# Patient Record
Sex: MALE | Race: WHITE | Employment: UNEMPLOYED | URBAN - METROPOLITAN AREA
[De-identification: names, ages, dates, MRNs, and addresses within clinical notes are randomized per-mention and may not be internally consistent; named-entity substitution may affect disease eponyms.]

---

## 2017-11-16 ENCOUNTER — APPOINTMENT (OUTPATIENT)
Dept: GENERAL RADIOLOGY | Age: 57
End: 2017-11-16
Attending: PHYSICIAN ASSISTANT
Payer: SELF-PAY

## 2017-11-16 ENCOUNTER — HOSPITAL ENCOUNTER (EMERGENCY)
Age: 57
Discharge: HOME | End: 2017-11-16
Attending: EMERGENCY MEDICINE | Admitting: EMERGENCY MEDICINE
Payer: SELF-PAY

## 2017-11-16 VITALS
OXYGEN SATURATION: 98 % | WEIGHT: 150 LBS | RESPIRATION RATE: 16 BRPM | HEIGHT: 70 IN | TEMPERATURE: 97.4 F | BODY MASS INDEX: 21.47 KG/M2 | HEART RATE: 72 BPM | SYSTOLIC BLOOD PRESSURE: 114 MMHG | DIASTOLIC BLOOD PRESSURE: 84 MMHG

## 2017-11-16 DIAGNOSIS — M79.672 ACUTE FOOT PAIN, LEFT: Primary | ICD-10-CM

## 2017-11-16 DIAGNOSIS — F17.200 NICOTINE DEPENDENCE, UNCOMPLICATED, UNSPECIFIED NICOTINE PRODUCT TYPE: ICD-10-CM

## 2017-11-16 LAB
ALBUMIN SERPL-MCNC: 3.9 G/DL (ref 3.5–5)
ALBUMIN/GLOB SERPL: 1.1 {RATIO} (ref 1.1–2.2)
ALP SERPL-CCNC: 69 U/L (ref 45–117)
ALT SERPL-CCNC: 21 U/L (ref 12–78)
ANION GAP SERPL CALC-SCNC: 8 MMOL/L (ref 5–15)
AST SERPL-CCNC: 13 U/L (ref 15–37)
BASOPHILS # BLD: 0 K/UL (ref 0–0.1)
BASOPHILS NFR BLD: 0 % (ref 0–1)
BILIRUB SERPL-MCNC: 0.4 MG/DL (ref 0.2–1)
BUN SERPL-MCNC: 16 MG/DL (ref 6–20)
BUN/CREAT SERPL: 13 (ref 12–20)
CALCIUM SERPL-MCNC: 8.6 MG/DL (ref 8.5–10.1)
CHLORIDE SERPL-SCNC: 103 MMOL/L (ref 97–108)
CO2 SERPL-SCNC: 29 MMOL/L (ref 21–32)
CREAT SERPL-MCNC: 1.22 MG/DL (ref 0.7–1.3)
CRP SERPL-MCNC: <0.29 MG/DL
EOSINOPHIL # BLD: 0.3 K/UL (ref 0–0.4)
EOSINOPHIL NFR BLD: 2 % (ref 0–7)
ERYTHROCYTE [DISTWIDTH] IN BLOOD BY AUTOMATED COUNT: 14.1 % (ref 11.5–14.5)
ERYTHROCYTE [SEDIMENTATION RATE] IN BLOOD: 4 MM/HR (ref 0–20)
GLOBULIN SER CALC-MCNC: 3.6 G/DL (ref 2–4)
GLUCOSE SERPL-MCNC: 102 MG/DL (ref 65–100)
HCT VFR BLD AUTO: 43.3 % (ref 36.6–50.3)
HGB BLD-MCNC: 14.6 G/DL (ref 12.1–17)
LYMPHOCYTES # BLD: 1.7 K/UL (ref 0.8–3.5)
LYMPHOCYTES NFR BLD: 13 % (ref 12–49)
MCH RBC QN AUTO: 30.9 PG (ref 26–34)
MCHC RBC AUTO-ENTMCNC: 33.7 G/DL (ref 30–36.5)
MCV RBC AUTO: 91.7 FL (ref 80–99)
MONOCYTES # BLD: 0.9 K/UL (ref 0–1)
MONOCYTES NFR BLD: 7 % (ref 5–13)
NEUTS SEG # BLD: 10.4 K/UL (ref 1.8–8)
NEUTS SEG NFR BLD: 78 % (ref 32–75)
PLATELET # BLD AUTO: 299 K/UL (ref 150–400)
POTASSIUM SERPL-SCNC: 3.9 MMOL/L (ref 3.5–5.1)
PROT SERPL-MCNC: 7.5 G/DL (ref 6.4–8.2)
RBC # BLD AUTO: 4.72 M/UL (ref 4.1–5.7)
SODIUM SERPL-SCNC: 140 MMOL/L (ref 136–145)
URATE SERPL-MCNC: 3.6 MG/DL (ref 3.5–7.2)
WBC # BLD AUTO: 13.2 K/UL (ref 4.1–11.1)

## 2017-11-16 PROCEDURE — 86140 C-REACTIVE PROTEIN: CPT | Performed by: PHYSICIAN ASSISTANT

## 2017-11-16 PROCEDURE — 74011250636 HC RX REV CODE- 250/636: Performed by: PHYSICIAN ASSISTANT

## 2017-11-16 PROCEDURE — 36415 COLL VENOUS BLD VENIPUNCTURE: CPT | Performed by: PHYSICIAN ASSISTANT

## 2017-11-16 PROCEDURE — 84550 ASSAY OF BLOOD/URIC ACID: CPT | Performed by: PHYSICIAN ASSISTANT

## 2017-11-16 PROCEDURE — 73630 X-RAY EXAM OF FOOT: CPT

## 2017-11-16 PROCEDURE — 85652 RBC SED RATE AUTOMATED: CPT | Performed by: PHYSICIAN ASSISTANT

## 2017-11-16 PROCEDURE — 99282 EMERGENCY DEPT VISIT SF MDM: CPT

## 2017-11-16 PROCEDURE — 96374 THER/PROPH/DIAG INJ IV PUSH: CPT

## 2017-11-16 PROCEDURE — 80053 COMPREHEN METABOLIC PANEL: CPT | Performed by: PHYSICIAN ASSISTANT

## 2017-11-16 PROCEDURE — 85025 COMPLETE CBC W/AUTO DIFF WBC: CPT | Performed by: PHYSICIAN ASSISTANT

## 2017-11-16 RX ORDER — AMOXICILLIN AND CLAVULANATE POTASSIUM 875; 125 MG/1; MG/1
1 TABLET, FILM COATED ORAL 2 TIMES DAILY
Qty: 20 TAB | Refills: 0 | Status: SHIPPED | OUTPATIENT
Start: 2017-11-16 | End: 2020-11-11

## 2017-11-16 RX ORDER — NAPROXEN 500 MG/1
500 TABLET ORAL
Qty: 20 TAB | Refills: 0 | Status: SHIPPED | OUTPATIENT
Start: 2017-11-16 | End: 2020-11-11

## 2017-11-16 RX ORDER — CIPROFLOXACIN 500 MG/1
500 TABLET ORAL 2 TIMES DAILY
Qty: 20 TAB | Refills: 0 | Status: SHIPPED | OUTPATIENT
Start: 2017-11-16 | End: 2017-11-26

## 2017-11-16 RX ORDER — TRAMADOL HYDROCHLORIDE 50 MG/1
50 TABLET ORAL
Qty: 10 TAB | Refills: 0 | Status: SHIPPED | OUTPATIENT
Start: 2017-11-16 | End: 2017-11-26

## 2017-11-16 RX ORDER — KETOROLAC TROMETHAMINE 30 MG/ML
15 INJECTION, SOLUTION INTRAMUSCULAR; INTRAVENOUS
Status: COMPLETED | OUTPATIENT
Start: 2017-11-16 | End: 2017-11-16

## 2017-11-16 RX ADMIN — KETOROLAC TROMETHAMINE 15 MG: 30 INJECTION, SOLUTION INTRAMUSCULAR at 16:04

## 2017-11-16 NOTE — DISCHARGE INSTRUCTIONS
Foot Pain: Care Instructions  Your Care Instructions  Foot injuries that cause pain and swelling are fairly common. Almost all sports or home repair projects can cause a misstep that ends up as foot pain. Normal wear and tear, especially as you get older, also can cause foot pain. Most minor foot injuries will heal on their own, and home treatment is usually all you need to do. If you have a severe injury, you may need tests and treatment. Follow-up care is a key part of your treatment and safety. Be sure to make and go to all appointments, and call your doctor if you are having problems. It's also a good idea to know your test results and keep a list of the medicines you take. How can you care for yourself at home? · Take pain medicines exactly as directed. ¨ If the doctor gave you a prescription medicine for pain, take it as prescribed. ¨ If you are not taking a prescription pain medicine, ask your doctor if you can take an over-the-counter medicine. · Rest and protect your foot. Take a break from any activity that may cause pain. · Put ice or a cold pack on your foot for 10 to 20 minutes at a time. Put a thin cloth between the ice and your skin. · Prop up the sore foot on a pillow when you ice it or anytime you sit or lie down during the next 3 days. Try to keep it above the level of your heart. This will help reduce swelling. · Your doctor may recommend that you wrap your foot with an elastic bandage. Keep your foot wrapped for as long as your doctor advises. · If your doctor recommends crutches, use them as directed. · Wear roomy footwear. · As soon as pain and swelling end, begin gentle exercises of your foot. Your doctor can tell you which exercises will help. When should you call for help? Call 911 anytime you think you may need emergency care. For example, call if:  ? · Your foot turns pale, white, blue, or cold.    ?Call your doctor now or seek immediate medical care if:  ? · You cannot move or stand on your foot. ? · Your foot looks twisted or out of its normal position. ? · Your foot is not stable when you step down. ? · You have signs of infection, such as:  ¨ Increased pain, swelling, warmth, or redness. ¨ Red streaks leading from the sore area. ¨ Pus draining from a place on your foot. ¨ A fever. ? · Your foot is numb or tingly. ? Watch closely for changes in your health, and be sure to contact your doctor if:  ? · You do not get better as expected. ? · You have bruises from an injury that last longer than 2 weeks. Where can you learn more? Go to http://yfn-chata.info/. Enter O042 in the search box to learn more about \"Foot Pain: Care Instructions. \"  Current as of: March 21, 2017  Content Version: 11.4  © 2383-4040 [employer]. Care instructions adapted under license by [employer] (which disclaims liability or warranty for this information). If you have questions about a medical condition or this instruction, always ask your healthcare professional. [address] any warranty or liability for your use of this information. Purine-Restricted Diet: Care Instructions  Your Care Instructions    Purines are substances that are found in some foods. Your body turns purines into uric acid. High levels of uric acid can cause gout, which is a form of arthritis that causes pain and inflammation in joints. You may be able to help control the amount of uric acid in your body by limiting high-purine foods in your diet. Follow-up care is a key part of your treatment and safety. Be sure to make and go to all appointments, and call your doctor if you are having problems. It's also a good idea to know your test results and keep a list of the medicines you take. How can you care for yourself at home? · Plan your meals and snacks around foods that are low in purines and are safe for you to eat.  These foods include:  ¨ Green vegetables and tomatoes. ¨ Fruits. ¨ Whole-grain breads, rice, and cereals. ¨ Eggs, peanut butter, and nuts. ¨ Low-fat milk, cheese, and other milk products. ¨ Popcorn. ¨ Gelatin desserts, chocolate, cocoa, and cakes and sweets, in small amounts. · You can eat certain foods that are medium-high in purines, but eat them only once in a while. These foods include:  ¨ Legumes, such as dried beans and dried peas. You can have 1 cup cooked legumes each day. ¨ Asparagus, cauliflower, spinach, mushrooms, and green peas. ¨ Fish and seafood (other than very high-purine seafood). ¨ Oatmeal, wheat bran, and wheat germ. · Limit very high-purine foods, including:  ¨ Organ meats, such as liver, kidneys, sweetbreads, and brains. ¨ Meats, including jones, beef, pork, and lamb. ¨ Game meats and any other meats in large amounts. ¨ Anchovies, sardines, herring, mackerel, and scallops. ¨ Gravy. ¨ Beer. Where can you learn more? Go to http://yfnLooxiichata.info/. Enter F448 in the search box to learn more about \"Purine-Restricted Diet: Care Instructions. \"  Current as of: May 12, 2017  Content Version: 11.4  © 4073-3594 [employer]. Care instructions adapted under license by [employer] (which disclaims liability or warranty for this information). If you have questions about a medical condition or this instruction, always ask your healthcare professional. [place] [address] any warranty or liability for your use of this information. Gout: Care Instructions  Your Care Instructions    Gout is a form of arthritis caused by a buildup of uric acid crystals in a joint. It causes sudden attacks of pain, swelling, redness, and stiffness, usually in one joint, especially the big toe. Gout usually comes on without a cause. But it can be brought on by drinking alcohol (especially beer) or eating seafood and red meat.  Taking certain medicines, such as diuretics or aspirin, also can bring on an attack of gout. Taking your medicines as prescribed and following up with your doctor regularly can help you avoid gout attacks in the future. Follow-up care is a key part of your treatment and safety. Be sure to make and go to all appointments, and call your doctor if you are having problems. It's also a good idea to know your test results and keep a list of the medicines you take. How can you care for yourself at home? · If the joint is swollen, put ice or a cold pack on the area for 10 to 20 minutes at a time. Put a thin cloth between the ice and your skin. · Prop up the sore limb on a pillow when you ice it or anytime you sit or lie down during the next 3 days. Try to keep it above the level of your heart. This will help reduce swelling. · Rest sore joints. Avoid activities that put weight or strain on the joints for a few days. Take short rest breaks from your regular activities during the day. · Take your medicines exactly as prescribed. Call your doctor if you think you are having a problem with your medicine. · Take pain medicines exactly as directed. ¨ If the doctor gave you a prescription medicine for pain, take it as prescribed. ¨ If you are not taking a prescription pain medicine, ask your doctor if you can take an over-the-counter medicine. · Eat less seafood and red meat. · Check with your doctor before drinking alcohol. · Losing weight, if you are overweight, may help reduce attacks of gout. But do not go on a [employer]. \" Losing a lot of weight in a short amount of time can cause a gout attack. When should you call for help? Call your doctor now or seek immediate medical care if:  ? · You have a fever. ? · The joint is so painful you cannot use it. ? · You have sudden, unexplained swelling, redness, warmth, or severe pain in one or more joints. ? Watch closely for changes in your health, and be sure to contact your doctor if:  ? · You have joint pain. ? · Your symptoms get worse or are not improving after 2 or 3 days. Where can you learn more? Go to http://yfn-chata.info/. Enter S019 in the search box to learn more about \"Gout: Care Instructions. \"  Current as of: October 31, 2016  Content Version: 11.4  © 3777-5713 [employer]. Care instructions adapted under license by [employer] (which disclaims liability or warranty for this information). If you have questions about a medical condition or this instruction, always ask your healthcare professional. [address] any warranty or liability for your use of this information. Learning About Benefits From Quitting Smoking  How does quitting smoking make you healthier? If you're thinking about quitting smoking, you may have a few reasons to be smoke-free. Your health may be one of them. · When you quit smoking, you lower your risks for cancer, lung disease, heart attack, stroke, blood vessel disease, and blindness from macular degeneration. · When you're smoke-free, you get sick less often, and you heal faster. You are less likely to get colds, flu, bronchitis, and pneumonia. · As a nonsmoker, you may find that your mood is better and you are less stressed. When and how will you feel healthier? Quitting has real health benefits that start from day 1 of being smoke-free. And the longer you stay smoke-free, the healthier you get and the better you feel. The first hours  · After just 20 minutes, your blood pressure and heart rate go down. That means there's less stress on your heart and blood vessels. · Within 12 hours, the level of carbon monoxide in your blood drops back to normal. That makes room for more oxygen. With more oxygen in your body, you may notice that you have more energy than when you smoked. After 2 weeks  · Your lungs start to work better. · Your risk of heart attack starts to drop.   After 1 month  · When your lungs are clear, you cough less and breathe deeper, so it's easier to be active. · Your sense of taste and smell return. That means you can enjoy food more than you have since you started smoking. Over the years  · After 1 year, your risk of heart disease is half what it would be if you kept smoking. · After 5 years, your risk of stroke starts to shrink. Within a few years after that, it's about the same as if you'd never smoked. · After 10 years, your risk of dying from lung cancer is cut by about half. And your risk for many other types of cancer is lower too. How would quitting help others in your life? When you quit smoking, you improve the health of everyone who now breathes in your smoke. · Their heart, lung, and cancer risks drop, much like yours. · They are sick less. For babies and small children, living smoke-free means they're less likely to have ear infections, pneumonia, and bronchitis. · If you're a woman who is or will be pregnant someday, quitting smoking means a healthier . · Children who are close to you are less likely to become adult smokers. Where can you learn more? Go to http://yfnQuobyte Inc.chata.info/. Enter [id number] [id number] [id number] in the search box to learn more about \"Learning About Benefits From Quitting Smoking. \"  Current as of: 2017  Content Version: 11.4  © 5519-3943 [employer]. Care instructions adapted under license by [employer] (which disclaims liability or warranty for this information). If you have questions about a medical condition or this instruction, always ask your healthcare professional. [address] any warranty or liability for your use of this information. We hope that we have addressed all of your medical concerns. The examination and treatment you received in the Emergency Department were for an emergent problem and were not intended as complete care.  It is important that you follow up with your healthcare provider(s) for ongoing care. If your symptoms worsen or do not improve as expected, and you are unable to reach your usual health care provider(s), you should return to the Emergency Department. Today's healthcare is undergoing tremendous change, and patient satisfaction surveys are one of the many tools to assess the quality of medical care. You may receive a survey from the [employer] regarding your experience in the Emergency Department. I hope that your experience has been completely positive, particularly the medical care that I provided. As such, please participate in the survey; anything less than excellent does not meet my expectations or intentions. [address] and [facility] participate in nationally recognized quality of care measures. If your blood pressure is greater than 120/80, as reported below, we urge that you seek medical care to address the potential of high blood pressure, commonly known as hypertension. Hypertension can be hereditary or can be caused by certain medical conditions, pain, stress, or \"white coat syndrome. \"       Please make an appointment with your health care provider(s) for follow up of your Emergency Department visit. VITALS:   Patient Vitals for the past 8 hrs:   Temp Pulse Resp BP SpO2   11/16/17 1527 97.4 °F (36.3 °C) (!) 120 16 128/78 98 %          Thank you for allowing us to provide you with medical care today. We realize that you have many choices for your emergency care needs. Please choose us in the future for any continued health care needs. Ginnie Krabbe Quick, [facility] [place] Webb: [phone]            Recent Results (from the past 24 hour(s))   CBC WITH AUTOMATED DIFF    Collection Time: 11/16/17  3:56 PM   Result Value Ref Range    WBC 13.2 (H) 4.1 - 11.1 K/uL    RBC 4.72 4.10 - 5.70 M/uL HGB 14.6 12.1 - 17.0 g/dL    HCT 43.3 36.6 - 50.3 %    MCV 91.7 80.0 - 99.0 FL    MCH 30.9 26.0 - 34.0 PG    MCHC 33.7 30.0 - 36.5 g/dL    RDW 14.1 11.5 - 14.5 %    PLATELET 322 560 - 949 K/uL    NEUTROPHILS 78 (H) 32 - 75 %    LYMPHOCYTES 13 12 - 49 %    MONOCYTES 7 5 - 13 %    EOSINOPHILS 2 0 - 7 %    BASOPHILS 0 0 - 1 %    ABS. NEUTROPHILS 10.4 (H) 1.8 - 8.0 K/UL    ABS. LYMPHOCYTES 1.7 0.8 - 3.5 K/UL    ABS. MONOCYTES 0.9 0.0 - 1.0 K/UL    ABS. EOSINOPHILS 0.3 0.0 - 0.4 K/UL    ABS. BASOPHILS 0.0 0.0 - 0.1 K/UL   METABOLIC PANEL, COMPREHENSIVE    Collection Time: 11/16/17  3:56 PM   Result Value Ref Range    Sodium 140 136 - 145 mmol/L    Potassium 3.9 3.5 - 5.1 mmol/L    Chloride 103 97 - 108 mmol/L    CO2 29 21 - 32 mmol/L    Anion gap 8 5 - 15 mmol/L    Glucose 102 (H) 65 - 100 mg/dL    BUN 16 6 - 20 MG/DL    Creatinine 1.22 0.70 - 1.30 MG/DL    BUN/Creatinine ratio 13 12 - 20      GFR est AA >60 >60 ml/min/1.73m2    GFR est non-AA >60 >60 ml/min/1.73m2    Calcium 8.6 8.5 - 10.1 MG/DL    Bilirubin, total 0.4 0.2 - 1.0 MG/DL    ALT (SGPT) 21 12 - 78 U/L    AST (SGOT) 13 (L) 15 - 37 U/L    Alk. phosphatase 69 45 - 117 U/L    Protein, total 7.5 6.4 - 8.2 g/dL    Albumin 3.9 3.5 - 5.0 g/dL    Globulin 3.6 2.0 - 4.0 g/dL    A-G Ratio 1.1 1.1 - 2.2     URIC ACID    Collection Time: 11/16/17  3:56 PM   Result Value Ref Range    Uric acid 3.6 3.5 - 7.2 MG/DL   SED RATE (ESR)    Collection Time: 11/16/17  3:56 PM   Result Value Ref Range    Sed rate, automated 4 0 - 20 mm/hr   C REACTIVE PROTEIN, QT    Collection Time: 11/16/17  3:56 PM   Result Value Ref Range    C-Reactive protein <0.29 <0.60 mg/dL       Xr Foot Lt Min 3 V    Result Date: 11/16/2017  EXAM:  XR FOOT LT MIN 3 V INDICATION: Pain and swelling at the left MTP since recently stepping on a nail. COMPARISON: None. FINDINGS: Three views of the left foot demonstrate no fracture or other acute osseous or articular abnormality.  The soft tissues are within normal limits. IMPRESSION: Normal left foot.

## 2017-11-16 NOTE — ED TRIAGE NOTES
Pt states he is having pain and swelling to left foot, reports that he stepped on a nail two weeks prior

## 2017-11-16 NOTE — LETTER
[address] 
[facility] EMERGENCY DEPT 
[address] [id number] [phone] Work/School Note Date: 11/16/2017 To Whom It May concern: 
 
Amaury Noel was seen and treated today in the emergency room by the following provider(s): 
Attending Provider: Salma Boss MD 
Physician Assistant: YOLANDA Lozano. Amaury Noel may return to work on 11/19/17.  
 
Sincerely, 
 
 
 
 
YOLANDA Lozano

## 2017-11-16 NOTE — ED PROVIDER NOTES
HPI Comments: 56 y.o. male with no significant past medical history who presents from home with chief complaint of foot pain. Pt has been experiencing pain and swelling over his 1st MTP joint of the L-foot with limited ROM. He stepped on a nail 2 weeks ago and thinks that it may be infected now. He took ibuprofen last night with little relief of symptoms. Pt denies hx of gout. Pt denies numbness, fever, chills, nausea, vomiting, diarrhea, SOB, CP, or abd pain. There are no other acute medical concerns at this time. Social hx: (+) tobacco use (1 pack/day); rare EtOH use; (-) illicit drug use; currently unemployed  Significant FMHx: gout (mother)    Note written by Janice Manning, as dictated by YOLANDA Blake 3:38 PM    The history is provided by the patient and a friend. No  was used. History reviewed. No pertinent past medical history. History reviewed. No pertinent surgical history. History reviewed. No pertinent family history. Social History     Social History    Marital status: SINGLE     Spouse name: N/A    Number of children: N/A    Years of education: N/A     Occupational History    Not on file. Social History Main Topics    Smoking status: Current Every Day Smoker     Packs/day: 0.50    Smokeless tobacco: Never Used    Alcohol use Not on file    Drug use: Not on file    Sexual activity: Not on file     Other Topics Concern    Not on file     Social History Narrative    No narrative on file         ALLERGIES: Review of patient's allergies indicates no known allergies. Review of Systems   Constitutional: Negative for chills and fever. HENT: Negative for congestion, rhinorrhea, sneezing and sore throat. Eyes: Negative for redness and visual disturbance. Respiratory: Negative for shortness of breath. Cardiovascular: Negative for chest pain and leg swelling.    Gastrointestinal: Negative for abdominal pain, diarrhea, nausea and vomiting. Genitourinary: Negative for difficulty urinating and frequency. Musculoskeletal: Positive for arthralgias (1st L-toe pain ) and joint swelling. Negative for back pain, myalgias and neck stiffness. Skin: Negative for rash. Neurological: Negative for dizziness, syncope, weakness, numbness and headaches. Hematological: Negative for adenopathy. Vitals:    11/16/17 1527 11/16/17 1745   BP: 128/78 114/84   Pulse: (!) 120 72   Resp: 16    Temp: 97.4 °F (36.3 °C)    SpO2: 98%    Weight: 68 kg (150 lb)    Height: 5' 10\" (1.778 m)             Physical Exam   Constitutional: He is oriented to person, place, and time. He appears well-developed and well-nourished. No distress. HENT:   Head: Normocephalic and atraumatic. Right Ear: External ear normal.   Left Ear: External ear normal.   Eyes: EOM are normal. Pupils are equal, round, and reactive to light. Neck: Neck supple. Cardiovascular: Normal rate, regular rhythm, normal heart sounds and intact distal pulses. Exam reveals no gallop and no friction rub. No murmur heard. Pulmonary/Chest: Effort normal and breath sounds normal. No stridor. No respiratory distress. He has no wheezes. He has no rales. He exhibits no tenderness. Abdominal: Soft. Bowel sounds are normal. He exhibits no distension and no mass. There is no tenderness. There is no rebound and no guarding. Musculoskeletal: Normal range of motion. He exhibits edema and tenderness. He exhibits no deformity. Left great toe MTP joint on dorsal surface with swelling, erythema and focal ttp. ROM intact with increased pain, distal  N/v intact cap refill brisk. Warm to touch. No swelling, ttp or lesions noted to plantar surface of foot   Neurological: He is alert and oriented to person, place, and time. No cranial nerve deficit. Coordination normal.   Skin: No rash noted. No erythema. No pallor. Psychiatric: He has a normal mood and affect.  His behavior is normal.   Nursing note and vitals reviewed. MDM  Number of Diagnoses or Management Options  Acute foot pain, left:   Nicotine dependence, uncomplicated, unspecified nicotine product type:      Amount and/or Complexity of Data Reviewed  Clinical lab tests: ordered and reviewed  Tests in the radiology section of CPT®: ordered and reviewed  Tests in the medicine section of CPT®: ordered and reviewed  Obtain history from someone other than the patient: yes (friend)  Review and summarize past medical records: yes  Independent visualization of images, tracings, or specimens: yes    Patient Progress  Patient progress: stable    ED Course       Procedures      3:43 PM  Discussed with the patient the medical risks of prolonged smoking habits and advised the patient of the benefits of the cessation of smoking. The patient verbalized their understanding. YOLANDA Carney    3:55 PM  Discussed pt, sx, hx and current findings with Dr Guillermo Ogden. He is in agreement with plan, suspect gout as source. Will check labs for infection and gout. Will get xray to eval for air/ infection  Jeancarlos Alexandra. LEANDRO Sotuh    5:38 PM   Labs and imaging non acute other than wbc 13.2 ( neg crp, sed rate, no shift, neg uric acid). Will tx for gout, but give rx for abx if sx not improving. Pt to follow with podiatry  Jeancarlos Alexandra. LEANDRO [place]    LABORATORY TESTS:  Recent Results (from the past 12 hour(s))   CBC WITH AUTOMATED DIFF    Collection Time: 11/16/17  3:56 PM   Result Value Ref Range    WBC 13.2 (H) 4.1 - 11.1 K/uL    RBC 4.72 4.10 - 5.70 M/uL    HGB 14.6 12.1 - 17.0 g/dL    HCT 43.3 36.6 - 50.3 %    MCV 91.7 80.0 - 99.0 FL    MCH 30.9 26.0 - 34.0 PG    MCHC 33.7 30.0 - 36.5 g/dL    RDW 14.1 11.5 - 14.5 %    PLATELET 180 905 - 244 K/uL    NEUTROPHILS 78 (H) 32 - 75 %    LYMPHOCYTES 13 12 - 49 %    MONOCYTES 7 5 - 13 %    EOSINOPHILS 2 0 - 7 %    BASOPHILS 0 0 - 1 %    ABS. NEUTROPHILS 10.4 (H) 1.8 - 8.0 K/UL    ABS. LYMPHOCYTES 1.7 0.8 - 3.5 K/UL    ABS. MONOCYTES 0.9 0.0 - 1.0 K/UL    ABS. EOSINOPHILS 0.3 0.0 - 0.4 K/UL    ABS. BASOPHILS 0.0 0.0 - 0.1 K/UL   METABOLIC PANEL, COMPREHENSIVE    Collection Time: 11/16/17  3:56 PM   Result Value Ref Range    Sodium 140 136 - 145 mmol/L    Potassium 3.9 3.5 - 5.1 mmol/L    Chloride 103 97 - 108 mmol/L    CO2 29 21 - 32 mmol/L    Anion gap 8 5 - 15 mmol/L    Glucose 102 (H) 65 - 100 mg/dL    BUN 16 6 - 20 MG/DL    Creatinine 1.22 0.70 - 1.30 MG/DL    BUN/Creatinine ratio 13 12 - 20      GFR est AA >60 >60 ml/min/1.73m2    GFR est non-AA >60 >60 ml/min/1.73m2    Calcium 8.6 8.5 - 10.1 MG/DL    Bilirubin, total 0.4 0.2 - 1.0 MG/DL    ALT (SGPT) 21 12 - 78 U/L    AST (SGOT) 13 (L) 15 - 37 U/L    Alk. phosphatase 69 45 - 117 U/L    Protein, total 7.5 6.4 - 8.2 g/dL    Albumin 3.9 3.5 - 5.0 g/dL    Globulin 3.6 2.0 - 4.0 g/dL    A-G Ratio 1.1 1.1 - 2.2     URIC ACID    Collection Time: 11/16/17  3:56 PM   Result Value Ref Range    Uric acid 3.6 3.5 - 7.2 MG/DL   SED RATE (ESR)    Collection Time: 11/16/17  3:56 PM   Result Value Ref Range    Sed rate, automated 4 0 - 20 mm/hr   C REACTIVE PROTEIN, QT    Collection Time: 11/16/17  3:56 PM   Result Value Ref Range    C-Reactive protein <0.29 <0.60 mg/dL       IMAGING RESULTS:    Xr Foot Lt Min 3 V    Result Date: 11/16/2017  EXAM:  XR FOOT LT MIN 3 V INDICATION: Pain and swelling at the left MTP since recently stepping on a nail. COMPARISON: None. FINDINGS: Three views of the left foot demonstrate no fracture or other acute osseous or articular abnormality. The soft tissues are within normal limits. IMPRESSION: Normal left foot. MEDICATIONS GIVEN:  Medications   ketorolac (TORADOL) injection 15 mg (15 mg IntraVENous Given 11/16/17 3161)       IMPRESSION:  1. Acute foot pain, left    2. Nicotine dependence, uncomplicated, unspecified nicotine product type        PLAN:  1. Discharge Medication List as of 11/16/2017  5:48 PM        2.    Follow-up Information     Follow up With Details Comments [address] [facility] Shazia Preciado DPM Schedule an appointment as soon as possible for a visit 2-4 days for recheck [address]  [phone]          Return to ED if worse     5:48 PM  Pt has been reexamined. Pt has no new complaints, changes or physical findings. Care plan outlined and precautions discussed. All available results were reviewed with pt. All medications were reviewed with pt. All of pt's questions and concerns were addressed. Pt agrees to F/U as instructed and agrees to return to ED upon further deterioration. Pt is ready to go home.   YOLANDA Thomas

## 2019-08-21 ENCOUNTER — APPOINTMENT (OUTPATIENT)
Dept: GENERAL RADIOLOGY | Age: 59
End: 2019-08-21
Attending: EMERGENCY MEDICINE
Payer: SELF-PAY

## 2019-08-21 ENCOUNTER — HOSPITAL ENCOUNTER (EMERGENCY)
Age: 59
Discharge: HOME | End: 2019-08-21
Attending: EMERGENCY MEDICINE
Payer: SELF-PAY

## 2019-08-21 VITALS
BODY MASS INDEX: 21.47 KG/M2 | RESPIRATION RATE: 18 BRPM | DIASTOLIC BLOOD PRESSURE: 74 MMHG | WEIGHT: 150 LBS | HEART RATE: 96 BPM | HEIGHT: 70 IN | SYSTOLIC BLOOD PRESSURE: 106 MMHG | TEMPERATURE: 98.3 F | OXYGEN SATURATION: 97 %

## 2019-08-21 DIAGNOSIS — L02.91 ABSCESS: Primary | ICD-10-CM

## 2019-08-21 PROCEDURE — 77030011256 HC DRSG MEPILEX <16IN NO BORD MOLN -A

## 2019-08-21 PROCEDURE — 73562 X-RAY EXAM OF KNEE 3: CPT

## 2019-08-21 PROCEDURE — 74011000250 HC RX REV CODE- 250: Performed by: NURSE PRACTITIONER

## 2019-08-21 PROCEDURE — 87147 CULTURE TYPE IMMUNOLOGIC: CPT

## 2019-08-21 PROCEDURE — 75810000289 HC I&D ABSCESS SIMP/COMP/MULT

## 2019-08-21 PROCEDURE — 87186 SC STD MICRODIL/AGAR DIL: CPT

## 2019-08-21 PROCEDURE — 87205 SMEAR GRAM STAIN: CPT

## 2019-08-21 PROCEDURE — 99283 EMERGENCY DEPT VISIT LOW MDM: CPT

## 2019-08-21 PROCEDURE — 87077 CULTURE AEROBIC IDENTIFY: CPT

## 2019-08-21 RX ORDER — SULFAMETHOXAZOLE AND TRIMETHOPRIM 800; 160 MG/1; MG/1
1 TABLET ORAL 2 TIMES DAILY
Qty: 20 TAB | Refills: 0 | Status: SHIPPED | OUTPATIENT
Start: 2019-08-21 | End: 2019-08-31

## 2019-08-21 RX ORDER — LIDOCAINE HYDROCHLORIDE AND EPINEPHRINE 10; 10 MG/ML; UG/ML
1.5 INJECTION, SOLUTION INFILTRATION; PERINEURAL ONCE
Status: COMPLETED | OUTPATIENT
Start: 2019-08-21 | End: 2019-08-21

## 2019-08-21 RX ADMIN — LIDOCAINE HYDROCHLORIDE,EPINEPHRINE BITARTRATE 15 MG: 10; .01 INJECTION, SOLUTION INFILTRATION; PERINEURAL at 21:00

## 2019-08-22 NOTE — DISCHARGE INSTRUCTIONS

## 2019-08-22 NOTE — ED TRIAGE NOTES
Pt reports right knee pain since Sunday after he popped the zit on the top of the knee cap, pain getting worse with activity.

## 2019-08-22 NOTE — ED PROVIDER NOTES
64 y.o. male with no known significant past medical history who presents via personal vehicle with chief complaint of right knee pain. Pt is forde. Pt noticed 1 \"zit\" on his right anterior knee on 8/18/19, unsuccessfully tried to pop it on 8/19/19, and now right anterior knee is swollen, red, and tender. Nae Wilkes is black and still present and the pt also reports warmth and mild pain to the right knee upon and standing and during movement. PT states that he doesn't think anything bit him. Pt denies any fever or chills. 8:20 PM  I have evaluated the patient as the Provider in Triage. I have reviewed His vital signs and the triage nurse assessment. I have talked with the patient and any available family and advised that I am the provider in triage and have ordered the appropriate study to initiate their work up based on the clinical presentation during my assessment. I have advised that the patient will be accommodated in the Main ED as soon as possible. I have also requested to contact the triage nurse or myself immediately if the patient experiences any changes in their condition during this brief waiting period. Bianca Nath MD    There are no other acute medical concerns at this time. Surgical hx - unknown  Social hx - Tobacco use: daily smoker (0.5 packs/day)    PCP: None    Note written by Gilford Marker, Scribe, in Room as dictated by Anurag Nolan NP 8:43PM  & in triage by Romero Henson MD 8:20PM    The history is provided by the patient. No  was used.      Social History     Socioeconomic History    Marital status: SINGLE     Spouse name: Not on file    Number of children: Not on file    Years of education: Not on file    Highest education level: Not on file   Occupational History    Not on file   Social Needs    Financial resource strain: Not on file    Food insecurity:     Worry: Not on file     Inability: Not on file    Transportation needs: Medical: Not on file     Non-medical: Not on file   Tobacco Use    Smoking status: Current Every Day Smoker     Packs/day: 0.50    Smokeless tobacco: Never Used   Substance and Sexual Activity    Alcohol use: Not on file    Drug use: Not on file    Sexual activity: Not on file   Lifestyle    Physical activity:     Days per week: Not on file     Minutes per session: Not on file    Stress: Not on file   Relationships    Social connections:     Talks on phone: Not on file     Gets together: Not on file     Attends Episcopal service: Not on file     Active member of club or organization: Not on file     Attends meetings of clubs or organizations: Not on file     Relationship status: Not on file    Intimate partner violence:     Fear of current or ex partner: Not on file     Emotionally abused: Not on file     Physically abused: Not on file     Forced sexual activity: Not on file   Other Topics Concern    Not on file   Social History Narrative    Not on file         ALLERGIES: Patient has no known allergies. Review of Systems   Constitutional: Negative for chills and fever. HENT: Negative for congestion and sore throat. Eyes: Negative for pain and redness. Respiratory: Negative for cough and shortness of breath. Cardiovascular: Negative for chest pain and palpitations. Gastrointestinal: Negative for abdominal pain, diarrhea, nausea and vomiting. Genitourinary: Negative for frequency and hematuria. Musculoskeletal: Positive for arthralgias and joint swelling. Negative for back pain and neck pain. Skin: Positive for color change and wound. Neurological: Negative for dizziness and headaches. Hematological: Does not bruise/bleed easily. All other systems reviewed and are negative.       Vitals:    08/21/19 2006   BP: 113/74   Pulse: 99   Resp: 16   Temp: 98.3 °F (36.8 °C)   SpO2: 98%   Weight: 68 kg (150 lb)   Height: 5' 10\" (1.778 m)            Physical Exam   Constitutional: He is oriented to person, place, and time. He appears well-developed and well-nourished. No distress. HENT:   Head: Normocephalic and atraumatic. Eyes: Pupils are equal, round, and reactive to light. Conjunctivae and EOM are normal.   Neck: Normal range of motion. Pulmonary/Chest: Effort normal. He has no wheezes. Musculoskeletal: Normal range of motion. He exhibits no deformity. Neurological: He is alert and oriented to person, place, and time. Coordination normal.   Skin: He is not diaphoretic. 1 cm x 1 cm area of tenderness and warmth over right anterior knee with a subcutaneous mass present. No spontaneous drainage of the area. Ultrasound shows nickel sized homogenous cystic region consistent w/ an abscess. Psychiatric: He has a normal mood and affect. His behavior is normal. Judgment and thought content normal.   Nursing note and vitals reviewed. Note written by Janice Wise, in Room as dictated by Arabella Walter NP 8:43PM    MDM  Number of Diagnoses or Management Options  Abscess: new and requires workup  Diagnosis management comments:     Physical examination reveals a small abscess, not involving the joint space. There is good ROM. Radiograph is unremarkable. Needle aspiration successful. Will plan to follow culture and start bactrim. Strict discussion regarding spreading to the joint space or systemic symptoms to report back to the ED.         Amount and/or Complexity of Data Reviewed  Tests in the radiology section of CPT®: ordered and reviewed  Discuss the patient with other providers: yes  Independent visualization of images, tracings, or specimens: yes           I&D Abcess Simple  Date/Time: 8/21/2019 10:38 PM  Performed by: Debo Davies NP  Authorized by: Debo Davies NP     Consent:     Consent obtained:  Verbal    Consent given by:  Patient    Risks discussed:  Incomplete drainage, pain and infection    Alternatives discussed:  No treatment  Location: Type:  Abscess    Location:  Lower extremity    Lower extremity location:  Knee    Knee location:  R knee  Pre-procedure details:     Skin preparation:  Betadine  Anesthesia (see MAR for exact dosages): Anesthesia method:  Local infiltration    Local anesthetic:  Lidocaine 1% WITH epi  Procedure type:     Complexity:  Simple  Procedure details:     Needle aspiration: yes      Needle size:  18 G    Incision types:  Stab incision    Incision depth:  Submucosal    Drainage:  Purulent    Drainage amount:  Scant    Wound treatment:  Wound left open    Packing materials:  1/2 in gauze  Post-procedure details:     Patient tolerance of procedure:   Tolerated well, no immediate complications

## 2019-08-24 LAB
BACTERIA SPEC CULT: ABNORMAL
GRAM STN SPEC: ABNORMAL
SERVICE CMNT-IMP: ABNORMAL

## 2020-01-01 ENCOUNTER — HOSPITAL ENCOUNTER (OUTPATIENT)
Dept: INFUSION THERAPY | Age: 60
Discharge: HOME | End: 2020-12-14
Payer: MEDICAID

## 2020-01-01 ENCOUNTER — OFFICE VISIT (OUTPATIENT)
Dept: ONCOLOGY | Age: 60
End: 2020-01-01
Payer: MEDICAID

## 2020-01-01 ENCOUNTER — HOSPITAL ENCOUNTER (OUTPATIENT)
Dept: INFUSION THERAPY | Age: 60
Discharge: HOME | End: 2020-12-30
Payer: MEDICAID

## 2020-01-01 ENCOUNTER — HOSPITAL ENCOUNTER (OUTPATIENT)
Dept: INFUSION THERAPY | Age: 60
Discharge: HOME | End: 2020-12-28
Payer: MEDICAID

## 2020-01-01 ENCOUNTER — HOSPITAL ENCOUNTER (OUTPATIENT)
Dept: INFUSION THERAPY | Age: 60
Discharge: HOME | End: 2020-12-16
Payer: MEDICAID

## 2020-01-01 ENCOUNTER — DOCUMENTATION ONLY (OUTPATIENT)
Dept: ONCOLOGY | Age: 60
End: 2020-01-01

## 2020-01-01 VITALS
HEART RATE: 95 BPM | SYSTOLIC BLOOD PRESSURE: 97 MMHG | OXYGEN SATURATION: 100 % | RESPIRATION RATE: 18 BRPM | DIASTOLIC BLOOD PRESSURE: 61 MMHG | BODY MASS INDEX: 18.5 KG/M2 | WEIGHT: 129.19 LBS | HEIGHT: 70 IN | TEMPERATURE: 97.7 F

## 2020-01-01 VITALS
WEIGHT: 129.2 LBS | RESPIRATION RATE: 18 BRPM | DIASTOLIC BLOOD PRESSURE: 65 MMHG | OXYGEN SATURATION: 100 % | SYSTOLIC BLOOD PRESSURE: 102 MMHG | TEMPERATURE: 97.7 F | HEIGHT: 70 IN | HEART RATE: 81 BPM | BODY MASS INDEX: 18.5 KG/M2

## 2020-01-01 VITALS
BODY MASS INDEX: 17.14 KG/M2 | OXYGEN SATURATION: 100 % | HEART RATE: 72 BPM | WEIGHT: 119.71 LBS | TEMPERATURE: 97.9 F | HEIGHT: 70 IN | SYSTOLIC BLOOD PRESSURE: 111 MMHG | RESPIRATION RATE: 18 BRPM | DIASTOLIC BLOOD PRESSURE: 78 MMHG

## 2020-01-01 VITALS
DIASTOLIC BLOOD PRESSURE: 62 MMHG | HEART RATE: 96 BPM | OXYGEN SATURATION: 99 % | RESPIRATION RATE: 18 BRPM | SYSTOLIC BLOOD PRESSURE: 126 MMHG | TEMPERATURE: 98.1 F

## 2020-01-01 VITALS
DIASTOLIC BLOOD PRESSURE: 74 MMHG | HEART RATE: 86 BPM | HEIGHT: 70 IN | TEMPERATURE: 97.9 F | WEIGHT: 119.7 LBS | OXYGEN SATURATION: 100 % | RESPIRATION RATE: 18 BRPM | BODY MASS INDEX: 17.13 KG/M2 | SYSTOLIC BLOOD PRESSURE: 110 MMHG

## 2020-01-01 VITALS — HEART RATE: 87 BPM | SYSTOLIC BLOOD PRESSURE: 112 MMHG | TEMPERATURE: 97.8 F | DIASTOLIC BLOOD PRESSURE: 82 MMHG

## 2020-01-01 DIAGNOSIS — C78.6 PERITONEAL CARCINOMATOSIS (HCC): ICD-10-CM

## 2020-01-01 DIAGNOSIS — C25.1 MALIGNANT NEOPLASM OF BODY OF PANCREAS (HCC): Primary | ICD-10-CM

## 2020-01-01 DIAGNOSIS — Z51.11 CHEMOTHERAPY MANAGEMENT, ENCOUNTER FOR: ICD-10-CM

## 2020-01-01 DIAGNOSIS — K59.03 DRUG-INDUCED CONSTIPATION: ICD-10-CM

## 2020-01-01 DIAGNOSIS — G89.3 NEOPLASM RELATED PAIN (ACUTE) (CHRONIC): ICD-10-CM

## 2020-01-01 DIAGNOSIS — R59.0 ABDOMINAL LYMPHADENOPATHY: ICD-10-CM

## 2020-01-01 DIAGNOSIS — C25.9 MALIGNANT NEOPLASM OF PANCREAS, UNSPECIFIED LOCATION OF MALIGNANCY (HCC): ICD-10-CM

## 2020-01-01 DIAGNOSIS — T45.1X5A CHEMOTHERAPY INDUCED NEUTROPENIA (HCC): ICD-10-CM

## 2020-01-01 DIAGNOSIS — D70.1 CHEMOTHERAPY INDUCED NEUTROPENIA (HCC): ICD-10-CM

## 2020-01-01 LAB
ALBUMIN SERPL-MCNC: 3.3 G/DL (ref 3.5–5)
ALBUMIN SERPL-MCNC: 3.3 G/DL (ref 3.5–5)
ALBUMIN/GLOB SERPL: 1.1 {RATIO} (ref 1.1–2.2)
ALBUMIN/GLOB SERPL: 1.2 {RATIO} (ref 1.1–2.2)
ALP SERPL-CCNC: 49 U/L (ref 45–117)
ALP SERPL-CCNC: 54 U/L (ref 45–117)
ALT SERPL-CCNC: 20 U/L (ref 12–78)
ALT SERPL-CCNC: 31 U/L (ref 12–78)
ANION GAP SERPL CALC-SCNC: 4 MMOL/L (ref 5–15)
ANION GAP SERPL CALC-SCNC: 5 MMOL/L (ref 5–15)
AST SERPL-CCNC: 15 U/L (ref 15–37)
AST SERPL-CCNC: 8 U/L (ref 15–37)
BASO+EOS+MONOS # BLD AUTO: 0.6 K/UL (ref 0.2–1.2)
BASO+EOS+MONOS NFR BLD AUTO: 14 % (ref 3.2–16.9)
BASOPHILS # BLD: 0 K/UL (ref 0–0.1)
BASOPHILS NFR BLD: 1 % (ref 0–1)
BILIRUB SERPL-MCNC: 0.2 MG/DL (ref 0.2–1)
BILIRUB SERPL-MCNC: 0.4 MG/DL (ref 0.2–1)
BUN SERPL-MCNC: 12 MG/DL (ref 6–20)
BUN SERPL-MCNC: 9 MG/DL (ref 6–20)
BUN/CREAT SERPL: 12 (ref 12–20)
BUN/CREAT SERPL: 9 (ref 12–20)
CALCIUM SERPL-MCNC: 8.4 MG/DL (ref 8.5–10.1)
CALCIUM SERPL-MCNC: 8.4 MG/DL (ref 8.5–10.1)
CHLORIDE SERPL-SCNC: 107 MMOL/L (ref 97–108)
CHLORIDE SERPL-SCNC: 111 MMOL/L (ref 97–108)
CO2 SERPL-SCNC: 27 MMOL/L (ref 21–32)
CO2 SERPL-SCNC: 28 MMOL/L (ref 21–32)
CREAT SERPL-MCNC: 1.01 MG/DL (ref 0.7–1.3)
CREAT SERPL-MCNC: 1.03 MG/DL (ref 0.7–1.3)
DIFFERENTIAL METHOD BLD: ABNORMAL
DIFFERENTIAL METHOD BLD: ABNORMAL
EOSINOPHIL # BLD: 0.2 K/UL (ref 0–0.4)
EOSINOPHIL NFR BLD: 5 % (ref 0–7)
ERYTHROCYTE [DISTWIDTH] IN BLOOD BY AUTOMATED COUNT: 13.9 % (ref 11.8–15.8)
ERYTHROCYTE [DISTWIDTH] IN BLOOD BY AUTOMATED COUNT: 14.6 % (ref 11.5–14.5)
GLOBULIN SER CALC-MCNC: 2.8 G/DL (ref 2–4)
GLOBULIN SER CALC-MCNC: 3 G/DL (ref 2–4)
GLUCOSE SERPL-MCNC: 132 MG/DL (ref 65–100)
GLUCOSE SERPL-MCNC: 97 MG/DL (ref 65–100)
HCT VFR BLD AUTO: 32.9 % (ref 36.6–50.3)
HCT VFR BLD AUTO: 35.3 % (ref 36.6–50.3)
HGB BLD-MCNC: 10.8 G/DL (ref 12.1–17)
HGB BLD-MCNC: 12.2 G/DL (ref 12.1–17)
IMM GRANULOCYTES # BLD AUTO: 0 K/UL (ref 0–0.04)
IMM GRANULOCYTES NFR BLD AUTO: 0 % (ref 0–0.5)
LYMPHOCYTES # BLD: 1.4 K/UL (ref 0.8–3.5)
LYMPHOCYTES # BLD: 1.8 K/UL (ref 0.8–3.5)
LYMPHOCYTES NFR BLD: 36 % (ref 12–49)
LYMPHOCYTES NFR BLD: 41 % (ref 12–49)
MCH RBC QN AUTO: 30.5 PG (ref 26–34)
MCH RBC QN AUTO: 30.6 PG (ref 26–34)
MCHC RBC AUTO-ENTMCNC: 32.8 G/DL (ref 30–36.5)
MCHC RBC AUTO-ENTMCNC: 34.6 G/DL (ref 30–36.5)
MCV RBC AUTO: 88.3 FL (ref 80–99)
MCV RBC AUTO: 93.2 FL (ref 80–99)
MONOCYTES # BLD: 0.6 K/UL (ref 0–1)
MONOCYTES NFR BLD: 16 % (ref 5–13)
NEUTS SEG # BLD: 1.5 K/UL (ref 1.8–8)
NEUTS SEG # BLD: 1.9 K/UL (ref 1.8–8)
NEUTS SEG NFR BLD: 41 % (ref 32–75)
NEUTS SEG NFR BLD: 45 % (ref 32–75)
NRBC # BLD: 0 K/UL (ref 0–0.01)
NRBC BLD-RTO: 0 PER 100 WBC
PLATELET # BLD AUTO: 162 K/UL (ref 150–400)
PLATELET # BLD AUTO: 183 K/UL (ref 150–400)
PMV BLD AUTO: 9.2 FL (ref 8.9–12.9)
POTASSIUM SERPL-SCNC: 3.3 MMOL/L (ref 3.5–5.1)
POTASSIUM SERPL-SCNC: 3.9 MMOL/L (ref 3.5–5.1)
PROT SERPL-MCNC: 6.1 G/DL (ref 6.4–8.2)
PROT SERPL-MCNC: 6.3 G/DL (ref 6.4–8.2)
RBC # BLD AUTO: 3.53 M/UL (ref 4.1–5.7)
RBC # BLD AUTO: 4 M/UL (ref 4.1–5.7)
SODIUM SERPL-SCNC: 140 MMOL/L (ref 136–145)
SODIUM SERPL-SCNC: 142 MMOL/L (ref 136–145)
WBC # BLD AUTO: 3.7 K/UL (ref 4.1–11.1)
WBC # BLD AUTO: 4.3 K/UL (ref 4.1–11.1)

## 2020-01-01 PROCEDURE — 96523 IRRIG DRUG DELIVERY DEVICE: CPT

## 2020-01-01 PROCEDURE — 74011250636 HC RX REV CODE- 250/636: Performed by: INTERNAL MEDICINE

## 2020-01-01 PROCEDURE — 96413 CHEMO IV INFUSION 1 HR: CPT

## 2020-01-01 PROCEDURE — 96417 CHEMO IV INFUS EACH ADDL SEQ: CPT

## 2020-01-01 PROCEDURE — 36415 COLL VENOUS BLD VENIPUNCTURE: CPT

## 2020-01-01 PROCEDURE — 96415 CHEMO IV INFUSION ADDL HR: CPT

## 2020-01-01 PROCEDURE — 80053 COMPREHEN METABOLIC PANEL: CPT

## 2020-01-01 PROCEDURE — 96367 TX/PROPH/DG ADDL SEQ IV INF: CPT

## 2020-01-01 PROCEDURE — 85025 COMPLETE CBC W/AUTO DIFF WBC: CPT

## 2020-01-01 PROCEDURE — 74011250637 HC RX REV CODE- 250/637: Performed by: NURSE PRACTITIONER

## 2020-01-01 PROCEDURE — 99214 OFFICE O/P EST MOD 30 MIN: CPT | Performed by: INTERNAL MEDICINE

## 2020-01-01 PROCEDURE — 96375 TX/PRO/DX INJ NEW DRUG ADDON: CPT

## 2020-01-01 PROCEDURE — 74011000258 HC RX REV CODE- 258: Performed by: INTERNAL MEDICINE

## 2020-01-01 PROCEDURE — 96368 THER/DIAG CONCURRENT INF: CPT

## 2020-01-01 PROCEDURE — 96416 CHEMO PROLONG INFUSE W/PUMP: CPT

## 2020-01-01 PROCEDURE — 99215 OFFICE O/P EST HI 40 MIN: CPT | Performed by: INTERNAL MEDICINE

## 2020-01-01 PROCEDURE — 77030012965 HC NDL HUBR BBMI -A

## 2020-01-01 PROCEDURE — 74011250636 HC RX REV CODE- 250/636: Performed by: NURSE PRACTITIONER

## 2020-01-01 PROCEDURE — 74011000250 HC RX REV CODE- 250: Performed by: INTERNAL MEDICINE

## 2020-01-01 RX ORDER — ACETAMINOPHEN 325 MG/1
650 TABLET ORAL AS NEEDED
Status: CANCELLED
Start: 2020-01-01

## 2020-01-01 RX ORDER — POTASSIUM CHLORIDE 750 MG/1
40 TABLET, FILM COATED, EXTENDED RELEASE ORAL
Status: COMPLETED | OUTPATIENT
Start: 2020-01-01 | End: 2020-01-01

## 2020-01-01 RX ORDER — SODIUM CHLORIDE 9 MG/ML
10 INJECTION INTRAMUSCULAR; INTRAVENOUS; SUBCUTANEOUS AS NEEDED
Status: ACTIVE | OUTPATIENT
Start: 2020-01-01 | End: 2020-01-01

## 2020-01-01 RX ORDER — HEPARIN 100 UNIT/ML
300-500 SYRINGE INTRAVENOUS AS NEEDED
Status: ACTIVE | OUTPATIENT
Start: 2020-01-01 | End: 2020-01-01

## 2020-01-01 RX ORDER — HEPARIN 100 UNIT/ML
300-500 SYRINGE INTRAVENOUS AS NEEDED
Status: DISCONTINUED | OUTPATIENT
Start: 2020-01-01 | End: 2020-01-01 | Stop reason: HOSPADM

## 2020-01-01 RX ORDER — OXYCODONE AND ACETAMINOPHEN 5; 325 MG/1; MG/1
1 TABLET ORAL
Qty: 45 TAB | Refills: 0 | Status: SHIPPED | OUTPATIENT
Start: 2020-01-01 | End: 2020-01-01

## 2020-01-01 RX ORDER — EPINEPHRINE 1 MG/ML
0.3 INJECTION, SOLUTION, CONCENTRATE INTRAVENOUS AS NEEDED
Status: CANCELLED | OUTPATIENT
Start: 2020-01-01

## 2020-01-01 RX ORDER — DIPHENHYDRAMINE HYDROCHLORIDE 50 MG/ML
50 INJECTION, SOLUTION INTRAMUSCULAR; INTRAVENOUS AS NEEDED
Status: CANCELLED
Start: 2020-01-01

## 2020-01-01 RX ORDER — ONDANSETRON 2 MG/ML
8 INJECTION INTRAMUSCULAR; INTRAVENOUS AS NEEDED
Status: CANCELLED | OUTPATIENT
Start: 2020-01-01

## 2020-01-01 RX ORDER — HEPARIN 100 UNIT/ML
300-500 SYRINGE INTRAVENOUS AS NEEDED
Status: CANCELLED
Start: 2020-01-01

## 2020-01-01 RX ORDER — ALBUTEROL SULFATE 0.83 MG/ML
2.5 SOLUTION RESPIRATORY (INHALATION) AS NEEDED
Status: CANCELLED
Start: 2020-01-01

## 2020-01-01 RX ORDER — ATROPINE SULFATE 0.4 MG/ML
0.4 INJECTION, SOLUTION ENDOTRACHEAL; INTRAMEDULLARY; INTRAMUSCULAR; INTRAVENOUS; SUBCUTANEOUS ONCE
Status: CANCELLED | OUTPATIENT
Start: 2020-01-01

## 2020-01-01 RX ORDER — ATROPINE SULFATE 0.4 MG/ML
0.4 INJECTION, SOLUTION ENDOTRACHEAL; INTRAMEDULLARY; INTRAMUSCULAR; INTRAVENOUS; SUBCUTANEOUS ONCE
Status: COMPLETED | OUTPATIENT
Start: 2020-01-01 | End: 2020-01-01

## 2020-01-01 RX ORDER — SODIUM CHLORIDE 9 MG/ML
10 INJECTION INTRAMUSCULAR; INTRAVENOUS; SUBCUTANEOUS AS NEEDED
Status: DISCONTINUED | OUTPATIENT
Start: 2020-01-01 | End: 2020-01-01 | Stop reason: HOSPADM

## 2020-01-01 RX ORDER — HYDROCORTISONE SODIUM SUCCINATE 100 MG/2ML
100 INJECTION, POWDER, FOR SOLUTION INTRAMUSCULAR; INTRAVENOUS AS NEEDED
Status: CANCELLED | OUTPATIENT
Start: 2020-01-01

## 2020-01-01 RX ORDER — DIPHENHYDRAMINE HYDROCHLORIDE 50 MG/ML
25 INJECTION, SOLUTION INTRAMUSCULAR; INTRAVENOUS AS NEEDED
Status: CANCELLED
Start: 2020-01-01

## 2020-01-01 RX ORDER — DEXTROSE MONOHYDRATE 50 MG/ML
25 INJECTION, SOLUTION INTRAVENOUS CONTINUOUS
Status: DISPENSED | OUTPATIENT
Start: 2020-01-01 | End: 2020-01-01

## 2020-01-01 RX ORDER — PALONOSETRON 0.05 MG/ML
0.25 INJECTION, SOLUTION INTRAVENOUS ONCE
Status: COMPLETED | OUTPATIENT
Start: 2020-01-01 | End: 2020-01-01

## 2020-01-01 RX ORDER — OXYCODONE HCL 10 MG/1
10 TABLET, FILM COATED, EXTENDED RELEASE ORAL EVERY 12 HOURS
Qty: 60 TAB | Refills: 0 | Status: SHIPPED | OUTPATIENT
Start: 2020-01-01 | End: 2021-01-01 | Stop reason: SDUPTHER

## 2020-01-01 RX ORDER — SODIUM CHLORIDE 0.9 % (FLUSH) 0.9 %
10 SYRINGE (ML) INJECTION AS NEEDED
Status: DISPENSED | OUTPATIENT
Start: 2020-01-01 | End: 2020-01-01

## 2020-01-01 RX ORDER — SODIUM CHLORIDE 0.9 % (FLUSH) 0.9 %
10 SYRINGE (ML) INJECTION AS NEEDED
Status: CANCELLED | OUTPATIENT
Start: 2020-01-01

## 2020-01-01 RX ORDER — SODIUM CHLORIDE 0.9 % (FLUSH) 0.9 %
10 SYRINGE (ML) INJECTION AS NEEDED
Status: DISCONTINUED | OUTPATIENT
Start: 2020-01-01 | End: 2020-01-01 | Stop reason: HOSPADM

## 2020-01-01 RX ORDER — SODIUM CHLORIDE 9 MG/ML
10 INJECTION INTRAMUSCULAR; INTRAVENOUS; SUBCUTANEOUS AS NEEDED
Status: CANCELLED | OUTPATIENT
Start: 2020-01-01

## 2020-01-01 RX ADMIN — DEXTROSE MONOHYDRATE 25 ML/HR: 5 INJECTION, SOLUTION INTRAVENOUS at 10:34

## 2020-01-01 RX ADMIN — ATROPINE SULFATE 0.4 MG: 0.4 INJECTION, SOLUTION INTRAMUSCULAR; INTRAVENOUS; SUBCUTANEOUS at 13:24

## 2020-01-01 RX ADMIN — FLUOROURACIL 3936 MG: 50 INJECTION, SOLUTION INTRAVENOUS at 15:54

## 2020-01-01 RX ADMIN — OXALIPLATIN 139.5 MG: 5 INJECTION, SOLUTION INTRAVENOUS at 11:05

## 2020-01-01 RX ADMIN — Medication 500 UNITS: at 13:45

## 2020-01-01 RX ADMIN — DEXTROSE MONOHYDRATE 25 ML/HR: 5 INJECTION, SOLUTION INTRAVENOUS at 10:04

## 2020-01-01 RX ADMIN — DEXTROSE MONOHYDRATE 246 MG: 50 INJECTION, SOLUTION INTRAVENOUS at 14:04

## 2020-01-01 RX ADMIN — PALONOSETRON 0.25 MG: 0.05 INJECTION, SOLUTION INTRAVENOUS at 10:38

## 2020-01-01 RX ADMIN — SODIUM CHLORIDE 10 ML: 9 INJECTION INTRAMUSCULAR; INTRAVENOUS; SUBCUTANEOUS at 09:01

## 2020-01-01 RX ADMIN — ATROPINE SULFATE 0.4 MG: 0.4 INJECTION, SOLUTION INTRAMUSCULAR; INTRAVENOUS; SUBCUTANEOUS at 13:55

## 2020-01-01 RX ADMIN — PALONOSETRON 0.25 MG: 0.05 INJECTION, SOLUTION INTRAVENOUS at 10:14

## 2020-01-01 RX ADMIN — POTASSIUM CHLORIDE 40 MEQ: 750 TABLET, FILM COATED, EXTENDED RELEASE ORAL at 13:34

## 2020-01-01 RX ADMIN — FLUOROURACIL 3936 MG: 50 INJECTION, SOLUTION INTRAVENOUS at 15:28

## 2020-01-01 RX ADMIN — DEXAMETHASONE SODIUM PHOSPHATE 12 MG: 10 INJECTION, SOLUTION INTRAMUSCULAR; INTRAVENOUS at 10:17

## 2020-01-01 RX ADMIN — OXALIPLATIN 139.5 MG: 5 INJECTION, SOLUTION INTRAVENOUS at 11:49

## 2020-01-01 RX ADMIN — LEUCOVORIN CALCIUM 656 MG: 500 INJECTION, POWDER, LYOPHILIZED, FOR SOLUTION INTRAMUSCULAR; INTRAVENOUS at 14:04

## 2020-01-01 RX ADMIN — DEXTROSE MONOHYDRATE 656 MG: 50 INJECTION, SOLUTION INTRAVENOUS at 13:33

## 2020-01-01 RX ADMIN — Medication 10 ML: at 13:45

## 2020-01-01 RX ADMIN — DEXAMETHASONE SODIUM PHOSPHATE 12 MG: 4 INJECTION, SOLUTION INTRAMUSCULAR; INTRAVENOUS at 10:37

## 2020-01-01 RX ADMIN — SODIUM CHLORIDE 10 ML: 9 INJECTION INTRAMUSCULAR; INTRAVENOUS; SUBCUTANEOUS at 09:00

## 2020-01-01 RX ADMIN — DEXTROSE MONOHYDRATE 246 MG: 50 INJECTION, SOLUTION INTRAVENOUS at 13:33

## 2020-01-01 RX ADMIN — Medication 10 ML: at 15:51

## 2020-11-11 ENCOUNTER — APPOINTMENT (OUTPATIENT)
Dept: CT IMAGING | Age: 60
End: 2020-11-11
Attending: EMERGENCY MEDICINE
Payer: MEDICAID

## 2020-11-11 ENCOUNTER — HOSPITAL ENCOUNTER (OUTPATIENT)
Age: 60
Setting detail: OBSERVATION
Discharge: HOME | End: 2020-11-13
Attending: EMERGENCY MEDICINE | Admitting: INTERNAL MEDICINE
Payer: MEDICAID

## 2020-11-11 DIAGNOSIS — R59.1 LYMPHADENOPATHY: ICD-10-CM

## 2020-11-11 DIAGNOSIS — R18.8 OTHER ASCITES: ICD-10-CM

## 2020-11-11 DIAGNOSIS — K86.89 PANCREATIC MASS: Primary | ICD-10-CM

## 2020-11-11 DIAGNOSIS — R59.0 ABDOMINAL LYMPHADENOPATHY: ICD-10-CM

## 2020-11-11 DIAGNOSIS — R10.9 INTRACTABLE ABDOMINAL PAIN: ICD-10-CM

## 2020-11-11 DIAGNOSIS — R10.9 ACUTE ABDOMINAL PAIN: ICD-10-CM

## 2020-11-11 LAB
ALBUMIN SERPL-MCNC: 3.7 G/DL (ref 3.5–5)
ALBUMIN/GLOB SERPL: 0.9 {RATIO} (ref 1.1–2.2)
ALP SERPL-CCNC: 66 U/L (ref 45–117)
ALT SERPL-CCNC: 19 U/L (ref 12–78)
ANION GAP SERPL CALC-SCNC: 4 MMOL/L (ref 5–15)
AST SERPL-CCNC: 10 U/L (ref 15–37)
BASOPHILS # BLD: 0.1 K/UL (ref 0–0.1)
BASOPHILS NFR BLD: 1 % (ref 0–1)
BILIRUB SERPL-MCNC: 0.2 MG/DL (ref 0.2–1)
BUN SERPL-MCNC: 12 MG/DL (ref 6–20)
BUN/CREAT SERPL: 9 (ref 12–20)
CALCIUM SERPL-MCNC: 8.4 MG/DL (ref 8.5–10.1)
CHLORIDE SERPL-SCNC: 106 MMOL/L (ref 97–108)
CO2 SERPL-SCNC: 29 MMOL/L (ref 21–32)
CREAT SERPL-MCNC: 1.31 MG/DL (ref 0.7–1.3)
DIFFERENTIAL METHOD BLD: NORMAL
EOSINOPHIL # BLD: 0.4 K/UL (ref 0–0.4)
EOSINOPHIL NFR BLD: 5 % (ref 0–7)
ERYTHROCYTE [DISTWIDTH] IN BLOOD BY AUTOMATED COUNT: 13.4 % (ref 11.5–14.5)
GLOBULIN SER CALC-MCNC: 4.1 G/DL (ref 2–4)
GLUCOSE SERPL-MCNC: 82 MG/DL (ref 65–100)
HCT VFR BLD AUTO: 43.3 % (ref 36.6–50.3)
HGB BLD-MCNC: 14.7 G/DL (ref 12.1–17)
IMM GRANULOCYTES # BLD AUTO: 0 K/UL (ref 0–0.04)
IMM GRANULOCYTES NFR BLD AUTO: 0 % (ref 0–0.5)
LIPASE SERPL-CCNC: 82 U/L (ref 73–393)
LYMPHOCYTES # BLD: 2.5 K/UL (ref 0.8–3.5)
LYMPHOCYTES NFR BLD: 31 % (ref 12–49)
MCH RBC QN AUTO: 30.6 PG (ref 26–34)
MCHC RBC AUTO-ENTMCNC: 33.9 G/DL (ref 30–36.5)
MCV RBC AUTO: 90 FL (ref 80–99)
MONOCYTES # BLD: 0.7 K/UL (ref 0–1)
MONOCYTES NFR BLD: 9 % (ref 5–13)
NEUTS SEG # BLD: 4.4 K/UL (ref 1.8–8)
NEUTS SEG NFR BLD: 54 % (ref 32–75)
NRBC # BLD: 0 K/UL (ref 0–0.01)
NRBC BLD-RTO: 0 PER 100 WBC
PLATELET # BLD AUTO: 280 K/UL (ref 150–400)
PMV BLD AUTO: 9.1 FL (ref 8.9–12.9)
POTASSIUM SERPL-SCNC: 3.9 MMOL/L (ref 3.5–5.1)
PROT SERPL-MCNC: 7.8 G/DL (ref 6.4–8.2)
RBC # BLD AUTO: 4.81 M/UL (ref 4.1–5.7)
SODIUM SERPL-SCNC: 139 MMOL/L (ref 136–145)
WBC # BLD AUTO: 8.2 K/UL (ref 4.1–11.1)

## 2020-11-11 PROCEDURE — 74011250636 HC RX REV CODE- 250/636: Performed by: EMERGENCY MEDICINE

## 2020-11-11 PROCEDURE — 74011000636 HC RX REV CODE- 636: Performed by: RADIOLOGY

## 2020-11-11 PROCEDURE — 80053 COMPREHEN METABOLIC PANEL: CPT

## 2020-11-11 PROCEDURE — 96374 THER/PROPH/DIAG INJ IV PUSH: CPT

## 2020-11-11 PROCEDURE — 85025 COMPLETE CBC W/AUTO DIFF WBC: CPT

## 2020-11-11 PROCEDURE — 96365 THER/PROPH/DIAG IV INF INIT: CPT

## 2020-11-11 PROCEDURE — 99218 HC RM OBSERVATION: CPT

## 2020-11-11 PROCEDURE — 96375 TX/PRO/DX INJ NEW DRUG ADDON: CPT

## 2020-11-11 PROCEDURE — 65270000029 HC RM PRIVATE

## 2020-11-11 PROCEDURE — 83690 ASSAY OF LIPASE: CPT

## 2020-11-11 PROCEDURE — 74011250636 HC RX REV CODE- 250/636: Performed by: INTERNAL MEDICINE

## 2020-11-11 PROCEDURE — 36415 COLL VENOUS BLD VENIPUNCTURE: CPT

## 2020-11-11 PROCEDURE — 99284 EMERGENCY DEPT VISIT MOD MDM: CPT

## 2020-11-11 PROCEDURE — 74177 CT ABD & PELVIS W/CONTRAST: CPT

## 2020-11-11 RX ORDER — AMOXICILLIN 250 MG
1 CAPSULE ORAL 2 TIMES DAILY
Status: DISCONTINUED | OUTPATIENT
Start: 2020-11-12 | End: 2020-11-13 | Stop reason: HOSPADM

## 2020-11-11 RX ORDER — MORPHINE SULFATE 4 MG/ML
4 INJECTION INTRAVENOUS
Status: COMPLETED | OUTPATIENT
Start: 2020-11-11 | End: 2020-11-11

## 2020-11-11 RX ORDER — FACIAL-BODY WIPES
10 EACH TOPICAL DAILY PRN
Status: DISCONTINUED | OUTPATIENT
Start: 2020-11-11 | End: 2020-11-13 | Stop reason: HOSPADM

## 2020-11-11 RX ORDER — SODIUM CHLORIDE 0.9 % (FLUSH) 0.9 %
5-40 SYRINGE (ML) INJECTION AS NEEDED
Status: DISCONTINUED | OUTPATIENT
Start: 2020-11-11 | End: 2020-11-13 | Stop reason: HOSPADM

## 2020-11-11 RX ORDER — ACETAMINOPHEN 650 MG/1
650 SUPPOSITORY RECTAL
Status: DISCONTINUED | OUTPATIENT
Start: 2020-11-11 | End: 2020-11-13 | Stop reason: HOSPADM

## 2020-11-11 RX ORDER — DEXTROSE, SODIUM CHLORIDE, AND POTASSIUM CHLORIDE 5; .45; .15 G/100ML; G/100ML; G/100ML
75 INJECTION INTRAVENOUS CONTINUOUS
Status: DISCONTINUED | OUTPATIENT
Start: 2020-11-11 | End: 2020-11-13 | Stop reason: HOSPADM

## 2020-11-11 RX ORDER — PROMETHAZINE HYDROCHLORIDE 25 MG/1
12.5 TABLET ORAL
Status: DISCONTINUED | OUTPATIENT
Start: 2020-11-11 | End: 2020-11-13 | Stop reason: HOSPADM

## 2020-11-11 RX ORDER — SODIUM CHLORIDE 9 MG/ML
25 INJECTION, SOLUTION INTRAVENOUS AS NEEDED
Status: DISCONTINUED | OUTPATIENT
Start: 2020-11-11 | End: 2020-11-13 | Stop reason: HOSPADM

## 2020-11-11 RX ORDER — ONDANSETRON 2 MG/ML
4 INJECTION INTRAMUSCULAR; INTRAVENOUS
Status: DISCONTINUED | OUTPATIENT
Start: 2020-11-11 | End: 2020-11-13 | Stop reason: HOSPADM

## 2020-11-11 RX ORDER — HYDROMORPHONE HYDROCHLORIDE 1 MG/ML
1 INJECTION, SOLUTION INTRAMUSCULAR; INTRAVENOUS; SUBCUTANEOUS
Status: DISCONTINUED | OUTPATIENT
Start: 2020-11-11 | End: 2020-11-12

## 2020-11-11 RX ORDER — ACETAMINOPHEN 325 MG/1
650 TABLET ORAL
Status: DISCONTINUED | OUTPATIENT
Start: 2020-11-11 | End: 2020-11-13 | Stop reason: HOSPADM

## 2020-11-11 RX ORDER — SODIUM CHLORIDE 0.9 % (FLUSH) 0.9 %
5-40 SYRINGE (ML) INJECTION EVERY 8 HOURS
Status: DISCONTINUED | OUTPATIENT
Start: 2020-11-12 | End: 2020-11-13 | Stop reason: HOSPADM

## 2020-11-11 RX ADMIN — POTASSIUM CHLORIDE, DEXTROSE MONOHYDRATE AND SODIUM CHLORIDE 75 ML/HR: 150; 5; 450 INJECTION, SOLUTION INTRAVENOUS at 21:39

## 2020-11-11 RX ADMIN — MORPHINE SULFATE 4 MG: 4 INJECTION INTRAVENOUS at 21:39

## 2020-11-11 RX ADMIN — IOPAMIDOL 100 ML: 755 INJECTION, SOLUTION INTRAVENOUS at 20:06

## 2020-11-11 NOTE — ED NOTES

## 2020-11-11 NOTE — ED TRIAGE NOTES
Patient arrives with complaint of lower abdominal pain that began \"3 to 4 weeks\" ago. States pain 6/10, and constant in nature. Denies N/V, diarrhea, SOB, chest pain. Reports decreased appetite with intermittent constipation. Denies having PCP.

## 2020-11-12 ENCOUNTER — APPOINTMENT (OUTPATIENT)
Dept: CT IMAGING | Age: 60
End: 2020-11-12
Attending: NURSE PRACTITIONER
Payer: MEDICAID

## 2020-11-12 LAB
ALBUMIN SERPL-MCNC: 3.4 G/DL (ref 3.5–5)
ALBUMIN/GLOB SERPL: 1.1 {RATIO} (ref 1.1–2.2)
ALP SERPL-CCNC: 57 U/L (ref 45–117)
ALT SERPL-CCNC: 16 U/L (ref 12–78)
ANION GAP SERPL CALC-SCNC: 4 MMOL/L (ref 5–15)
AST SERPL-CCNC: 9 U/L (ref 15–37)
BILIRUB DIRECT SERPL-MCNC: 0.1 MG/DL (ref 0–0.2)
BILIRUB SERPL-MCNC: 0.3 MG/DL (ref 0.2–1)
BUN SERPL-MCNC: 11 MG/DL (ref 6–20)
BUN/CREAT SERPL: 10 (ref 12–20)
CALCIUM SERPL-MCNC: 8.2 MG/DL (ref 8.5–10.1)
CHLORIDE SERPL-SCNC: 107 MMOL/L (ref 97–108)
CHOLEST SERPL-MCNC: 123 MG/DL
CO2 SERPL-SCNC: 27 MMOL/L (ref 21–32)
CREAT SERPL-MCNC: 1.05 MG/DL (ref 0.7–1.3)
ERYTHROCYTE [DISTWIDTH] IN BLOOD BY AUTOMATED COUNT: 13.4 % (ref 11.5–14.5)
EST. AVERAGE GLUCOSE BLD GHB EST-MCNC: 117 MG/DL
GLOBULIN SER CALC-MCNC: 3 G/DL (ref 2–4)
GLUCOSE SERPL-MCNC: 108 MG/DL (ref 65–100)
HBA1C MFR BLD: 5.7 % (ref 4–5.6)
HCT VFR BLD AUTO: 40.5 % (ref 36.6–50.3)
HDLC SERPL-MCNC: 45 MG/DL
HDLC SERPL: 2.7 {RATIO} (ref 0–5)
HGB BLD-MCNC: 13.7 G/DL (ref 12.1–17)
LDLC SERPL CALC-MCNC: 63 MG/DL (ref 0–100)
LIPASE SERPL-CCNC: 166 U/L (ref 73–393)
LIPID PROFILE,FLP: NORMAL
MAGNESIUM SERPL-MCNC: 2 MG/DL (ref 1.6–2.4)
MCH RBC QN AUTO: 30.4 PG (ref 26–34)
MCHC RBC AUTO-ENTMCNC: 33.8 G/DL (ref 30–36.5)
MCV RBC AUTO: 90 FL (ref 80–99)
NRBC # BLD: 0 K/UL (ref 0–0.01)
NRBC BLD-RTO: 0 PER 100 WBC
PHOSPHATE SERPL-MCNC: 3.4 MG/DL (ref 2.6–4.7)
PLATELET # BLD AUTO: 227 K/UL (ref 150–400)
PMV BLD AUTO: 9.4 FL (ref 8.9–12.9)
POTASSIUM SERPL-SCNC: 3.9 MMOL/L (ref 3.5–5.1)
PROT SERPL-MCNC: 6.4 G/DL (ref 6.4–8.2)
RBC # BLD AUTO: 4.5 M/UL (ref 4.1–5.7)
SODIUM SERPL-SCNC: 138 MMOL/L (ref 136–145)
TRIGL SERPL-MCNC: 75 MG/DL (ref ?–150)
TSH SERPL DL<=0.05 MIU/L-ACNC: 3.36 UIU/ML (ref 0.36–3.74)
VLDLC SERPL CALC-MCNC: 15 MG/DL
WBC # BLD AUTO: 5.6 K/UL (ref 4.1–11.1)

## 2020-11-12 PROCEDURE — 85027 COMPLETE CBC AUTOMATED: CPT

## 2020-11-12 PROCEDURE — 99222 1ST HOSP IP/OBS MODERATE 55: CPT | Performed by: INTERNAL MEDICINE

## 2020-11-12 PROCEDURE — 96375 TX/PRO/DX INJ NEW DRUG ADDON: CPT

## 2020-11-12 PROCEDURE — 99218 HC RM OBSERVATION: CPT

## 2020-11-12 PROCEDURE — 83735 ASSAY OF MAGNESIUM: CPT

## 2020-11-12 PROCEDURE — 83036 HEMOGLOBIN GLYCOSYLATED A1C: CPT

## 2020-11-12 PROCEDURE — 74011000636 HC RX REV CODE- 636: Performed by: RADIOLOGY

## 2020-11-12 PROCEDURE — 36415 COLL VENOUS BLD VENIPUNCTURE: CPT

## 2020-11-12 PROCEDURE — 74011250637 HC RX REV CODE- 250/637: Performed by: INTERNAL MEDICINE

## 2020-11-12 PROCEDURE — 83690 ASSAY OF LIPASE: CPT

## 2020-11-12 PROCEDURE — 80061 LIPID PANEL: CPT

## 2020-11-12 PROCEDURE — 84100 ASSAY OF PHOSPHORUS: CPT

## 2020-11-12 PROCEDURE — 94760 N-INVAS EAR/PLS OXIMETRY 1: CPT

## 2020-11-12 PROCEDURE — 74011250636 HC RX REV CODE- 250/636: Performed by: INTERNAL MEDICINE

## 2020-11-12 PROCEDURE — 74011250637 HC RX REV CODE- 250/637: Performed by: FAMILY MEDICINE

## 2020-11-12 PROCEDURE — 84443 ASSAY THYROID STIM HORMONE: CPT

## 2020-11-12 PROCEDURE — 80076 HEPATIC FUNCTION PANEL: CPT

## 2020-11-12 PROCEDURE — 80048 BASIC METABOLIC PNL TOTAL CA: CPT

## 2020-11-12 PROCEDURE — 65270000029 HC RM PRIVATE

## 2020-11-12 PROCEDURE — 71260 CT THORAX DX C+: CPT

## 2020-11-12 RX ORDER — HYDROCODONE BITARTRATE AND ACETAMINOPHEN 5; 325 MG/1; MG/1
1 TABLET ORAL
Status: DISCONTINUED | OUTPATIENT
Start: 2020-11-12 | End: 2020-11-13 | Stop reason: HOSPADM

## 2020-11-12 RX ORDER — HYDROMORPHONE HYDROCHLORIDE 1 MG/ML
1 INJECTION, SOLUTION INTRAMUSCULAR; INTRAVENOUS; SUBCUTANEOUS
Status: DISCONTINUED | OUTPATIENT
Start: 2020-11-12 | End: 2020-11-13 | Stop reason: HOSPADM

## 2020-11-12 RX ADMIN — Medication 10 ML: at 00:00

## 2020-11-12 RX ADMIN — DOCUSATE SODIUM 50MG AND SENNOSIDES 8.6MG 1 TABLET: 8.6; 5 TABLET, FILM COATED ORAL at 00:00

## 2020-11-12 RX ADMIN — HYDROCODONE BITARTRATE AND ACETAMINOPHEN 1 TABLET: 5; 325 TABLET ORAL at 21:32

## 2020-11-12 RX ADMIN — HYDROMORPHONE HYDROCHLORIDE 1 MG: 1 INJECTION, SOLUTION INTRAMUSCULAR; INTRAVENOUS; SUBCUTANEOUS at 08:22

## 2020-11-12 RX ADMIN — POTASSIUM CHLORIDE, DEXTROSE MONOHYDRATE AND SODIUM CHLORIDE 75 ML/HR: 150; 5; 450 INJECTION, SOLUTION INTRAVENOUS at 11:53

## 2020-11-12 RX ADMIN — Medication 10 ML: at 06:00

## 2020-11-12 RX ADMIN — IOPAMIDOL 100 ML: 612 INJECTION, SOLUTION INTRAVENOUS at 16:58

## 2020-11-12 RX ADMIN — Medication 10 ML: at 22:00

## 2020-11-12 RX ADMIN — HYDROCODONE BITARTRATE AND ACETAMINOPHEN 1 TABLET: 5; 325 TABLET ORAL at 16:40

## 2020-11-12 NOTE — PROGRESS NOTES
TRANSFER - IN REPORT: 
 
Verbal report received from Ei Ei, RN(name) on Doren Mail  being received from ED(unit) for routine progression of care Report consisted of patients Situation, Background, Assessment and  
Recommendations(SBAR). Information from the following report(s) SBAR and ED Summary was reviewed with the receiving nurse. Opportunity for questions and clarification was provided. Assessment completed upon patients arrival to unit and care assumed.

## 2020-11-12 NOTE — ED PROVIDER NOTES
53-year-old male with no significant past medical history presents with abdominal pain for the past 3 to 4 weeks. He denies any nausea or vomiting. Pain 6 out of 10. Constant in nature. Denies any diarrhea or change in his bowel movements. Denies any shortness of breath or chest pain. He reports decreased appetite but does have intermittent constipation. He does not have a PCP. Abdominal Pain This is a new problem. Episode onset: 3-4 weeks ago. No past medical history on file. No past surgical history on file. No family history on file. Social History Socioeconomic History  Marital status: SINGLE Spouse name: Not on file  Number of children: Not on file  Years of education: Not on file  Highest education level: Not on file Occupational History  Not on file Social Needs  Financial resource strain: Not on file  Food insecurity Worry: Not on file Inability: Not on file  Transportation needs Medical: Not on file Non-medical: Not on file Tobacco Use  Smoking status: Current Every Day Smoker Packs/day: 0.50  Smokeless tobacco: Never Used Substance and Sexual Activity  Alcohol use: Not on file  Drug use: Not on file  Sexual activity: Not on file Lifestyle  Physical activity Days per week: Not on file Minutes per session: Not on file  Stress: Not on file Relationships  Social connections Talks on phone: Not on file Gets together: Not on file Attends Jewish service: Not on file Active member of club or organization: Not on file Attends meetings of clubs or organizations: Not on file Relationship status: Not on file  Intimate partner violence Fear of current or ex partner: Not on file Emotionally abused: Not on file Physically abused: Not on file Forced sexual activity: Not on file Other Topics Concern  Not on file Social History Narrative  Not on file ALLERGIES: Patient has no known allergies. Review of Systems Gastrointestinal: Positive for abdominal pain. All other systems reviewed and are negative. Vitals:  
 11/11/20 1845 11/11/20 2023 11/11/20 2030 11/11/20 2045 BP: 124/82 (!) 117/95 (!) 136/92 (!) 126/96 Pulse: 98 Resp: 20 Temp: 97.8 °F (36.6 °C) SpO2: 99% 100% 99% 99% Weight: 58.1 kg (128 lb 1.4 oz) Height: 5' 10\" (1.778 m) Physical Exam 
Vitals signs and nursing note reviewed. Constitutional:   
   General: He is not in acute distress. HENT:  
   Head: Normocephalic and atraumatic. Eyes:  
   General: No scleral icterus. Conjunctiva/sclera: Conjunctivae normal.  
   Pupils: Pupils are equal, round, and reactive to light. Neck: Musculoskeletal: No neck rigidity. Trachea: No tracheal deviation. Cardiovascular:  
   Rate and Rhythm: Normal rate and regular rhythm. Pulmonary:  
   Effort: Pulmonary effort is normal. No respiratory distress. Breath sounds: Normal breath sounds. No stridor. Abdominal:  
   General: There is no distension. Palpations: Abdomen is soft. Tenderness: There is no abdominal tenderness. Genitourinary: 
   Comments: deferred Musculoskeletal:     
   General: No deformity. Skin: 
   General: Skin is warm and dry. Neurological:  
   General: No focal deficit present. Mental Status: He is alert. Psychiatric:     
   Mood and Affect: Mood normal.     
   Behavior: Behavior normal.  
 
  
 
[facility] Procedures Perfect Serve Consult for Admission 9:12 PM 
 
ED Room Number: PW09/94 Patient Name and age:  Gerald Walker 64 y.o.  male Working Diagnosis: 1. Pancreatic mass 2. Intractable abdominal pain COVID-19 Suspicion:  no 
Sepsis present:  no  Reassessment needed: no 
Code Status:  Full Code Readmission: no 
Isolation Requirements:  no 
Recommended Level of Care:  med/surg Department:[facility] ED - [phone] Other: 57-year-old male with intractable abdominal pain found to have pancreatic mass. No PCP or prior medical care for further work-up. LABORATORY TESTS: 
Labs Reviewed METABOLIC PANEL, COMPREHENSIVE - Abnormal; Notable for the following components:  
    Result Value Anion gap 4 (*) Creatinine 1.31 (*)   
 BUN/Creatinine ratio 9 (*)   
 GFR est non-AA 56 (*) Calcium 8.4 (*) AST (SGOT) 10 (*) Globulin 4.1 (*) A-G Ratio 0.9 (*) All other components within normal limits CBC WITH AUTOMATED DIFF  
LIPASE IMAGING RESULTS: 
CT ABD PELV W CONT Final Result IMPRESSION:  
  
1. Mass in the pancreatic body most likely representing pancreatic carcinoma. There is dilatation of the more distal pancreatic duct with atrophy of the  
parenchyma. 2. Enlarged necrotic lymph node in the left periaortic space likely representing  
a remington metastasis. 3. Mild amount of ascites adjacent to the liver and in the pelvis. Findings were discussed with Dr. Oliva Delgado at 8:45 PM on 11/11/2020 MEDICATIONS GIVEN: 
Medications  
iopamidoL (ISOVUE-370) 76 % injection 100 mL (100 mL IntraVENous Given 11/11/20 2006) IMPRESSION/ PLAN: 
1. Pancreatic mass 2. Intractable abdominal pain   
 
- admit to hospitalist for pain control, work-up of pancreatitic mass Total critical care time spent exclusive of procedures:  0 minutes Radha Dang.  Oliva Delgado MD

## 2020-11-12 NOTE — NURSE NAVIGATOR
Diagnosis: New pancreatic mass; biopsy not yet performed Reason for Visit: Introduce self and assess needs Patient Concerns/Needs: Financial concerns Coordination of Care with: JANELLE 
Education/Education Material: \"Patient Source\" - Pancreatic CA handout Community Resources/Recommendations: [employer] for [employer], Housing Resource Hotline; Homeless Crisis Line; [address] application. Narrative:  
Pt seen d/t new pancreatic mass concerning for malignancy. Mr. Aidee Corrigan was resting comfortably in bed during visit. Pt verbalized understanding of his CT findings and possible diagnoses. I gave him a Pancreatic [place] handout to have as a resource once diagnostic biopsy is complete. We also discussed barriers to care such as transportation and financial burden. He states he doesn't own his own vehicle, but he would be able to arrange a ride or borrow a vehicle if he knows ahead of time. He is concerned about paying rent d/t not being able to work. I gave him info r/t [employer] Hotline as to help prevent homelessness. He desires to get back to work and feels hopeful he can do so if the pain is managed. I also suggested he apply for [facility] Financial Assistance and gave him the Summary page and Application. He was appreciative of the information and states he will call those resources. CM, Radha Baltazar, updated of this as well. He did verbalize a good support system with his children. He stated his youngest daughter was aware of the findings and was planning to visit him this afternoon. No other immediate concerns were identified at this time. Pt given my contact info and was encouraged to reach out if anything were to arise. TIFFANIE ArmentaN, RN, VIA [facility] Oncology Nurse Navigator [address] [facility] [address] [facility] [address], [address] [facility] W: [phone] [email or web address]  
Good Help to Those in Need®

## 2020-11-12 NOTE — H&P
S[facility]chez Koch [facility] [place] [address] 
[address] [facility], [address] [place] [address] 
[phone] Admission History and Physical 
 
 
NAME:  Alexandrea Metzger :   1960 MRN:  [id number] PCP:  None  
 
Date/Time of service:  2020  9:27 PM 
  
  
Subjective: CHIEF COMPLAINT: abd pain HISTORY OF PRESENT ILLNESS:    
The patient is a 65 yo otherwise healthy, presented w/ abd pain, new pancreatic mass. The patient c/o generalized abd pain for 2-3 weeks; described as sharp, 8/10, constant. Nothing really makes it better or worse. Denied chest pain, SOB, nausea, vomiting, diarrhea. In the ED, Abd CT revealed new pancreatic mass with periaortic LAD. No Known Allergies Prior to Admission medications Not on File Past Medical History:  
Diagnosis Date  Tobacco abuse No past surgical history on file. Social History Tobacco Use  Smoking status: Current Every Day Smoker Packs/day: 0.50  Smokeless tobacco: Never Used Substance Use Topics  Alcohol use: Yes Family History Problem Relation Age of Onset  Pancreatic Cancer Brother Review of Systems: 
(bold if positive, if negative) Gen:  Eyes:  ENT:  CVS:  Pulm:  GI:  Abdominal pain,:  MS:  Skin:  Psych:  Endo:  Hem:  Renal:  Neuro:    
 
  
Objective: VITALS:   
Vital signs reviewed; most recent are: 
 
Visit Vitals /78 Pulse 98 Temp 97.8 °F (36.6 °C) Resp 20 Ht 5' 10\" (1.778 m) Wt 58.1 kg (128 lb 1.4 oz) SpO2 98% BMI 18.38 kg/m² SpO2 Readings from Last 6 Encounters:  
20 98% 19 97% 17 98% No intake or output data in the 24 hours ending 20 Exam:  
 
Physical Exam: 
 
Gen:  Well-developed, well-nourished, in no acute distress HEENT:  Pink conjunctivae, PERRL, hearing intact to voice, moist mucous membranes Neck:  Supple, without masses, thyroid non-tender Resp:  No accessory muscle use, clear breath sounds without wheezes rales or rhonchi 
Card:  No murmurs, normal S1, S2 without thrills, bruits or peripheral edema Abd:  Soft, diffused abd pain, non-distended, normoactive bowel sounds are present Lymph:  No cervical adenopathy Musc:  No cyanosis or clubbing Skin:  No rashes Neuro:  Cranial nerves 3-12 are grossly intact, follows commands appropriately Psych:  Alert with good insight. Oriented to person, place, and time Labs: 
 
Recent Labs 11/11/20 1859 WBC 8.2 HGB 14.7 HCT 43.3  Recent Labs 11/11/20 1859   
K 3.9  CO2 29 GLU 82 BUN 12  
CREA 1.31* CA 8.4* ALB 3.7 TBILI 0.2 ALT 19 No results found for: Arpita Jefferson No results for input(s): PH, PCO2, PO2, HCO3, FIO2 in the last 72 hours. No results for input(s): INR, INREXT in the last 72 hours. Radiology and EKG reviewed:   abd CT reviewed **Old Records reviewed in [address] [facility]** Assessment/Plan:   
  
Principal Problem: 
 
63 yo otherwise healthy, presented w/ abd pain, new pancreatic mass 1) Pancreatic mass/periaortic necrotic LAD: concerning for new pancreatic CA with mets. Will keep NPO for possible biopsy. Will consult GI, Oncology to eval 
 
2) Acute abd pain: due to above. Will start IV dilaudid prn severe pain Risk of deterioration: medium Total time spent with patient: 48 Minutes **I personally saw and examined the patient during this time period** Care Plan discussed with: Patient, nursing Discussed:  Care Plan Prophylaxis:  SCD's 
 
Probable Disposition:  Home w/Family 
        
___________________________________________________ Attending Physician: Princess Inman MD

## 2020-11-12 NOTE — PROGRESS NOTES
Bedside and Verbal shift change report given to Adriano Villalta RN (oncoming nurse) by Spike Mejia RN (offgoing nurse). Report included the following information SBAR, Kardex, ED Summary, Intake/Output and MAR.

## 2020-11-12 NOTE — CONSULTS
Itz Cohen. Todd Urias, [address] [facility] 
[phone] office 
[phone] voicemail Gastroenterology Consultation Note Admit Date: 11/11/2020 Consult Date: 11/12/2020 I greatly appreciate your asking me to see Patria Borrero, thank you very much for the opportunity to participate in his care. Narrative Assessment and Plan · Pancreatic mass · Abdominal pain Needs EUS/FNA for diagnosis / staging. I don't have training or expertise to provide. Dr. Rosa Persley consulted- no availability this week (needs cytopathologist in suite at time of procedure). Soonest Dr. Rosa Presley can accomplish is Wednesday 11/18 1pm 
I've advised patient to get drive - through COVID test done Monday and to arrive at 1200 to outpatient registration Wednesday for the procedure. NPO, with . Please advise patient to contact the office of Dr. Rosa Presley for any additional information about this procedure. From my perspective he can be discharged; but will need pain conrol. He can feed. I will sign off. Subjective: Chief Complaint: Abdominal Pain History of Present Illness: Patria Borrero is a(n) 64 y.o. male who complains of abdominal pain. The pain is located in the epigastric area without radiation. Pain is described as aching and measures 8/10 in intensity. Onset of pain was several weeks ago. Since onset the pain is gradually worsening. Aggravating factors include eating. Alleviating factors include none. Associated symptoms include anorexia. PCP: 
None Past Medical History:  
Diagnosis Date  Tobacco abuse No past surgical history on file. Social History Tobacco Use  Smoking status: Current Every Day Smoker Packs/day: 0.50  Smokeless tobacco: Never Used Substance Use Topics  Alcohol use: Yes Family History Problem Relation Age of Onset  Pancreatic Cancer Brother No Known Allergies Home Medications: 
None Hospital Medications: 
Current Facility-Administered Medications Medication Dose Route Frequency  HYDROmorphone (PF) (DILAUDID) injection 1 mg  1 mg IntraVENous Q4H PRN  
 HYDROcodone-acetaminophen (NORCO) 5-325 mg per tablet 1 Tab  1 Tab Oral Q4H PRN  
 dextrose 5% - 0.45% NaCl with KCl 20 mEq/L infusion  75 mL/hr IntraVENous CONTINUOUS  
 sodium chloride (NS) flush 5-40 mL  5-40 mL IntraVENous Q8H  
 sodium chloride (NS) flush 5-40 mL  5-40 mL IntraVENous PRN  
 0.9% sodium chloride infusion 25 mL  25 mL IntraVENous PRN  
 acetaminophen (TYLENOL) tablet 650 mg  650 mg Oral Q6H PRN Or  
 acetaminophen (TYLENOL) suppository 650 mg  650 mg Rectal Q6H PRN  
 senna-docusate (PERICOLACE) 8.6-50 mg per tablet 1 Tab  1 Tab Oral BID  
 bisacodyL (DULCOLAX) suppository 10 mg  10 mg Rectal DAILY PRN  promethazine (PHENERGAN) tablet 12.5 mg  12.5 mg Oral Q6H PRN Or  
 ondansetron (ZOFRAN) injection 4 mg  4 mg IntraVENous Q6H PRN Review of Systems: Admission ROS by Rehana Quintero MD from 11/11/2020 were reviewed with the patient and changes (other than per HPI) include: none Objective:  
 
Physical Exam: 
Visit Vitals /79 (BP 1 Location: Left arm, BP Patient Position: At rest) Pulse 66 Temp 97.3 °F (36.3 °C) Resp 16 Ht 5' 10\" (1.778 m) Wt 58.1 kg (128 lb 1.4 oz) SpO2 98% BMI 18.38 kg/m² SpO2 Readings from Last 6 Encounters:  
11/12/20 98% 08/21/19 97% 11/16/17 98% Intake/Output Summary (Last 24 hours) at 11/12/2020 1154 Last data filed at 11/12/2020 2612 Gross per 24 hour Intake 426.25 ml Output 0 ml Net 426.25 ml General: no distress, comfortable Skin:  No rash or palpable dermatologic mass lesions HEENT: Pupils equal, sclera anicteric, oropharynx with no gross lesions Cardiovascular: No abnormal audible heart sounds, well perfused, Respiratory:  No abnormal audible breath sounds, normal respiratory effort, no throacic deformity Musculoskeletal:  No skeletal deformity nor acute arthritis noted. Neurological:  Motor and sensory function intact in upper extremeties Psychiatric:  Normal affect, memory intact, appears to have insight into current illness Laboratory:   
Recent Results (from the past 24 hour(s)) CBC WITH AUTOMATED DIFF Collection Time: 11/11/20  6:59 PM  
Result Value Ref Range WBC 8.2 4.1 - 11.1 K/uL  
 RBC 4.81 4.10 - 5.70 M/uL  
 HGB 14.7 12.1 - 17.0 g/dL HCT 43.3 36.6 - 50.3 % MCV 90.0 80.0 - 99.0 FL  
 MCH 30.6 26.0 - 34.0 PG  
 MCHC 33.9 30.0 - 36.5 g/dL  
 RDW 13.4 11.5 - 14.5 % PLATELET 963 005 - 951 K/uL MPV 9.1 8.9 - 12.9 FL  
 NRBC 0.0 0  WBC ABSOLUTE NRBC 0.00 0.00 - 0.01 K/uL NEUTROPHILS 54 32 - 75 % LYMPHOCYTES 31 12 - 49 % MONOCYTES 9 5 - 13 % EOSINOPHILS 5 0 - 7 % BASOPHILS 1 0 - 1 % IMMATURE GRANULOCYTES 0 0.0 - 0.5 % ABS. NEUTROPHILS 4.4 1.8 - 8.0 K/UL  
 ABS. LYMPHOCYTES 2.5 0.8 - 3.5 K/UL  
 ABS. MONOCYTES 0.7 0.0 - 1.0 K/UL  
 ABS. EOSINOPHILS 0.4 0.0 - 0.4 K/UL  
 ABS. BASOPHILS 0.1 0.0 - 0.1 K/UL  
 ABS. IMM. GRANS. 0.0 0.00 - 0.04 K/UL  
 DF AUTOMATED METABOLIC PANEL, COMPREHENSIVE Collection Time: 11/11/20  6:59 PM  
Result Value Ref Range Sodium 139 136 - 145 mmol/L Potassium 3.9 3.5 - 5.1 mmol/L Chloride 106 97 - 108 mmol/L  
 CO2 29 21 - 32 mmol/L Anion gap 4 (L) 5 - 15 mmol/L Glucose 82 65 - 100 mg/dL BUN 12 6 - 20 MG/DL Creatinine 1.31 (H) 0.70 - 1.30 MG/DL  
 BUN/Creatinine ratio 9 (L) 12 - 20 GFR est AA >60 >60 ml/min/1.73m2 GFR est non-AA 56 (L) >60 ml/min/1.73m2 Calcium 8.4 (L) 8.5 - 10.1 MG/DL Bilirubin, total 0.2 0.2 - 1.0 MG/DL  
 ALT (SGPT) 19 12 - 78 U/L  
 AST (SGOT) 10 (L) 15 - 37 U/L Alk. phosphatase 66 45 - 117 U/L Protein, total 7.8 6.4 - 8.2 g/dL Albumin 3.7 3.5 - 5.0 g/dL Globulin 4.1 (H) 2.0 - 4.0 g/dL A-G Ratio 0.9 (L) 1.1 - 2.2 LIPASE Collection Time: 11/11/20  6:59 PM  
Result Value Ref Range Lipase 82 73 - 152 U/L  
METABOLIC PANEL, BASIC Collection Time: 11/12/20  5:28 AM  
Result Value Ref Range Sodium 138 136 - 145 mmol/L Potassium 3.9 3.5 - 5.1 mmol/L Chloride 107 97 - 108 mmol/L  
 CO2 27 21 - 32 mmol/L Anion gap 4 (L) 5 - 15 mmol/L Glucose 108 (H) 65 - 100 mg/dL BUN 11 6 - 20 MG/DL Creatinine 1.05 0.70 - 1.30 MG/DL  
 BUN/Creatinine ratio 10 (L) 12 - 20 GFR est AA >60 >60 ml/min/1.73m2 GFR est non-AA >60 >60 ml/min/1.73m2 Calcium 8.2 (L) 8.5 - 10.1 MG/DL  
HEPATIC FUNCTION PANEL Collection Time: 11/12/20  5:28 AM  
Result Value Ref Range Protein, total 6.4 6.4 - 8.2 g/dL Albumin 3.4 (L) 3.5 - 5.0 g/dL Globulin 3.0 2.0 - 4.0 g/dL A-G Ratio 1.1 1.1 - 2.2 Bilirubin, total 0.3 0.2 - 1.0 MG/DL Bilirubin, direct 0.1 0.0 - 0.2 MG/DL Alk. phosphatase 57 45 - 117 U/L  
 AST (SGOT) 9 (L) 15 - 37 U/L  
 ALT (SGPT) 16 12 - 78 U/L  
MAGNESIUM Collection Time: 11/12/20  5:28 AM  
Result Value Ref Range Magnesium 2.0 1.6 - 2.4 mg/dL CBC W/O DIFF Collection Time: 11/12/20  5:28 AM  
Result Value Ref Range WBC 5.6 4.1 - 11.1 K/uL  
 RBC 4.50 4. 10 - 5.70 M/uL  
 HGB 13.7 12.1 - 17.0 g/dL HCT 40.5 36.6 - 50.3 % MCV 90.0 80.0 - 99.0 FL  
 MCH 30.4 26.0 - 34.0 PG  
 MCHC 33.8 30.0 - 36.5 g/dL  
 RDW 13.4 11.5 - 14.5 % PLATELET 659 859 - 659 K/uL MPV 9.4 8.9 - 12.9 FL  
 NRBC 0.0 0  WBC ABSOLUTE NRBC 0.00 0.00 - 0.01 K/uL PHOSPHORUS Collection Time: 11/12/20  5:28 AM  
Result Value Ref Range Phosphorus 3.4 2.6 - 4.7 MG/DL  
HEMOGLOBIN A1C WITH EAG Collection Time: 11/12/20  5:28 AM  
Result Value Ref Range Hemoglobin A1c 5.7 (H) 4.0 - 5.6 % Est. average glucose 117 mg/dL LIPID PANEL Collection Time: 11/12/20  5:28 AM  
Result Value Ref Range LIPID PROFILE Cholesterol, total 123 <200 MG/DL  Triglyceride 75 <150 MG/DL  
 HDL Cholesterol 45 MG/DL  
 LDL, calculated 63 0 - 100 MG/DL VLDL, calculated 15 MG/DL  
 CHOL/HDL Ratio 2.7 0.0 - 5.0 TSH 3RD GENERATION Collection Time: 11/12/20  5:28 AM  
Result Value Ref Range TSH 3.36 0.36 - 3.74 uIU/mL  
LIPASE Collection Time: 11/12/20  5:28 AM  
Result Value Ref Range Lipase 166 73 - 393 U/L Assessment/Plan:  
 
Principal Problem: 
  Pancreatic mass (11/11/2020) Active Problems: 
  Abdominal lymphadenopathy (11/11/2020) Acute abdominal pain (11/11/2020) See above narrative for full detail.

## 2020-11-12 NOTE — PROGRESS NOTES
[facility]: MED RECONCILIATION 
 
Comment: 
 
Not on any meds per patient  
 
Rios Hernandez, PHARMD   Contact: [phone]

## 2020-11-12 NOTE — ED NOTES
TRANSFER - OUT REPORT: 
 
Verbal report given to Alen Amato RN(name) on Merline Lipanthony  being transferred to 5th Floor(unit) for routine progression of care Report consisted of patients Situation, Background, Assessment and  
Recommendations(SBAR). Information from the following report(s) SBAR, Kardex, ED Summary, MAR, Accordion and Recent Results was reviewed with the receiving nurse. Lines:  
Peripheral IV 11/11/20 Right Antecubital (Active) Opportunity for questions and clarification was provided. Patient transported with: 
 [employer]

## 2020-11-12 NOTE — PROGRESS NOTES
CM Note: Met with pt who stated that PTA he was independent with ADL's.  He takes no meds. 
Reason for Admission:   pancreatic mass 
                
RUR Score:     8%      
        
Plan for utilizing home health:      To be determined 
 
PCP: Pt was given a list of BS providers 
 
Current Advanced Directive/Advance Care Plan: none; his dtr, Chrystal, and his brother, Sohail, are his next of kin 
                      
Transition of Care Plan:     
1.  GI consult  --possible biopsy 
2. Onc consult 
3. Pain management 
4. CM following 
5. Family to transport at d/c 
BHARATH Romero 
 
Care Management Interventions 
PCP Verified by CM: No 
Mode of Transport at Discharge: Other (see comment)(family) 
Transition of Care Consult (CM Consult): Discharge Planning 
Current Support Network: Own Home, Lives Alone(1 story with 3-4 entry steps) 
Confirm Follow Up Transport: Family 
Discharge Location 
Discharge Placement: Home with family assistance

## 2020-11-12 NOTE — CONSULTS
[facility] [place] at [address], [address] [facility] [address] [facility] W: [phone]  F: [phone] Reason for Visit:  
German Carrera is a 64 y.o. male who is seen in consultation at the request of Dr. Ren Or  for evaluation of  New pancreatic mass Hematology Oncology Treatment History:  
 
Diagnosis:  
 
Stage: Pending Pathology:  
 
Prior Treatment: None Current Treatment: pending History of Present Illness: Mr. German Carrera is a 64 y.o. male admitted on 2020 from the ED when he presented with lower abd pain x 3-4 weeks. ED CT A/P showed mass in pancreatic body as well as enlarged necrotic left periaortic lymph node, mild ascites. Mr. Lolis Tijerina reports lower abdominal pain from the rib area down for several months. Described as dull, constant, rated at 8-9/10 at worst and improved to 3/10 with pain medication. Denies any radiation to his back. Denies change in bowels. Has had decrease in appetite with unspecified weight loss over the last several months; normal weight 145-150#. Denies SOB or night sweats. No h/o pancreatitis. He does not follow with a PCP regularly. 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 Smokes cigarettes: 1 ppd x 30 yrs. Rare EtOH use. : 3 kids; twins 29 yrs old and 25 yr old. Past Medical History:  
Diagnosis Date  Tobacco abuse No past surgical history on file. Social History Tobacco Use  Smoking status: Current Every Day Smoker Packs/day: 0.50  Smokeless tobacco: Never Used Substance Use Topics  Alcohol use: Yes Family History Problem Relation Age of Onset  Pancreatic Cancer Brother Current Facility-Administered Medications Medication Dose Route Frequency  HYDROmorphone (PF) (DILAUDID) injection 1 mg  1 mg IntraVENous Q4H PRN  
  dextrose 5% - 0.45% NaCl with KCl 20 mEq/L infusion  75 mL/hr IntraVENous CONTINUOUS  
 sodium chloride (NS) flush 5-40 mL  5-40 mL IntraVENous Q8H  
 sodium chloride (NS) flush 5-40 mL  5-40 mL IntraVENous PRN  
 0.9% sodium chloride infusion 25 mL  25 mL IntraVENous PRN  
 acetaminophen (TYLENOL) tablet 650 mg  650 mg Oral Q6H PRN Or  
 acetaminophen (TYLENOL) suppository 650 mg  650 mg Rectal Q6H PRN  
 senna-docusate (PERICOLACE) 8.6-50 mg per tablet 1 Tab  1 Tab Oral BID  
 bisacodyL (DULCOLAX) suppository 10 mg  10 mg Rectal DAILY PRN  promethazine (PHENERGAN) tablet 12.5 mg  12.5 mg Oral Q6H PRN Or  
 ondansetron (ZOFRAN) injection 4 mg  4 mg IntraVENous Q6H PRN No Known Allergies Review of Systems: A complete review of systems was obtained, negative except as described above. Physical Exam:  
 
Visit Vitals /79 (BP 1 Location: Left arm, BP Patient Position: At rest) Pulse 66 Temp 97.3 °F (36.3 °C) Resp 16 Ht 5' 10\" (1.778 m) Wt 58.1 kg (128 lb 1.4 oz) SpO2 98% BMI 18.38 kg/m² ECOG PS: 0 General: thin, No distress Eyes: PERRLA, anicteric sclerae HENT: Atraumatic with normal appearance of ears and nose; OP clear Neck: Supple; no thyromegaly Lymphatic: No cervical, supraclavicular, or axillary adenopathy Respiratory: CTAB, normal respiratory effort CV: Normal rate, regular rhythm, no murmurs, no peripheral edema GI: Soft, slight tenderness with palpation to lower abd area and rt upper abd area, nondistended, no masses, no hepatomegaly, no splenomegaly MS: Digits without clubbing or cyanosis. Skin: No rashes, ecchymoses, or petechiae. Normal temperature, turgor, and texture. Neuro/Psych: Alert, oriented, appropriate affect, normal judgment/insight Results:  
 
Lab Results Component Value Date/Time  WBC 5.6 11/12/2020 05:28 AM  
 HGB 13.7 11/12/2020 05:28 AM  
 HCT 40.5 11/12/2020 05:28 AM  
 PLATELET 038 70/01/7354 05:28 AM  
 MCV 90.0 11/12/2020 05:28 AM  
 ABS. NEUTROPHILS 4.4 11/11/2020 06:59 PM  
 
Lab Results Component Value Date/Time Sodium 138 11/12/2020 05:28 AM  
 Potassium 3.9 11/12/2020 05:28 AM  
 Chloride 107 11/12/2020 05:28 AM  
 CO2 27 11/12/2020 05:28 AM  
 Glucose 108 (H) 11/12/2020 05:28 AM  
 BUN 11 11/12/2020 05:28 AM  
 Creatinine 1.05 11/12/2020 05:28 AM  
 GFR est AA >60 11/12/2020 05:28 AM  
 GFR est non-AA >60 11/12/2020 05:28 AM  
 Calcium 8.2 (L) 11/12/2020 05:28 AM  
 
Lab Results Component Value Date/Time Bilirubin, total 0.3 11/12/2020 05:28 AM  
 ALT (SGPT) 16 11/12/2020 05:28 AM  
 Alk. phosphatase 57 11/12/2020 05:28 AM  
 Protein, total 6.4 11/12/2020 05:28 AM  
 Albumin 3.4 (L) 11/12/2020 05:28 AM  
 Globulin 3.0 11/12/2020 05:28 AM  
 
Lab Results Component Value Date/Time Sed rate, automated 4 11/16/2017 03:56 PM  
 C-Reactive protein <0.29 11/16/2017 03:56 PM  
 TSH 3.36 11/12/2020 05:28 AM  
 Lipase 166 11/12/2020 05:28 AM  
 
Lab Results Component Value Date/Time INR 1.0 09/23/2010 03:05 AM  
 aPTT 28.0 09/23/2010 03:05 AM  
 
No results found for: CEA, 405923, C199LT, C125, WHSU158, 2729LT, C153LT, PSA, PSALT, LDH, [id number], HCGTLT, HCGN, AFP, CHRGLT, CHRGA 
 
11/11/2020 CT ABD PELV W CONT IMPRESSION: 
  
1. Mass in the pancreatic body most likely representing pancreatic carcinoma. There is dilatation of the more distal pancreatic duct with atrophy of the 
parenchyma. 2. Enlarged necrotic lymph node in the left periaortic space likely representing 
a remington metastasis. 3. Mild amount of ascites adjacent to the liver and in the pelvis. Assessment and Recommendations: 1. Pancreatic Mass / Regional lymphadenopathy / Ascites:  
Concerning for carcinoma, locally advanced vs metastatic given necrotic lymph node in left periaortic space and the mild ascites adjacent to liver.   
-- will need tissue bx for definitive dx: GI consulted and has arranged EUS guided biopsy in near future -- will need CT chest to complete staging -- will add CEA and Ca 19-9  
-- will establish follow up in the clinic, approx 11/24/20. 
 
2. Pain Continue prn medication. 3. Family h/o pancreatic cancer: 
Concerning for genetic predisposition. Recommend genetic testing as outpatient (BRCA 1/2, PALB2, etc.) I have personally seen and evaluated the patient in conjunction with Phill Wolff NP. I find the patient's history and physical exam are consistent with the NP's documentation. I agree with the above assessment and plan, which I have edited if needed.  
 
 
 
Signed By: Ramya Sawyer MD

## 2020-11-12 NOTE — PROGRESS NOTES
[address] Adult  Hospitalist Group Hospitalist Progress Note Anita Vieira MD 
  
  
Date of Service:  2020 NAME:  Santana Zurita :  1960 MRN:  [id number] Admission Summary:  
43-year-old male presented with abdominal pain and found to have a new pancreatic mass Interval history / Subjective:  
Reports pain in lower abdomen bilaterally Assessment & Plan:  
 
63 yo otherwise healthy, presented w/ abd pain, new pancreatic mass 
  
Pancreatic mass/periaortic necrotic LAD  
-concerning for new pancreatic CA with mets to lymph node. -Will keep NPO for possible biopsy. -GI to see 
-Appreciate oncology input 
  
Acute abd pain  
-due to above.   
-Continue IV dilaudid prn severe pain 
-Norco as needed for moderate pain Code status: Full DVT prophylaxis: SCDs Hospital Problems  Date Reviewed: 2020 Codes Class Noted POA * (Principal) Pancreatic mass ICD-10-CM: A12.48 
ICD-9-CM: 577.8  2020 Unknown Abdominal lymphadenopathy ICD-10-CM: R59.0 ICD-9-CM: 785.6  2020 Yes Acute abdominal pain ICD-10-CM: R10.9 ICD-9-CM: 789.00, 338.19  2020 Yes Review of Systems: A comprehensive review of systems was negative except for that written in the HPI. Vital Signs:  
 Last 24hrs VS reviewed since prior progress note. Most recent are: 
Visit Vitals /79 (BP 1 Location: Left arm, BP Patient Position: At rest) Pulse 66 Temp 97.3 °F (36.3 °C) Resp 16 Ht 5' 10\" (1.778 m) Wt 58.1 kg (128 lb 1.4 oz) SpO2 98% BMI 18.38 kg/m² Intake/Output Summary (Last 24 hours) at 2020 1040 Last data filed at 2020 6875 Gross per 24 hour Intake 426.25 ml Output 0 ml Net 426.25 ml Physical Examination:  
 
 
     
Constitutional:  No acute distress, cooperative, pleasant ENT:  Oral mucosa moist, oropharynx benign. Resp:  CTA bilaterally. No wheezing/rhonchi/rales. No accessory muscle use CV:  Regular rhythm, normal rate, no murmurs, gallops, rubs GI:  Soft, non distended, vague tenderness in the lower half of abdomen. normoactive bowel sounds, no hepatosplenomegaly Musculoskeletal:  No edema, warm, 2+ pulses throughout Neurologic:  Moves all extremities. AAOx3, CN II-XII reviewed Psych:  Good insight, Not anxious nor agitated. Data Review:  
 Review and/or order of clinical lab test 
 
 
Labs:  
 
Recent Labs 11/12/20 0528 11/11/20 1859 WBC 5.6 8.2 HGB 13.7 14.7 HCT 40.5 43.3  280 Recent Labs 11/12/20 0528 11/11/20 1859  139  
K 3.9 3.9  106 CO2 27 29 BUN 11 12 CREA 1.05 1.31* * 82  
CA 8.2* 8.4* MG 2.0  --   
PHOS 3.4  --   
 
Recent Labs 11/12/20 0528 11/11/20 1859 ALT 16 19 AP 57 66 TBILI 0.3 0.2 TP 6.4 7.8 ALB 3.4* 3.7 GLOB 3.0 4.1*  
LPSE 166 82 No results for input(s): INR, PTP, APTT, INREXT in the last 72 hours. No results for input(s): FE, TIBC, PSAT, FERR in the last 72 hours. No results found for: FOL, RBCF No results for input(s): PH, PCO2, PO2 in the last 72 hours. No results for input(s): CPK, CKNDX, TROIQ in the last 72 hours. No lab exists for component: CPKMB Lab Results Component Value Date/Time Cholesterol, total 123 11/12/2020 05:28 AM  
 HDL Cholesterol 45 11/12/2020 05:28 AM  
 LDL, calculated 63 11/12/2020 05:28 AM  
 Triglyceride 75 11/12/2020 05:28 AM  
 CHOL/HDL Ratio 2.7 11/12/2020 05:28 AM  
 
No results found for: [facility] No results found for: COLOR, APPRN, SPGRU, REFSG, VIKKI, PROTU, GLUCU, KETU, BILU, UROU, ISAIAH, LEUKU, GLUKE, EPSU, BACTU, WBCU, RBCU, CASTS, UCRY Medications Reviewed:  
 
Current Facility-Administered Medications Medication Dose Route Frequency  HYDROmorphone (PF) (DILAUDID) injection 1 mg  1 mg IntraVENous Q4H PRN  
 dextrose 5% - 0.45% NaCl with KCl 20 mEq/L infusion  75 mL/hr IntraVENous CONTINUOUS  
 sodium chloride (NS) flush 5-40 mL  5-40 mL IntraVENous Q8H  
 sodium chloride (NS) flush 5-40 mL  5-40 mL IntraVENous PRN  
 0.9% sodium chloride infusion 25 mL  25 mL IntraVENous PRN  
 acetaminophen (TYLENOL) tablet 650 mg  650 mg Oral Q6H PRN Or  
 acetaminophen (TYLENOL) suppository 650 mg  650 mg Rectal Q6H PRN  
 senna-docusate (PERICOLACE) 8.6-50 mg per tablet 1 Tab  1 Tab Oral BID  
 bisacodyL (DULCOLAX) suppository 10 mg  10 mg Rectal DAILY PRN  promethazine (PHENERGAN) tablet 12.5 mg  12.5 mg Oral Q6H PRN Or  
 ondansetron (ZOFRAN) injection 4 mg  4 mg IntraVENous Q6H PRN  
 
______________________________________________________________________ EXPECTED LENGTH OF STAY: - - - 
ACTUAL LENGTH OF STAY:          1 Jennifer Bateman MD

## 2020-11-12 NOTE — PROGRESS NOTES
Comprehensive Nutrition Assessment Type and Reason for Visit: Initial(low BMI) Nutrition Recommendations/Plan: 1. Continue regular, 50 g fat diet. 2. Added apple Ensure Clear BID to promote adequate intake. Nutrition Assessment:     
11/12: 63 y/o male admitted with pancreatic mass. Per GI, pt needs EUS/FNA for dx/staging, with the earliest option for this next Wednesday. Pt reports abd pain, poor appetite/po intake x3-4 weeks. Says he has lost 15# during that time. Reported weight change equates to a 10% wt loss x1 month which is considered significant for time frame. Pt also with milk fat/muslce wasting. Currently meets criteria for moderate malnutrition based on assessment below. Diet advanced before visit, pt eating lunch at time of visit. Says he is hungry right now d/t not being able to eat anything since yesterday morning. No c/o N/V currently, but still having a little abd pain. Pt agreeable to trying Ensure clear while here- will add and monitor acceptance. Labs- A1c 5.7. Meds- [email or web address]. Weight hx: Wt Readings from Last 10 Encounters:  
11/11/20 58.1 kg (128 lb 1.4 oz) 08/21/19 68 kg (150 lb)  
11/16/17 68 kg (150 lb) Malnutrition Assessment: 
Malnutrition Status: Moderate malnutrition Context:  Chronic illness Findings of the 6 clinical characteristics of malnutrition:  
Energy Intake:  Mild decrease in energy intake (specify) Weight Loss:  7 - Greater than 5% over 1 month Body Fat Loss:  1 - Mild body fat loss, Fat overlying ribs Muscle Mass Loss:  1 - Mild muscle mass loss, Temples (temporalis) Fluid Accumulation:  No significant fluid accumulation,   
 Strength:  Not performed Estimated Daily Nutrient Needs: 
Energy (kcal): 2164(4525 X 1.3 AF); Weight Used for Energy Requirements: Current Protein (g): 58(1-1.2 g/kg); Weight Used for Protein Requirements: Current Fluid (ml/day): 2079; Method Used for Fluid Requirements: 1 ml/kcal 
 
 Nutrition Related Findings:  LBM 11/11 Wounds:   
None Current Nutrition Therapies: DIET REGULAR 50GM Fat DIET NUTRITIONAL SUPPLEMENTS Breakfast, Dinner; Ensure Clear Anthropometric Measures: 
· Height:  5' 10\" (177.8 cm) · Current Body Wt:  58.1 kg (128 lb 1.4 oz) · Admission Body Wt:      
· Usual Body Wt:       
· Ideal Body Wt:  166 lbs:  77.2 % · Adjusted Body Weight:   ; Weight Adjustment for: No adjustment · BMI Category:  Underweight (BMI less than 18.5) Nutrition Diagnosis:  
· Inadequate oral intake related to inadequate protein-energy intake as evidenced by poor intake prior to admission, weight loss Nutrition Interventions:  
Food and/or Nutrient Delivery: Continue current diet, Start oral nutrition supplement Nutrition Education and Counseling: No recommendations at this time Coordination of Nutrition Care: Continue to monitor while inpatient Goals: 
PO intake >50% meals + ONS next 3-5 days Nutrition Monitoring and Evaluation:  
Behavioral-Environmental Outcomes: None identified Food/Nutrient Intake Outcomes: Food and nutrient intake, Supplement intake Physical Signs/Symptoms Outcomes: Weight, Biochemical data, GI status, Nutrition focused physical findings Discharge Planning:   
Continue oral nutrition supplement, Continue current diet Electronically signed by Inna Miranda RDN on 11/12/2020 at 2:30 PM 
 
Contact: [phone]

## 2020-11-13 VITALS
OXYGEN SATURATION: 99 % | TEMPERATURE: 97.4 F | HEART RATE: 68 BPM | WEIGHT: 128.09 LBS | DIASTOLIC BLOOD PRESSURE: 79 MMHG | BODY MASS INDEX: 18.34 KG/M2 | HEIGHT: 70 IN | RESPIRATION RATE: 18 BRPM | SYSTOLIC BLOOD PRESSURE: 122 MMHG

## 2020-11-13 LAB
BACTERIA SPEC CULT: NORMAL
BACTERIA SPEC CULT: NORMAL
CEA SERPL-MCNC: 4.9 NG/ML
SERVICE CMNT-IMP: NORMAL

## 2020-11-13 PROCEDURE — 99218 HC RM OBSERVATION: CPT

## 2020-11-13 PROCEDURE — 82378 CARCINOEMBRYONIC ANTIGEN: CPT

## 2020-11-13 PROCEDURE — 74011250637 HC RX REV CODE- 250/637: Performed by: FAMILY MEDICINE

## 2020-11-13 PROCEDURE — 74011250636 HC RX REV CODE- 250/636: Performed by: INTERNAL MEDICINE

## 2020-11-13 PROCEDURE — 86301 IMMUNOASSAY TUMOR CA 19-9: CPT

## 2020-11-13 PROCEDURE — 36415 COLL VENOUS BLD VENIPUNCTURE: CPT

## 2020-11-13 PROCEDURE — 87635 SARS-COV-2 COVID-19 AMP PRB: CPT

## 2020-11-13 RX ORDER — OXYCODONE AND ACETAMINOPHEN 5; 325 MG/1; MG/1
1 TABLET ORAL
Qty: 30 TAB | Refills: 0 | Status: SHIPPED | OUTPATIENT
Start: 2020-11-13 | End: 2020-11-20

## 2020-11-13 RX ORDER — AMOXICILLIN 250 MG
2 CAPSULE ORAL DAILY
Qty: 60 TAB | Refills: 0 | Status: SHIPPED | OUTPATIENT
Start: 2020-11-13 | End: 2021-01-01 | Stop reason: SDUPTHER

## 2020-11-13 RX ADMIN — Medication 10 ML: at 06:00

## 2020-11-13 RX ADMIN — HYDROCODONE BITARTRATE AND ACETAMINOPHEN 1 TABLET: 5; 325 TABLET ORAL at 03:08

## 2020-11-13 RX ADMIN — Medication 10 ML: at 13:59

## 2020-11-13 RX ADMIN — POTASSIUM CHLORIDE, DEXTROSE MONOHYDRATE AND SODIUM CHLORIDE 75 ML/HR: 150; 5; 450 INJECTION, SOLUTION INTRAVENOUS at 03:10

## 2020-11-13 RX ADMIN — HYDROCODONE BITARTRATE AND ACETAMINOPHEN 1 TABLET: 5; 325 TABLET ORAL at 09:29

## 2020-11-13 RX ADMIN — HYDROCODONE BITARTRATE AND ACETAMINOPHEN 1 TABLET: 5; 325 TABLET ORAL at 15:52

## 2020-11-13 NOTE — DISCHARGE SUMMARY
Discharge Summary PATIENT ID: Jose Mauro MRN: [id number] YOB: 1960 DATE OF ADMISSION: 11/11/2020  6:47 PM   
DATE OF DISCHARGE: 11/13/20 PRIMARY CARE PROVIDER: None DISCHARGING PROVIDER: Kati Nelson MD   
 
CONSULTATIONS: IP CONSULT TO HOSPITALIST 
IP CONSULT TO ONCOLOGY 
IP CONSULT TO GASTROENTEROLOGY PROCEDURES/SURGERIES: Procedure(s): ENDOSCOPIC ULTRASOUND (EUS) [address] COURSE:  
63 yo otherwise healthy, presented w/ abd pain, new pancreatic mass 
  
Pancreatic mass/periaortic necrotic LAD  
-concerning for new pancreatic CA with mets to lymph node.    
-GI evaluated, plan for outpatient biopsy next week 
-Outpatient cardiology appointment scheduled in 2 weeks 
  
Acute abd pain  
-due to above.   
-Percocet as needed for pain. ADDITIONAL CARE RECOMMENDATIONS:  
You may take 1 or 2 tablets of pain medication every 4-6 hours as needed. You will need to establish with primary care doctor to get refills for this medication. You have an appointment on Wednesday, November 28 at [facility] for biopsy at 1 PM. Arrive at 1200 to outpatient registration Wednesday for the procedure. Do not eat anything after midnight on Tuesday night and please come with somebody who can drive you home since he will be receiving anesthesia. Please  contact the office of Dr. Fabiola Hernandez for any additional information about this procedure. PENDING TEST RESULTS:  
At the time of discharge the following test results are still pending: None FOLLOW UP APPOINTMENTS:   
Follow-up Information Follow up With Specialties Details Why Contact Info Luis Fernando Ernst MD Hematology and Oncology, Internal Medicine, Hematology, Oncology Go on 11/24/2020 appt at 1 pm to review pathology [address] [facility] [address] [facility] 
[phone]  Unknown MD Yun Gastroenterology On 11/18/2020 1pm for biopsy, must check in at registration 1 hour prior [address] [facility] [address] [facility] 
[phone] DIET: Regular ACTIVITY: As tolerated DISCHARGE MEDICATIONS: 
Current Discharge Medication List  
  
START taking these medications Details  
senna-docusate (PERICOLACE) 8.6-50 mg per tablet Take 2 Tabs by mouth daily. Qty: 60 Tab, Refills: 0  
  
oxyCODONE-acetaminophen (Percocet) 5-325 mg per tablet Take 1 Tab by mouth every four (4) hours as needed for Pain for up to 7 days. Max Daily Amount: 6 Tabs. Qty: 30 Tab, Refills: 0 Associated Diagnoses: Pancreatic mass NOTIFY YOUR PHYSICIAN FOR ANY OF THE FOLLOWING:  
Fever over 101 degrees for 24 hours. Chest pain, shortness of breath, fever, chills, nausea, vomiting, diarrhea, change in mentation, falling, weakness, bleeding. Severe pain or pain not relieved by medications. Or, any other signs or symptoms that you may have questions about. DISPOSITION: 
X Home With: 
 OT  PT  HH  RN  
  
 Long term SNF/Inpatient Rehab Independent/assisted living Hospice Other:  
 
 
PATIENT CONDITION AT DISCHARGE:  
 
Functional status Poor Deconditioned X Independent Cognition  
 x Lucid Forgetful Dementia Catheters/lines (plus indication) [facility] PICC   
 PEG   
x None Code status  
 x Full code DNR   
 
PHYSICAL EXAMINATION AT DISCHARGE: 
General:          Alert, cooperative, no distress, appears stated age. HEENT:           Atraumatic, anicteric sclerae, pink conjunctivae No oral ulcers, mucosa moist, throat clear, dentition fair Neck:               Supple, symmetrical 
Lungs:             Clear to auscultation bilaterally. No Wheezing or Rhonchi. No rales. Heart:              Regular  rhythm,  No  murmur   No edema Abdomen:        Soft, non-tender. Not distended. Bowel sounds normal 
Extremities:     No cyanosis.   No clubbing,   
 Skin turgor normal, Capillary refill normal 
Skin:                Not pale. Not Jaundiced  No rashes Psych:             Not anxious or agitated. Neurologic:      Alert, moves all extremities, answers questions appropriately and responds to commands CHRONIC MEDICAL DIAGNOSES: 
Problem List as of 11/13/2020 Date Reviewed: 11/11/2020 Codes Class Noted - Resolved * (Principal) Pancreatic mass ICD-10-CM: T32.19 
ICD-9-CM: 577.8  11/11/2020 - Present Abdominal lymphadenopathy ICD-10-CM: R59.0 ICD-9-CM: 785.6  11/11/2020 - Present Acute abdominal pain ICD-10-CM: R10.9 ICD-9-CM: 789.00, 338.19  11/11/2020 - Present Greater than 20 minutes were spent with the patient on counseling and coordination of care Signed:  
Shyann Wick MD 
11/13/2020 
10:38 AM

## 2020-11-13 NOTE — PROGRESS NOTES
Patient's discharge order noted. Patient's ride coming at [address] [facility], so his PIV is in per primary nurse. Primary nurse Mela Larsen to remove PIV prior to evening discharge. Discharge medications reviewed with patient and appropriate educational materials and side effects teaching were provided. I have reviewed discharge instructions with the patient and the patient verbalized understanding. Leave smart education provided to patient and the patient verbalized understanding. AVS signed and given to patient.

## 2020-11-13 NOTE — DISCHARGE INSTRUCTIONS
Discharge Instructions       PATIENT ID: Alexandrea Metzger  MRN: [id number]   YOB: 1960    DATE OF ADMISSION: 11/11/2020  6:47 PM    DATE OF DISCHARGE: 11/13/2020    PRIMARY CARE PROVIDER: None     ATTENDING PHYSICIAN: Renzo Flores MD  DISCHARGING PROVIDER: Marlo Jordan MD        DISCHARGE DIAGNOSES   Pancreatic mass/periaortic lymphadenopathy  Acute abdominal pain    CONSULTATIONS: IP CONSULT TO HOSPITALIST  IP CONSULT TO ONCOLOGY  IP CONSULT TO GASTROENTEROLOGY    PROCEDURES/SURGERIES: Procedure(s):  ENDOSCOPIC ULTRASOUND (EUS)    PENDING TEST RESULTS:   At the time of discharge the following test results are still pending: None    FOLLOW UP APPOINTMENTS:   Follow-up Information     Follow up With Specialties Details Why Selvin Urban MD Hematology and Oncology, Internal Medicine, Hematology, Oncology Go on 11/24/2020 appt at 1 pm to review pathology [address] [place] [address] [address] [facility]  [phone]      Beau Reeves MD Gastroenterology On 11/18/2020 1pm for biopsy, must check in at registration 1 hour prior [address] [place] [address] [place] [address]  [phone]             ADDITIONAL CARE RECOMMENDATIONS: You may take 1 or 2 tablets of pain medication every 4-6 hours as needed. You will need to establish with primary care doctor to get refills for this medication. You have an appointment on Wednesday, November 28 at [facility] for biopsy at 1 PM. Arrive at 1200 to outpatient registration Wednesday for the procedure. Do not eat anything after midnight on Tuesday night and please come with somebody who can drive you home since he will be receiving anesthesia. Please  contact the office of Dr. Amrik Farrell for any additional information about this procedure. DIET: Regular    ACTIVITY: As tolerated      DISCHARGE MEDICATIONS:   See Medication Reconciliation Form    · It is important that you take the medication exactly as they are prescribed. · Keep your medication in the bottles provided by the pharmacist and keep a list of the medication names, dosages, and times to be taken in your wallet. · Do not take other medications without consulting your doctor. NOTIFY YOUR PHYSICIAN FOR ANY OF THE FOLLOWING:   Fever over 101 degrees for 24 hours. Chest pain, shortness of breath, fever, chills, nausea, vomiting, diarrhea, change in mentation, falling, weakness, bleeding. Severe pain or pain not relieved by medications. Or, any other signs or symptoms that you may have questions about. DISPOSITION:  x  Home With:   OT  PT  HH  RN       SNF/Inpatient Rehab/LTAC    Independent/assisted living    Hospice    Other:     CDMP Checked:   Yes ***     PROBLEM LIST Updated:  Yes ***       Signed:   Jo Ann Bob MD  11/13/2020  10:46 AM      [facility] Post Hospital/ED Visit Follow-Up Instructions/Information    You may have an in home follow up visit set up with [facility] or may wish to contact [facility] to set-up a visit:    What are we? [facility] is an in-home urgent care service staffed with emergency trained medical teams. We come to your home in a vehicle stocked with medical supplies and technology. An ER physician is always available if needed. When? As a part of your hospital follow-up, an appointment has been/ or can be set up for us to come see you. Usually, this will be 24-72 hours after you leave the hospital or as needed. [facility] is open 7am-9pm, 7 days a week, 365 days a year, including holidays. Why? We know that you cannot always get to your doctor after being in the hospital and that your doctor is not always available when you need them. Once your workup is complete, we'll call in your prescriptions, update your family doctor, and handle billing with your insurance so you can focus on feeling better, faster without leaving home. How much?   We accept most major health insurance plans, including Medicaid, Medicare, and Medicare Advantage [address] [facility], [facility], [facility], and [employer]. We also accept: credit, debit, health savings account (HSA), health reimbursement account (HRA) and flexible spending account (FSA) payments. [facility] prices compare to conventional urgent care facilities, but we bring the care to you. How to reach us? Getting care is easy- use our mobile jyoti ([employer]), website ([email or web address]) or call us [phone].

## 2020-11-13 NOTE — PROGRESS NOTES
Bedside and Verbal shift change report given to Darling Hope RN (oncoming nurse) by Shlomo Rosario RN (offgoing nurse). Report included the following information SBAR, Kardex, Intake/Output and Recent Results.

## 2020-11-13 NOTE — ROUTINE PROCESS
Bedside shift change report given to Radha Estrada RN (oncoming nurse) by Kehinde Romero RN (offgoing nurse). Report included the following information SBAR, Kardex, Intake/Output, MAR and Accordion.

## 2020-11-13 NOTE — PROGRESS NOTES
CM Note: 
Pt to d/c to home today transported by family/friend. He is to f/u as OP with providers. No CM needs.  
BHARATH Romero

## 2020-11-14 LAB
CANCER AG19-9 SERPL-ACNC: 5607 U/ML (ref 0–35)
HEALTH STATUS, XMCV2T: NORMAL
SARS-COV-2, COV2: NOT DETECTED
SOURCE, COVRS: NORMAL
SPECIMEN SOURCE, FCOV2M: NORMAL
SPECIMEN TYPE, XMCV1T: NORMAL

## 2020-11-16 NOTE — PROGRESS NOTES
Physician Progress Note Nilesh Hamilton 
CSN #:                  [id number] :                       1960 ADMIT DATE:       2020 6:47 PM 
[address] [place] DATE:        2020 4:48 PM 
RESPONDING 
PROVIDER #:        Sabrina Forman MD [facility] MD 
 
 
 
 
QUERY TEXT: 
 
Dear Dr. Conde, 
[address] admitted with new pancreatic mass. Noted documentation of moderate malnutrition on  by RD, nutritional consultant. If possible, please document in progress notes and discharge summary: The medical record reflects the following: 
Risk Factors: 64 yr. old male admitted with new pancreatic mass. Clinical Indicators: RD notes in consultation note; \"Pt. reports abd pain, poor appetite/po intake x3-4 weeks. Says he has lost 15# during that time. Reported weight change equates to a 10% wt. loss x1 month which is considered significant for time frame. Pt. also with milk fat/muscle wasting. \" 
RD consult notes on , Malnutrition Status: Moderate malnutrition Context:  Chronic illness Findings of the 6 clinical characteristics of malnutrition: 
Energy Intake:  Mild decrease in energy intake (specify) Weight Loss:  7 - Greater than 5% over 1 month Body Fat Loss:  1 - Mild body fat loss, Fat overlying ribs Muscle Mass Loss:  1 - Mild muscle mass loss, Temples (temporalis) Fluid Accumulation:  No significant fluid accumulation, 
 Strength:  Not performed BMI 18.38 Treatment: RD consult with comprehensive nutritional assessment Options provided: 
-- Moderate protein calorie malnutrition confirmed present on admission -- Moderate protein calorie malnutrition confirmed not present on admission -- Moderate protein calorie malnutrition ruled out 
-- Other - I will add my own diagnosis -- Disagree - Not applicable / Not valid -- Disagree - Clinically unable to determine / Unknown 
-- Refer to Clinical Documentation Reviewer PROVIDER RESPONSE TEXT: 
 
 The diagnosis of Moderate protein calorie malnutrition was confirmed as present on admission.  
 
Query created by: Wali Guzman on 11/13/2020 4:56 PM 
 
 
Electronically signed by:  Lynette Nagel MD [facility] MD 11/16/2020 11:50 AM

## 2020-11-18 ENCOUNTER — HOSPITAL ENCOUNTER (OUTPATIENT)
Age: 60
Setting detail: OUTPATIENT SURGERY
Discharge: HOME | End: 2020-11-18
Attending: INTERNAL MEDICINE | Admitting: INTERNAL MEDICINE
Payer: MEDICAID

## 2020-11-18 ENCOUNTER — ANESTHESIA (OUTPATIENT)
Dept: ENDOSCOPY | Age: 60
End: 2020-11-18
Payer: MEDICAID

## 2020-11-18 ENCOUNTER — ANESTHESIA EVENT (OUTPATIENT)
Dept: ENDOSCOPY | Age: 60
End: 2020-11-18
Payer: MEDICAID

## 2020-11-18 VITALS
OXYGEN SATURATION: 99 % | RESPIRATION RATE: 13 BRPM | WEIGHT: 126.32 LBS | SYSTOLIC BLOOD PRESSURE: 110 MMHG | BODY MASS INDEX: 18.08 KG/M2 | HEART RATE: 63 BPM | TEMPERATURE: 96.5 F | DIASTOLIC BLOOD PRESSURE: 74 MMHG | HEIGHT: 70 IN

## 2020-11-18 PROCEDURE — 88173 CYTOPATH EVAL FNA REPORT: CPT

## 2020-11-18 PROCEDURE — 74011250636 HC RX REV CODE- 250/636: Performed by: INTERNAL MEDICINE

## 2020-11-18 PROCEDURE — 2709999900 HC NON-CHARGEABLE SUPPLY: Performed by: INTERNAL MEDICINE

## 2020-11-18 PROCEDURE — 77030028690 HC NDL ASPIR ULTRSND BSC -E: Performed by: INTERNAL MEDICINE

## 2020-11-18 PROCEDURE — 88172 CYTP DX EVAL FNA 1ST EA SITE: CPT

## 2020-11-18 PROCEDURE — 74011250636 HC RX REV CODE- 250/636: Performed by: NURSE ANESTHETIST, CERTIFIED REGISTERED

## 2020-11-18 PROCEDURE — 76040000007: Performed by: INTERNAL MEDICINE

## 2020-11-18 PROCEDURE — 76060000032 HC ANESTHESIA 0.5 TO 1 HR: Performed by: INTERNAL MEDICINE

## 2020-11-18 PROCEDURE — 88305 TISSUE EXAM BY PATHOLOGIST: CPT

## 2020-11-18 RX ORDER — MIDAZOLAM HYDROCHLORIDE 1 MG/ML
.25-5 INJECTION, SOLUTION INTRAMUSCULAR; INTRAVENOUS
Status: DISCONTINUED | OUTPATIENT
Start: 2020-11-18 | End: 2020-11-18 | Stop reason: HOSPADM

## 2020-11-18 RX ORDER — ATROPINE SULFATE 0.1 MG/ML
0.4 INJECTION INTRAVENOUS
Status: DISCONTINUED | OUTPATIENT
Start: 2020-11-18 | End: 2020-11-18 | Stop reason: HOSPADM

## 2020-11-18 RX ORDER — DEXTROMETHORPHAN/PSEUDOEPHED 2.5-7.5/.8
1.2 DROPS ORAL
Status: DISCONTINUED | OUTPATIENT
Start: 2020-11-18 | End: 2020-11-18 | Stop reason: HOSPADM

## 2020-11-18 RX ORDER — EPINEPHRINE 0.1 MG/ML
1 INJECTION INTRACARDIAC; INTRAVENOUS
Status: DISCONTINUED | OUTPATIENT
Start: 2020-11-18 | End: 2020-11-18 | Stop reason: HOSPADM

## 2020-11-18 RX ORDER — FLUMAZENIL 0.1 MG/ML
0.2 INJECTION INTRAVENOUS
Status: DISCONTINUED | OUTPATIENT
Start: 2020-11-18 | End: 2020-11-18 | Stop reason: HOSPADM

## 2020-11-18 RX ORDER — PROPOFOL 10 MG/ML
INJECTION, EMULSION INTRAVENOUS
Status: DISCONTINUED | OUTPATIENT
Start: 2020-11-18 | End: 2020-11-18 | Stop reason: HOSPADM

## 2020-11-18 RX ORDER — NALOXONE HYDROCHLORIDE 0.4 MG/ML
0.4 INJECTION, SOLUTION INTRAMUSCULAR; INTRAVENOUS; SUBCUTANEOUS
Status: DISCONTINUED | OUTPATIENT
Start: 2020-11-18 | End: 2020-11-18 | Stop reason: HOSPADM

## 2020-11-18 RX ORDER — SODIUM CHLORIDE 9 MG/ML
50 INJECTION, SOLUTION INTRAVENOUS CONTINUOUS
Status: DISCONTINUED | OUTPATIENT
Start: 2020-11-18 | End: 2020-11-18 | Stop reason: HOSPADM

## 2020-11-18 RX ADMIN — PROPOFOL 200 MCG/KG/MIN: 10 INJECTION, EMULSION INTRAVENOUS at 13:12

## 2020-11-18 RX ADMIN — SODIUM CHLORIDE 50 ML/HR: 900 INJECTION, SOLUTION INTRAVENOUS at 12:40

## 2020-11-18 NOTE — H&P
Meka Rosas M.D.  [phone]            History and Physical       NAME:  Larue Merlin   :   1960   MRN:   [id number]       Referring Physician:  Dr. Moni Marley Date: 2020 12:57 PM    Chief Complaint:  Pancreas mass    History of Present Illness:  Patient is a 64 y.o. who is seen for abnormal CT scan showing mass in the pancreas body. PMH:  Past Medical History:   Diagnosis Date    Abscess of knee, right 2019    Gout     History of MRSA infection 2019    In abscess of right knee    Pancreatic mass     Psoriasis     Tobacco abuse        PSH:  Past Surgical History:   Procedure Laterality Date    HX ORTHOPAEDIC      femur repair    HX ORTHOPAEDIC      lumbar compressed fracture       Allergies:  No Known Allergies    Home Medications:  Prior to Admission Medications   Prescriptions Last Dose Informant Patient Reported? Taking?   oxyCODONE-acetaminophen (Percocet) 5-325 mg per tablet   No No   Sig: Take 1 Tab by mouth every four (4) hours as needed for Pain for up to 7 days. Max Daily Amount: 6 Tabs. senna-docusate (PERICOLACE) 8.6-50 mg per tablet   No No   Sig: Take 2 Tabs by mouth daily.       Facility-Administered Medications: None       Hospital Medications:  Current Facility-Administered Medications   Medication Dose Route Frequency    0.9% sodium chloride infusion  50 mL/hr IntraVENous CONTINUOUS    midazolam (VERSED) injection 0.25-5 mg  0.25-5 mg IntraVENous Multiple    naloxone (NARCAN) injection 0.4 mg  0.4 mg IntraVENous Multiple    flumazeniL (ROMAZICON) 0.1 mg/mL injection 0.2 mg  0.2 mg IntraVENous Multiple    simethicone (MYLICON) 77LL/8.5WF oral drops 80 mg  1.2 mL Oral Multiple    atropine injection 0.4 mg  0.4 mg IntraVENous ONCE PRN    EPINEPHrine (ADRENALIN) 0.1 mg/mL syringe 1 mg  1 mg Endoscopically ONCE PRN       Social History:  Social History     Tobacco Use    Smoking status: Current Every Day Smoker Packs/day: 1.00    Smokeless tobacco: Never Used    Tobacco comment: last week was last cigarette   Substance Use Topics    Alcohol use: Yes       Family History:  Family History   Problem Relation Age of Onset    Pancreatic Cancer Brother              Review of Systems:      Constitutional: negative fever, negative chills, negative weight loss  Eyes:   negative visual changes  ENT:   negative sore throat, tongue or lip swelling  Respiratory:  negative cough, negative dyspnea  Cards:  negative for chest pain, palpitations, lower extremity edema  GI:   See HPI  :  negative for frequency, dysuria  Integument:  negative for rash and pruritus  Heme:  negative for easy bruising and gum/nose bleeding  Musculoskel: negative for myalgias,  back pain and muscle weakness  Neuro: negative for headaches, dizziness, vertigo  Psych:  negative for feelings of anxiety, depression       Objective:     Patient Vitals for the past 8 hrs:   BP Temp Pulse Resp SpO2 Height Weight   11/18/20 1226 118/87 97.7 °F (36.5 °C) 82 12 100 % 5' 10\" (1.778 m) 57.3 kg (126 lb 5.2 oz)     No intake/output data recorded. No intake/output data recorded. EXAM:     NEURO-a&o   HEENT-wnl   LUNGS-clear    COR-regular rate and rhythym     ABD-soft , no tenderness, no rebound, good bs     EXT-no edema     Data Review     No results for input(s): WBC, HGB, HCT, PLT, HGBEXT, HCTEXT, PLTEXT in the last 72 hours. No results for input(s): NA, K, CL, CO2, BUN, CREA, GLU, PHOS, CA in the last 72 hours. No results for input(s): AP, TBIL, TP, ALB, GLOB, GGT, AML, LPSE in the last 72 hours. No lab exists for component: SGOT, GPT, AMYP, HLPSE  No results for input(s): INR, PTP, APTT, INREXT in the last 72 hours.     Patient Active Problem List   Diagnosis Code    Pancreatic mass K86.89    Abdominal lymphadenopathy R59.0    Acute abdominal pain R10.9      Assessment:   · Mass in the body of the pancreas, concerning for malignancy   Plan:   · EUS with FNA today.      Signed By: Ying Pryor MD     11/18/2020  12:57 PM

## 2020-11-18 NOTE — PERIOP NOTES
1312 Procedure being performed under MAC; REJI Love at bedside monitoring patient. See anesthesia notes. 1400 Endoscope was pre-cleaned at bedside immediately following procedure by Sammie.    1405 Patient received Propofol, per REJI Love. Care of patient assumed from the anesthesia provider. Patient tolerated procedure well. Abdomen remains soft and non tender post procedure, no complaints or indication of discomfort noted at this time. See anesthesia note. Patient transferred to Endoscopy Recovery and report given to recovery nurse.

## 2020-11-18 NOTE — PROCEDURES
Yanet Park M.D.  [phone]       2020                EUS Operative Report  Dwain Guadalupe  :  1960  Tonny Zuñiga Medical Record Number:  [id number]      Procedure Type: EUS with FNB      Indications: Abnormal CT scan showing mass in the body of the pancreas    Pre-operative Diagnosis: see indication above    Post-operative Diagnosis:  See findings below    : Barb Agarwal MD    Referring Provider: -Vonnie Stratton MD    Procedure Details:    Exam:  Airway: clear, no airway problems anticipated  Heart: RRR, without gallops or rubs  Lungs: clear bilaterally without wheezes, crackles, or rhonchi  Abdomen: soft, nontender, nondistended, bowel sounds present  Mental Status: awake, alert and oriented to person, place and time     Anethesia/Sedation:  MAC anesthesia      Procedure Details   After infom consent was obtained for the procedure, with all risks and benefits of procedure explained the patient was taken to the endoscopy suite and placed in the left lateral decubitus position. Following sequential administration of sedation as per above, the linear echoendoscope was inserted into the mouth and advanced under direct vision to second portion of the duodenum. A careful inspection was made as the gastroscope was withdrawn, including a retroflexed view of the proximal stomach; findings and interventions are described below. Findings:     Endoscopic:   Esophagus:normal   Stomach: normal    Duodenum/jejunum: normal    Ultrasound:   Esophagus: normal findings   Stomach: normal findings   Pancreas:     Areas examined: the entire gland    Parenchyma: -Evidence of a hypoechoic mass seen in the body close to the neck with anechoic areas, measuring about 2.8 cm in longest diameter. It involves the splenic vessels and abutting the SMA.      Pancreatic Duct: prominent but not dilated going through the mass   Liver:     Parenchyma: normal in the left lobe of the liver    Gallbladder: normal    Bile Duct: Not dilated               Lymph Node: left periaortic node over 1 cm noted        Specimen Removed:  Biopsy of  the body of the pancreas    Complications: None. EBL:  None. Interventions: Fine needle aspirate for biopsy of  pancreas  using a 22 gauge needle with 5 of passes with preliminary results suggesting malignancy      Impression:     Mass in the body of the pancreas noted abutting the SMA.    Preliminary fine needle biopsy revealed adenocarcinoma    Recommendations:     - Follow-up with Dr. Ingrid Ann for further treatment and management  - Follow-up with Dr. Florecita Barroso as needed      Leopoldo Villagomez MD

## 2020-11-18 NOTE — ROUTINE PROCESS
Justo Banks  1960  [id number]    Situation:  Verbal report received from: Jody Jacobs   Procedure: Procedure(s):  ENDOSCOPIC ULTRASOUND (EUS)    Background:    Preoperative diagnosis: abnormal CT  Postoperative diagnosis: * No post-op diagnosis entered *    :  Dr. Marleni Hughes  Assistant(s): Endoscopy Technician-1: Hector Schultz  Endoscopy RN-1: Siomara Willard RN    Specimens: * No specimens in log *  H. Pylori  no    Assessment:  Intra-procedure medications   Anesthesia gave intra-procedure sedation and medications, see anesthesia flow sheet yes    Intravenous fluids: NS@ KVO     Vital signs stable     Abdominal assessment: round and soft     Recommendation:  Discharge patient per MD order. Family Sister Elva Hope  Permission to share finding with family or friend yes    Endoscopy discharge instructions have been reviewed and given to patient. The patient verbalized understanding and acceptance of instructions. Dr. Marleni Hughes discussed with patient and Sister procedure findings and next steps.

## 2020-11-18 NOTE — DISCHARGE INSTRUCTIONS
[facility]  [address]    Endoscopic ultrasound DISCHARGE INSTRUCTIONS    2020      Jennifer Garcia  :  1960  Vijaya Medical Record Number:  [id number]    DISCOMFORT  Sore throat- throat lozenges or warm salt water gargle  redness at IV site- apply warm compress to area; if redness or soreness persist- contact your physician  Gaseous discomfort- walking, belching will help relieve any discomfort  You may not operate a vehicle for 12 hours  You may not engage in an occupation involving machinery or appliances for rest of today  You may not drink alcoholic beverages for at least 12 hours  Avoid making any critical decisions for at least 24 hour  DIET  You may eat and drink now and after you leave. You may resume your regular diet - however -  remember your colon is empty and a heavy meal will produce gas. Avoid these foods:  vegetables, fried / greasy foods, carbonated drinks    ACTIVITY  You may resume your normal daily activities   Spend the remainder of the day resting -  avoid any strenuous activity. CALL M.D. ANY SIGN OF   Increasing pain, nausea, vomiting  Abdominal distension (swelling)  New increased bleeding (oral or rectal)  Fever (chills)  Pain in chest area  Bloody discharge from nose or mouth  Shortness of breath    FOLLOW UP INSTRUCTIONS   Call Dr. Emily Torres for any questions or problems. Telephone # [phone]    ENDOSCOPY FINDINGS   Your endoscopy showed the mass in the body of the pancreas, preliminary reading on fine needle biopsy revealed adenocarcinoma. Follow-up with Dr. Rosie Moreno, will inform you of final pathology and follow-up with Dr. Estefania Ugalde as needed.     Signed By: Oniel Hicks MD   2020  2:19 PM

## 2020-11-18 NOTE — ANESTHESIA PREPROCEDURE EVALUATION
Anesthetic History No history of anesthetic complications Review of Systems / Medical History Patient summary reviewed and pertinent labs reviewed Pulmonary Smoker Neuro/Psych Within defined limits Cardiovascular Within defined limits GI/Hepatic/Renal 
  
 
 
 
 
 
Comments: pancreativ mass, possible cancer 
chronic abd pain Endo/Other Within defined limits Other Findings Comments: gout Physical Exam 
 
Airway Mallampati: II 
 
 
Mouth opening: Normal 
 
 Cardiovascular Rhythm: regular Rate: normal 
 
 
 
 Dental 
 
Dentition: Poor dentition Pulmonary Breath sounds clear to auscultation Abdominal 
GI exam deferred Other Findings Anesthetic Plan ASA: 2 Anesthesia type: MAC Induction: Intravenous Anesthetic plan and risks discussed with: Patient

## 2020-11-22 ENCOUNTER — HOSPITAL ENCOUNTER (INPATIENT)
Age: 60
LOS: 2 days | Discharge: HOME | DRG: 197 | End: 2020-11-24
Attending: EMERGENCY MEDICINE | Admitting: FAMILY MEDICINE
Payer: MEDICAID

## 2020-11-22 ENCOUNTER — APPOINTMENT (OUTPATIENT)
Dept: GENERAL RADIOLOGY | Age: 60
DRG: 197 | End: 2020-11-22
Attending: PHYSICIAN ASSISTANT
Payer: MEDICAID

## 2020-11-22 ENCOUNTER — APPOINTMENT (OUTPATIENT)
Dept: CT IMAGING | Age: 60
DRG: 197 | End: 2020-11-22
Attending: EMERGENCY MEDICINE
Payer: MEDICAID

## 2020-11-22 DIAGNOSIS — K85.80 OTHER ACUTE PANCREATITIS, UNSPECIFIED COMPLICATION STATUS: Primary | ICD-10-CM

## 2020-11-22 DIAGNOSIS — G89.3 NEOPLASM RELATED PAIN: ICD-10-CM

## 2020-11-22 DIAGNOSIS — R10.9 ACUTE ABDOMINAL PAIN: ICD-10-CM

## 2020-11-22 DIAGNOSIS — R10.13 ABDOMINAL PAIN, EPIGASTRIC: ICD-10-CM

## 2020-11-22 DIAGNOSIS — I82.890 SPLENIC VEIN THROMBOSIS: ICD-10-CM

## 2020-11-22 DIAGNOSIS — Z80.0 FAMILY HISTORY OF MALIGNANT NEOPLASM OF PANCREAS: ICD-10-CM

## 2020-11-22 DIAGNOSIS — C25.9 MALIGNANT NEOPLASM OF PANCREAS, UNSPECIFIED LOCATION OF MALIGNANCY (HCC): ICD-10-CM

## 2020-11-22 DIAGNOSIS — R59.0 ABDOMINAL LYMPHADENOPATHY: ICD-10-CM

## 2020-11-22 DIAGNOSIS — K86.3 PSEUDOCYST, PANCREAS: ICD-10-CM

## 2020-11-22 PROBLEM — K85.90 PANCREATITIS: Status: ACTIVE | Noted: 2020-11-22

## 2020-11-22 LAB
ALBUMIN SERPL-MCNC: 3.5 G/DL (ref 3.5–5)
ALBUMIN/GLOB SERPL: 0.8 {RATIO} (ref 1.1–2.2)
ALP SERPL-CCNC: 74 U/L (ref 45–117)
ALT SERPL-CCNC: 30 U/L (ref 12–78)
ANION GAP SERPL CALC-SCNC: 4 MMOL/L (ref 5–15)
AST SERPL-CCNC: 10 U/L (ref 15–37)
BASOPHILS # BLD: 0.1 K/UL (ref 0–0.1)
BASOPHILS NFR BLD: 1 % (ref 0–1)
BILIRUB SERPL-MCNC: 0.5 MG/DL (ref 0.2–1)
BUN SERPL-MCNC: 17 MG/DL (ref 6–20)
BUN/CREAT SERPL: 16 (ref 12–20)
CALCIUM SERPL-MCNC: 9 MG/DL (ref 8.5–10.1)
CHLORIDE SERPL-SCNC: 101 MMOL/L (ref 97–108)
CO2 SERPL-SCNC: 30 MMOL/L (ref 21–32)
COMMENT, HOLDF: NORMAL
CREAT SERPL-MCNC: 1.09 MG/DL (ref 0.7–1.3)
DIFFERENTIAL METHOD BLD: NORMAL
EOSINOPHIL # BLD: 0.2 K/UL (ref 0–0.4)
EOSINOPHIL NFR BLD: 2 % (ref 0–7)
ERYTHROCYTE [DISTWIDTH] IN BLOOD BY AUTOMATED COUNT: 13.1 % (ref 11.5–14.5)
GLOBULIN SER CALC-MCNC: 4.5 G/DL (ref 2–4)
GLUCOSE SERPL-MCNC: 103 MG/DL (ref 65–100)
HCT VFR BLD AUTO: 42.6 % (ref 36.6–50.3)
HGB BLD-MCNC: 15 G/DL (ref 12.1–17)
IMM GRANULOCYTES # BLD AUTO: 0 K/UL (ref 0–0.04)
IMM GRANULOCYTES NFR BLD AUTO: 0 % (ref 0–0.5)
LIPASE SERPL-CCNC: 112 U/L (ref 73–393)
LYMPHOCYTES # BLD: 1.6 K/UL (ref 0.8–3.5)
LYMPHOCYTES NFR BLD: 16 % (ref 12–49)
MCH RBC QN AUTO: 31.4 PG (ref 26–34)
MCHC RBC AUTO-ENTMCNC: 35.2 G/DL (ref 30–36.5)
MCV RBC AUTO: 89.3 FL (ref 80–99)
MONOCYTES # BLD: 0.9 K/UL (ref 0–1)
MONOCYTES NFR BLD: 10 % (ref 5–13)
NEUTS SEG # BLD: 6.9 K/UL (ref 1.8–8)
NEUTS SEG NFR BLD: 71 % (ref 32–75)
NRBC # BLD: 0 K/UL (ref 0–0.01)
NRBC BLD-RTO: 0 PER 100 WBC
PLATELET # BLD AUTO: 280 K/UL (ref 150–400)
PMV BLD AUTO: 9.4 FL (ref 8.9–12.9)
POTASSIUM SERPL-SCNC: 4 MMOL/L (ref 3.5–5.1)
PROT SERPL-MCNC: 8 G/DL (ref 6.4–8.2)
RBC # BLD AUTO: 4.77 M/UL (ref 4.1–5.7)
SAMPLES BEING HELD,HOLD: NORMAL
SODIUM SERPL-SCNC: 135 MMOL/L (ref 136–145)
WBC # BLD AUTO: 9.6 K/UL (ref 4.1–11.1)

## 2020-11-22 PROCEDURE — 74019 RADEX ABDOMEN 2 VIEWS: CPT

## 2020-11-22 PROCEDURE — 83690 ASSAY OF LIPASE: CPT

## 2020-11-22 PROCEDURE — 36415 COLL VENOUS BLD VENIPUNCTURE: CPT

## 2020-11-22 PROCEDURE — 74011000636 HC RX REV CODE- 636: Performed by: RADIOLOGY

## 2020-11-22 PROCEDURE — 80053 COMPREHEN METABOLIC PANEL: CPT

## 2020-11-22 PROCEDURE — 85025 COMPLETE CBC W/AUTO DIFF WBC: CPT

## 2020-11-22 PROCEDURE — 74177 CT ABD & PELVIS W/CONTRAST: CPT

## 2020-11-22 PROCEDURE — 65270000029 HC RM PRIVATE

## 2020-11-22 PROCEDURE — 74011250636 HC RX REV CODE- 250/636: Performed by: FAMILY MEDICINE

## 2020-11-22 PROCEDURE — 96374 THER/PROPH/DIAG INJ IV PUSH: CPT

## 2020-11-22 PROCEDURE — 74011250636 HC RX REV CODE- 250/636: Performed by: EMERGENCY MEDICINE

## 2020-11-22 PROCEDURE — 99284 EMERGENCY DEPT VISIT MOD MDM: CPT

## 2020-11-22 RX ORDER — ONDANSETRON 2 MG/ML
4 INJECTION INTRAMUSCULAR; INTRAVENOUS
Status: DISCONTINUED | OUTPATIENT
Start: 2020-11-22 | End: 2020-11-24 | Stop reason: HOSPADM

## 2020-11-22 RX ORDER — HYDROMORPHONE HYDROCHLORIDE 2 MG/ML
1 INJECTION, SOLUTION INTRAMUSCULAR; INTRAVENOUS; SUBCUTANEOUS
Status: DISCONTINUED | OUTPATIENT
Start: 2020-11-22 | End: 2020-11-24 | Stop reason: HOSPADM

## 2020-11-22 RX ORDER — POLYETHYLENE GLYCOL 3350 17 G/17G
17 POWDER, FOR SOLUTION ORAL DAILY PRN
Status: DISCONTINUED | OUTPATIENT
Start: 2020-11-22 | End: 2020-11-23

## 2020-11-22 RX ORDER — ENOXAPARIN SODIUM 100 MG/ML
40 INJECTION SUBCUTANEOUS DAILY
Status: DISCONTINUED | OUTPATIENT
Start: 2020-11-23 | End: 2020-11-23

## 2020-11-22 RX ORDER — SODIUM CHLORIDE 0.9 % (FLUSH) 0.9 %
5-40 SYRINGE (ML) INJECTION AS NEEDED
Status: DISCONTINUED | OUTPATIENT
Start: 2020-11-22 | End: 2020-11-24 | Stop reason: HOSPADM

## 2020-11-22 RX ORDER — ACETAMINOPHEN 325 MG/1
650 TABLET ORAL
Status: DISCONTINUED | OUTPATIENT
Start: 2020-11-22 | End: 2020-11-24 | Stop reason: HOSPADM

## 2020-11-22 RX ORDER — HYDROMORPHONE HYDROCHLORIDE 1 MG/ML
1 INJECTION, SOLUTION INTRAMUSCULAR; INTRAVENOUS; SUBCUTANEOUS
Status: COMPLETED | OUTPATIENT
Start: 2020-11-22 | End: 2020-11-22

## 2020-11-22 RX ORDER — SODIUM CHLORIDE 0.9 % (FLUSH) 0.9 %
5-40 SYRINGE (ML) INJECTION EVERY 8 HOURS
Status: DISCONTINUED | OUTPATIENT
Start: 2020-11-22 | End: 2020-11-24 | Stop reason: HOSPADM

## 2020-11-22 RX ORDER — OXYCODONE HYDROCHLORIDE 5 MG/1
10 TABLET ORAL
Status: DISCONTINUED | OUTPATIENT
Start: 2020-11-22 | End: 2020-11-24 | Stop reason: HOSPADM

## 2020-11-22 RX ORDER — OXYCODONE AND ACETAMINOPHEN 5; 325 MG/1; MG/1
1 TABLET ORAL
COMMUNITY
End: 2020-01-01 | Stop reason: SDUPTHER

## 2020-11-22 RX ORDER — SODIUM CHLORIDE 9 MG/ML
100 INJECTION, SOLUTION INTRAVENOUS CONTINUOUS
Status: DISCONTINUED | OUTPATIENT
Start: 2020-11-22 | End: 2020-11-24

## 2020-11-22 RX ADMIN — HYDROMORPHONE HYDROCHLORIDE 1 MG: 1 INJECTION, SOLUTION INTRAMUSCULAR; INTRAVENOUS; SUBCUTANEOUS at 17:49

## 2020-11-22 RX ADMIN — HYDROMORPHONE HYDROCHLORIDE 1 MG: 2 INJECTION, SOLUTION INTRAMUSCULAR; INTRAVENOUS; SUBCUTANEOUS at 22:18

## 2020-11-22 RX ADMIN — SODIUM CHLORIDE 1000 ML: 900 INJECTION, SOLUTION INTRAVENOUS at 17:48

## 2020-11-22 RX ADMIN — SODIUM CHLORIDE 100 ML/HR: 900 INJECTION, SOLUTION INTRAVENOUS at 20:54

## 2020-11-22 RX ADMIN — Medication 10 ML: at 21:29

## 2020-11-22 RX ADMIN — IOPAMIDOL 100 ML: 755 INJECTION, SOLUTION INTRAVENOUS at 17:35

## 2020-11-22 NOTE — ED TRIAGE NOTES
Patient arrives with complaint of mid-abdominal pain, and decreased appetite, and constipation. Patient states that he was recently diagnosed with pancreatic cancer this month. Reports that he was prescribed Oxycodone, last taken yesterday, but reports minimal relief.

## 2020-11-22 NOTE — ED PROVIDER NOTES
Mr. Alexandria Petersen is a 82-year-old man who presents to the ER with complaints of abdominal pain and difficulty eating. He states he was just recently diagnosed with pancreatic cancer. He states that he had the biopsy results several days ago which confirmed was cancer. He has his first visit with oncology in 2 days. He was given oxycodone for pain and another pain medicine that he does not member the name of. He stopped taking the second medicine but has taken oxycodone. He took his last oxycodone last night. He said his pain is been worse today. He has not been able to eat much over the last 2 days. His pain is located in his mid abdomen and describes it as severe. He denies fevers or chills. No nausea or vomiting. No changes with his urine or bowel movements. No chest pain or trouble breathing. No runny nose, sore throat, cough. He denies any other complaints. Past Medical History:  
Diagnosis Date  Abscess of knee, right 08/21/2019  Gout  History of MRSA infection 08/21/2019 In abscess of right knee  Pancreatic mass  Psoriasis  Tobacco abuse Past Surgical History:  
Procedure Laterality Date  HX ORTHOPAEDIC    
 femur repair  HX ORTHOPAEDIC    
 lumbar compressed fracture Family History:  
Problem Relation Age of Onset  Pancreatic Cancer Brother Social History Socioeconomic History  Marital status: SINGLE Spouse name: Not on file  Number of children: Not on file  Years of education: Not on file  Highest education level: Not on file Occupational History  Not on file Social Needs  Financial resource strain: Not on file  Food insecurity Worry: Not on file Inability: Not on file  Transportation needs Medical: Not on file Non-medical: Not on file Tobacco Use  Smoking status: Current Every Day Smoker Packs/day: 1.00  Smokeless tobacco: Never Used  Tobacco comment: last week was last cigarette Substance and Sexual Activity  Alcohol use: Yes  Drug use: Not on file  Sexual activity: Not on file Lifestyle  Physical activity Days per week: Not on file Minutes per session: Not on file  Stress: Not on file Relationships  Social connections Talks on phone: Not on file Gets together: Not on file Attends Jain service: Not on file Active member of club or organization: Not on file Attends meetings of clubs or organizations: Not on file Relationship status: Not on file  Intimate partner violence Fear of current or ex partner: Not on file Emotionally abused: Not on file Physically abused: Not on file Forced sexual activity: Not on file Other Topics Concern  Not on file Social History Narrative  Not on file ALLERGIES: Patient has no known allergies. Review of Systems Constitutional: Negative for chills and fever. HENT: Negative for rhinorrhea and sore throat. Respiratory: Negative for cough and shortness of breath. Cardiovascular: Negative for chest pain. Gastrointestinal: Positive for abdominal pain. Negative for diarrhea, nausea and vomiting. Genitourinary: Negative for dysuria and hematuria. Musculoskeletal: Negative for arthralgias and myalgias. Skin: Negative for pallor and rash. Neurological: Negative for dizziness, weakness and light-headedness. All other systems reviewed and are negative. Vitals:  
 11/22/20 1344 BP: 95/71 Pulse: (!) 109 Resp: 20 Temp: 97.7 °F (36.5 °C) SpO2: 100% Weight: 54.7 kg (120 lb 9.5 oz) Height: 5' 10\" (1.778 m) Physical Exam  
 
Vital signs reviewed. Nursing notes reviewed. Const:  No acute distress, well developed, well nourished Head:  Atraumatic, normocephalic Eyes:  PERRL, conjunctiva normal, no scleral icterus Neck:  Supple, trachea midline Cardiovascular: Regular rate Resp:  No resp distress, no increased work of breathing Abd:  Soft, diffuse abdominal tenderness worse in the periumbilical region, non-distended, no rebound, no guarding MSK:  No pedal edema, normal ROM Neuro:  Alert and oriented x3, no cranial nerve defect Skin:  Warm, dry, intact Psych: normal mood and affect, behavior is normal, judgement and thought content is normal 
 
 
 
 
MDM Number of Diagnoses or Management Options Amount and/or Complexity of Data Reviewed Clinical lab tests: ordered and reviewed Tests in the radiology section of CPT®: ordered and reviewed Review and summarize past medical records: yes Patient Progress Patient progress: stable Procedures Perfect Serve Consult for Admission 7:09 PM 
 
ED Room Number: TK56/95 Patient Name and age:  Lieutenant Cooney 64 y.o.  male Working Diagnosis: 1. Other acute pancreatitis, unspecified complication status 2. Pseudocyst, pancreas 3. Malignant neoplasm of pancreas, unspecified location of malignancy ([address] 4. Abdominal pain, epigastric COVID-19 Suspicion:  no 
Sepsis present:  no  Reassessment needed: no 
Code Status:  Full Code Readmission: yes Isolation Requirements:  no 
Recommended Level of Care:  med/surg Department:[facility] ED - [phone] Mr. Daniela Lopez is a 65yo male who presents to the ER with worsening abdominal pain. CT shows worsening pancreatic cancer and pancreatitis with pseudocyst.  Pt.  To be evaluated for admission by the hospitalist.

## 2020-11-23 PROBLEM — K86.89 PANCREATIC MASS: Status: RESOLVED | Noted: 2020-11-11 | Resolved: 2020-11-23

## 2020-11-23 PROBLEM — I82.890 SPLENIC VEIN THROMBOSIS: Status: ACTIVE | Noted: 2020-11-23

## 2020-11-23 PROBLEM — K85.90 PANCREATITIS: Status: RESOLVED | Noted: 2020-11-22 | Resolved: 2020-11-23

## 2020-11-23 PROBLEM — C78.6 PERITONEAL CARCINOMATOSIS (HCC): Status: ACTIVE | Noted: 2020-11-23

## 2020-11-23 LAB
ALBUMIN SERPL-MCNC: 2.7 G/DL (ref 3.5–5)
ALBUMIN/GLOB SERPL: 0.8 {RATIO} (ref 1.1–2.2)
ALP SERPL-CCNC: 56 U/L (ref 45–117)
ALT SERPL-CCNC: 21 U/L (ref 12–78)
ANION GAP SERPL CALC-SCNC: 5 MMOL/L (ref 5–15)
AST SERPL-CCNC: 8 U/L (ref 15–37)
BILIRUB SERPL-MCNC: 0.4 MG/DL (ref 0.2–1)
BUN SERPL-MCNC: 14 MG/DL (ref 6–20)
BUN/CREAT SERPL: 18 (ref 12–20)
CALCIUM SERPL-MCNC: 7.8 MG/DL (ref 8.5–10.1)
CHLORIDE SERPL-SCNC: 107 MMOL/L (ref 97–108)
CO2 SERPL-SCNC: 27 MMOL/L (ref 21–32)
COMMENT, HOLDF: NORMAL
CREAT SERPL-MCNC: 0.79 MG/DL (ref 0.7–1.3)
ERYTHROCYTE [DISTWIDTH] IN BLOOD BY AUTOMATED COUNT: 13.1 % (ref 11.5–14.5)
GLOBULIN SER CALC-MCNC: 3.5 G/DL (ref 2–4)
GLUCOSE SERPL-MCNC: 82 MG/DL (ref 65–100)
HCT VFR BLD AUTO: 37.8 % (ref 36.6–50.3)
HGB BLD-MCNC: 12.8 G/DL (ref 12.1–17)
LIPASE SERPL-CCNC: 115 U/L (ref 73–393)
MCH RBC QN AUTO: 30.8 PG (ref 26–34)
MCHC RBC AUTO-ENTMCNC: 33.9 G/DL (ref 30–36.5)
MCV RBC AUTO: 91.1 FL (ref 80–99)
NRBC # BLD: 0 K/UL (ref 0–0.01)
NRBC BLD-RTO: 0 PER 100 WBC
PLATELET # BLD AUTO: 200 K/UL (ref 150–400)
PMV BLD AUTO: 9.3 FL (ref 8.9–12.9)
POTASSIUM SERPL-SCNC: 3.6 MMOL/L (ref 3.5–5.1)
PROT SERPL-MCNC: 6.2 G/DL (ref 6.4–8.2)
RBC # BLD AUTO: 4.15 M/UL (ref 4.1–5.7)
SAMPLES BEING HELD,HOLD: NORMAL
SODIUM SERPL-SCNC: 139 MMOL/L (ref 136–145)
WBC # BLD AUTO: 6.1 K/UL (ref 4.1–11.1)

## 2020-11-23 PROCEDURE — 99222 1ST HOSP IP/OBS MODERATE 55: CPT | Performed by: INTERNAL MEDICINE

## 2020-11-23 PROCEDURE — 83690 ASSAY OF LIPASE: CPT

## 2020-11-23 PROCEDURE — 74011250636 HC RX REV CODE- 250/636: Performed by: FAMILY MEDICINE

## 2020-11-23 PROCEDURE — 85027 COMPLETE CBC AUTOMATED: CPT

## 2020-11-23 PROCEDURE — 74011250637 HC RX REV CODE- 250/637: Performed by: NURSE PRACTITIONER

## 2020-11-23 PROCEDURE — 36415 COLL VENOUS BLD VENIPUNCTURE: CPT

## 2020-11-23 PROCEDURE — 65270000029 HC RM PRIVATE

## 2020-11-23 PROCEDURE — 74011250637 HC RX REV CODE- 250/637: Performed by: INTERNAL MEDICINE

## 2020-11-23 PROCEDURE — 74011250637 HC RX REV CODE- 250/637: Performed by: FAMILY MEDICINE

## 2020-11-23 PROCEDURE — 80053 COMPREHEN METABOLIC PANEL: CPT

## 2020-11-23 RX ORDER — ATROPINE SULFATE 0.4 MG/ML
0.4 INJECTION, SOLUTION ENDOTRACHEAL; INTRAMEDULLARY; INTRAMUSCULAR; INTRAVENOUS; SUBCUTANEOUS
Status: CANCELLED | OUTPATIENT
Start: 2020-11-30

## 2020-11-23 RX ORDER — ACETAMINOPHEN 325 MG/1
650 TABLET ORAL AS NEEDED
Status: CANCELLED
Start: 2020-11-30

## 2020-11-23 RX ORDER — SODIUM CHLORIDE 0.9 % (FLUSH) 0.9 %
10 SYRINGE (ML) INJECTION AS NEEDED
Status: CANCELLED | OUTPATIENT
Start: 2020-12-02

## 2020-11-23 RX ORDER — SODIUM CHLORIDE 9 MG/ML
10 INJECTION INTRAMUSCULAR; INTRAVENOUS; SUBCUTANEOUS AS NEEDED
Status: CANCELLED | OUTPATIENT
Start: 2020-12-02

## 2020-11-23 RX ORDER — DEXTROSE MONOHYDRATE 50 MG/ML
25 INJECTION, SOLUTION INTRAVENOUS CONTINUOUS
Status: CANCELLED
Start: 2020-11-30

## 2020-11-23 RX ORDER — ATROPINE SULFATE 0.4 MG/ML
0.4 INJECTION, SOLUTION ENDOTRACHEAL; INTRAMEDULLARY; INTRAMUSCULAR; INTRAVENOUS; SUBCUTANEOUS ONCE
Status: CANCELLED | OUTPATIENT
Start: 2020-11-30

## 2020-11-23 RX ORDER — SODIUM CHLORIDE 9 MG/ML
10 INJECTION INTRAMUSCULAR; INTRAVENOUS; SUBCUTANEOUS AS NEEDED
Status: CANCELLED | OUTPATIENT
Start: 2020-11-30

## 2020-11-23 RX ORDER — POLYETHYLENE GLYCOL 3350 17 G/17G
17 POWDER, FOR SOLUTION ORAL DAILY
Status: DISCONTINUED | OUTPATIENT
Start: 2020-11-23 | End: 2020-11-24 | Stop reason: HOSPADM

## 2020-11-23 RX ORDER — ALBUTEROL SULFATE 0.83 MG/ML
2.5 SOLUTION RESPIRATORY (INHALATION) AS NEEDED
Status: CANCELLED
Start: 2020-11-30

## 2020-11-23 RX ORDER — HYDROCORTISONE SODIUM SUCCINATE 100 MG/2ML
100 INJECTION, POWDER, FOR SOLUTION INTRAMUSCULAR; INTRAVENOUS AS NEEDED
Status: CANCELLED | OUTPATIENT
Start: 2020-11-30

## 2020-11-23 RX ORDER — ENOXAPARIN SODIUM 100 MG/ML
1 INJECTION SUBCUTANEOUS EVERY 12 HOURS
Status: DISCONTINUED | OUTPATIENT
Start: 2020-11-23 | End: 2020-11-23

## 2020-11-23 RX ORDER — AMOXICILLIN 250 MG
1 CAPSULE ORAL 2 TIMES DAILY
Status: DISCONTINUED | OUTPATIENT
Start: 2020-11-23 | End: 2020-11-24 | Stop reason: HOSPADM

## 2020-11-23 RX ORDER — HEPARIN 100 UNIT/ML
300-500 SYRINGE INTRAVENOUS AS NEEDED
Status: CANCELLED
Start: 2020-11-30

## 2020-11-23 RX ORDER — HEPARIN 100 UNIT/ML
300-500 SYRINGE INTRAVENOUS AS NEEDED
Status: CANCELLED
Start: 2020-12-02

## 2020-11-23 RX ORDER — ONDANSETRON 2 MG/ML
8 INJECTION INTRAMUSCULAR; INTRAVENOUS AS NEEDED
Status: CANCELLED | OUTPATIENT
Start: 2020-11-30

## 2020-11-23 RX ORDER — DIPHENHYDRAMINE HYDROCHLORIDE 50 MG/ML
50 INJECTION, SOLUTION INTRAMUSCULAR; INTRAVENOUS AS NEEDED
Status: CANCELLED
Start: 2020-11-30

## 2020-11-23 RX ORDER — AMOXICILLIN 250 MG
1 CAPSULE ORAL DAILY
Status: DISCONTINUED | OUTPATIENT
Start: 2020-11-23 | End: 2020-11-23

## 2020-11-23 RX ORDER — SODIUM CHLORIDE 0.9 % (FLUSH) 0.9 %
10 SYRINGE (ML) INJECTION AS NEEDED
Status: CANCELLED | OUTPATIENT
Start: 2020-11-30

## 2020-11-23 RX ORDER — DIPHENHYDRAMINE HYDROCHLORIDE 50 MG/ML
25 INJECTION, SOLUTION INTRAMUSCULAR; INTRAVENOUS AS NEEDED
Status: CANCELLED
Start: 2020-11-30

## 2020-11-23 RX ORDER — PALONOSETRON 0.05 MG/ML
0.25 INJECTION, SOLUTION INTRAVENOUS ONCE
Status: CANCELLED | OUTPATIENT
Start: 2020-11-30

## 2020-11-23 RX ORDER — ENOXAPARIN SODIUM 100 MG/ML
1 INJECTION SUBCUTANEOUS EVERY 12 HOURS
Status: DISCONTINUED | OUTPATIENT
Start: 2020-11-24 | End: 2020-11-24 | Stop reason: HOSPADM

## 2020-11-23 RX ORDER — OXYCODONE HCL 10 MG/1
10 TABLET, FILM COATED, EXTENDED RELEASE ORAL EVERY 12 HOURS
Status: DISCONTINUED | OUTPATIENT
Start: 2020-11-23 | End: 2020-11-24 | Stop reason: HOSPADM

## 2020-11-23 RX ORDER — EPINEPHRINE 1 MG/ML
0.3 INJECTION, SOLUTION, CONCENTRATE INTRAVENOUS AS NEEDED
Status: CANCELLED | OUTPATIENT
Start: 2020-11-30

## 2020-11-23 RX ADMIN — OXYCODONE HYDROCHLORIDE 10 MG: 10 TABLET, FILM COATED, EXTENDED RELEASE ORAL at 10:26

## 2020-11-23 RX ADMIN — DOCUSATE SODIUM 50MG AND SENNOSIDES 8.6MG 1 TABLET: 8.6; 5 TABLET, FILM COATED ORAL at 18:00

## 2020-11-23 RX ADMIN — OXYCODONE HYDROCHLORIDE 10 MG: 10 TABLET, FILM COATED, EXTENDED RELEASE ORAL at 20:04

## 2020-11-23 RX ADMIN — POLYETHYLENE GLYCOL 3350 17 G: 17 POWDER, FOR SOLUTION ORAL at 08:17

## 2020-11-23 RX ADMIN — Medication 10 ML: at 05:01

## 2020-11-23 RX ADMIN — ENOXAPARIN SODIUM 40 MG: 40 INJECTION SUBCUTANEOUS at 08:13

## 2020-11-23 RX ADMIN — OXYCODONE HYDROCHLORIDE 10 MG: 5 TABLET ORAL at 18:28

## 2020-11-23 RX ADMIN — DOCUSATE SODIUM 50MG AND SENNOSIDES 8.6MG 1 TABLET: 8.6; 5 TABLET, FILM COATED ORAL at 10:26

## 2020-11-23 RX ADMIN — SODIUM CHLORIDE 100 ML/HR: 900 INJECTION, SOLUTION INTRAVENOUS at 18:00

## 2020-11-23 RX ADMIN — Medication 10 ML: at 21:13

## 2020-11-23 RX ADMIN — HYDROMORPHONE HYDROCHLORIDE 1 MG: 2 INJECTION, SOLUTION INTRAMUSCULAR; INTRAVENOUS; SUBCUTANEOUS at 03:05

## 2020-11-23 RX ADMIN — OXYCODONE HYDROCHLORIDE 10 MG: 5 TABLET ORAL at 23:03

## 2020-11-23 RX ADMIN — OXYCODONE HYDROCHLORIDE 10 MG: 5 TABLET ORAL at 08:13

## 2020-11-23 RX ADMIN — SODIUM CHLORIDE 100 ML/HR: 900 INJECTION, SOLUTION INTRAVENOUS at 06:31

## 2020-11-23 RX ADMIN — OXYCODONE HYDROCHLORIDE 10 MG: 5 TABLET ORAL at 14:13

## 2020-11-23 NOTE — ACP (ADVANCE CARE PLANNING)
responded to an in-basket request to assist Mr. Daisy Mendieta with an Advanced Medical Directive on the [facility]. Surgical Unit. Mr. Daisy Mendieta was awake, alert, and sitting up in bed when the  came to the room. He made good eye contact with the  and greeted the  warmly. Mr. Daisy Mendieta shared that he was doing as well as could be expected and then spoke of the difficult news he received earlier this morning.  continued to be a listening ear as Mr. Daisy Mendieta shared his current thoughts and concerns, and also engaged in story telling and review.  inquired about the Advanced Medical Directive Request (AMD) and Mr. Daisy Mendieta appeared very uncertain.  provided a brief review of the AMD form and answered his questions as needed. He shared that he would like his sister to be his primary spokes person, but declined to complete the form at this time as he would like to discuss the matter with his sister first. He thanked the  for stopping by and is aware of the 's availability. 's will follow up as able and/or needed Jermaine Ramos.  Paging Service: Teresa-LUNA ([phone])

## 2020-11-23 NOTE — PROGRESS NOTES
Readmission Assessment Number of days since last admission?: 8-30 days Previous disposition: Home Alone Who is being interviewed?: Patient What was the patient's/caregiver's perception as to why they think they needed to return back to the hospital?: Other (Comment)(I couldn't eat, no bowel movement and pain) Did you visit your Primary Care Physician after you left the hospital, before you returned this time?: No 
Why weren't you able to visit your PCP?: Other (Comment)(does not have one) Did you see a specialist, such as Cardiac, Pulmonary, Orthopedic Physician, etc. after you left the hospital?: Yes Who advised the patient to return to the hospital?: Physician Does the patient report anything that got in the way of taking their medications?: Yes(pain) What reasons did they give?: Other (Comment)(pain) In our efforts to provide the best possible care to you and others like you, can you think of anything that we could have done to help you after you left the hospital the first time, so that you might not have needed to return so soon?: Other (Comment)(no) Reason for Readmission:     Abdominal pain/pancreatitis RUR Score/Risk Level:     11% PCP: None at present; he will have his sister help him choose one Is a Care Conference indicated:  no 
 
 
Did you attend your follow up appointment (s):   Yes   If not, why not: 
 
    
Resources/supports as identified by patient/family:  Sister, daughter and brother Top Challenges facing patient (as identified by patient/family and CM): Finances/Medication cost?     Has Medicaid; medications from [employer] on [address] [facility] [address] Transportation     Family can transport if pt unable to drive Support system or lack thereof? See above Living arrangements? Lives in a single story house by himself Self-care/ADLs/Cognition? Has been able to be independent with ADL's. Pt is alert, oriented x 4. Current Advanced Directive/Advance Care Plan:  His daughter, sister & brother are his next of kin Plan for utilizing home health: To be determined Transition of Care Plan:    Based on readmission, the patient's previous Plan of Care 
 has been evaluated and/or modified. The current Transition of Care Plan is: 1. Family to transport home at d/c 
2. Sister will attend meeting with pt regarding treatment plans 3. Will need port placement prior to d/c 
4. Palliative consult 5. GI and vascular surgery consults 6. CM following for any needs prior to d/c CRAIG Michel RN Care Management Interventions PCP Verified by CM: No 
Palliative Care Criteria Met (RRAT>21 & CHF Dx)?: Yes Mode of Transport at Discharge: Other (see comment)(family) Current Support Network: Own Home, Lives Alone Discharge Location Discharge Placement: Home with family assistance

## 2020-11-23 NOTE — PROGRESS NOTES
Physician Progress Note Kath Guerra 
CSN #:                  [id number] :                       1960 ADMIT DATE:       2020 1:46 PM 
[address] [place] DATE: 
RESPONDING 
PROVIDER #:        Ubaldo Moran MD 
 
 
 
 
QUERY TEXT: 
 
Dear Dr. Patsy Kruse, 
Pt admitted with abdominal pain  and newly diagnosed pancreatic adenocarcinoma. Noted documentation of severe malnutrition noted by RD in the Comprehensive nutritional assessment. If possible, please document in progress notes and discharge summary: The medical record reflects the following: 
Risk Factors: 64 yr old male admitted with c/o abdominal pain and newly diagnosed with pancreatic adenocarcinoma. Clinical Indicators: RD notes in Comprehensive nutritional assessment, \"Malnutrition Status:  Severe malnutrition, Context:  Acute illness,  Findings of the 6 clinical characteristics of malnutrition:  Energy Intake:  7 - 50% or less of est energy requirements for 5 or more days, Weight Loss:  7 - Greater than 2% over 1 week,  Body Fat Loss:  1 - Mild body fat loss, Fat overlying ribs Muscle Mass Loss:  7 - Moderate muscle mass loss, Temples (temporalis), Fluid Accumulation:  No significant fluid accumulation,   Strength:  Not performed\". BMI 17.30, ? Height:  5' 10\" (177.8 cm), Current Body Wt.:  54.7 kg (120 lb 9.5 oz.) Treatment: RD consult, commercial supplements, I&O Options provided: 
-- Severe protein calorie malnutrition confirmed present on admission 
-- Severe protein calorie malnutrition ruled out 
-- Other - I will add my own diagnosis -- Disagree - Not applicable / Not valid -- Disagree - Clinically unable to determine / Unknown 
-- Refer to Clinical Documentation Reviewer PROVIDER RESPONSE TEXT: 
 
The diagnosis of severe protein calorie malnutrition was confirmed as present on admission.  
 
Query created by: Bouchra Martínez on 2020 5:43 PM 
 
 
 Electronically signed by:  Sujata Pierre MD 11/23/2020 5:47 PM

## 2020-11-23 NOTE — NURSE NAVIGATOR
Diagnosis: Pancreatic CA Reason for Visit: Follow-up r/t financial concerns Patient Concerns/Needs: Finances; Pain; ACP Coordination of Care with: Jeniffer Smalls NP Education/Education Material: Printed Material - Port placement and care Community Resources/Recommendations: [facility], Medicaid application, SSI application Narrative: Lisbeth Pickens had long conversation with pt regarding recent diagnosis and financial concerns. Pt verbalizes understanding the extent of his cancer and the intent of treatment. He states that he is awaiting Dr. Kina Wallace to discuss this with his sister in the a.m. Pt is scheduled for port placement tomorrow. Port education given with no further questions. We spoke about his pain leading up to this hospital admission and he mentioned that it made him want to give up. Now, with his pain more managed, he is looking ahead and wanting to be able to spend as much time with his kids as possible. His former housing concerns seem to be resolving with his daughter offering her spare room to him. He is appreciative of this and wants to be able to contribute financially to the household. We talked about applying for SSI/SSD. I left information for him r/t the application process. He states that he has not yet applied for Medicaid, however, Nigel Lord states that she believes it was started during his last admission. We are awaiting confirmation at this time. He mentioned that the  came by and discussed ACP. He states he is waiting for his sister to help him make these decisions as well. Overall, the patient seems to be coping well and has a positive outlook on things. ONN plans to follow-up with pt and pt's sister tomorrow to further assist in any way. Pt also encouraged to reach out if anything arises in the meantime. Allyssa Law, TIFFANIEN, RN, VIA [facility] Oncology Nurse Navigator [address] [facility] [address] [facility] [address] [place], [address] [facility] [address]: [phone] [email or web address]  
Good Help to Those in Need®

## 2020-11-23 NOTE — ED NOTES
TRANSFER - OUT REPORT: 
 
Verbal report given to Vera Mujica RN(name) on Lucio Low  being transferred to 5th floor(unit) for routine progression of care Report consisted of patients Situation, Background, Assessment and  
Recommendations(SBAR). Information from the following report(s) SBAR, ED Summary, Intake/Output, MAR and Recent Results was reviewed with the receiving nurse. Lines:  
Peripheral IV 11/22/20 Left Antecubital (Active) Site Assessment Clean, dry, & intact 11/22/20 1520 Phlebitis Assessment 0 11/22/20 1520 Infiltration Assessment 0 11/22/20 1520 Dressing Status Clean, dry, & intact 11/22/20 1520 Dressing Type Tape 11/22/20 1520 Hub Color/Line Status Pink;Flushed;Patent 11/22/20 1520 Action Taken Blood drawn 11/22/20 1520 Opportunity for questions and clarification was provided. Patient transported with: 
 [email or web address] Visit Vitals /89 Pulse 82 Temp 97.6 °F (36.4 °C) Resp 16 Ht 5' 10\" (1.778 m) Wt 54.7 kg (120 lb 9.5 oz) SpO2 96% BMI 17.30 kg/m²

## 2020-11-23 NOTE — PROGRESS NOTES
Bedside and Verbal shift change report given to Rojas Deluna RN (oncoming nurse) by Melania Coronado (offgoing nurse). Report included the following information SBAR, Intake/Output, MAR and Recent Results.

## 2020-11-23 NOTE — H&P
[address] Adult  Hospitalist Group History & Physical 
 
Date of service: 11/22/2020 Patient name: Edilia Valle MRN: [id number] YOB: 1960 Age: 64 y.o. Primary care provider:  None Source of Information: patient, medical records Chief complain: Abdominal pain History of present illness Edilia Valle is a 64 y.o. male who presented with abdominal pain. He was recently discharged after being diagnosed with pancreatic mass earlier this month. He underwent outpatient biopsy last week which was positive for pancreatic malignancy. For the last 2 days he has been having increasing abdominal pain and pain with eating. He was prescribed oxycodone but that has not been helping to relieve his pain and he took his last pill last night. Pain is in the mid abdomen and is not associated with any nausea or vomiting. He denies any fevers or chills. In the ED lipase was normal but CT of the abdomen and pelvis showed subacute pancreatitis. Past Medical History:  
Diagnosis Date  Abscess of knee, right 08/21/2019  Gout  History of MRSA infection 08/21/2019 In abscess of right knee  Pancreatic mass  Psoriasis  Tobacco abuse Past Surgical History:  
Procedure Laterality Date  HX ORTHOPAEDIC    
 femur repair  HX ORTHOPAEDIC    
 lumbar compressed fracture Prior to Admission medications Medication Sig Start Date End Date Taking? Authorizing Provider  
senna-docusate (PERICOLACE) 8.6-50 mg per tablet Take 2 Tabs by mouth daily. 11/13/20   Keenan Saini MD  
 
No Known Allergies Family History Problem Relation Age of Onset  Pancreatic Cancer Brother Social history Social History Tobacco Use Smoking Status Current Every Day Smoker  Packs/day: 1.00 Smokeless Tobacco Never Used Tobacco Comment  
 last week was last cigarette Social History Substance and Sexual Activity Alcohol Use Yes Code status Code status discussed with the patient/caregivers. Full Code Review of systems A comprehensive review of systems was negative except for that written in the History of Present Illness. Physical Examination Visit Vitals /89 Pulse 82 Temp 97.7 °F (36.5 °C) Resp 20 Ht 5' 10\" (1.778 m) Wt 54.7 kg (120 lb 9.5 oz) SpO2 100% BMI 17.30 kg/m² O2 Device: Room air General:  Alert, cooperative, no distress Head:  Normocephalic, without obvious abnormality, atraumatic Eyes:  Conjunctivae/corneas clear. PERRL, EOMs intact Head/Neck: Nares normal. No nasal drainage or sinus tenderness Lips, mucosa, and tongue normal  
Teeth and gums normal 
Clear oropharynx Trachea midline No palpable adenopathy No thyroid enlargement, tenderness Lungs:   Symmetrical chest expansion and respiratory effort Clear to auscultation bilaterally Heart:  Regular rhythm Sounds normal; no murmur, rub or gallop Abdomen:   Soft, vague lower abd tenderness without guarding Bowel sounds normal 
No masses or hepatosplenomegaly Back: No CVA tenderness Extremities: Extremities normal, atraumatic No cyanosis or edema Skin: No rashes or ulcers Musculo- 
    skeletal: Gait not tested Normal symmetry, ROM, strength and tone Neuro: Cranial nerves grossly normal 
  
Psych: Alert, oriented x3 Normal affect, judgement and insight Data Review 24 Hour Results: 
Recent Results (from the past 24 hour(s)) CBC WITH AUTOMATED DIFF Collection Time: 11/22/20  3:22 PM  
Result Value Ref Range WBC 9.6 4.1 - 11.1 K/uL  
 RBC 4.77 4.10 - 5.70 M/uL  
 HGB 15.0 12.1 - 17.0 g/dL HCT 42.6 36.6 - 50.3 % MCV 89.3 80.0 - 99.0 FL  
 MCH 31.4 26.0 - 34.0 PG  
 MCHC 35.2 30.0 - 36.5 g/dL  
 RDW 13.1 11.5 - 14.5 % PLATELET 435 911 - 490 K/uL MPV 9.4 8.9 - 12.9 FL  
 NRBC 0.0 0  WBC ABSOLUTE NRBC 0.00 0.00 - 0.01 K/uL NEUTROPHILS 71 32 - 75 % LYMPHOCYTES 16 12 - 49 % MONOCYTES 10 5 - 13 % EOSINOPHILS 2 0 - 7 % BASOPHILS 1 0 - 1 % IMMATURE GRANULOCYTES 0 0.0 - 0.5 % ABS. NEUTROPHILS 6.9 1.8 - 8.0 K/UL  
 ABS. LYMPHOCYTES 1.6 0.8 - 3.5 K/UL  
 ABS. MONOCYTES 0.9 0.0 - 1.0 K/UL  
 ABS. EOSINOPHILS 0.2 0.0 - 0.4 K/UL  
 ABS. BASOPHILS 0.1 0.0 - 0.1 K/UL  
 ABS. IMM. GRANS. 0.0 0.00 - 0.04 K/UL  
 DF AUTOMATED METABOLIC PANEL, COMPREHENSIVE Collection Time: 11/22/20  3:22 PM  
Result Value Ref Range Sodium 135 (L) 136 - 145 mmol/L Potassium 4.0 3.5 - 5.1 mmol/L Chloride 101 97 - 108 mmol/L  
 CO2 30 21 - 32 mmol/L Anion gap 4 (L) 5 - 15 mmol/L Glucose 103 (H) 65 - 100 mg/dL BUN 17 6 - 20 MG/DL Creatinine 1.09 0.70 - 1.30 MG/DL  
 BUN/Creatinine ratio 16 12 - 20 GFR est AA >60 >60 ml/min/1.73m2 GFR est non-AA >60 >60 ml/min/1.73m2 Calcium 9.0 8.5 - 10.1 MG/DL Bilirubin, total 0.5 0.2 - 1.0 MG/DL  
 ALT (SGPT) 30 12 - 78 U/L  
 AST (SGOT) 10 (L) 15 - 37 U/L Alk. phosphatase 74 45 - 117 U/L Protein, total 8.0 6.4 - 8.2 g/dL Albumin 3.5 3.5 - 5.0 g/dL Globulin 4.5 (H) 2.0 - 4.0 g/dL A-G Ratio 0.8 (L) 1.1 - 2.2 LIPASE Collection Time: 11/22/20  3:22 PM  
Result Value Ref Range Lipase 112 73 - 393 U/L  
SAMPLES BEING HELD Collection Time: 11/22/20  3:22 PM  
Result Value Ref Range SAMPLES BEING HELD [email or web address]   
 COMMENT Add-on orders for these samples will be processed based on acceptable specimen integrity and analyte stability, which may vary by analyte. Recent Labs  
  11/22/20 
1522 WBC 9.6 HGB 15.0  
HCT 42.6  Recent Labs  
  11/22/20 
1522 * K 4.0  
 CO2 30 * BUN 17 CREA 1.09  
CA 9.0 ALB 3.5 TBILI 0.5 ALT 30 Imaging Xr Abd Flat/ Erect Result Date: 11/22/2020 INDICATION:  Constipation COMPARISON: CT abdomen and pelvis 11/11/2020 FINDINGS: 2 views of the abdomen submitted for review. Nonspecific bowel gas pattern without evidence for obstruction or mass effect. No significant fecal retention in the large bowel IMPRESSION: No evidence for acute abnormality Ct Abd Pelv W Cont Result Date: 11/22/2020 CT ABDOMEN AND PELVIS WITH CONTRAST. 11/22/2020 5:40 PM INDICATION: Abdominal pain. COMPARISON: 11/12/2020, 11/11/2020. TECHNIQUE: CT of the abdomen and pelvis was performed after the administration of 100 cc IV Isovue-370. CT dose reduction was achieved through use of a standardized protocol tailored for this examination and automatic exposure control for dose modulation. FINDINGS: Pancreatic carcinoma: A mass in the pancreatic body is ill-defined and difficult to measure. However, it measures at least 54 x 23 x 28 mm. There is upstream atrophy and duct dilation in the tail (601-46). Tumor extends outside the pancreas superiorly and posteriorly, to encase the splenic artery and abut the celiac artery over approximately 180 degrees (601-35). The splenic vein is occluded, and the spleen drains via collaterals to the gastroepiploic veins. There are extensive submucosal gastric varices in the cardia along the path of these collaterals. Tumor abuts the SMV anteriorly and abuts the SMA left laterally at its origin. A heterogeneous, retroperitoneal, left para-aortic nodule, immediately posterior superior to the left renal artery, measures 13 x 17 x 17 mm, and is consistent with remington metastasis. Less likely, this may represent direct extension, as there is hazy, ill-defined soft tissue extending between the body of the pancreas and this more well-defined nodule. A smaller, right retroperitoneal nodule between the right diaphragmatic michael and the IVC (2-21, 601-43) may also be a metastasis.  Secondary pancreatitis: New edema in the root of the mesentery, trace ascites in the hepatorenal fossa, and a new, small walled-off necrosis (\"pseudocyst\") in the lesser sac between the pancreatic body and stomach, indicate subacute, secondary pancreatitis. The walled off necrosis measures 15 x 31 x 15 mm. Abdomen: The lung bases are clear. The heart size is normal. Incidental note is made of hepatic fat deposition along the fissure for the falciform ligament. The distal esophagus, liver, gallbladder, spleen, adrenals, and kidneys are normal. Pelvis: There is trace ascites in the pelvis, with smooth peritoneal enhancement in the rectovesical pouch (602-83). Diffuse omental infiltration (2-44, 602-78, 601-25) is further evidence of peritoneal carcinomatosis. The small bowel, ileocecal junction, colon, and bladder are normal. The prostate is enlarged. No free air. Bones: Post right femoral intramedullary nail and screw fixation. The locking screw is perpendicular to the typical orientation. It is probably from the lateral cortex of the greater trochanter (601-60), and protrudes 19 mm inferomedial to the medial femoral cortex, into the adductor muscles of the thigh. Lumbarization of S1 is a normal variant. An L4 compression fracture with 30% vertebral body height loss shows short-term stability from 11/11/2020. IMPRESSION: 1. Subacute pancreatitis, with small walled-off necrosis (\"pseudocyst\") in the lesser sac. 2. Pancreatic body adenocarcinoma. 3. Occluded splenic vein. Consequent, extensive, submucosal, gastric varices in the cardia. 4. Encasement of the splenic artery. Abutment of the celiac artery, SMV, and SMA. 5. Retroperitoneal remington metastases. 6. Peritoneal carcinomatosis. The findings were called to Dr. Joslyn Woodard on 11/22/2020 6:08 PM by Dr. Jocelynn Guillaume. [address] Assessment and Plan Subacute/early pancreatitis 
-We will keep n.p.o., pain management with IV Dilaudid and IV antiemetics.   Continue IV fluids 
-Consult GI 
 
 Pancreatic adenocarcinoma, newly diagnosed 
-Pain management with IV Dilaudid as above for now 
-Consult oncology, patient states he was supposed to have an appointment in 2 days Occluded splenic vein 
-ED physician spoke with vasc, no intervention necessary, likely progression of malignancy Hyponatremia, mild 
-Likely due to decreased p.o. intake 
-Continue with IV fluids and monitor Diet: N.p.o. Activity: As tolerated DVT prophylaxis: Lovenox Isolation precautions: None Signed by:  Melinda Kay MD  
 November 22, 2020 at 7:22 PM

## 2020-11-23 NOTE — PROGRESS NOTES
[facility] Medical Progress Note NAME: Edilia Valle :  1960 MRM:  [id number] Date/Time of service 2020  9:12 AM    
 
  
Assessment and Plan:  
 
Splenic vein thrombosis / Acute abdominal pain - POA, new finding of thrombosis. Vascular consult pending. Treatment dose lovenox. NPO. Start oxycontin. Prn IV dilaudid. Laxatives Pancreatic cancer / Abdominal lymphadenopathy - POA, new dx. Bx last week proved adenocarcinoma. Grim prognosis. Awaiting treatment plan from oncology. Needs port. Palliative consult. Psoriasis - No symptoms Gout - No symptoms Tobacco abuse - Too late too advise cessation. Subjective: Chief Complaint:  Abdominal pain better after morphine ROS: 
(bold if positive, if negative) Abd PainTolerating PT  NPO Objective:  
 
Last 24hrs VS reviewed since prior progress note. Most recent are: 
 
Visit Vitals /82 (BP 1 Location: Right arm, BP Patient Position: Sitting) Pulse 83 Temp 97.8 °F (36.6 °C) Resp 17 Ht 5' 10\" (1.778 m) Wt 54.7 kg (120 lb 9.5 oz) SpO2 99% BMI 17.30 kg/m² SpO2 Readings from Last 6 Encounters:  
20 99% 20 99% 20 99% 19 97% 17 98% Intake/Output Summary (Last 24 hours) at 2020 0080 Last data filed at 2020 9422 Gross per 24 hour Intake 2038. 33 ml Output  Net 2038. 33 ml Physical Exam: 
 
Gen:  Frail, cachectic, in no acute distress HEENT:  Pink conjunctivae, PERRL, hearing barely intact to voice, moist mucous membranes Neck:  Supple, without masses, thyroid non-tender Resp:  No accessory muscle use, clear breath sounds without wheezes rales or rhonchi 
Card:  No murmurs, normal S1, S2 without thrills, bruits or peripheral edema Abd:  Soft, non-tender, non-distended, normoactive bowel sounds are present, no mass Lymph:  No cervical or inguinal adenopathy Musc:  No cyanosis or clubbing Skin:  No rashes or ulcers, skin turgor is good Neuro:  Cranial nerves are grossly intact, mild motor weakness, follows commands Psych:  Poor insight, oriented to person, place Telemetry reviewed:   normal sinus rhythm 
__________________________________________________________________ Medications Reviewed: (see below) Medications:  
 
Current Facility-Administered Medications Medication Dose Route Frequency  polyethylene glycol (MIRALAX) packet 17 g  17 g Oral DAILY  enoxaparin (LOVENOX) injection 50 mg  1 mg/kg SubCUTAneous Q12H  
 oxyCODONE ER (OxyCONTIN) tablet 10 mg  10 mg Oral Q12H  
 senna-docusate (PERICOLACE) 8.6-50 mg per tablet 1 Tab  1 Tab Oral DAILY  sodium chloride (NS) flush 5-40 mL  5-40 mL IntraVENous Q8H  
 sodium chloride (NS) flush 5-40 mL  5-40 mL IntraVENous PRN  
 acetaminophen (TYLENOL) tablet 650 mg  650 mg Oral Q6H PRN  
 ondansetron (ZOFRAN) injection 4 mg  4 mg IntraVENous Q6H PRN  
 0.9% sodium chloride infusion  100 mL/hr IntraVENous CONTINUOUS  
 HYDROmorphone (PF) (DILAUDID) injection 1 mg  1 mg IntraVENous Q4H PRN  
 oxyCODONE IR (ROXICODONE) tablet 10 mg  10 mg Oral Q4H PRN Lab Data Reviewed: (see below) Lab Review:  
 
Recent Labs  
  11/23/20 
0622 11/22/20 
1522 WBC 6.1 9.6 HGB 12.8 15.0  
HCT 37.8 42.6  280 Recent Labs  
  11/23/20 
0622 11/22/20 
1522  135* K 3.6 4.0  
 101 CO2 27 30 GLU 82 103* BUN 14 17 CREA 0.79 1.09  
CA 7.8* 9.0 ALB 2.7* 3.5 TBILI 0.4 0.5 ALT 21 30 No results found for: Lorenza Montelongo No results for input(s): PH, PCO2, PO2, HCO3, FIO2 in the last 72 hours. No results for input(s): INR, INREXT in the last 72 hours. All Micro Results None Other pertinent lab: none Total time spent with patient: 39 Minutes I personally reviewed chart, notes, data and current medications in the medical record.   I have personally examined and treated the patient at bedside during this period. Care Plan discussed with: Patient, Care Manager, Nursing Staff, Consultant/Specialist and >50% of time spent in counseling and coordination of care Discussed:  Care Plan and D/C Planning Prophylaxis:  H2B/PPI Disposition:  Home w/Family 
        
___________________________________________________ Attending Physician: Yumi Darby MD

## 2020-11-23 NOTE — PROGRESS NOTES
Comprehensive Nutrition Assessment Type and Reason for Visit: Initial(low BMI) Nutrition Recommendations/Plan: 1. Advance diet as medically feasible. Recommend goal of regular, low-fat. 2. Add apple Ensure clear BID while on CLD to promote adequate intake. Nutrition Assessment:    
11/23: 65 y/o male admitted with pancreatitis. PMH includes recently diagnosed pancreatic CA. BMI 17.3, c/w underweight. RD familiar with pt from recent admission. Pt reports poor appetite but says he ate all of lunch anyway. Says it was the first time he was able to eat anything x4-5 days as he was having pain, decreased appetite at home. Currently on CLD. No c/o N/V, but is having constipation. On bowel regimen. Recorded weights show 6% wt loss x11 days (since previous admission) which is considered severe for time frame. Pt currently meets criteria for severe acute malnutrition based on assessment below. He met criteria for moderate chronic malnutrition at previous visit. Pt says he never got the chance to try ensure clear during previous admission but is agreeable to trying it now. Labs- Ca 7.8. Meds- Miralax. Intakes: 
No data found. Weight hx: Wt Readings from Last 10 Encounters:  
11/22/20 54.7 kg (120 lb 9.5 oz)  
11/18/20 57.3 kg (126 lb 5.2 oz)  
11/11/20 58.1 kg (128 lb 1.4 oz) 08/21/19 68 kg (150 lb)  
11/16/17 68 kg (150 lb) Malnutrition Assessment: 
Malnutrition Status:  Severe malnutrition Context:  Acute illness Findings of the 6 clinical characteristics of malnutrition:  
Energy Intake:  7 - 50% or less of est energy requirements for 5 or more days Weight Loss:  7 - Greater than 2% over 1 week Body Fat Loss:  1 - Mild body fat loss, Fat overlying ribs Muscle Mass Loss:  7 - Moderate muscle mass loss, Temples (temporalis) Fluid Accumulation:  No significant fluid accumulation,   
 Strength:  Not performed Estimated Daily Nutrient Needs: Energy (kcal): 3592(9169 x 1.3 AF (+250)); Weight Used for Energy Requirements: Current Protein (g): 66(1.2-1.5 g/kg); Weight Used for Protein Requirements: Current Fluid (ml/day): 2034; Method Used for Fluid Requirements: 1 ml/kcal 
 
Nutrition Related Findings:  LBM 11/19 Wounds:   
None Current Nutrition Therapies: DIET NPO 
DIET CLEAR LIQUID 
DIET NUTRITIONAL SUPPLEMENTS Dinner, Breakfast; Ensure Clear Anthropometric Measures: 
· Height:  5' 10\" (177.8 cm) · Current Body Wt:  54.7 kg (120 lb 9.5 oz) · Admission Body Wt:      
· Usual Body Wt:       
· Ideal Body Wt:  166 lbs:  72.6 % · Adjusted Body Weight:   ; Weight Adjustment for: No adjustment · BMI Category:  Underweight (BMI less than 18.5) Nutrition Diagnosis:  
· Inadequate oral intake related to inadequate protein-energy intake as evidenced by NPO or clear liquid status due to medical condition, poor intake prior to admission Nutrition Interventions:  
Food and/or Nutrient Delivery: Modify current diet, Start oral nutrition supplement Nutrition Education and Counseling: No recommendations at this time Coordination of Nutrition Care: Continue to monitor while inpatient Goals: 
Diet advancement with po intake >50% meals + ONS next 1-3 days Nutrition Monitoring and Evaluation:  
Behavioral-Environmental Outcomes: None identified Food/Nutrient Intake Outcomes: Diet advancement/tolerance, Food and nutrient intake, Supplement intake Physical Signs/Symptoms Outcomes: Weight, Biochemical data, Nutrition focused physical findings, Constipation, GI status Discharge Planning:   
Continue oral nutrition supplement Electronically signed by Gordon Alvarado RDN on 11/23/2020 at 2:26 PM 
 
Contact: [phone]

## 2020-11-23 NOTE — PROGRESS NOTES
Spiritual Care Assessment/Progress Note [address] [place] [address] Dr 
 
 
NAME: Carol Moe      MRN: [id number] AGE: 64 y.o. SEX: male [employer] Affiliation: No preference Language: English  
 
11/23/2020     Total Time (in minutes): 35  
 
Spiritual Assessment begun in [facility] 5M1 MED SURG 1 through conversation with: 
  
    [x]Patient        [] Family    [] Friend(s) Reason for Consult: Advance medical directive consult Spiritual beliefs: (Please include comment if needed) [x] Identifies with a gibran tradition: [place]    
   [] Supported by a gibran community:        
   [] Claims no spiritual orientation:       
   [] Seeking spiritual identity:            
   [x] Adheres to an individual form of spirituality:       
   [] Not able to assess:                   
 
    
Identified resources for coping:  
   [] Prayer                           
   [] Music                  [] Guided Imagery [x] Family/friends                 [] Pet visits [] Devotional reading                         [] Unknown 
   [] Other:                                        
 
 
Interventions offered during this visit: (See comments for more details) Patient Interventions: Advance medical directive completed, Affirmation of gibran, Life review/legacy, Catharsis/review of pertinent events in supportive environment Plan of Care: 
 
 [] Support spiritual and/or cultural needs [x] Support AMD and/or advance care planning process    
 [] Support grieving process 
 [] Coordinate Rites and/or Rituals  
 [] Coordination with community clergy [] No spiritual needs identified at this time 
 [] Detailed Plan of Care below (See Comments)  [] Make referral to Music Therapy 
[] Make referral to Pet Therapy    
[] Make referral to Addiction services 
[] Make referral to [facility] 
[] Make referral to Spiritual Care Partner 
[] No future visits requested       
[x] Follow up visits as needed Comments:  responded to an in-basket request to assist Mr. Daniela Lopez with an Advanced Medical Directive on the [facility]. Mr. Daniela Lopez was awake, alert, and sitting up in bed when the  came to the room. He made good eye contact with the  and greeted the  warmly. Mr. Daniela Lopez shared that he was doing as well as could be expected and then spoke of the difficult news he received earlier this morning.  continued to be a listening ear as Mr. Daniela Lopez shared his current thoughts and concerns, and also engaged in story telling and review. He spoke of his own understanding of his illness and what may have contributed to him becoming unwell.  inquired if gibran was important to him as he was coping with everything and he shared that it was. He is not a regular [employer] goer, but shared that he has a very strong gibran and has a more individual form of spirituality.  inquired about the Advanced Medical Directive Request (AMD) and Mr. Daniela Lopez appeared very uncertain.  provided a brief review of the AMD form and answered his questions as needed. He shared that he would like his sister to be his primary spokes person, but declined to complete the form at this time as he would like to discuss the matter with his sister first. He thanked the  for stopping by and is aware of the 's availability. 's will follow up as able and/or needed Jermaine Moran.  Paging Service: [phone] ([phone])

## 2020-11-23 NOTE — PROGRESS NOTES
[facility]: MED RECONCILIATION Medications added: Percocet 5/325 mg PO Q4H PRN pain - prescription filled 11/16 for 30 tablets; patient reported that he took his last pill last night (per progress notes) Medications removed: 
 
none Medications adjusted: 
 
none Information obtained from: Rx query, progress notes Significant PMH/Disease States:  
Past Medical History:  
Diagnosis Date Abscess of knee, right 08/21/2019 Gout History of MRSA infection 08/21/2019 In abscess of right knee Pancreatic mass Psoriasis Tobacco abuse Chief Complaint for this Admission: Chief Complaint Patient presents with Abdominal Pain Decreased Appetite Allergies: Patient has no known allergies. Prior to Admission Medications:  
 
Medication Documentation Review Audit Reviewed by Kandace Haque PHARMD (Pharmacist) on 11/22/20 at 2028 Medication Sig Documenting Provider Last Dose Status Taking?  
oxyCODONE-acetaminophen (Percocet) 5-325 mg per tablet Take 1 Tab by mouth every four (4) hours as needed for Pain. Provider, Historical 11/21/2020 pm Active Yes Med Note Prakash Kincaid Nov 22, 2020  8:28 PM) Ashleigh Delong last of Percocet yesterday evening  
senna-docusate (PERICOLACE) 8.6-50 mg per tablet Take 2 Tabs by mouth daily. Toby Lucas MD  Active Yes Thank you for the consult, 
Manfred TERRY, [address]

## 2020-11-23 NOTE — CONSULTS
[facility] [address] [address] [facility], [address] [place] [address] W: [phone]  F: [phone] Reason for Visit:  
Justo Banks is a 64 y.o. male who is seen in consultation at the request of Dr. Elo Moran for evaluation of pancreatic cancer, new diagnosis. Hematology / Oncology Treatment History:  
 
Diagnosis: Pancreatic, adenocarcinoma, moderately to poorly diiferentiated Stage: IV [[id number]] Pathology: 2020 pancreatic body, FNA ;adenocarcinoma, moderately to poorly diiferentiated Prior Treatment: none Current Treatment: pending, tentative plan for FOLFIRINOX every 2 weeks in near future. History of Present Illness: Mr. Justo Banks is a 64 y.o. male admitted on 2020 from the ED with worsening abd pain and constipation. Rates pain on admission 9/10. Recent dx of pancreatic cancer during last hospitalization. Pt reports has not had a BM since last Wed; stopped taking the Senna-docusate last week; he thought it was causing his stomach to hurt more. He was unaware that it was part of a bowel regimen and thought it was pain medication. Has had some nausea but denies vomiting. Decreased appetite. Denies SOB. ED CT scan shows subacute pancreatitis, pancreatic body adenocarcinoma, retroperitoneal remington metastases, peritoneal carcinomatosis. Pt would like his sister to be able to hear about the treatment plans; she is coming from [place] in the am. Karan Stewart to port placement; defininelty wants to have treatment. Today rates abd pain 4/10. Hungry and would like to eat something if possible. 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 Smokes cigarettes: 1 ppd x 30 yrs. Rare EtOH use. : 3 kids; twins 29 yrs old and 25 yr old. Past Medical History:  
Diagnosis Date  Gout  Pancreatic cancer ([facility] [address]  Psoriasis  Tobacco abuse Past Surgical History:  
Procedure Laterality Date  HX ORTHOPAEDIC    
 femur repair  HX ORTHOPAEDIC    
 lumbar compressed fracture Social History Tobacco Use  Smoking status: Current Every Day Smoker Packs/day: 1.00  Smokeless tobacco: Never Used  Tobacco comment: last week was last cigarette Substance Use Topics  Alcohol use: Yes Family History Problem Relation Age of Onset  Pancreatic Cancer Brother Current Facility-Administered Medications Medication Dose Route Frequency  polyethylene glycol (MIRALAX) packet 17 g  17 g Oral DAILY  sodium chloride (NS) flush 5-40 mL  5-40 mL IntraVENous Q8H  
 sodium chloride (NS) flush 5-40 mL  5-40 mL IntraVENous PRN  
 acetaminophen (TYLENOL) tablet 650 mg  650 mg Oral Q6H PRN  
 ondansetron (ZOFRAN) injection 4 mg  4 mg IntraVENous Q6H PRN  
 enoxaparin (LOVENOX) injection 40 mg  40 mg SubCUTAneous DAILY  0.9% sodium chloride infusion  100 mL/hr IntraVENous CONTINUOUS  
 HYDROmorphone (PF) (DILAUDID) injection 1 mg  1 mg IntraVENous Q4H PRN  
 oxyCODONE IR (ROXICODONE) tablet 10 mg  10 mg Oral Q4H PRN No Known Allergies Review of Systems: A complete review of systems was obtained, negative except as described above. Physical Exam:  
 
Visit Vitals /82 (BP 1 Location: Right arm, BP Patient Position: Sitting) Pulse 83 Temp 97.8 °F (36.6 °C) Resp 17 Ht 5' 10\" (1.778 m) Wt 54.7 kg (120 lb 9.5 oz) SpO2 99% BMI 17.30 kg/m² ECOG PS: 1 General: No distress, thin Eyes: PERRLA, anicteric sclerae HENT: Atraumatic with normal appearance of ears and nose; OP clear Neck: Supple; no thyromegaly Lymphatic: No cervical, supraclavicular, or axillary adenopathy Respiratory: CTAB, normal respiratory effort CV: Normal rate, regular rhythm, no murmurs, no peripheral edema GI: Soft, nontender, nondistended, no masses, no hepatomegaly, no splenomegaly MS: Normal gait and station. Digits without clubbing or cyanosis. Skin: No rashes, ecchymoses, or petechiae. Normal temperature, turgor, and texture. Neuro/Psych: Alert, oriented, appropriate affect, normal judgment/insight Results:  
 
Lab Results Component Value Date/Time WBC 6.1 11/23/2020 06:22 AM  
 HGB 12.8 11/23/2020 06:22 AM  
 HCT 37.8 11/23/2020 06:22 AM  
 PLATELET 424 14/50/5430 06:22 AM  
 MCV 91.1 11/23/2020 06:22 AM  
 ABS. NEUTROPHILS 6.9 11/22/2020 03:22 PM  
 
Lab Results Component Value Date/Time Sodium 139 11/23/2020 06:22 AM  
 Potassium 3.6 11/23/2020 06:22 AM  
 Chloride 107 11/23/2020 06:22 AM  
 CO2 27 11/23/2020 06:22 AM  
 Glucose 82 11/23/2020 06:22 AM  
 BUN 14 11/23/2020 06:22 AM  
 Creatinine 0.79 11/23/2020 06:22 AM  
 GFR est AA >60 11/23/2020 06:22 AM  
 GFR est non-AA >60 11/23/2020 06:22 AM  
 Calcium 7.8 (L) 11/23/2020 06:22 AM  
 
Lab Results Component Value Date/Time Bilirubin, total 0.4 11/23/2020 06:22 AM  
 ALT (SGPT) 21 11/23/2020 06:22 AM  
 Alk. phosphatase 56 11/23/2020 06:22 AM  
 Protein, total 6.2 (L) 11/23/2020 06:22 AM  
 Albumin 2.7 (L) 11/23/2020 06:22 AM  
 Globulin 3.5 11/23/2020 06:22 AM  
 
Lab Results Component Value Date/Time Sed rate, automated 4 11/16/2017 03:56 PM  
 C-Reactive protein <0.29 11/16/2017 03:56 PM  
 TSH 3.36 11/12/2020 05:28 AM  
 Lipase 115 11/23/2020 06:22 AM  
 
Lab Results Component Value Date/Time INR 1.0 09/23/2010 03:05 AM  
 aPTT 28.0 09/23/2010 03:05 AM  
 
Lab Results Component Value Date/Time CEA 4.9 11/13/2020 05:29 AM  
 Carbohydrate Antigen 19-9, (CA 19-9) 5,607 (H) 11/13/2020 05:29 AM  
 
 
11/22/2020 XR ABD FLAT IMPRESSION: 
 No evidence for acute abnormality 11/23/2020 CT ABD PELV W CONT IMPRESSION:  
1. Subacute pancreatitis, with small walled-off necrosis (\"pseudocyst\") in the lesser sac. 2. Pancreatic body adenocarcinoma. 3. Occluded splenic vein. Consequent, extensive, submucosal, gastric varices in the cardia. 4. Encasement of the splenic artery. Abutment of the celiac artery, SMV, and SMA. 5. Retroperitoneal remington metastases. 6. Peritoneal carcinomatosis. 
  
 
Assessment and Recommendations:  
 
63 yo male with recent dx of pancreatic cancer admitted with abd pain. 1. Pancreatic adenocarcinoma:  
Diagnosed 11/18/2020 and now appears to have Stage IV disease based on peritoneal carcinomatosis seen on CT. Also has retroperitoneal remington metastases and encasement of vasculature. Was scheduled to be seen in our clinic on 11/24 to discuss treatment options. Discussed with him today that his disease is not curable, but is treatable. Emphasized the importance of genetic testing, germline and somatic. As his brother passed away from pancreas cancer, pt may have BRCA or PALB2 mutation, would allow him to possible be treated with oral medication in the future. For now, I recommend port placement and palliative chemotherapy with FOLFIRINOX regimen. CA [id number] at diagnosis. -- Plan for port placement in am 
-- Will arrange for germline and somatic genetic testing. 
-- Will establish follow up in the clinic 2. Subacute pancreatitis: On IV fluids and GI consulted 3. Constipation:  
2/2 opioids and bowel regimen adjusted. 4. Occluded splenic vein: 
Agree with anticoagulation; will hold around port placement and then resume with transition to DOAC at time of discharge. 5. Neoplasm related pain:  
Continue with prn medication. May need to start on long acting medication as well. 6. Family h/o pancreatic cancer: 
Concerning for genetic predisposition. Recommend genetic testing as outpatient (BRCA 1/2, PALB2, etc.) I have personally seen and evaluated the patient in conjunction with Bharti Colbertan, BHAVYA.  I find the patient's history and physical exam are consistent with the NP's documentation. I agree with the above assessment and plan, which I have edited if needed.  
 
 
Signed By: Marlyn Pereyra MD

## 2020-11-24 ENCOUNTER — APPOINTMENT (OUTPATIENT)
Dept: INTERVENTIONAL RADIOLOGY/VASCULAR | Age: 60
DRG: 197 | End: 2020-11-24
Attending: NURSE PRACTITIONER
Payer: MEDICAID

## 2020-11-24 VITALS
OXYGEN SATURATION: 100 % | SYSTOLIC BLOOD PRESSURE: 129 MMHG | DIASTOLIC BLOOD PRESSURE: 92 MMHG | TEMPERATURE: 97.2 F | RESPIRATION RATE: 16 BRPM | WEIGHT: 120.59 LBS | BODY MASS INDEX: 17.26 KG/M2 | HEIGHT: 70 IN | HEART RATE: 80 BPM

## 2020-11-24 LAB
BACTERIA SPEC CULT: NORMAL
BACTERIA SPEC CULT: NORMAL
SERVICE CMNT-IMP: NORMAL

## 2020-11-24 PROCEDURE — 0JH60WZ INSERTION OF TOTALLY IMPLANTABLE VASCULAR ACCESS DEVICE INTO CHEST SUBCUTANEOUS TISSUE AND FASCIA, OPEN APPROACH: ICD-10-PCS | Performed by: RADIOLOGY

## 2020-11-24 PROCEDURE — 77030031139 HC SUT VCRL2 J&J -A

## 2020-11-24 PROCEDURE — C1894 INTRO/SHEATH, NON-LASER: HCPCS

## 2020-11-24 PROCEDURE — 77030010507 HC ADH SKN DERMBND J&J -B

## 2020-11-24 PROCEDURE — 74011250636 HC RX REV CODE- 250/636: Performed by: RADIOLOGY

## 2020-11-24 PROCEDURE — 74011250637 HC RX REV CODE- 250/637: Performed by: INTERNAL MEDICINE

## 2020-11-24 PROCEDURE — 77001 FLUOROGUIDE FOR VEIN DEVICE: CPT

## 2020-11-24 PROCEDURE — 74011000250 HC RX REV CODE- 250: Performed by: RADIOLOGY

## 2020-11-24 PROCEDURE — 36561 INSERT TUNNELED CV CATH: CPT

## 2020-11-24 PROCEDURE — 99232 SBSQ HOSP IP/OBS MODERATE 35: CPT | Performed by: INTERNAL MEDICINE

## 2020-11-24 PROCEDURE — 99153 MOD SED SAME PHYS/QHP EA: CPT

## 2020-11-24 PROCEDURE — 99152 MOD SED SAME PHYS/QHP 5/>YRS: CPT

## 2020-11-24 PROCEDURE — 76937 US GUIDE VASCULAR ACCESS: CPT

## 2020-11-24 PROCEDURE — 74011250637 HC RX REV CODE- 250/637: Performed by: FAMILY MEDICINE

## 2020-11-24 PROCEDURE — 74011250636 HC RX REV CODE- 250/636: Performed by: FAMILY MEDICINE

## 2020-11-24 PROCEDURE — 02HV33Z INSERTION OF INFUSION DEVICE INTO SUPERIOR VENA CAVA, PERCUTANEOUS APPROACH: ICD-10-PCS | Performed by: RADIOLOGY

## 2020-11-24 PROCEDURE — C1788 PORT, INDWELLING, IMP: HCPCS

## 2020-11-24 RX ORDER — APIXABAN 5 MG (74)
KIT ORAL
Qty: 1 DOSE PACK | Refills: 0 | Status: SHIPPED | OUTPATIENT
Start: 2020-11-24 | End: 2020-11-25

## 2020-11-24 RX ORDER — FENTANYL CITRATE 50 UG/ML
25-200 INJECTION, SOLUTION INTRAMUSCULAR; INTRAVENOUS
Status: DISCONTINUED | OUTPATIENT
Start: 2020-11-24 | End: 2020-11-24 | Stop reason: HOSPADM

## 2020-11-24 RX ORDER — MIDAZOLAM HYDROCHLORIDE 1 MG/ML
1-10 INJECTION, SOLUTION INTRAMUSCULAR; INTRAVENOUS
Status: DISCONTINUED | OUTPATIENT
Start: 2020-11-24 | End: 2020-11-24 | Stop reason: HOSPADM

## 2020-11-24 RX ORDER — HEPARIN 100 UNIT/ML
300 SYRINGE INTRAVENOUS AS NEEDED
Status: DISCONTINUED | OUTPATIENT
Start: 2020-11-24 | End: 2020-11-24 | Stop reason: HOSPADM

## 2020-11-24 RX ORDER — OXYCODONE HCL 10 MG/1
10 TABLET, FILM COATED, EXTENDED RELEASE ORAL EVERY 12 HOURS
Qty: 60 TAB | Refills: 0 | Status: SHIPPED | OUTPATIENT
Start: 2020-11-24 | End: 2020-01-01 | Stop reason: SDUPTHER

## 2020-11-24 RX ORDER — LIDOCAINE HYDROCHLORIDE AND EPINEPHRINE 10; 10 MG/ML; UG/ML
1.5 INJECTION, SOLUTION INFILTRATION; PERINEURAL ONCE
Status: COMPLETED | OUTPATIENT
Start: 2020-11-24 | End: 2020-11-24

## 2020-11-24 RX ORDER — LIDOCAINE HYDROCHLORIDE 10 MG/ML
10-30 INJECTION INFILTRATION; PERINEURAL
Status: DISCONTINUED | OUTPATIENT
Start: 2020-11-24 | End: 2020-11-24 | Stop reason: HOSPADM

## 2020-11-24 RX ORDER — WATER FOR INJECTION,STERILE
VIAL (ML) INJECTION ONCE
Status: DISCONTINUED | OUTPATIENT
Start: 2020-11-24 | End: 2020-11-24

## 2020-11-24 RX ADMIN — SODIUM CHLORIDE 100 ML/HR: 900 INJECTION, SOLUTION INTRAVENOUS at 03:04

## 2020-11-24 RX ADMIN — Medication 10 ML: at 12:27

## 2020-11-24 RX ADMIN — MIDAZOLAM HYDROCHLORIDE 1 MG: 1 INJECTION, SOLUTION INTRAMUSCULAR; INTRAVENOUS at 14:25

## 2020-11-24 RX ADMIN — OXYCODONE HYDROCHLORIDE 10 MG: 5 TABLET ORAL at 12:26

## 2020-11-24 RX ADMIN — OXYCODONE HYDROCHLORIDE 10 MG: 10 TABLET, FILM COATED, EXTENDED RELEASE ORAL at 08:43

## 2020-11-24 RX ADMIN — LIDOCAINE HYDROCHLORIDE 10 ML: 10 INJECTION, SOLUTION INFILTRATION; PERINEURAL at 14:19

## 2020-11-24 RX ADMIN — HYDROMORPHONE HYDROCHLORIDE 1 MG: 2 INJECTION, SOLUTION INTRAMUSCULAR; INTRAVENOUS; SUBCUTANEOUS at 04:15

## 2020-11-24 RX ADMIN — Medication 10 ML: at 05:01

## 2020-11-24 RX ADMIN — FENTANYL CITRATE 50 MCG: 50 INJECTION, SOLUTION INTRAMUSCULAR; INTRAVENOUS at 14:18

## 2020-11-24 RX ADMIN — MIDAZOLAM HYDROCHLORIDE 1 MG: 1 INJECTION, SOLUTION INTRAMUSCULAR; INTRAVENOUS at 14:18

## 2020-11-24 RX ADMIN — OXYCODONE HYDROCHLORIDE 10 MG: 5 TABLET ORAL at 16:40

## 2020-11-24 RX ADMIN — Medication 300 UNITS: at 14:46

## 2020-11-24 RX ADMIN — LIDOCAINE HYDROCHLORIDE AND EPINEPHRINE 5 MG: 10; 10 INJECTION, SOLUTION INFILTRATION; PERINEURAL at 14:36

## 2020-11-24 RX ADMIN — HYDROMORPHONE HYDROCHLORIDE 1 MG: 2 INJECTION, SOLUTION INTRAMUSCULAR; INTRAVENOUS; SUBCUTANEOUS at 10:10

## 2020-11-24 RX ADMIN — CEFAZOLIN SODIUM 2 G: 1 INJECTION, POWDER, FOR SOLUTION INTRAMUSCULAR; INTRAVENOUS at 14:06

## 2020-11-24 NOTE — CONSULTS
Vascular Surgery --unable to see this patient prior to discharge --chart reviewed:  Ok to d/c on Eliquis for splenic vein thrombosis --will d/w Dr. Emil Leo DISPLAY PLAN FREE TEXT DISPLAY PLAN FREE TEXT DISPLAY PLAN FREE TEXT DISPLAY PLAN FREE TEXT DISPLAY PLAN FREE TEXT DISPLAY PLAN FREE TEXT

## 2020-11-24 NOTE — PROGRESS NOTES
CM Note: 
Pt to have a port placed today for chemo tx. Family will transport him home when he d/c'd today. No CM needs. He will f/u with Dr. Sean Briceno as scheduled. BHARATH Romero

## 2020-11-24 NOTE — PROGRESS NOTES
responded to call from staff for an AMD consult with pt, Mr. Anthony Rodriguez, on 5 Med Surg unit. Pt was in bed, his sister and daughter were present in the room. Pt and family welcomed  warmly. Pt was clear about his sister being his primary agent, and his daughter his secondary agent. However, during the process of reviewing document, it became apparent that pt was not fully clear of his choice of care, from the options offered on the AMD document. He therefore asked for time to review it further with family before completing document. When  inquired how he may be supported spiritually, he expressed that he has no need at this time. Mr. Anthony Rodriguez has a copy of AMD document, and Your Right to Decide Booklet. He is also aware of 's availability for support. Pt and family thanked  for the support. Spiritual care is still available as able or as needed. Visited by: Leonie Arita. To neda recinos: [phone]

## 2020-11-24 NOTE — DISCHARGE INSTRUCTIONS
Patient Discharge Instructions    Gautam Paulino / [id number] : 1960    Admitted 2020 Discharged: 2020     Primary Diagnoses  Problem List as of 2020 Date Reviewed: 2020           Tobacco abuse   Psoriasis   Gout   Pancreatic cancer ([facility] [address]   * (Principal) Splenic vein thrombosis   Peritoneal carcinomatosis ([facility] [address]   Abdominal lymphadenopathy   Acute abdominal pain          Take Home Medications     · It is important that you take the medication exactly as they are prescribed. · Keep your medication in the bottles provided by the pharmacist and keep a list of the medication names, dosages, and times to be taken in your wallet. · Do not take other medications without consulting your doctor. What to do at Home    Recommended diet: Regular Diet, Increase noncaffeinated fluids, Encourage fluids and Comfort feeding    Recommended activity: Activity as tolerated    If you experience worse symptoms, please follow up with Dr Nita Bond. Follow-up with any PCP in a few weeks        Information obtained by :  I understand that if any problems occur once I am at home I am to contact my physician. I understand and acknowledge receipt of the instructions indicated above. Physician's or R.N.'s Signature                                                                  Date/Time                                                                                                                                              Patient or Representative Signature                                                          Date/Time    [facility]/ED Visit Follow-Up Instructions/Information    You may have an in home follow up visit set up with [facility] or may wish to contact [facility] to set-up a visit:    What are we?   [facility] staffed with emergency trained medical teams. We come to your home in a vehicle stocked with medical supplies and technology. An ER physician is always available if needed. When? As a part of your hospital follow-up, an appointment has been/ or can be set up for us to come see you. Usually, this will be 24-72 hours after you leave the hospital or as needed. [facility] is open 7am-9pm, 7 days a week, 365 days a year, including holidays. Why? We know that you cannot always get to your doctor after being in the hospital and that your doctor is not always available when you need them. Once your workup is complete, we'll call in your prescriptions, update your family doctor, and handle billing with your insurance so you can focus on feeling better, faster without leaving home. How much? We accept most major health insurance plans, including Medicaid, Medicare, and Medicare Advantage [address], [employer], [place], and [employer]. We also accept: credit, debit, health savings account (HSA), health reimbursement account (HRA) and flexible spending account (FSA) payments. [facility]'s prices compare to conventional urgent care facilities, but we bring the care to you. How to reach us? Getting care is easy- use our mobile jyoti (USEUM), website ([email or web address]) or call us [phone].

## 2020-11-24 NOTE — DISCHARGE SUMMARY
Physician Discharge Summary Patient ID: 
Meme Chandler 
[id number] 
89 y.o. 
1960 Admit date: 11/22/2020 Discharge date of service and time: 11/24/2020 Admission Diagnoses: Pancreatitis [K85.90] Discharge Diagnoses:   
Principal Diagnosis Splenic vein thrombosis Hospital Course and other diagnoses Splenic vein thrombosis / Acute abdominal pain - POA, new finding of thrombosis. Vascular consult pending. Treatment dose lovenox here, DC on eliquis per hematology. NPO. Start oxycontin. Prn IV dilaudid. Laxatives 
  
Pancreatic cancer / Abdominal lymphadenopathy - POA, new dx. Bx last week proved adenocarcinoma. Grim prognosis. Awaiting treatment plan from oncology. Getting port. Palliative consult. 
  
Psoriasis - No symptoms 
  
Gout - No symptoms 
  
Tobacco abuse - Too late too advise cessation.   
  
PCP: None Consults: GI, Hematology/Oncology and Vascular Surgery Significant Diagnostic Studies: See Hospital Course Discharged home in improved condition. Discharge Exam: 
/73 (BP 1 Location: Right arm, BP Patient Position: At rest) Pulse 84 Temp 97.6 °F (36.4 °C) Resp 16 Ht 5' 10\" (1.778 m) Wt 54.7 kg (120 lb 9.5 oz) SpO2 98% BMI 17.30 kg/m²  
  
Gen:  Frail, cachectic, in no acute distress HEENT:  Pink conjunctivae, PERRL, hearing barely intact to voice, moist mucous membranes Neck:  Supple, without masses, thyroid non-tender Resp:  No accessory muscle use, clear breath sounds without wheezes rales or rhonchi 
Card:  No murmurs, normal S1, S2 without thrills, bruits or peripheral edema Abd:  Soft, non-tender, non-distended, normoactive bowel sounds are present, no mass Lymph:  No cervical or inguinal adenopathy Musc:  No cyanosis or clubbing Skin:  No rashes or ulcers, skin turgor is good Neuro:  Cranial nerves are grossly intact, mild motor weakness, follows commands Psych:  Poor insight, oriented to person, place Patient Instructions:  
Current Discharge Medication List  
  
START taking these medications Details  
apixaban (Eliquis DVT-PE Treat 30D Start) 5 mg (74 tabs) starter pack Take 10 mg (two 5 mg tablets) by mouth twice a day for 7 days Followed by 5 mg (one 5 mg tablet) by mouth twice a day  Indications: blood clot in a deep vein of the extremities Qty: 1 Dose Pack, Refills: 0  
  
oxyCODONE ER (OxyCONTIN) 10 mg ER tablet Take 1 Tab by mouth every twelve (12) hours for 30 days. Max Daily Amount: 20 mg. 
Qty: 60 Tab, Refills: 0 Associated Diagnoses: Malignant neoplasm of pancreas, unspecified location of malignancy ([facility] [address] CONTINUE these medications which have NOT CHANGED Details  
oxyCODONE-acetaminophen (Percocet) 5-325 mg per tablet Take 1 Tab by mouth every four (4) hours as needed for Pain. senna-docusate (PERICOLACE) 8.6-50 mg per tablet Take 2 Tabs by mouth daily. Qty: 60 Tab, Refills: 0 Activity: Activity as tolerated Diet: Regular Diet, Encourage fluids and Comfort feeding Wound Care: Reinforce dressing PRN and As directed Follow-up with Dr aguayo in 1 week. Follow-up tests/labs - none Signed: 
Rosanna Campbell MD 
11/24/2020 
11:04 AM

## 2020-11-24 NOTE — PROGRESS NOTES
[facility] Medical Progress Note NAME: Brett Maharaj :  1960 MRM:  [id number] Date/Time of service 2020  11:00 AM    
 
  
Assessment and Plan:  
 
Splenic vein thrombosis / Acute abdominal pain - POA, new finding of thrombosis. Vascular consult pending. Treatment dose lovenox here, DC on eliquis per hematology. NPO. Start oxycontin. Prn IV dilaudid. Laxatives Pancreatic cancer / Abdominal lymphadenopathy - POA, new dx. Bx last week proved adenocarcinoma. Grim prognosis. Awaiting treatment plan from oncology. Getting port. Palliative consult. Psoriasis - No symptoms Gout - No symptoms Tobacco abuse - Too late too advise cessation. Subjective: Chief Complaint:  Abdominal pain better after morphine, getting port ROS: 
(bold if positive, if negative) Abd PainTolerating PT  NPO Objective:  
 
Last 24hrs VS reviewed since prior progress note. Most recent are: 
 
Visit Vitals /73 (BP 1 Location: Right arm, BP Patient Position: At rest) Pulse 84 Temp 97.6 °F (36.4 °C) Resp 16 Ht 5' 10\" (1.778 m) Wt 54.7 kg (120 lb 9.5 oz) SpO2 98% BMI 17.30 kg/m² SpO2 Readings from Last 6 Encounters:  
20 98% 20 99% 20 99% 19 97% 17 98% Intake/Output Summary (Last 24 hours) at 2020 1100 Last data filed at 2020 5949 Gross per 24 hour Intake 2558.34 ml Output  Net 2558.34 ml Physical Exam: 
 
Gen:  Frail, cachectic, in no acute distress HEENT:  Pink conjunctivae, PERRL, hearing barely intact to voice, moist mucous membranes Neck:  Supple, without masses, thyroid non-tender Resp:  No accessory muscle use, clear breath sounds without wheezes rales or rhonchi 
Card:  No murmurs, normal S1, S2 without thrills, bruits or peripheral edema Abd:  Soft, non-tender, non-distended, normoactive bowel sounds are present, no mass Lymph:  No cervical or inguinal adenopathy Musc:  No cyanosis or clubbing Skin:  No rashes or ulcers, skin turgor is good Neuro:  Cranial nerves are grossly intact, mild motor weakness, follows commands Psych:  Poor insight, oriented to person, place Telemetry reviewed:   normal sinus rhythm 
__________________________________________________________________ Medications Reviewed: (see below) Medications:  
 
Current Facility-Administered Medications Medication Dose Route Frequency  polyethylene glycol (MIRALAX) packet 17 g  17 g Oral DAILY  oxyCODONE ER (OxyCONTIN) tablet 10 mg  10 mg Oral Q12H  
 senna-docusate (PERICOLACE) 8.6-50 mg per tablet 1 Tab  1 Tab Oral BID  enoxaparin (LOVENOX) injection 50 mg  1 mg/kg SubCUTAneous Q12H  
 sodium chloride (NS) flush 5-40 mL  5-40 mL IntraVENous Q8H  
 sodium chloride (NS) flush 5-40 mL  5-40 mL IntraVENous PRN  
 acetaminophen (TYLENOL) tablet 650 mg  650 mg Oral Q6H PRN  
 ondansetron (ZOFRAN) injection 4 mg  4 mg IntraVENous Q6H PRN  
 0.9% sodium chloride infusion  100 mL/hr IntraVENous CONTINUOUS  
 HYDROmorphone (PF) (DILAUDID) injection 1 mg  1 mg IntraVENous Q4H PRN  
 oxyCODONE IR (ROXICODONE) tablet 10 mg  10 mg Oral Q4H PRN Lab Data Reviewed: (see below) Lab Review:  
 
Recent Labs  
  11/23/20 0622 11/22/20 
1522 WBC 6.1 9.6 HGB 12.8 15.0  
HCT 37.8 42.6  280 Recent Labs  
  11/23/20 
0622 11/22/20 
1522  135* K 3.6 4.0  
 101 CO2 27 30 GLU 82 103* BUN 14 17 CREA 0.79 1.09  
CA 7.8* 9.0 ALB 2.7* 3.5 TBILI 0.4 0.5 ALT 21 30 No results found for: Debbie Roach No results for input(s): PH, PCO2, PO2, HCO3, FIO2 in the last 72 hours. No results for input(s): INR, INREXT, INREXT in the last 72 hours. All Micro Results Procedure Component Value Units Date/Time CULTURE, MRSA [[id number]] Collected:  11/23/20 1822 Order Status:  Completed Specimen:  Nasal from Nares Updated:  11/23/20 2149 Other pertinent lab: none Total time spent with patient: 35 Minutes I personally reviewed chart, notes, data and current medications in the medical record. I have personally examined and treated the patient at bedside during this period. Care Plan discussed with: Patient, Care Manager, Nursing Staff, Consultant/Specialist and >50% of time spent in counseling and coordination of care Discussed:  Care Plan and D/C Planning Prophylaxis:  H2B/PPI Disposition:  Home w/Family 
        
___________________________________________________ Attending Physician: Salty Shaikh MD

## 2020-11-24 NOTE — ACP (ADVANCE CARE PLANNING)
responded to call from staff for an AMD consult with pt, Mr. Deja Urena, on 5 Med Surg unit. Pt was in bed, his sister and daughter were present in the room. Pt and family welcomed  warmly. Pt was clear about his sister being his primary agent, and his daughter his secondary agent. However, during the process of reviewing document, it became apparent that pt was not fully clear of his choice of care, from the options offered on the AMD document. He therefore asked for time to review it further with family before completing document. When  inquired how he may be supported spiritually, he expressed that he has no need at this time. Mr. Deja Urena has a copy of AMD document, and Your Right to Decide Booklet. He is also aware of 's availability for support. Pt and family thanked  for the support. Spiritual care is still available as able or as needed. Visited by: Rebel Wu. iKn recinos: [phone]

## 2020-11-24 NOTE — PROGRESS NOTES
[facility] [place] [facility] [place]  [address] [facility], [address]  [place] Life: [phone]  F: [phone]      Reason for Visit:   Raquel Ruiz is a 64 y.o. male who is seen in consultation at the request of Dr. Anastacio Santizo for evaluation of pancreatic cancer, new diagnosis. Hematology / Oncology Treatment History:     Diagnosis: Pancreatic, adenocarcinoma, moderately to poorly diiferentiated    Stage: IV [[id number]]    Pathology: 2020 pancreatic body, FNA ;adenocarcinoma, moderately to poorly diiferentiated    Prior Treatment: none     Current Treatment: pending, tentative plan for FOLFIRINOX every 2 weeks in near future. History of Present Illness:   Mr. Raquel Ruiz is a 64 y.o. male admitted on 2020 from the ED with worsening abd pain and constipation. Rates pain on admission 9/10. Recent dx of pancreatic cancer during last hospitalization. Pt reports has not had a BM since last Wed; stopped taking the Senna-docusate last week; he thought it was causing his stomach to hurt more. He was unaware that it was part of a bowel regimen and thought it was pain medication. Has had some nausea but denies vomiting. Decreased appetite. Denies SOB. ED CT scan shows subacute pancreatitis, pancreatic body adenocarcinoma, retroperitoneal remington metastases, peritoneal carcinomatosis. Pt would like his sister to be able to hear about the treatment plans; she is coming from [place] in the am. [facility] to port placement; defininelty wants to have treatment. Today rates abd pain 4/10. Hungry and would like to eat something if possible. 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 Smokes cigarettes: 1 ppd x 30 yrs. Rare EtOH use. : 3 kids; twins 29 yrs old and 25 yr old.          Interval History:     Awaiting port placement; no BM yet; having some abd pain; rates it 8/10. Sister and daughter at bedside. Past Medical History:   Diagnosis Date    Gout     Pancreatic cancer ([address]     Psoriasis     Tobacco abuse       Past Surgical History:   Procedure Laterality Date    HX ORTHOPAEDIC      femur repair    HX ORTHOPAEDIC      lumbar compressed fracture      Social History     Tobacco Use    Smoking status: Current Every Day Smoker     Packs/day: 1.00    Smokeless tobacco: Never Used    Tobacco comment: last week was last cigarette   Substance Use Topics    Alcohol use: Yes      Family History   Problem Relation Age of Onset    Pancreatic Cancer Brother      Current Facility-Administered Medications   Medication Dose Route Frequency    polyethylene glycol (MIRALAX) packet 17 g  17 g Oral DAILY    oxyCODONE ER (OxyCONTIN) tablet 10 mg  10 mg Oral Q12H    senna-docusate (PERICOLACE) 8.6-50 mg per tablet 1 Tab  1 Tab Oral BID    enoxaparin (LOVENOX) injection 50 mg  1 mg/kg SubCUTAneous Q12H    sodium chloride (NS) flush 5-40 mL  5-40 mL IntraVENous Q8H    sodium chloride (NS) flush 5-40 mL  5-40 mL IntraVENous PRN    acetaminophen (TYLENOL) tablet 650 mg  650 mg Oral Q6H PRN    ondansetron (ZOFRAN) injection 4 mg  4 mg IntraVENous Q6H PRN    0.9% sodium chloride infusion  100 mL/hr IntraVENous CONTINUOUS    HYDROmorphone (PF) (DILAUDID) injection 1 mg  1 mg IntraVENous Q4H PRN    oxyCODONE IR (ROXICODONE) tablet 10 mg  10 mg Oral Q4H PRN      No Known Allergies     Review of Systems: A complete review of systems was obtained, negative except as described above.     Physical Exam:     Visit Vitals  /81 (BP 1 Location: Right arm, BP Patient Position: Sitting)   Pulse 85   Temp 97.8 °F (36.6 °C)   Resp 16   Ht 5' 10\" (1.778 m)   Wt 54.7 kg (120 lb 9.5 oz)   SpO2 98%   BMI 17.30 kg/m²     ECOG PS: 1  General: No distress  Eyes: Anicteric sclerae  HENT: Atraumatic  Neck: Supple  Respiratory: Normal respiratory effort  CV: No peripheral edema  GI: Soft, nontender, nondistended, no masses, no hepatomegaly, no splenomegaly  Skin: No rashes, ecchymoses, or petechiae  Psych: Alert, oriented, appropriate affect, normal judgment/insight    Results:     Lab Results   Component Value Date/Time    WBC 6.1 11/23/2020 06:22 AM    HGB 12.8 11/23/2020 06:22 AM    HCT 37.8 11/23/2020 06:22 AM    PLATELET 032 57/72/3960 06:22 AM    MCV 91.1 11/23/2020 06:22 AM    ABS. NEUTROPHILS 6.9 11/22/2020 03:22 PM     Lab Results   Component Value Date/Time    Sodium 139 11/23/2020 06:22 AM    Potassium 3.6 11/23/2020 06:22 AM    Chloride 107 11/23/2020 06:22 AM    CO2 27 11/23/2020 06:22 AM    Glucose 82 11/23/2020 06:22 AM    BUN 14 11/23/2020 06:22 AM    Creatinine 0.79 11/23/2020 06:22 AM    GFR est AA >60 11/23/2020 06:22 AM    GFR est non-AA >60 11/23/2020 06:22 AM    Calcium 7.8 (L) 11/23/2020 06:22 AM     Lab Results   Component Value Date/Time    Bilirubin, total 0.4 11/23/2020 06:22 AM    ALT (SGPT) 21 11/23/2020 06:22 AM    Alk. phosphatase 56 11/23/2020 06:22 AM    Protein, total 6.2 (L) 11/23/2020 06:22 AM    Albumin 2.7 (L) 11/23/2020 06:22 AM    Globulin 3.5 11/23/2020 06:22 AM     Lab Results   Component Value Date/Time    Sed rate, automated 4 11/16/2017 03:56 PM    C-Reactive protein <0.29 11/16/2017 03:56 PM    TSH 3.36 11/12/2020 05:28 AM    Lipase 115 11/23/2020 06:22 AM     Lab Results   Component Value Date/Time    INR 1.0 09/23/2010 03:05 AM    aPTT 28.0 09/23/2010 03:05 AM     Lab Results   Component Value Date/Time    CEA 4.9 11/13/2020 05:29 AM    Carbohydrate Antigen 19-9, (CA 19-9) 5,607 (H) 11/13/2020 05:29 AM       11/22/2020 XR ABD FLAT  IMPRESSION:   No evidence for acute abnormality    11/23/2020 CT ABD PELV W CONT  IMPRESSION:   1. Subacute pancreatitis, with small walled-off necrosis (\"pseudocyst\") in the lesser sac. 2. Pancreatic body adenocarcinoma. 3. Occluded splenic vein.  Consequent, extensive, submucosal, gastric varices in the cardia. 4. Encasement of the splenic artery. Abutment of the celiac artery, SMV, and SMA. 5. Retroperitoneal remington metastases. 6. Peritoneal carcinomatosis.       Assessment and Recommendations:     65 yo male with recent dx of pancreatic cancer admitted with abd pain. 1. Pancreatic adenocarcinoma:   Diagnosed 11/18/2020 and now appears to have Stage IV disease based on peritoneal carcinomatosis seen on CT. Also has retroperitoneal remington metastases and encasement of vasculature. Was scheduled to be seen in our clinic on 11/24 to discuss treatment options. Discussed with him today that his disease is not curable, but is treatable. Emphasized the importance of genetic testing, germline and somatic. As his brother passed away from pancreas cancer, pt may have BRCA or PALB2 mutation, would allow him to possible be treated with oral medication in the future. For now, I recommend port placement and palliative chemotherapy with FOLFIRINOX regimen. CA [phone] at diagnosis. -- 11/24 Plan for port placement this am  -- Will arrange for germline and somatic genetic testing.  --  follow up in the clinic scheduled for 11/30 to initiate tx with FOLFIRINOX; printed info given on medication and pancreatic cancer. Will ask Onc Nurse Navigator to follow for more in depth review of information. 2. Subacute pancreatitis:   On IV fluids ; GI consulted: no indication for endoscopy at this time    3. Constipation:   2/2 opioids and bowel regimen adjusted. Reviewed with pt importance of regimen at home. 4. Occluded splenic vein:  Agree with anticoagulation; will hold around port placement and then resume with transition to DOAC at time of discharge. Recommending Eliquis with loading dose. 5. Neoplasm related pain:   Continue with prn medication. May need to start on long acting medication as well. 6. Family h/o pancreatic cancer:  Concerning for genetic predisposition.  Recommend genetic testing as outpatient (BRCA 1/2, PALB2, etc.)    7/ Medical Power of   Will ask Dave to assist prior to discharge to complete per pt's request.         Plan reviewed with Dr Chelsy Willis By: Foreign Heller NP

## 2020-11-24 NOTE — PROGRESS NOTES
Bedside and Verbal shift change report given to [address] [place] [address] (oncoming nurse) by Libertad Arias (offgoing nurse). Report included the following information SBAR, Intake/Output, MAR and Recent Results.

## 2020-11-24 NOTE — H&P
Interventional Radiology History and Physical (Inpatient) Patient: Jennifer Garcia 64 y.o. male Referring Physician:  No ref. provider found Chief Complaint: presents for portacath placement with conscious sedation History of Present Illness: pancreatic cancer History: 
Past Medical History:  
Diagnosis Date  Gout  [facility] [address]  Psoriasis  Tobacco abuse Family History Problem Relation Age of Onset  Pancreatic Cancer Brother Social History Socioeconomic History  Marital status: SINGLE Spouse name: Not on file  Number of children: Not on file  Years of education: Not on file  Highest education level: Not on file Occupational History  Not on file Social Needs  Financial resource strain: Not on file  Food insecurity Worry: Not on file Inability: Not on file  Transportation needs Medical: Not on file Non-medical: Not on file Tobacco Use  Smoking status: Current Every Day Smoker Packs/day: 1.00  Smokeless tobacco: Never Used  Tobacco comment: last week was last cigarette Substance and Sexual Activity  Alcohol use: Yes  Drug use: Not on file  Sexual activity: Not on file Lifestyle  Physical activity Days per week: Not on file Minutes per session: Not on file  Stress: Not on file Relationships  Social connections Talks on phone: Not on file Gets together: Not on file Attends Baptism service: Not on file Active member of club or organization: Not on file Attends meetings of clubs or organizations: Not on file Relationship status: Not on file  Intimate partner violence Fear of current or ex partner: Not on file Emotionally abused: Not on file Physically abused: Not on file Forced sexual activity: Not on file Other Topics Concern  Not on file Social History Narrative  Not on file Allergies: No Known Allergies Current Medications: 
Current Facility-Administered Medications Medication Dose Route Frequency  [START ON 11/25/2020] apixaban (ELIQUIS) tablet 10 mg  10 mg Oral BID  ceFAZolin (ANCEF) 2 g in sterile water (preservative free) 20 mL IV syringe  2 g IntraVENous ONCE  polyethylene glycol (MIRALAX) packet 17 g  17 g Oral DAILY  oxyCODONE ER (OxyCONTIN) tablet 10 mg  10 mg Oral Q12H  
 senna-docusate (PERICOLACE) 8.6-50 mg per tablet 1 Tab  1 Tab Oral BID  enoxaparin (LOVENOX) injection 50 mg  1 mg/kg SubCUTAneous Q12H  
 sodium chloride (NS) flush 5-40 mL  5-40 mL IntraVENous Q8H  
 sodium chloride (NS) flush 5-40 mL  5-40 mL IntraVENous PRN  
 acetaminophen (TYLENOL) tablet 650 mg  650 mg Oral Q6H PRN  
 ondansetron (ZOFRAN) injection 4 mg  4 mg IntraVENous Q6H PRN  
 HYDROmorphone (PF) (DILAUDID) injection 1 mg  1 mg IntraVENous Q4H PRN  
 oxyCODONE IR (ROXICODONE) tablet 10 mg  10 mg Oral Q4H PRN Physical Exam: 
Blood pressure 103/73, pulse 84, temperature 97.6 °F (36.4 °C), resp. rate 16, height 5' 10\" (1.778 m), weight 54.7 kg (120 lb 9.5 oz), SpO2 98 %. GENERAL: alert, cooperative, no distress, appears stated age, LUNG: clear to auscultation bilaterally, HEART: regular rate and rhythm Findings/Diagnosis: pancreatic cancer Alerts:   
Hospital Problems  Date Reviewed: 11/11/2020 Codes Class Noted POA Tobacco abuse ICD-10-CM: Z72.0 ICD-9-CM: 305.1  Unknown Yes Psoriasis ICD-10-CM: L40.9 ICD-9-CM: 696.1  Unknown Yes Gout ICD-10-CM: M10.9 ICD-9-CM: 274.9  Unknown Yes [facility] ([facility] ICD-10-CM: C25.9 ICD-9-CM: 157.9  Unknown Yes * (Principal) Splenic vein thrombosis ICD-10-CM: I82.890 ICD-9-CM: 289.59  11/23/2020 Yes Abdominal lymphadenopathy ICD-10-CM: R59.0 ICD-9-CM: 785.6  11/11/2020 Yes Acute abdominal pain ICD-10-CM: R10.9 ICD-9-CM: 789.00, 338.19  11/11/2020 Yes Laboratory:     
Recent Labs 11/23/20 
4182 HGB 12.8 HCT 37.8 WBC 6.1  BUN 14  
CREA 0.79  
K 3.6 Plan of Care/Planned Procedure: 
Risks, benefits, and alternatives reviewed with patient and he agrees to proceed with the procedure. Full dictated report to follow.

## 2020-11-24 NOTE — NURSE NAVIGATOR
Diagnosis: Pancreatic CA Reason for Visit: Follow-up r/t financial concerns; chemo education Patient Concerns/Needs: Finances; Pain; Anxiety Coordination of Care with: Mik Harrison NP and [employer] 
Education/Education Material: FOLFIRINOX education; written and verbal education with teachback Community Resources/Recommendations: SSI application Narrative: Met with patient, patient's sister, and pt's daughter this a.m. to review chemotherapy education and SSI/SSD application. FOLFIRINOX handouts at bedside. In depth discussion related to chemo regimen and what to expect during treatment. We discussed possible side effects and how to manage potential side effects. Side effects discussed: low blood counts, fatigue, diarrhea, sensitivity to cold, numbness and tingling, and mouth sores. Infection prevention reinforced and when it is necessary to contact the provider. Initially, pt was unable to provide teachback, but did verbalize understanding. Pt's daughter and sister, who is a nurse, were at bedside for teaching as well. Opportunity given for questions, but they had none at this time. We also discussed the Medicaid and SSI/SSD applications. ONN clarified with MedAngeline[employer] that the Medicaid application has been completed. ONN encouraged patient to submit SSI/SSD application as soon as possible. Phone number to do so given and highlighted for patient. ONN to fax necessary info to the [facility] [employer] to aid in this process. Overall, pt appears nervous r/t to port placement. He is anxious to get the procedure done and to go home. He also verbalized feeling overwhelmed. I provided emotional support and encouraged him to contact me at anytime if he has questions or concerns. I gave contact info to pt's daughter and sister as well. TIFFANIE PalmerN, RN, VIA [facility] Oncology Nurse Navigator [address] [facility] [address] [facility] [place], [address] [facility] [address]: [phone] [email or web address]  
Good Help to Those in Need®

## 2020-11-24 NOTE — PROGRESS NOTES
Problem: Falls - Risk of 
Goal: *Absence of Falls Description: Document Saumya Rangel Fall Risk and appropriate interventions in the flowsheet. Outcome: Progressing Towards Goal 
Note: Fall Risk Interventions: 
  
 
  
 
Medication Interventions: Teach patient to arise slowly

## 2020-11-24 NOTE — PROGRESS NOTES
2:55 PM 
 
TRANSFER - IN REPORT: 
 
Verbal report received from [facility] (name) on Melania Gutierrez  being received from [employer] (unit) for routine post - op Report consisted of patients Situation, Background, Assessment and  
Recommendations(SBAR). Information from the following report(s) Procedure Summary was reviewed with the receiving nurse. Opportunity for questions and clarification was provided. Assessment completed upon patients arrival to unit and care assumed. 3:28 PM 
 
30 mins pre call to charge nurse Earl Resendez. 3:56 PM 
 
TRANSFER - OUT REPORT: 
 
Verbal report given to [facility] RN  (name) on Melania Gutierrez  being transferred to 5th Floor (unit) for routine progression of care Report consisted of patients Situation, Background, Assessment and  
Recommendations(SBAR). Information from the following report(s) Procedure Summary, Recent Results and Cardiac Rhythm NSR  was reviewed with the receiving nurse. Lines:  
Venous Access Device 11/24/20 Upper chest (subclavicular area, right (Active) Central Line Being Utilized No 11/24/20 1435 Site Assessment Clean, dry, & intact 11/24/20 1435 Date of Last Dressing Change 11/24/20 11/24/20 1435 Dressing Type Topical skin adhesive 11/24/20 1435 Peripheral IV 11/24/20 Left Forearm (Active) Opportunity for questions and clarification was provided.

## 2020-11-25 ENCOUNTER — TELEPHONE (OUTPATIENT)
Dept: ONCOLOGY | Age: 60
End: 2020-11-25

## 2020-11-25 DIAGNOSIS — T45.1X5A CHEMOTHERAPY-INDUCED NAUSEA: Primary | ICD-10-CM

## 2020-11-25 DIAGNOSIS — R11.0 CHEMOTHERAPY-INDUCED NAUSEA: Primary | ICD-10-CM

## 2020-11-25 DIAGNOSIS — C25.1 MALIGNANT NEOPLASM OF BODY OF PANCREAS (HCC): ICD-10-CM

## 2020-11-25 RX ORDER — LIDOCAINE AND PRILOCAINE 25; 25 MG/G; MG/G
CREAM TOPICAL
Qty: 30 G | Refills: 2 | Status: SHIPPED | OUTPATIENT
Start: 2020-11-25 | End: 2021-01-01

## 2020-11-25 RX ORDER — PROCHLORPERAZINE MALEATE 10 MG
10 TABLET ORAL
Qty: 30 TAB | Refills: 3 | Status: SHIPPED | OUTPATIENT
Start: 2020-11-25

## 2020-11-25 RX ORDER — ONDANSETRON HYDROCHLORIDE 8 MG/1
8 TABLET, FILM COATED ORAL
Qty: 30 TAB | Refills: 3 | Status: SHIPPED | OUTPATIENT
Start: 2020-11-25

## 2020-11-25 RX ORDER — DEXAMETHASONE 4 MG/1
TABLET ORAL
Qty: 28 TAB | Refills: 2 | Status: SHIPPED | OUTPATIENT
Start: 2020-11-25 | End: 2021-01-01 | Stop reason: SDUPTHER

## 2020-11-25 NOTE — PROGRESS NOTES
11/25/20 4:30 PM Called patient and advised he stop taking eliquis because radiologist reviewed scan and decided he does not have a blood clot. Advised he  supportive medications from pharmacy which include dexamethasone, zofran, compazine and emla cream. Advised he hold on to these and bring them to his appt on Monday and I will review them. Also advised he use the emla cream on Monday before arrival to [facility]. Patient verbalized understanding.

## 2020-11-25 NOTE — TELEPHONE ENCOUNTER
11/25/20 3:43 PM Called emergency contact and left message since patient is not answer phone and his VM is full.

## 2020-11-25 NOTE — TELEPHONE ENCOUNTER
11/25/20 12:29 PM Called patient to discuss chemotherapy treatment and supportive medications. Also, need to advise patient to stop eliquis. VM is full and home phone number listed in inactive.

## 2020-11-30 ENCOUNTER — TELEPHONE (OUTPATIENT)
Dept: ONCOLOGY | Age: 60
End: 2020-11-30

## 2020-11-30 ENCOUNTER — HOSPITAL ENCOUNTER (OUTPATIENT)
Dept: INFUSION THERAPY | Age: 60
Discharge: HOME | End: 2020-11-30
Payer: MEDICAID

## 2020-11-30 ENCOUNTER — OFFICE VISIT (OUTPATIENT)
Dept: ONCOLOGY | Age: 60
End: 2020-11-30
Payer: MEDICAID

## 2020-11-30 VITALS
TEMPERATURE: 97.7 F | WEIGHT: 124.4 LBS | DIASTOLIC BLOOD PRESSURE: 74 MMHG | OXYGEN SATURATION: 100 % | BODY MASS INDEX: 17.81 KG/M2 | HEIGHT: 70 IN | SYSTOLIC BLOOD PRESSURE: 109 MMHG | HEART RATE: 84 BPM

## 2020-11-30 VITALS
BODY MASS INDEX: 17.81 KG/M2 | DIASTOLIC BLOOD PRESSURE: 82 MMHG | HEIGHT: 70 IN | WEIGHT: 124.4 LBS | OXYGEN SATURATION: 100 % | RESPIRATION RATE: 18 BRPM | HEART RATE: 81 BPM | SYSTOLIC BLOOD PRESSURE: 117 MMHG | TEMPERATURE: 97.7 F

## 2020-11-30 DIAGNOSIS — R59.0 ABDOMINAL LYMPHADENOPATHY: ICD-10-CM

## 2020-11-30 DIAGNOSIS — Z51.11 CHEMOTHERAPY MANAGEMENT, ENCOUNTER FOR: ICD-10-CM

## 2020-11-30 DIAGNOSIS — K59.03 DRUG-INDUCED CONSTIPATION: ICD-10-CM

## 2020-11-30 DIAGNOSIS — C25.1 MALIGNANT NEOPLASM OF BODY OF PANCREAS (HCC): Primary | ICD-10-CM

## 2020-11-30 DIAGNOSIS — C78.6 PERITONEAL CARCINOMATOSIS (HCC): ICD-10-CM

## 2020-11-30 DIAGNOSIS — K12.30 MUCOSITIS: ICD-10-CM

## 2020-11-30 DIAGNOSIS — I82.890 SPLENIC VEIN THROMBOSIS: ICD-10-CM

## 2020-11-30 LAB
ALBUMIN SERPL-MCNC: 3.3 G/DL (ref 3.5–5)
ALBUMIN/GLOB SERPL: 1 {RATIO} (ref 1.1–2.2)
ALP SERPL-CCNC: 54 U/L (ref 45–117)
ALT SERPL-CCNC: 17 U/L (ref 12–78)
ANION GAP SERPL CALC-SCNC: 3 MMOL/L (ref 5–15)
AST SERPL-CCNC: 7 U/L (ref 15–37)
BASOPHILS # BLD: 0.1 K/UL (ref 0–0.1)
BASOPHILS NFR BLD: 2 % (ref 0–1)
BILIRUB SERPL-MCNC: 0.4 MG/DL (ref 0.2–1)
BUN SERPL-MCNC: 12 MG/DL (ref 6–20)
BUN/CREAT SERPL: 11 (ref 12–20)
CALCIUM SERPL-MCNC: 8.5 MG/DL (ref 8.5–10.1)
CHLORIDE SERPL-SCNC: 105 MMOL/L (ref 97–108)
CO2 SERPL-SCNC: 31 MMOL/L (ref 21–32)
CREAT SERPL-MCNC: 1.05 MG/DL (ref 0.7–1.3)
DIFFERENTIAL METHOD BLD: ABNORMAL
EOSINOPHIL # BLD: 0.3 K/UL (ref 0–0.4)
EOSINOPHIL NFR BLD: 5 % (ref 0–7)
ERYTHROCYTE [DISTWIDTH] IN BLOOD BY AUTOMATED COUNT: 13 % (ref 11.5–14.5)
GLOBULIN SER CALC-MCNC: 3.3 G/DL (ref 2–4)
GLUCOSE SERPL-MCNC: 81 MG/DL (ref 65–100)
HCT VFR BLD AUTO: 37.5 % (ref 36.6–50.3)
HGB BLD-MCNC: 12.5 G/DL (ref 12.1–17)
IMM GRANULOCYTES # BLD AUTO: 0 K/UL (ref 0–0.04)
IMM GRANULOCYTES NFR BLD AUTO: 1 % (ref 0–0.5)
LYMPHOCYTES # BLD: 1.9 K/UL (ref 0.8–3.5)
LYMPHOCYTES NFR BLD: 32 % (ref 12–49)
MCH RBC QN AUTO: 30.2 PG (ref 26–34)
MCHC RBC AUTO-ENTMCNC: 33.3 G/DL (ref 30–36.5)
MCV RBC AUTO: 90.6 FL (ref 80–99)
MONOCYTES # BLD: 0.6 K/UL (ref 0–1)
MONOCYTES NFR BLD: 11 % (ref 5–13)
NEUTS SEG # BLD: 3 K/UL (ref 1.8–8)
NEUTS SEG NFR BLD: 49 % (ref 32–75)
NRBC # BLD: 0 K/UL (ref 0–0.01)
NRBC BLD-RTO: 0 PER 100 WBC
PLATELET # BLD AUTO: 258 K/UL (ref 150–400)
PMV BLD AUTO: 9.2 FL (ref 8.9–12.9)
POTASSIUM SERPL-SCNC: 3.4 MMOL/L (ref 3.5–5.1)
PROT SERPL-MCNC: 6.6 G/DL (ref 6.4–8.2)
RBC # BLD AUTO: 4.14 M/UL (ref 4.1–5.7)
SODIUM SERPL-SCNC: 139 MMOL/L (ref 136–145)
WBC # BLD AUTO: 6 K/UL (ref 4.1–11.1)

## 2020-11-30 PROCEDURE — 74011250636 HC RX REV CODE- 250/636: Performed by: INTERNAL MEDICINE

## 2020-11-30 PROCEDURE — 74011000258 HC RX REV CODE- 258: Performed by: INTERNAL MEDICINE

## 2020-11-30 PROCEDURE — 85025 COMPLETE CBC W/AUTO DIFF WBC: CPT

## 2020-11-30 PROCEDURE — 96417 CHEMO IV INFUS EACH ADDL SEQ: CPT

## 2020-11-30 PROCEDURE — 36415 COLL VENOUS BLD VENIPUNCTURE: CPT

## 2020-11-30 PROCEDURE — 86301 IMMUNOASSAY TUMOR CA 19-9: CPT

## 2020-11-30 PROCEDURE — 96416 CHEMO PROLONG INFUSE W/PUMP: CPT

## 2020-11-30 PROCEDURE — 74011250637 HC RX REV CODE- 250/637: Performed by: NURSE PRACTITIONER

## 2020-11-30 PROCEDURE — 74011000250 HC RX REV CODE- 250: Performed by: INTERNAL MEDICINE

## 2020-11-30 PROCEDURE — 99215 OFFICE O/P EST HI 40 MIN: CPT | Performed by: INTERNAL MEDICINE

## 2020-11-30 PROCEDURE — 80053 COMPREHEN METABOLIC PANEL: CPT

## 2020-11-30 PROCEDURE — 77030012965 HC NDL HUBR BBMI -A

## 2020-11-30 PROCEDURE — 96415 CHEMO IV INFUSION ADDL HR: CPT

## 2020-11-30 PROCEDURE — 96375 TX/PRO/DX INJ NEW DRUG ADDON: CPT

## 2020-11-30 PROCEDURE — 96413 CHEMO IV INFUSION 1 HR: CPT

## 2020-11-30 PROCEDURE — 96368 THER/DIAG CONCURRENT INF: CPT

## 2020-11-30 RX ORDER — POLYETHYLENE GLYCOL 3350 17 G/17G
17 POWDER, FOR SOLUTION ORAL
Qty: 24 EACH | Refills: 4 | Status: SHIPPED | OUTPATIENT
Start: 2020-11-30

## 2020-11-30 RX ORDER — ONDANSETRON 2 MG/ML
8 INJECTION INTRAMUSCULAR; INTRAVENOUS AS NEEDED
Status: ACTIVE | OUTPATIENT
Start: 2020-11-30 | End: 2020-11-30

## 2020-11-30 RX ORDER — SODIUM CHLORIDE 9 MG/ML
10 INJECTION INTRAMUSCULAR; INTRAVENOUS; SUBCUTANEOUS AS NEEDED
Status: ACTIVE | OUTPATIENT
Start: 2020-11-30 | End: 2020-11-30

## 2020-11-30 RX ORDER — AMOXICILLIN 250 MG
1 CAPSULE ORAL DAILY
Qty: 30 TAB | Refills: 3 | Status: SHIPPED | OUTPATIENT
Start: 2020-11-30

## 2020-11-30 RX ORDER — DEXTROSE MONOHYDRATE 50 MG/ML
25 INJECTION, SOLUTION INTRAVENOUS CONTINUOUS
Status: DISPENSED | OUTPATIENT
Start: 2020-11-30 | End: 2020-11-30

## 2020-11-30 RX ORDER — ACETAMINOPHEN 325 MG/1
650 TABLET ORAL AS NEEDED
Status: ACTIVE | OUTPATIENT
Start: 2020-11-30 | End: 2020-11-30

## 2020-11-30 RX ORDER — POTASSIUM CHLORIDE 750 MG/1
40 TABLET, FILM COATED, EXTENDED RELEASE ORAL
Status: COMPLETED | OUTPATIENT
Start: 2020-11-30 | End: 2020-11-30

## 2020-11-30 RX ORDER — SODIUM CHLORIDE 0.9 % (FLUSH) 0.9 %
10 SYRINGE (ML) INJECTION AS NEEDED
Status: DISPENSED | OUTPATIENT
Start: 2020-11-30 | End: 2020-11-30

## 2020-11-30 RX ORDER — HEPARIN 100 UNIT/ML
300-500 SYRINGE INTRAVENOUS AS NEEDED
Status: ACTIVE | OUTPATIENT
Start: 2020-11-30 | End: 2020-11-30

## 2020-11-30 RX ORDER — ATROPINE SULFATE 0.4 MG/ML
0.4 INJECTION, SOLUTION ENDOTRACHEAL; INTRAMEDULLARY; INTRAMUSCULAR; INTRAVENOUS; SUBCUTANEOUS ONCE
Status: COMPLETED | OUTPATIENT
Start: 2020-11-30 | End: 2020-11-30

## 2020-11-30 RX ORDER — PALONOSETRON 0.05 MG/ML
0.25 INJECTION, SOLUTION INTRAVENOUS ONCE
Status: COMPLETED | OUTPATIENT
Start: 2020-11-30 | End: 2020-11-30

## 2020-11-30 RX ORDER — DIPHENHYDRAMINE HYDROCHLORIDE 50 MG/ML
25 INJECTION, SOLUTION INTRAMUSCULAR; INTRAVENOUS AS NEEDED
Status: ACTIVE | OUTPATIENT
Start: 2020-11-30 | End: 2020-11-30

## 2020-11-30 RX ADMIN — OXALIPLATIN 139.5 MG: 5 INJECTION, SOLUTION INTRAVENOUS at 11:24

## 2020-11-30 RX ADMIN — DEXTROSE MONOHYDRATE 656 MG: 50 INJECTION, SOLUTION INTRAVENOUS at 13:55

## 2020-11-30 RX ADMIN — SODIUM CHLORIDE 10 ML: 9 INJECTION, SOLUTION INTRAMUSCULAR; INTRAVENOUS; SUBCUTANEOUS at 08:10

## 2020-11-30 RX ADMIN — ATROPINE SULFATE 0.4 MG: 0.4 INJECTION, SOLUTION INTRAMUSCULAR; INTRAVENOUS; SUBCUTANEOUS at 13:45

## 2020-11-30 RX ADMIN — POTASSIUM CHLORIDE 40 MEQ: 750 TABLET, FILM COATED, EXTENDED RELEASE ORAL at 10:26

## 2020-11-30 RX ADMIN — DEXAMETHASONE SODIUM PHOSPHATE 12 MG: 10 INJECTION, SOLUTION INTRAMUSCULAR; INTRAVENOUS at 10:31

## 2020-11-30 RX ADMIN — PALONOSETRON 0.25 MG: 0.05 INJECTION, SOLUTION INTRAVENOUS at 10:29

## 2020-11-30 RX ADMIN — IRINOTECAN HYDROCHLORIDE 246 MG: 20 INJECTION, SOLUTION INTRAVENOUS at 13:55

## 2020-11-30 RX ADMIN — FLUOROURACIL 3936 MG: 50 INJECTION, SOLUTION INTRAVENOUS at 15:43

## 2020-11-30 RX ADMIN — DEXTROSE MONOHYDRATE 25 ML/HR: 5 INJECTION, SOLUTION INTRAVENOUS at 10:13

## 2020-11-30 NOTE — PROGRESS NOTES
[facility] VISIT NOTE    0750. Pt arrived at [facility] ambulatory and in no distress for C1D1 Folfirinox. Assessment completed, pt c/o nausea and mild diarrhea the past few days. RCW chest port accessed with . 75 in gordon with no difficulty. Positive blood return noted and labs drawn. Pt proceeded to MD apt. Labs resulted and are within parameters to treat. Medications received:  D5 KVO  Potassium Chloride PO  Aloxi IV  Dexamethasone IV  Oxaliplatin IV  Atropine SQ  Irinotecan IV  Leucovorin IV  Fluorouracil CIV CADD cassette    1520. Pt began complaining of abdominal pain/cramping at 1000. Pt took home oxycodone at this time. At 1520, pt visibly uncomfortable, complaining of continued abdominal pain. MD office notified. No new orders received, pt to continue taking oxycodone as prescribed and pt to begin bowel regimen for constipation as discussed with pt by Dr. Will Brown. Pt verbalized understanding. Tolerated treatment well, no adverse reaction noted. Port attached to 5fu pump and verified infusing. Positive blood return noted. Patient Vitals for the past 12 hrs:   Temp Pulse Resp BP SpO2   11/30/20 1548 -- 81 18 117/82 --   11/30/20 0751 97.7 °F (36.5 °C) 84 18 109/74 100 %     Recent Results (from the past 12 hour(s))   CBC WITH AUTOMATED DIFF    Collection Time: 11/30/20  8:09 AM   Result Value Ref Range    WBC 6.0 4.1 - 11.1 K/uL    RBC 4.14 4.10 - 5.70 M/uL    HGB 12.5 12.1 - 17.0 g/dL    HCT 37.5 36.6 - 50.3 %    MCV 90.6 80.0 - 99.0 FL    MCH 30.2 26.0 - 34.0 PG    MCHC 33.3 30.0 - 36.5 g/dL    RDW 13.0 11.5 - 14.5 %    PLATELET 004 739 - 224 K/uL    MPV 9.2 8.9 - 12.9 FL    NRBC 0.0 0  WBC    ABSOLUTE NRBC 0.00 0.00 - 0.01 K/uL    NEUTROPHILS 49 32 - 75 %    LYMPHOCYTES 32 12 - 49 %    MONOCYTES 11 5 - 13 %    EOSINOPHILS 5 0 - 7 %    BASOPHILS 2 (H) 0 - 1 %    IMMATURE GRANULOCYTES 1 (H) 0.0 - 0.5 %    ABS. NEUTROPHILS 3.0 1.8 - 8.0 K/UL    ABS. LYMPHOCYTES 1.9 0.8 - 3.5 K/UL    ABS. MONOCYTES 0.6 0.0 - 1.0 K/UL    ABS. EOSINOPHILS 0.3 0.0 - 0.4 K/UL    ABS. BASOPHILS 0.1 0.0 - 0.1 K/UL    ABS. IMM. GRANS. 0.0 0.00 - 0.04 K/UL    DF AUTOMATED     METABOLIC PANEL, COMPREHENSIVE    Collection Time: 11/30/20  8:09 AM   Result Value Ref Range    Sodium 139 136 - 145 mmol/L    Potassium 3.4 (L) 3.5 - 5.1 mmol/L    Chloride 105 97 - 108 mmol/L    CO2 31 21 - 32 mmol/L    Anion gap 3 (L) 5 - 15 mmol/L    Glucose 81 65 - 100 mg/dL    BUN 12 6 - 20 MG/DL    Creatinine 1.05 0.70 - 1.30 MG/DL    BUN/Creatinine ratio 11 (L) 12 - 20      GFR est AA >60 >60 ml/min/1.73m2    GFR est non-AA >60 >60 ml/min/1.73m2    Calcium 8.5 8.5 - 10.1 MG/DL    Bilirubin, total 0.4 0.2 - 1.0 MG/DL    ALT (SGPT) 17 12 - 78 U/L    AST (SGOT) 7 (L) 15 - 37 U/L    Alk. phosphatase 54 45 - 117 U/L    Protein, total 6.6 6.4 - 8.2 g/dL    Albumin 3.3 (L) 3.5 - 5.0 g/dL    Globulin 3.3 2.0 - 4.0 g/dL    A-G Ratio 1.0 (L) 1.1 - 2.2       1550. D/C'd from [place] ambulatory and in no distress.  Next appointment is 12/2/20 at 1:30 pm.

## 2020-11-30 NOTE — PROGRESS NOTES
Chief Complaint Patient presents with  Follow-up Raquel Rubioelvisvicky is a pleasant 64year old male who presents today as a follow up for Pancreatic cancer. He reports mild stomach pain at a level 2 today.

## 2020-11-30 NOTE — TELEPHONE ENCOUNTER
[employer] at [facility]  [phone]        11/30/20 12:09 PM Called in verbal order for magic mouthwash per written order of Kimberly Baltazar NP.

## 2020-12-01 ENCOUNTER — DOCUMENTATION ONLY (OUTPATIENT)
Dept: ONCOLOGY | Age: 60
End: 2020-12-01

## 2020-12-01 LAB — CANCER AG19-9 SERPL-ACNC: 6483 U/ML (ref 0–35)

## 2020-12-01 NOTE — PROGRESS NOTES
[employer]  Social Work Navigator Encounter     Patient Name:  Renan Segal    Medical History: pancreatic cancer, stage IV    Advance Directives: none on file    Narrative: Call placed to patient on introduce oncology social work role and supports. Voicemail message left requesting a return call.      Referral:     Thank you,  Andris Phoenix, LCSW

## 2020-12-02 ENCOUNTER — TELEPHONE (OUTPATIENT)
Dept: ONCOLOGY | Age: 60
End: 2020-12-02

## 2020-12-02 ENCOUNTER — HOSPITAL ENCOUNTER (OUTPATIENT)
Dept: INFUSION THERAPY | Age: 60
Discharge: HOME | End: 2020-12-02
Payer: MEDICAID

## 2020-12-02 VITALS
DIASTOLIC BLOOD PRESSURE: 74 MMHG | RESPIRATION RATE: 18 BRPM | SYSTOLIC BLOOD PRESSURE: 101 MMHG | OXYGEN SATURATION: 99 % | HEART RATE: 108 BPM | TEMPERATURE: 97.5 F

## 2020-12-02 DIAGNOSIS — C25.1 MALIGNANT NEOPLASM OF BODY OF PANCREAS (HCC): Primary | ICD-10-CM

## 2020-12-02 DIAGNOSIS — R59.0 ABDOMINAL LYMPHADENOPATHY: ICD-10-CM

## 2020-12-02 DIAGNOSIS — C78.6 PERITONEAL CARCINOMATOSIS (HCC): ICD-10-CM

## 2020-12-02 PROCEDURE — 96523 IRRIG DRUG DELIVERY DEVICE: CPT

## 2020-12-02 PROCEDURE — 74011250636 HC RX REV CODE- 250/636: Performed by: INTERNAL MEDICINE

## 2020-12-02 RX ORDER — SODIUM CHLORIDE 9 MG/ML
10 INJECTION INTRAMUSCULAR; INTRAVENOUS; SUBCUTANEOUS AS NEEDED
Status: DISCONTINUED | OUTPATIENT
Start: 2020-12-02 | End: 2020-12-03 | Stop reason: HOSPADM

## 2020-12-02 RX ORDER — HEPARIN 100 UNIT/ML
300-500 SYRINGE INTRAVENOUS AS NEEDED
Status: DISCONTINUED | OUTPATIENT
Start: 2020-12-02 | End: 2020-12-03 | Stop reason: HOSPADM

## 2020-12-02 RX ORDER — SODIUM CHLORIDE 0.9 % (FLUSH) 0.9 %
10 SYRINGE (ML) INJECTION AS NEEDED
Status: DISCONTINUED | OUTPATIENT
Start: 2020-12-02 | End: 2020-12-03 | Stop reason: HOSPADM

## 2020-12-02 RX ADMIN — Medication 500 UNITS: at 14:14

## 2020-12-02 RX ADMIN — SODIUM CHLORIDE 10 ML: 9 INJECTION INTRAMUSCULAR; INTRAVENOUS; SUBCUTANEOUS at 14:14

## 2020-12-02 NOTE — PROGRESS NOTES
[facility]patient [facility]fusion [facility] Short Visit Progress Note    6451 Patient admitted to [facility] for CADD pump removal ambulatory in stable condition. Assessment completed. No new concerns voiced. Covid Screening    1. Do you have any symptoms of COVID-19? SOB, coughing, fever, or generally not feeling well ? NO  2. Have you been exposed to COVID-19 recently? NO  3. Have you had any recent contact with family/friend that has a pending COVID test? NO    Vital Signs:  Visit Vitals  /74   Pulse (!) 108   Temp 97.5 °F (36.4 °C)   Resp 18   SpO2 99%         Right chest wall port with positive blood return. Pump read as 0 ml left in chamber. Medications:  Medications Administered     0.9% sodium chloride injection 10 mL     Admin Date  12/02/2020 Action  Given Dose  10 mL Route  IntraVENous Administered By  Jeremiah Myles, BHARATH          heparin (porcine) pf 300-500 Units     Admin Date  12/02/2020 Action  Given Dose  500 Units Route  InterCATHeter Administered By  Jeremiah Myles, [address] [facility] Patient tolerated treatment well. Patient discharged from [place] [address] ambulatory in no distress. Patient aware of next appointment on 12/14/20.     Khloe Robison RN      Future Appointments   Date Time Provider [place] Tiffanie   12/14/2020  7:30 AM [facility]F3 [facility]   12/14/2020  8:45 AM Hazel Rodriguez NP [facility]   12/16/2020  1:30 PM [facility]5 [facility]   12/28/2020  7:30 AM SS INF6 [facility]3 [facility]   12/30/2020  1:30 PM SS INF4 CH4 [facility]1H [facility]   1/11/2021  7:30 AM SS INF2 CH2 >7H [facility]   1/13/2021  1:30 PM SS INF4 [facility]4 [facility]1H [facility]   1/25/2021  7:30 AM SS INF2 [facility]   1/27/2021  1:30 PM SS INF6 CH4 <1H [facility] [address] [place] [address]

## 2020-12-03 NOTE — TELEPHONE ENCOUNTER
[facility] [place] [facility] [place]  [address], [address] [place], [place] [address]crissy[facility]in[facility] [id number]  W: [phone]  F: [phone]    Medical Nutrition Therapy  Nutrition Referral:    Referral received. Patient with pancreatic cancer. Called and spoke with patient,  explained that RD is available to address nutrition throughout the spectrum of care. He reports he is struggling with constipation. He reports not eating as much as he would because of constipation. Taking miralax just once a day, suggested to increase to twice daily. Reviewed symptoms of exocrine pancreatic insufficiency and he does not appear to be experiencing it currently except for weight loss. He reports he was about 145-150# about 3 months ago. We discussed strategies to improve appetite. Type of treatment:   Chemotherapy Flowsheet 11/30/2020   Cycle C1D1   Date 11/30/2020   Drug / Regimen Folfirinox   Pre Meds given   Notes given     Wt Readings from Last 5 Encounters:   11/30/20 124 lb 6.4 oz (56.4 kg)   11/30/20 124 lb 6.4 oz (56.4 kg)   11/22/20 120 lb 9.5 oz (54.7 kg)   11/18/20 126 lb 5.2 oz (57.3 kg)   11/11/20 128 lb 1.4 oz (58.1 kg)       Estimated Nutrition Needs:   Calorie Range: 1960-2256kcal/day    Protein Range: 65-75g/day     Fluid Needs: 2000ml    Plan:  - Continue to monitor for symptoms and need for Creon. - Strategies to improve PO intake discussed. - Encouraged him to call for any additional questions or concerns.       Signed By: Grant Dang, [address], [address] [facility] [address]

## 2020-12-08 RX ORDER — EPINEPHRINE 1 MG/ML
0.3 INJECTION, SOLUTION, CONCENTRATE INTRAVENOUS AS NEEDED
Status: CANCELLED | OUTPATIENT
Start: 2020-01-01

## 2020-12-08 RX ORDER — HEPARIN 100 UNIT/ML
300-500 SYRINGE INTRAVENOUS AS NEEDED
Status: CANCELLED
Start: 2020-01-01

## 2020-12-08 RX ORDER — ACETAMINOPHEN 325 MG/1
650 TABLET ORAL AS NEEDED
Status: CANCELLED
Start: 2020-01-01

## 2020-12-08 RX ORDER — DIPHENHYDRAMINE HYDROCHLORIDE 50 MG/ML
50 INJECTION, SOLUTION INTRAMUSCULAR; INTRAVENOUS AS NEEDED
Status: CANCELLED
Start: 2020-01-01

## 2020-12-08 RX ORDER — SODIUM CHLORIDE 9 MG/ML
10 INJECTION INTRAMUSCULAR; INTRAVENOUS; SUBCUTANEOUS AS NEEDED
Status: CANCELLED | OUTPATIENT
Start: 2020-01-01

## 2020-12-08 RX ORDER — SODIUM CHLORIDE 0.9 % (FLUSH) 0.9 %
10 SYRINGE (ML) INJECTION AS NEEDED
Status: CANCELLED | OUTPATIENT
Start: 2020-01-01

## 2020-12-08 RX ORDER — HYDROCORTISONE SODIUM SUCCINATE 100 MG/2ML
100 INJECTION, POWDER, FOR SOLUTION INTRAMUSCULAR; INTRAVENOUS AS NEEDED
Status: CANCELLED | OUTPATIENT
Start: 2020-01-01

## 2020-12-08 RX ORDER — DIPHENHYDRAMINE HYDROCHLORIDE 50 MG/ML
25 INJECTION, SOLUTION INTRAMUSCULAR; INTRAVENOUS AS NEEDED
Status: CANCELLED
Start: 2020-01-01

## 2020-12-08 RX ORDER — ALBUTEROL SULFATE 0.83 MG/ML
2.5 SOLUTION RESPIRATORY (INHALATION) AS NEEDED
Status: CANCELLED
Start: 2020-01-01

## 2020-12-08 RX ORDER — ATROPINE SULFATE 0.4 MG/ML
0.4 INJECTION, SOLUTION ENDOTRACHEAL; INTRAMEDULLARY; INTRAMUSCULAR; INTRAVENOUS; SUBCUTANEOUS
Status: CANCELLED | OUTPATIENT
Start: 2020-01-01

## 2020-12-08 RX ORDER — ATROPINE SULFATE 0.4 MG/ML
0.4 INJECTION, SOLUTION ENDOTRACHEAL; INTRAMEDULLARY; INTRAMUSCULAR; INTRAVENOUS; SUBCUTANEOUS ONCE
Status: CANCELLED | OUTPATIENT
Start: 2020-01-01

## 2020-12-08 RX ORDER — ONDANSETRON 2 MG/ML
8 INJECTION INTRAMUSCULAR; INTRAVENOUS AS NEEDED
Status: CANCELLED | OUTPATIENT
Start: 2020-01-01

## 2020-12-09 NOTE — PROGRESS NOTES
[facility] [place] at [address], [address] [facility] [address] [facility] W: [phone]  F: [phone] Hematology Oncology established visit Reason for Visit:  
Debara Sandifer is a 64 y.o. male who is seen here for follow up of pancreatic cancer, new diagnosis. Hematology / Oncology Treatment History:  
 
Diagnosis: Pancreatic, adenocarcinoma, moderately to poorly diiferentiated Stage: IV [[id number]] Pathology: 2020 pancreatic body, FNA ;adenocarcinoma, moderately to poorly diiferentiated [employer] testing: Negative for reportable alterations [employer]: Germline testing negative for BRCA1/2 and 86 other genes. Prior Treatment: none Current Treatment:  FOLFIRINOX every 2 weeks, to start 20. History of Present Illness: Mr. Debara Sandifer is a 64 y.o. male admitted on 2020 from the ED with worsening abd pain and constipation. Rates pain on admission 9/10. Recent dx of pancreatic cancer during last hospitalization. Pt reports has not had a BM since last Wed; stopped taking the Senna-docusate last week; he thought it was causing his stomach to hurt more. He was unaware that it was part of a bowel regimen and thought it was pain medication. Has had some nausea but denies vomiting. Decreased appetite. Denies SOB. ED CT scan shows subacute pancreatitis, pancreatic body adenocarcinoma, retroperitoneal remington metastases, peritoneal carcinomatosis. Pt would like his sister to be able to hear about the treatment plans; she is coming from [place] in the am. Cinthya Mcclendon to port placement; defininelty wants to have treatment. 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 Smokes cigarettes: 1 ppd x 30 yrs. Rare EtOH use. : 3 kids; twins 29 yrs old and 25 yr old. Interval History: Patient here for follow up of pancreatic cancer and C2 of FOLFIRINOX. Reports constipation after treatment. Reports he is managing constipation with daily miralax twice daily. He lost about 5 lbs. Reports poor appetite the week of treatment but notes improvement the following week. Reports mild abdominal tenderness. He is taking oxycontin 10mg twice daily which manages pain. Reports nausea after treatment managed with zofran, compazine and dex. Reports mild neuropathy in fingertips that is intermittent. Denies fevers, chills, CP, SOB, mucositis lesions. Past Medical History:  
Diagnosis Date  Gout  Pancreatic cancer ([address]  Psoriasis  Tobacco abuse Past Surgical History:  
Procedure Laterality Date  HX ORTHOPAEDIC    
 femur repair  HX ORTHOPAEDIC    
 lumbar compressed fracture  IR INSERT TUNL CVC W PORT OVER 5 YEARS  11/24/2020 Social History Tobacco Use  Smoking status: Current Every Day Smoker Packs/day: 1.00  Smokeless tobacco: Never Used  Tobacco comment: last week was last cigarette Substance Use Topics  Alcohol use: Yes Family History Problem Relation Age of Onset  Pancreatic Cancer Brother Current Outpatient Medications Medication Sig  polyethylene glycol (MIRALAX) 17 gram packet Take 1 Packet by mouth daily as needed for Constipation.  senna-docusate (PERICOLACE) 8.6-50 mg per tablet Take 1 Tab by mouth daily. Indications: constipation  magic mouthwash solution Sig: 
Magic mouth wash Maalox Lidocaine 2% viscous Diphenhydramine oral solution Pharmacy to mix equal portions of ingredients to a total volume as indicated in the dispense amount. Swish and spit 10mL every 4 hours as needed for mouth pain, okay to swallow for throat pain.  prochlorperazine (Compazine) 10 mg tablet Take 1 Tab by mouth every six (6) hours as needed for Nausea or Vomiting.  ondansetron hcl (ZOFRAN) 8 mg tablet Take 1 Tab by mouth every eight (8) hours as needed for Nausea or Vomiting.  lidocaine-prilocaine (EMLA) topical cream Apply a dime size amount to port site 30 minutes before access to prevent pain.  dexAMETHasone (DECADRON) 4 mg tablet Take 8mg on days 2 and 3 after treatment to prevent nausea.  oxyCODONE ER (OxyCONTIN) 10 mg ER tablet Take 1 Tab by mouth every twelve (12) hours for 30 days. Max Daily Amount: 20 mg.  
 oxyCODONE-acetaminophen (Percocet) 5-325 mg per tablet Take 1 Tab by mouth every four (4) hours as needed for Pain.  senna-docusate (PERICOLACE) 8.6-50 mg per tablet Take 2 Tabs by mouth daily. No current facility-administered medications for this visit. No Known Allergies Review of Systems: A complete review of systems was obtained, negative except as described above. Physical Exam:  
 
Visit Vitals /78 Pulse 72 Temp 97.9 °F (36.6 °C) Resp 18 Ht 5' 10\" (1.778 m) Wt 119 lb 11.4 oz (54.3 kg) SpO2 100% BMI 17.18 kg/m² ECOG PS: 1 General: No distress Eyes: Anicteric sclerae HENT: Atraumatic Neck: Supple Respiratory: Normal respiratory effort CV: No peripheral edema, port right upper chest.  
GI: Soft, nontender, nondistended, no masses, no hepatomegaly, no splenomegaly Skin: No rashes, ecchymoses, or petechiae Psych: Alert, oriented, appropriate affect, normal judgment/insight Results:  
 
Lab Results Component Value Date/Time WBC 4.3 12/14/2020 08:54 AM  
 HGB 12.2 12/14/2020 08:54 AM  
 HCT 35.3 (L) 12/14/2020 08:54 AM  
 PLATELET 115 57/35/4684 08:54 AM  
 MCV 88.3 12/14/2020 08:54 AM  
 ABS. NEUTROPHILS 1.9 12/14/2020 08:54 AM  
 
Lab Results Component Value Date/Time  Sodium 139 11/30/2020 08:09 AM  
 Potassium 3.4 (L) 11/30/2020 08:09 AM  
 Chloride 105 11/30/2020 08:09 AM  
 CO2 31 11/30/2020 08:09 AM  
 Glucose 81 11/30/2020 08:09 AM  
 BUN 12 11/30/2020 08:09 AM  
 Creatinine 1.05 11/30/2020 08:09 AM  
 GFR est AA >60 11/30/2020 08:09 AM  
 GFR est non-AA >60 11/30/2020 08:09 AM  
 Calcium 8.5 11/30/2020 08:09 AM  
 
Lab Results Component Value Date/Time Bilirubin, total 0.4 11/30/2020 08:09 AM  
 ALT (SGPT) 17 11/30/2020 08:09 AM  
 Alk. phosphatase 54 11/30/2020 08:09 AM  
 Protein, total 6.6 11/30/2020 08:09 AM  
 Albumin 3.3 (L) 11/30/2020 08:09 AM  
 Globulin 3.3 11/30/2020 08:09 AM  
 
Lab Results Component Value Date/Time Sed rate, automated 4 11/16/2017 03:56 PM  
 C-Reactive protein <0.29 11/16/2017 03:56 PM  
 TSH 3.36 11/12/2020 05:28 AM  
 Lipase 115 11/23/2020 06:22 AM  
 
Lab Results Component Value Date/Time INR 1.0 09/23/2010 03:05 AM  
 aPTT 28.0 09/23/2010 03:05 AM  
 
Lab Results Component Value Date/Time CEA 4.9 11/13/2020 05:29 AM  
 Carbohydrate Antigen 19-9, (CA 19-9) 6,483 (H) 11/30/2020 08:09 AM  
 
 
11/22/2020 XR ABD FLAT IMPRESSION: 
 No evidence for acute abnormality 11/23/2020 CT ABD PELV W CONT IMPRESSION:  
1. Subacute pancreatitis, with small walled-off necrosis (\"pseudocyst\") in the lesser sac. 2. Pancreatic body adenocarcinoma. 3. Occluded splenic vein. Consequent, extensive, submucosal, gastric varices in the cardia. 4. Encasement of the splenic artery. Abutment of the celiac artery, SMV, and SMA. 5. Retroperitoneal remington metastases. 6. Peritoneal carcinomatosis. 
  
 
Assessment and Recommendations:  
 
65 yo male with recent dx of pancreatic cancer admitted with abd pain. 1. Pancreatic adenocarcinoma: Germline and somatic genetic testing negative. Diagnosed 11/18/2020 and now appears to have Stage IV disease based on peritoneal carcinomatosis seen on CT. Also has retroperitoneal remington metastases and encasement of vasculature. Was scheduled to be seen in our clinic on 11/24 to discuss treatment options. Discussed with him today that his disease is not curable, but is treatable. Emphasized the importance of genetic testing, germline (negative) and somatic. His brother passed away from pancreas cancer. I recommend palliative chemotherapy with FOLFIRINOX regimen. We discussed the risks and benefits of FOLFIRINOX chemotherapy, including potential side effects. These include but are not limited to fatigue, nausea vomiting, diarrhea, neuropathy, taste changes, cold intolerance, esophageal spasm, allergic reactions, alopecia, mucositis, myelosuppression, risk for infection, infertility, and rarely, death. Rarely, a patient may have a condition where they do not metabolize fluorouracil appropriately (called DPD deficiency), and they may have excessive toxicity. A Port-A-Cath will be required in order to deliver the continuous infusion. The patient has consented to beginning therapy. CA 19-9 5607 at diagnosis. Supportive medications include: EMLA cream, zofran, compazine, dexamethasone. -- Proceed with cycle 2 FOLFIRNOX, MD/NP visit, pump removal on Wednesday. -- CT scan due after cycle 4 treatment. -- Follow up in 2 weeks for cycle 3, MD/NP visit. 2. H/o subacute pancreatitis:  
Was treated with IVF in hospital. Currently stable. 3. F/E/N/G: 
Constipation 2/2 opioids and bowel regimen adjusted. Continue Miralax 1-2 times daily and Docusate/Senna 1-2 times daily. Encouraged supplementing with 1-2 high calorie protein drinks daily to prevent further WL. Given samples from our supply due to cost.  
-- Luis Sanchez RD. 4. Occluded splenic vein: Originally recommended anticoagulation, but after discussion with radiology, there is no clot. This is occlusion by the cancer and no anticoagulation is needed. 5. Neoplasm related pain:  
Well controlled on Oxycontin 10mg q12h and Percocet prn.   
-- Refilled Oxycontin 10mg q12h #60 on 12/14/20 (dated 12/21/20) -- Refilled Percocet q4h PRN #45 on 12/14/20.  
 
6. Family h/o pancreatic cancer: 
Concerning for genetic predisposition. Germline genetic testing negative. I have personally seen and evaluated the patient in conjunction with Derek Montemayor NP. I find the patient's history and physical exam are consistent with the NP's documentation. I agree with the above assessment and plan, which I have edited if needed. Signed By: 
Date: Collins Colon MD  
12/14/20

## 2020-12-14 NOTE — PROGRESS NOTES
[facility] VISIT NOTE 
 
0845. Pt arrived at [facility] ambulatory and in no distress for C2D1 Folfirinox. Assessment completed, pt c/o feeling mild nausea this morning. Covid Questionnaire completed. 1. Do you have any symptoms of covid 19? SOB, coughing, fever, or generally not feeling well? - no 
2. Have you been exposed to covid 19 recently? - no 
3. Have you had any recent contact with family/friend that has a pending covid test? no 
 
RCW chest port accessed with . 75 in gordon with no difficulty. Positive blood return noted and labs drawn. Pt proceeded to MD calhoun. Labs resulted and are within parameters to treat. Medications received: 
Loman Peon Aloxi IV Dexamethasone IV Oxaliplatin IV Atropine IV Irinotecan IV Leucovorin IV Potassium Chloride PO Fluorouracil CIV CADD cassette Tolerated treatment well, no adverse reaction noted. Port attached to 5fu pump and verified infusing. Positive blood return noted. Patient Vitals for the past 12 hrs: 
 Temp Pulse Resp BP SpO2  
12/14/20 1533  86  110/74   
12/14/20 0847 97.9 °F (36.6 °C) 72 18 111/78 100 % Recent Results (from the past 12 hour(s)) CBC WITH 3 PART DIFF Collection Time: 12/14/20  8:54 AM  
Result Value Ref Range WBC 4.3 4.1 - 11.1 K/uL  
 RBC 4.00 (L) 4.10 - 5.70 M/uL  
 HGB 12.2 12.1 - 17.0 g/dL HCT 35.3 (L) 36.6 - 50.3 % MCV 88.3 80.0 - 99.0 FL  
 MCH 30.5 26.0 - 34.0 PG  
 MCHC 34.6 30.0 - 36.5 g/dL  
 RDW 13.9 11.8 - 15.8 % PLATELET 735 652 - 364 K/uL NEUTROPHILS 45 32 - 75 % MIXED CELLS 14 3.2 - 16.9 % LYMPHOCYTES 41 12 - 49 % ABS. NEUTROPHILS 1.9 1.8 - 8.0 K/UL  
 ABS. MIXED CELLS 0.6 0.2 - 1.2 K/uL  
 ABS. LYMPHOCYTES 1.8 0.8 - 3.5 K/UL  
 DF AUTOMATED METABOLIC PANEL, COMPREHENSIVE Collection Time: 12/14/20  9:59 AM  
Result Value Ref Range Sodium 140 136 - 145 mmol/L Potassium 3.3 (L) 3.5 - 5.1 mmol/L  Chloride 107 97 - 108 mmol/L  
 CO2 28 21 - 32 mmol/L  
 Anion gap 5 5 - 15 mmol/L Glucose 132 (H) 65 - 100 mg/dL BUN 9 6 - 20 MG/DL Creatinine 1.03 0.70 - 1.30 MG/DL  
 BUN/Creatinine ratio 9 (L) 12 - 20 GFR est AA >60 >60 ml/min/1.73m2 GFR est non-AA >60 >60 ml/min/1.73m2 Calcium 8.4 (L) 8.5 - 10.1 MG/DL Bilirubin, total 0.4 0.2 - 1.0 MG/DL  
 ALT (SGPT) 20 12 - 78 U/L  
 AST (SGOT) 8 (L) 15 - 37 U/L Alk. phosphatase 49 45 - 117 U/L Protein, total 6.3 (L) 6.4 - 8.2 g/dL Albumin 3.3 (L) 3.5 - 5.0 g/dL Globulin 3.0 2.0 - 4.0 g/dL A-G Ratio 1.1 1.1 - 2.2    
 
1535. D/C'd from [facility] ambulatory and in no distress.  Next appointment is 12/16/20 at 1:30 pm.

## 2020-12-14 NOTE — PROGRESS NOTES
Chief Complaint Patient presents with  Follow-up Nam Murillo is a pleasant 64year old male who presents today as a follow up for pancreatic cancer. He denies any pain at this time.

## 2020-12-14 NOTE — PROGRESS NOTES
Oncology Social Work  Psychosocial Assessment    Reason for Assessment:      [x] Social Work Referral [x] Initial Assessment [x] New Diagnosis [] Other    [facility] [address] 2020   Patient's Simon is: Legal Annette [address] Directive None   Patient Would Like to Complete Advance Directive No   Does the patient have other document types -       Sources of Information:    [x]Patient  []Family  [x]Staff  [x]Medical Record    Mental Status:    [x]Alert  []Lethargic  []Unresponsive   [] Unable to assess   Oriented to:  [x]Person  [x]Place  [x]Time  [x]Situation      Relationship Status:  [x]Single  []  []Significant Other/Life Partner  []  []  []    Living Circumstances:  []Lives Alone  [x]Family/Significant Other in Household  []Roommates  []Children in the Home  []Paid Caregivers  []Assisted Living Facility/Group Home  []Skilled [address]  []Homeless  []Incarcerated  []Environmental/Care Concerns  []Other:    Employment Status:  []Employed Full-time []Employed Part-time []Homemaker  [] Retired [] Short-Term Disability [] Long-Term Disability  [x] Unemployed (due to cancer diagnosis)    Barriers to Learning:    []Language  []Developmental  []Cognitive  []Altered Mental Status  []Visual/Hearing Impairment  []Unable to Read/Write  []Motivational  [] Challenges Understanding Medical Jargon [x]No Barriers Identified      Financial/Legal Concerns:    []Uninsured  [x]Limited Income/Resources  []Non-Citizen  []Food Insecurity []No Concerns Identified   []Other:    Adventism/Spiritual/Existential:  Does patient have any spiritual or Protestant beliefs? [x] Yes [] No  Is the patient involved in a spiritual, gibran or Protestant community?  [] Yes [x] No  Patient expressing spiritual/existential angst? [] Yes [x] No  Notes:    Support System: daughter, sister   Identified Support Person/Group:  []Strong  [x]Fair []Limited    Coping with Illness:   [x]  Coping Well  [] Challenges Coping with Serious Illness [] Situational Depression [] Situational Anxiety [] Anticipatory Grief  [] Recent Loss [] Caregiver [place]            Narrative: Patient here for follow up and treatment for pancreatic cancer. Met with patient to introduce social work role and supports. Patient lives with his daughter in her private residence. 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 He has [employer]. Patient's primary supports are his three adult children and his sister. Patient has adequate transportation to medical appointments. Patient primary concern is financial. He voiced not wanting to be a financial burden on his daughter. Patient plans to apply for [employer]. His sister is assisting him with the online application. Provided patient with my contact information should they have questions with the application. Patient denies depression or anxiety. He tells me he remains positive and continues to take things a day at a time. Reviewed support resources and patient declined referral at this time. Patient is also working with Maggie Adame RD. Encouraged him to contact me as needed for ongoing psychosocial support. Plan: Patient is actively coping with diagnosis and treatment and well supported with practical and emotional support by his children and sister. Patients's primary concern is financial. Encouraged patient to completed SSDI application soon as there is a waiting period. Patient will contact me as desired for support.      Referral:   Insurance/Entitlements referral    Thank you,  Rolan Hendrickson LCSW

## 2020-12-16 NOTE — PROGRESS NOTES
[facility] Progress Note Date: 2020 Name: Govind Vanegas MRN: [id number] : 1960 
 
1335. Mr. Merle Staley Arrived ambulatory and in no distress for Pump Removal.  Assessment was completed, no acute issues at this time, no new complaints voiced. CADD completed- 100 ml infused per order. Patient Vitals for the past 12 hrs: 
 Temp Pulse Resp BP SpO2  
20 1344 98.1 °F (36.7 °C) 96 18 126/62 99 % 1345. Mr. Merle Staley tolerated treatment well and was discharged from [place] [address] in stable condition. Port de-accessed, flushed & heparinized per protocol. He is to return on 20 for his next appointment. Shirley Osorio RN 2020

## 2020-12-18 NOTE — PROGRESS NOTES
[facility] [address] [facility], [address] [place] [address] W: [phone]  F: [phone] Hematology Oncology established visit Reason for Visit:  
Justo Banks is a 64 y.o. male who is seen here for follow up of pancreatic cancer, new diagnosis. Hematology / Oncology Treatment History:  
 
Diagnosis: Pancreatic, adenocarcinoma, moderately to poorly diiferentiated Stage: IV [[id number]] Pathology: 2020 pancreatic body, FNA ;adenocarcinoma, moderately to poorly diiferentiated [employer] testing: Negative for reportable alterations [employer]: Germline testing negative for BRCA1/2 and 86 other genes. Prior Treatment: none Current Treatment:  FOLFIRINOX every 2 weeks, to start 20. History of Present Illness: Mr. Justo Banks is a 64 y.o. male admitted on 2020 from the ED with worsening abd pain and constipation. Rates pain on admission 9/10. Recent dx of pancreatic cancer during last hospitalization. Pt reports has not had a BM since last Wed; stopped taking the Senna-docusate last week; he thought it was causing his stomach to hurt more. He was unaware that it was part of a bowel regimen and thought it was pain medication. Has had some nausea but denies vomiting. Decreased appetite. Denies SOB. ED CT scan shows subacute pancreatitis, pancreatic body adenocarcinoma, retroperitoneal remington metastases, peritoneal carcinomatosis. Pt would like his sister to be able to hear about the treatment plans; she is coming from [place] in the am. Karan Stewart to port placement; defininelty wants to have treatment. 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 Smokes cigarettes: 1 ppd x 30 yrs. Rare EtOH use. : 3 kids; twins 29 yrs old and 25 yr old. Interval History: Patient here for follow up of pancreatic cancer and C3 of FOLFIRINOX. Reports constipation after treatment. Reports he is managing constipation with daily miralax twice daily. He gained about 10 lbs due to improved appetite. Reports poor appetite the week of treatment but notes improvement the following week. Reports mild abdominal tenderness. He is taking oxycontin 10mg twice daily which manages pain. Reports nausea after treatment managed with zofran, compazine and dex. Reports mild neuropathy in fingertips that is intermittent. Denies fevers, chills, CP, SOB, mucositis lesions. Past Medical History:  
Diagnosis Date  Gout  Pancreatic cancer ([facility] [address]  Psoriasis  Tobacco abuse Past Surgical History:  
Procedure Laterality Date  HX ORTHOPAEDIC    
 femur repair  HX ORTHOPAEDIC    
 lumbar compressed fracture  IR INSERT TUNL CVC W PORT OVER 5 YEARS  11/24/2020 Social History Tobacco Use  Smoking status: Current Every Day Smoker Packs/day: 1.00  Smokeless tobacco: Never Used  Tobacco comment: last week was last cigarette Substance Use Topics  Alcohol use: Yes Family History Problem Relation Age of Onset  Pancreatic Cancer Brother Current Outpatient Medications Medication Sig  
 oxyCODONE ER (OxyCONTIN) 10 mg ER tablet Take 1 Tab by mouth every twelve (12) hours for 30 days. Max Daily Amount: 20 mg.  
 oxyCODONE-acetaminophen (Percocet) 5-325 mg per tablet Take 1 Tab by mouth every four (4) hours as needed for Pain for up to 14 days. Max Daily Amount: 6 Tabs.  polyethylene glycol (MIRALAX) 17 gram packet Take 1 Packet by mouth daily as needed for Constipation.  senna-docusate (PERICOLACE) 8.6-50 mg per tablet Take 1 Tab by mouth daily. Indications: constipation  magic mouthwash solution Sig: 
Magic mouth wash Maalox Lidocaine 2% viscous Diphenhydramine oral solution Pharmacy to mix equal portions of ingredients to a total volume as indicated in the dispense amount. Swish and spit 10mL every 4 hours as needed for mouth pain, okay to swallow for throat pain.  prochlorperazine (Compazine) 10 mg tablet Take 1 Tab by mouth every six (6) hours as needed for Nausea or Vomiting.  ondansetron hcl (ZOFRAN) 8 mg tablet Take 1 Tab by mouth every eight (8) hours as needed for Nausea or Vomiting.  lidocaine-prilocaine (EMLA) topical cream Apply a dime size amount to port site 30 minutes before access to prevent pain.  dexAMETHasone (DECADRON) 4 mg tablet Take 8mg on days 2 and 3 after treatment to prevent nausea.  senna-docusate (PERICOLACE) 8.6-50 mg per tablet Take 2 Tabs by mouth daily. No current facility-administered medications for this visit. Facility-Administered Medications Ordered in Other Visits Medication Dose Route Frequency  dextrose 5% infusion  25 mL/hr IntraVENous CONTINUOUS  
 fluorouraciL (ADRUCIL) 3,936 mg in 0.9% sodium chloride 100 mL CADD Cassette  2,400 mg/m2 (Treatment Plan Recorded) IntraVENous ONCE  
 sodium chloride (NS) flush 10 mL  10 mL IntraVENous PRN  
 0.9% sodium chloride injection 10 mL  10 mL IntraVENous PRN  
 heparin (porcine) pf 300-500 Units  300-500 Units InterCATHeter PRN No Known Allergies Review of Systems: A complete review of systems was obtained, negative except as described above. Physical Exam:  
 
Visit Vitals BP 97/61 Pulse 95 Temp 97.7 °F (36.5 °C) Resp 18 Ht 5' 10\" (1.778 m) Wt 129 lb 3 oz (58.6 kg) SpO2 100% BMI 18.54 kg/m² ECOG PS: 1 General: No distress Eyes: Anicteric sclerae HENT: Atraumatic Neck: Supple Respiratory: Normal respiratory effort CV: No peripheral edema, port right upper chest.  
GI: Soft, nontender, nondistended, no masses, no hepatomegaly, no splenomegaly Skin: No rashes, ecchymoses, or petechiae Psych: Alert, oriented, appropriate affect, normal judgment/insight Results:  
 
Lab Results Component Value Date/Time WBC 3.7 (L) 12/28/2020 08:45 AM  
 HGB 10.8 (L) 12/28/2020 08:45 AM  
 HCT 32.9 (L) 12/28/2020 08:45 AM  
 PLATELET 191 44/87/7276 08:45 AM  
 MCV 93.2 12/28/2020 08:45 AM  
 ABS. NEUTROPHILS 1.5 (L) 12/28/2020 08:45 AM  
 
Lab Results Component Value Date/Time Sodium 142 12/28/2020 08:45 AM  
 Potassium 3.9 12/28/2020 08:45 AM  
 Chloride 111 (H) 12/28/2020 08:45 AM  
 CO2 27 12/28/2020 08:45 AM  
 Glucose 97 12/28/2020 08:45 AM  
 BUN 12 12/28/2020 08:45 AM  
 Creatinine 1.01 12/28/2020 08:45 AM  
 GFR est AA >60 12/28/2020 08:45 AM  
 GFR est non-AA >60 12/28/2020 08:45 AM  
 Calcium 8.4 (L) 12/28/2020 08:45 AM  
 
Lab Results Component Value Date/Time Bilirubin, total 0.2 12/28/2020 08:45 AM  
 ALT (SGPT) 31 12/28/2020 08:45 AM  
 Alk. phosphatase 54 12/28/2020 08:45 AM  
 Protein, total 6.1 (L) 12/28/2020 08:45 AM  
 Albumin 3.3 (L) 12/28/2020 08:45 AM  
 Globulin 2.8 12/28/2020 08:45 AM  
 
Lab Results Component Value Date/Time Sed rate, automated 4 11/16/2017 03:56 PM  
 C-Reactive protein <0.29 11/16/2017 03:56 PM  
 TSH 3.36 11/12/2020 05:28 AM  
 Lipase 115 11/23/2020 06:22 AM  
 
Lab Results Component Value Date/Time INR 1.0 09/23/2010 03:05 AM  
 aPTT 28.0 09/23/2010 03:05 AM  
 
Lab Results Component Value Date/Time CEA 4.9 11/13/2020 05:29 AM  
 Carbohydrate Antigen 19-9, (CA 19-9) 6,483 (H) 11/30/2020 08:09 AM  
 
 
11/22/2020 XR ABD FLAT IMPRESSION: 
 No evidence for acute abnormality 11/23/2020 CT ABD PELV W CONT IMPRESSION:  
1. Subacute pancreatitis, with small walled-off necrosis (\"pseudocyst\") in the lesser sac. 2. Pancreatic body adenocarcinoma. 3. Occluded splenic vein. Consequent, extensive, submucosal, gastric varices in the cardia. 4. Encasement of the splenic artery. Abutment of the celiac artery, SMV, and SMA. 5. Retroperitoneal remington metastases. 6. Peritoneal carcinomatosis. 
  
 
Assessment and Recommendations:  
 
65 yo male with recent dx of pancreatic cancer admitted with abd pain. 1. Pancreatic adenocarcinoma:  
Germline and somatic genetic testing negative. Diagnosed 11/18/2020 and now appears to have Stage IV disease based on peritoneal carcinomatosis seen on CT. Also has retroperitoneal remington metastases and encasement of vasculature. Was scheduled to be seen in our clinic on 11/24 to discuss treatment options. Discussed with him today that his disease is not curable, but is treatable. Emphasized the importance of genetic testing, germline (negative) and somatic. His brother passed away from pancreas cancer. I recommend palliative chemotherapy with FOLFIRINOX regimen. We discussed the risks and benefits of FOLFIRINOX chemotherapy, including potential side effects. These include but are not limited to fatigue, nausea vomiting, diarrhea, neuropathy, taste changes, cold intolerance, esophageal spasm, allergic reactions, alopecia, mucositis, myelosuppression, risk for infection, infertility, and rarely, death. Rarely, a patient may have a condition where they do not metabolize fluorouracil appropriately (called DPD deficiency), and they may have excessive toxicity. A Port-A-Cath will be required in order to deliver the continuous infusion. The patient has consented to beginning therapy. CA 19-9 5607 at diagnosis. Supportive medications include: EMLA cream, zofran, compazine, dexamethasone. -- Proceed with cycle 3 FOLFIRNOX, MD/NP visit, pump removal on Wednesday. -- CT scan due after cycle 4 treatment. -- Follow up in 2 weeks for cycle 4, MD/NP visit. 2. H/o subacute pancreatitis:  
Was treated with IVF in hospital. Currently stable.  
 
3. F/E/N/G: 
 Constipation 2/2 opioids and bowel regimen adjusted. Continue Miralax 1-2 times daily and Docusate/Senna 1-2 times daily. Encouraged supplementing with 1-2 high calorie protein drinks daily to prevent further WL. Given samples from our supply due to cost.  
-- Aura Restrepo RD. 4. Occluded splenic vein: 
Originally recommended anticoagulation, but after discussion with radiology, there is no clot. This is occlusion by the cancer and no anticoagulation is needed. 5. Neoplasm related pain:  
Well controlled on Oxycontin 10mg q12h and Percocet prn.   
-- Refilled Oxycontin 10mg q12h #60 on 12/14/20 (dated 12/21/20) -- Refilled Percocet q4h PRN #45 on 12/14/20.  
 
6. Family h/o pancreatic cancer: 
Concerning for genetic predisposition. Germline genetic testing negative. 7. Chemotherapy induced neutropenia:  
Mild. Occurred after C2 treatment. Advised neutropenic precautions. I have personally seen and evaluated the patient in conjunction with Gladis Haynes NP. I find the patient's history and physical exam are consistent with the NP's documentation. I agree with the above assessment and plan, which I have edited if needed. Signed By: 
Date: Ally Fuentes MD  
12/28/20

## 2020-12-28 NOTE — PROGRESS NOTES
[facility] VISIT NOTE Date: 2020 Name: Jennifer Otero MRN: [id number] : 1960 
 
0830 Mr. Suzan Faust Arrived ambulatory and in no distress for C3D1 of  Folfirinox Regimen. Assessment was completed, no acute issues at this time, no new complaints voiced. Right chest wall port accessed without difficulty, labs drawn & sent for processing. Patient proceeded to appointment with Dr. Wali Walters. 1. Do you have any symptoms of COVID-19? SOB, coughing, fever, or generally not feeling well NO 
 
2. Have you been exposed to COVID-19 recently? NO 
 
3. Have you had any recent contact with family/friend that has a pending COVID test? NO Chemotherapy Flowsheet 2020 Cycle C3D1 Date 2020 Drug / Regimen Folfirinox Pre Meds given Notes given Vitals: 
 
 
 Lab results were obtained and reviewed. Recent Results (from the past 12 hour(s)) METABOLIC PANEL, COMPREHENSIVE Collection Time: 20  8:45 AM  
Result Value Ref Range Sodium 142 136 - 145 mmol/L Potassium 3.9 3.5 - 5.1 mmol/L Chloride 111 (H) 97 - 108 mmol/L  
 CO2 27 21 - 32 mmol/L Anion gap 4 (L) 5 - 15 mmol/L Glucose 97 65 - 100 mg/dL BUN 12 6 - 20 MG/DL Creatinine 1.01 0.70 - 1.30 MG/DL  
 BUN/Creatinine ratio 12 12 - 20 GFR est AA >60 >60 ml/min/1.73m2 GFR est non-AA >60 >60 ml/min/1.73m2 Calcium 8.4 (L) 8.5 - 10.1 MG/DL Bilirubin, total 0.2 0.2 - 1.0 MG/DL  
 ALT (SGPT) 31 12 - 78 U/L  
 AST (SGOT) 15 15 - 37 U/L Alk. phosphatase 54 45 - 117 U/L Protein, total 6.1 (L) 6.4 - 8.2 g/dL Albumin 3.3 (L) 3.5 - 5.0 g/dL Globulin 2.8 2.0 - 4.0 g/dL A-G Ratio 1.2 1.1 - 2.2    
CBC WITH AUTOMATED DIFF Collection Time: 20  8:45 AM  
Result Value Ref Range WBC 3.7 (L) 4.1 - 11.1 K/uL  
 RBC 3.53 (L) 4.10 - 5.70 M/uL  
 HGB 10.8 (L) 12.1 - 17.0 g/dL HCT 32.9 (L) 36.6 - 50.3 %  MCV 93.2 80.0 - 99.0 FL  
 MCH 30.6 26.0 - 34.0 PG  
 MCHC 32.8 30.0 - 36.5 g/dL  
 RDW 14.6 (H) 11.5 - 14.5 % PLATELET 141 328 - 289 K/uL MPV 9.2 8.9 - 12.9 FL  
 NRBC 0.0 0  WBC ABSOLUTE NRBC 0.00 0.00 - 0.01 K/uL NEUTROPHILS 41 32 - 75 % LYMPHOCYTES 36 12 - 49 % MONOCYTES 16 (H) 5 - 13 % EOSINOPHILS 5 0 - 7 % BASOPHILS 1 0 - 1 % IMMATURE GRANULOCYTES 0 0.0 - 0.5 % ABS. NEUTROPHILS 1.5 (L) 1.8 - 8.0 K/UL  
 ABS. LYMPHOCYTES 1.4 0.8 - 3.5 K/UL  
 ABS. MONOCYTES 0.6 0.0 - 1.0 K/UL  
 ABS. EOSINOPHILS 0.2 0.0 - 0.4 K/UL  
 ABS. BASOPHILS 0.0 0.0 - 0.1 K/UL  
 ABS. IMM. GRANS. 0.0 0.00 - 0.04 K/UL  
 DF AUTOMATED Medications received: 
Medications Administered 0.9% sodium chloride injection 10 mL Admin Date 
12/28/2020 Action Given Dose 
10 mL Route IntraVENous Administered By Jayesh Flower RN  
  
  
 atropine 0.4 mg/mL injection 0.4 mg   
 Admin Date 
12/28/2020 Action Given Dose 0.4 mg Route IntraVENous Administered By Jayesh Flower RN  
  
  
 dexamethasone (DECADRON) 12 mg in 0.9% sodium chloride 50 mL IVPB Admin Date 
12/28/2020 Action Given Dose 
12 mg Route IntraVENous Administered By Jayesh Flower RN  
  
  
 dextrose 5% infusion Admin Date 
12/28/2020 Action New Bag Dose 25 mL/hr Rate 25 mL/hr Route IntraVENous Administered By Jayesh Flower RN  
  
  
 fluorouraciL (ADRUCIL) 3,936 mg in 0.9% sodium chloride 100 mL CADD Cassette Admin Date 
12/28/2020 Action New Bag Dose 
3,936 mg Rate 2.2 mL/hr Route IntraVENous Administered By Jayesh Flower RN  
  
  
 irinotecan (CAMPTOSAR) 246 mg in dextrose 5% 250 mL, overfill volume 25 mL chemo infusion Admin Date 
12/28/2020 Action New Bag Dose 
246 mg Rate 
191.5 mL/hr Route IntraVENous Administered By Jayesh Flower RN  
  
  
 leucovorin (WELLCOVORIN) 656 mg in dextrose 5% 250 mL, overfill volume 25 mL IVPB Admin Date 
12/28/2020 Action New Bag Dose 656 mg Rate 
205.2 mL/hr Route IntraVENous Administered By 
 Nancie Garcia RN  
  
  
 oxaliplatin (ELOXATIN) 139.5 mg in dextrose 5% 250 mL, overfill volume 25 mL chemo infusion Admin Date 
12/28/2020 Action New Bag Dose 
139.5 mg Rate 151.5 mL/hr Route IntraVENous Administered By Nancie Garcia RN  
  
  
 palonosetron HCl (ALOXI) injection 0.25 mg   
 Admin Date 
12/28/2020 Action Given Dose 0.25 mg Route IntraVENous Administered By Nancie Garcia RN  
  
  
 sodium chloride (NS) flush 10 mL Admin Date 
12/28/2020 Action Given Dose 
10 mL Route IntraVENous Administered By Nancie Garcia RN  
  
  
  
 
 
Mr. Leonardo Gibbs tolerated treatment well and was discharged from [place] [address] in stable condition at 1600. Port flushed and connected to infusing CADD pump. He is to return on  December 30, 2020 at 1330 for his next appointment. John Estrada RN December 28, 2020 Future Appointments: 
Future Appointments Date Time Provider Tennille Moreno 12/30/2020  1:30 PM SS INF4 CH4 <1H [facility]  
1/11/2021  7:30 AM SS INF2 CH2 >7H [facility]  
1/11/2021  8:45 AM Yogi Rodriguez NP [facility] AMB  
1/13/2021  1:30 PM SS INF4 CH4 <1H [facility]  
1/25/2021  7:30 AM SS INF2 CH2 >7H [facility]  
1/27/2021  1:30 PM SS INF6 CH4 <1H [facility] Kulwant Mandujano

## 2020-12-28 NOTE — PROGRESS NOTES
Chief Complaint Patient presents with  Follow-up Rose Pierce is a pleasant 64year old male who presents today as a follow up for pancreatic cancer. He reports pain in his abdomen today

## 2020-12-30 NOTE — PROGRESS NOTES
[facility] Progress Note Date: 2020 Name: Jennifer Connell MRN: [id number] : 1960 Mr. Shanna Nieto Arrived ambulatory and in no distress for Pump Removal.  Assessment was completed, no acute issues at this time, no new complaints voiced. CADD completed- 100 ml infused per order. Mr. Raúl Blackwood vitals were reviewed. Visit Vitals /82 Pulse 87 Temp 97.8 °F (36.6 °C) Medications: 
Saline flush Heparin flush Mr. Shanna Nieto tolerated treatment well and was discharged from [place] [address] in stable condition. Port de-accessed, flushed & heparinized per protocol. He is to return on  at 0730 for his next appointment. Onel Hendrix RN 2020

## 2021-01-01 ENCOUNTER — TELEPHONE (OUTPATIENT)
Dept: ONCOLOGY | Age: 61
End: 2021-01-01

## 2021-01-01 ENCOUNTER — HOSPITAL ENCOUNTER (OUTPATIENT)
Dept: INFUSION THERAPY | Age: 61
End: 2021-01-01

## 2021-01-01 ENCOUNTER — APPOINTMENT (OUTPATIENT)
Dept: INFUSION THERAPY | Age: 61
End: 2021-01-01

## 2021-01-01 ENCOUNTER — HOSPITAL ENCOUNTER (OUTPATIENT)
Dept: INFUSION THERAPY | Age: 61
Discharge: HOME | End: 2021-06-21
Payer: MEDICAID

## 2021-01-01 ENCOUNTER — HOSPITAL ENCOUNTER (OUTPATIENT)
Dept: CT IMAGING | Age: 61
Discharge: HOME | End: 2021-01-21
Attending: NURSE PRACTITIONER
Payer: MEDICAID

## 2021-01-01 ENCOUNTER — HOSPITAL ENCOUNTER (OUTPATIENT)
Dept: INFUSION THERAPY | Age: 61
Discharge: HOME | End: 2021-04-26
Payer: MEDICAID

## 2021-01-01 ENCOUNTER — HOSPITAL ENCOUNTER (INPATIENT)
Age: 61
LOS: 22 days | End: 2021-12-10
Attending: INTERNAL MEDICINE | Admitting: INTERNAL MEDICINE

## 2021-01-01 ENCOUNTER — HOSPITAL ENCOUNTER (OUTPATIENT)
Dept: INFUSION THERAPY | Age: 61
Discharge: HOME | End: 2021-10-06
Payer: MEDICAID

## 2021-01-01 ENCOUNTER — OFFICE VISIT (OUTPATIENT)
Dept: ONCOLOGY | Age: 61
End: 2021-01-01

## 2021-01-01 ENCOUNTER — OFFICE VISIT (OUTPATIENT)
Dept: ONCOLOGY | Age: 61
End: 2021-01-01
Payer: MEDICAID

## 2021-01-01 ENCOUNTER — HOSPITAL ENCOUNTER (OUTPATIENT)
Dept: INFUSION THERAPY | Age: 61
Discharge: HOME | End: 2021-10-04
Payer: MEDICAID

## 2021-01-01 ENCOUNTER — APPOINTMENT (OUTPATIENT)
Dept: CT IMAGING | Age: 61
DRG: 281 | End: 2021-01-01
Attending: PHYSICIAN ASSISTANT
Payer: MEDICAID

## 2021-01-01 ENCOUNTER — HOSPITAL ENCOUNTER (OUTPATIENT)
Dept: INFUSION THERAPY | Age: 61
Discharge: HOME | End: 2021-03-25
Payer: MEDICAID

## 2021-01-01 ENCOUNTER — HOSPITAL ENCOUNTER (OUTPATIENT)
Dept: INFUSION THERAPY | Age: 61
Discharge: HOME | End: 2021-04-06

## 2021-01-01 ENCOUNTER — HOSPITAL ENCOUNTER (OUTPATIENT)
Dept: INFUSION THERAPY | Age: 61
Discharge: HOME | End: 2021-04-14
Payer: MEDICAID

## 2021-01-01 ENCOUNTER — APPOINTMENT (OUTPATIENT)
Dept: INFUSION THERAPY | Age: 61
End: 2021-01-01
Payer: MEDICAID

## 2021-01-01 ENCOUNTER — HOSPITAL ENCOUNTER (OUTPATIENT)
Dept: CT IMAGING | Age: 61
Discharge: HOME | End: 2021-07-24
Attending: NURSE PRACTITIONER
Payer: MEDICAID

## 2021-01-01 ENCOUNTER — HOSPITAL ENCOUNTER (OUTPATIENT)
Dept: INFUSION THERAPY | Age: 61
Discharge: HOME | End: 2021-03-23
Payer: MEDICAID

## 2021-01-01 ENCOUNTER — DOCUMENTATION ONLY (OUTPATIENT)
Dept: ONCOLOGY | Age: 61
End: 2021-01-01

## 2021-01-01 ENCOUNTER — HOSPITAL ENCOUNTER (OUTPATIENT)
Dept: INFUSION THERAPY | Age: 61
Discharge: HOME | End: 2021-07-26
Payer: MEDICAID

## 2021-01-01 ENCOUNTER — HOSPITAL ENCOUNTER (OUTPATIENT)
Dept: INFUSION THERAPY | Age: 61
Discharge: HOME | End: 2021-04-28
Payer: MEDICAID

## 2021-01-01 ENCOUNTER — HOSPITAL ENCOUNTER (OUTPATIENT)
Dept: INFUSION THERAPY | Age: 61
Discharge: HOME | End: 2021-02-08
Payer: MEDICAID

## 2021-01-01 ENCOUNTER — HOSPITAL ENCOUNTER (INPATIENT)
Age: 61
LOS: 3 days | Discharge: HOSPICE | DRG: 281 | End: 2021-11-18
Attending: EMERGENCY MEDICINE | Admitting: INTERNAL MEDICINE
Payer: MEDICAID

## 2021-01-01 ENCOUNTER — HOSPITAL ENCOUNTER (OUTPATIENT)
Dept: INFUSION THERAPY | Age: 61
Discharge: HOME | End: 2021-02-22
Payer: MEDICAID

## 2021-01-01 ENCOUNTER — HOSPITAL ENCOUNTER (OUTPATIENT)
Dept: INFUSION THERAPY | Age: 61
Discharge: HOME | End: 2021-05-24
Payer: MEDICAID

## 2021-01-01 ENCOUNTER — HOSPITAL ENCOUNTER (OUTPATIENT)
Dept: INFUSION THERAPY | Age: 61
Discharge: HOME | End: 2021-01-13
Payer: MEDICAID

## 2021-01-01 ENCOUNTER — HOSPITAL ENCOUNTER (OUTPATIENT)
Dept: INFUSION THERAPY | Age: 61
Discharge: HOME | End: 2021-09-07
Payer: MEDICAID

## 2021-01-01 ENCOUNTER — HOSPITAL ENCOUNTER (OUTPATIENT)
Dept: INFUSION THERAPY | Age: 61
Discharge: HOME | End: 2021-02-24
Payer: MEDICAID

## 2021-01-01 ENCOUNTER — HOSPITAL ENCOUNTER (OUTPATIENT)
Dept: INFUSION THERAPY | Age: 61
Discharge: HOME | End: 2021-09-09
Payer: MEDICAID

## 2021-01-01 ENCOUNTER — HOSPITAL ENCOUNTER (OUTPATIENT)
Dept: INFUSION THERAPY | Age: 61
Discharge: HOME | End: 2021-07-12
Payer: MEDICAID

## 2021-01-01 ENCOUNTER — HOSPITAL ENCOUNTER (OUTPATIENT)
Dept: INFUSION THERAPY | Age: 61
Discharge: HOME | End: 2021-08-25
Payer: MEDICAID

## 2021-01-01 ENCOUNTER — HOSPITAL ENCOUNTER (EMERGENCY)
Age: 61
Discharge: HOME | End: 2021-06-22
Attending: EMERGENCY MEDICINE
Payer: MEDICAID

## 2021-01-01 ENCOUNTER — HOSPITAL ENCOUNTER (OUTPATIENT)
Dept: INFUSION THERAPY | Age: 61
Discharge: HOME | End: 2021-07-14
Payer: MEDICAID

## 2021-01-01 ENCOUNTER — HOSPITAL ENCOUNTER (OUTPATIENT)
Dept: INFUSION THERAPY | Age: 61
Discharge: HOME | End: 2021-08-23
Payer: MEDICAID

## 2021-01-01 ENCOUNTER — HOSPITAL ENCOUNTER (OUTPATIENT)
Dept: INFUSION THERAPY | Age: 61
Discharge: HOME | End: 2021-02-10
Payer: MEDICAID

## 2021-01-01 ENCOUNTER — HOSPITAL ENCOUNTER (OUTPATIENT)
Dept: INFUSION THERAPY | Age: 61
Discharge: HOME | End: 2021-07-28

## 2021-01-01 ENCOUNTER — HOSPITAL ENCOUNTER (OUTPATIENT)
Dept: INFUSION THERAPY | Age: 61
Discharge: HOME | End: 2021-07-29
Payer: MEDICAID

## 2021-01-01 ENCOUNTER — HOSPITAL ENCOUNTER (OUTPATIENT)
Dept: INFUSION THERAPY | Age: 61
Discharge: HOME | End: 2021-01-11
Payer: MEDICAID

## 2021-01-01 ENCOUNTER — HOSPICE ADMISSION (OUTPATIENT)
Dept: HOSPICE | Facility: HOSPICE | Age: 61
End: 2021-01-01
Payer: MEDICAID

## 2021-01-01 ENCOUNTER — HOSPITAL ENCOUNTER (OUTPATIENT)
Dept: INFUSION THERAPY | Age: 61
Discharge: HOME | End: 2021-06-23

## 2021-01-01 ENCOUNTER — HOSPITAL ENCOUNTER (OUTPATIENT)
Dept: INFUSION THERAPY | Age: 61
Discharge: HOME | End: 2021-04-12
Payer: MEDICAID

## 2021-01-01 ENCOUNTER — HOSPITAL ENCOUNTER (OUTPATIENT)
Dept: INFUSION THERAPY | Age: 61
Discharge: HOME | End: 2021-05-26
Payer: MEDICAID

## 2021-01-01 ENCOUNTER — HOSPITAL ENCOUNTER (OUTPATIENT)
Dept: CT IMAGING | Age: 61
Discharge: HOME | End: 2021-04-24
Attending: NURSE PRACTITIONER
Payer: MEDICAID

## 2021-01-01 ENCOUNTER — APPOINTMENT (OUTPATIENT)
Dept: INTERVENTIONAL RADIOLOGY/VASCULAR | Age: 61
DRG: 281 | End: 2021-01-01
Attending: NURSE PRACTITIONER
Payer: MEDICAID

## 2021-01-01 VITALS
BODY MASS INDEX: 18.18 KG/M2 | TEMPERATURE: 98.1 F | WEIGHT: 126.98 LBS | SYSTOLIC BLOOD PRESSURE: 127 MMHG | DIASTOLIC BLOOD PRESSURE: 69 MMHG | RESPIRATION RATE: 18 BRPM | OXYGEN SATURATION: 97 % | HEIGHT: 70 IN | HEART RATE: 69 BPM

## 2021-01-01 VITALS
BODY MASS INDEX: 18.21 KG/M2 | TEMPERATURE: 97.3 F | HEIGHT: 70 IN | HEART RATE: 85 BPM | DIASTOLIC BLOOD PRESSURE: 69 MMHG | RESPIRATION RATE: 18 BRPM | SYSTOLIC BLOOD PRESSURE: 113 MMHG | WEIGHT: 127.2 LBS | OXYGEN SATURATION: 100 %

## 2021-01-01 VITALS
RESPIRATION RATE: 18 BRPM | OXYGEN SATURATION: 99 % | DIASTOLIC BLOOD PRESSURE: 70 MMHG | TEMPERATURE: 97.9 F | SYSTOLIC BLOOD PRESSURE: 106 MMHG | HEART RATE: 83 BPM

## 2021-01-01 VITALS
SYSTOLIC BLOOD PRESSURE: 121 MMHG | HEIGHT: 70 IN | OXYGEN SATURATION: 100 % | WEIGHT: 127 LBS | BODY MASS INDEX: 18.18 KG/M2 | TEMPERATURE: 98 F | WEIGHT: 118.5 LBS | RESPIRATION RATE: 18 BRPM | DIASTOLIC BLOOD PRESSURE: 70 MMHG | RESPIRATION RATE: 18 BRPM | BODY MASS INDEX: 16.96 KG/M2 | HEART RATE: 84 BPM | DIASTOLIC BLOOD PRESSURE: 77 MMHG | HEART RATE: 73 BPM | SYSTOLIC BLOOD PRESSURE: 100 MMHG | HEIGHT: 70 IN | TEMPERATURE: 97.4 F | OXYGEN SATURATION: 97 %

## 2021-01-01 VITALS
SYSTOLIC BLOOD PRESSURE: 110 MMHG | RESPIRATION RATE: 18 BRPM | RESPIRATION RATE: 16 BRPM | TEMPERATURE: 96.5 F | OXYGEN SATURATION: 99 % | SYSTOLIC BLOOD PRESSURE: 116 MMHG | DIASTOLIC BLOOD PRESSURE: 79 MMHG | DIASTOLIC BLOOD PRESSURE: 74 MMHG | HEART RATE: 87 BPM | OXYGEN SATURATION: 96 % | HEART RATE: 67 BPM | TEMPERATURE: 97.2 F

## 2021-01-01 VITALS
OXYGEN SATURATION: 100 % | SYSTOLIC BLOOD PRESSURE: 108 MMHG | HEART RATE: 70 BPM | WEIGHT: 116.62 LBS | BODY MASS INDEX: 16.7 KG/M2 | RESPIRATION RATE: 16 BRPM | HEIGHT: 70 IN | DIASTOLIC BLOOD PRESSURE: 69 MMHG | TEMPERATURE: 97.1 F

## 2021-01-01 VITALS
HEART RATE: 70 BPM | HEIGHT: 70 IN | BODY MASS INDEX: 16.7 KG/M2 | OXYGEN SATURATION: 97 % | WEIGHT: 116.62 LBS | SYSTOLIC BLOOD PRESSURE: 110 MMHG | RESPIRATION RATE: 18 BRPM | DIASTOLIC BLOOD PRESSURE: 68 MMHG | TEMPERATURE: 97.6 F

## 2021-01-01 VITALS
HEIGHT: 70 IN | RESPIRATION RATE: 20 BRPM | TEMPERATURE: 96.9 F | SYSTOLIC BLOOD PRESSURE: 117 MMHG | HEART RATE: 71 BPM | OXYGEN SATURATION: 100 % | BODY MASS INDEX: 19.2 KG/M2 | WEIGHT: 134.1 LBS | DIASTOLIC BLOOD PRESSURE: 75 MMHG

## 2021-01-01 VITALS
TEMPERATURE: 97.7 F | OXYGEN SATURATION: 96 % | HEART RATE: 88 BPM | RESPIRATION RATE: 6 BRPM | SYSTOLIC BLOOD PRESSURE: 76 MMHG | DIASTOLIC BLOOD PRESSURE: 55 MMHG

## 2021-01-01 VITALS
BODY MASS INDEX: 19.2 KG/M2 | HEART RATE: 77 BPM | WEIGHT: 134.1 LBS | DIASTOLIC BLOOD PRESSURE: 73 MMHG | TEMPERATURE: 97 F | SYSTOLIC BLOOD PRESSURE: 114 MMHG | OXYGEN SATURATION: 98 % | HEIGHT: 70 IN | RESPIRATION RATE: 16 BRPM

## 2021-01-01 VITALS
HEART RATE: 86 BPM | OXYGEN SATURATION: 98 % | RESPIRATION RATE: 16 BRPM | SYSTOLIC BLOOD PRESSURE: 101 MMHG | DIASTOLIC BLOOD PRESSURE: 70 MMHG | TEMPERATURE: 98.2 F

## 2021-01-01 VITALS
TEMPERATURE: 97.8 F | RESPIRATION RATE: 20 BRPM | HEIGHT: 70 IN | WEIGHT: 123.9 LBS | HEART RATE: 78 BPM | SYSTOLIC BLOOD PRESSURE: 90 MMHG | BODY MASS INDEX: 17.74 KG/M2 | OXYGEN SATURATION: 100 % | DIASTOLIC BLOOD PRESSURE: 66 MMHG

## 2021-01-01 VITALS
TEMPERATURE: 96.6 F | BODY MASS INDEX: 18.65 KG/M2 | SYSTOLIC BLOOD PRESSURE: 114 MMHG | DIASTOLIC BLOOD PRESSURE: 73 MMHG | RESPIRATION RATE: 16 BRPM | HEART RATE: 91 BPM | HEART RATE: 72 BPM | OXYGEN SATURATION: 100 % | HEIGHT: 70 IN | SYSTOLIC BLOOD PRESSURE: 106 MMHG | WEIGHT: 130 LBS | DIASTOLIC BLOOD PRESSURE: 72 MMHG | BODY MASS INDEX: 18.18 KG/M2 | OXYGEN SATURATION: 97 % | TEMPERATURE: 97.3 F | WEIGHT: 127 LBS | RESPIRATION RATE: 18 BRPM

## 2021-01-01 VITALS
OXYGEN SATURATION: 97 % | HEART RATE: 100 BPM | RESPIRATION RATE: 18 BRPM | WEIGHT: 126.98 LBS | BODY MASS INDEX: 18.18 KG/M2 | DIASTOLIC BLOOD PRESSURE: 81 MMHG | TEMPERATURE: 98 F | SYSTOLIC BLOOD PRESSURE: 137 MMHG | HEIGHT: 70 IN

## 2021-01-01 VITALS
DIASTOLIC BLOOD PRESSURE: 61 MMHG | HEART RATE: 74 BPM | SYSTOLIC BLOOD PRESSURE: 108 MMHG | TEMPERATURE: 96.5 F | OXYGEN SATURATION: 100 % | WEIGHT: 134.1 LBS | HEIGHT: 70 IN | BODY MASS INDEX: 19.2 KG/M2

## 2021-01-01 VITALS
SYSTOLIC BLOOD PRESSURE: 106 MMHG | DIASTOLIC BLOOD PRESSURE: 70 MMHG | TEMPERATURE: 96.2 F | HEART RATE: 101 BPM | RESPIRATION RATE: 18 BRPM | OXYGEN SATURATION: 99 %

## 2021-01-01 VITALS
RESPIRATION RATE: 18 BRPM | HEART RATE: 73 BPM | WEIGHT: 123.9 LBS | BODY MASS INDEX: 17.74 KG/M2 | OXYGEN SATURATION: 100 % | TEMPERATURE: 98 F | HEIGHT: 70 IN | SYSTOLIC BLOOD PRESSURE: 103 MMHG | DIASTOLIC BLOOD PRESSURE: 68 MMHG

## 2021-01-01 VITALS
TEMPERATURE: 98 F | RESPIRATION RATE: 18 BRPM | HEART RATE: 73 BPM | SYSTOLIC BLOOD PRESSURE: 99 MMHG | OXYGEN SATURATION: 98 % | DIASTOLIC BLOOD PRESSURE: 69 MMHG

## 2021-01-01 VITALS
DIASTOLIC BLOOD PRESSURE: 70 MMHG | SYSTOLIC BLOOD PRESSURE: 103 MMHG | TEMPERATURE: 97.4 F | WEIGHT: 118.5 LBS | BODY MASS INDEX: 16.96 KG/M2 | HEIGHT: 70 IN | OXYGEN SATURATION: 100 % | HEART RATE: 83 BPM

## 2021-01-01 VITALS
SYSTOLIC BLOOD PRESSURE: 113 MMHG | OXYGEN SATURATION: 99 % | BODY MASS INDEX: 17.77 KG/M2 | TEMPERATURE: 98 F | HEIGHT: 70 IN | WEIGHT: 124.1 LBS | RESPIRATION RATE: 18 BRPM | DIASTOLIC BLOOD PRESSURE: 75 MMHG | HEART RATE: 65 BPM

## 2021-01-01 VITALS
DIASTOLIC BLOOD PRESSURE: 74 MMHG | HEART RATE: 79 BPM | WEIGHT: 129.9 LBS | TEMPERATURE: 97.5 F | OXYGEN SATURATION: 99 % | HEIGHT: 70 IN | SYSTOLIC BLOOD PRESSURE: 108 MMHG | BODY MASS INDEX: 18.6 KG/M2

## 2021-01-01 VITALS
WEIGHT: 116.1 LBS | HEART RATE: 73 BPM | TEMPERATURE: 98 F | HEIGHT: 70 IN | SYSTOLIC BLOOD PRESSURE: 109 MMHG | DIASTOLIC BLOOD PRESSURE: 68 MMHG | OXYGEN SATURATION: 96 % | BODY MASS INDEX: 16.62 KG/M2

## 2021-01-01 VITALS
OXYGEN SATURATION: 100 % | SYSTOLIC BLOOD PRESSURE: 100 MMHG | HEIGHT: 70 IN | DIASTOLIC BLOOD PRESSURE: 70 MMHG | BODY MASS INDEX: 17.85 KG/M2 | HEART RATE: 83 BPM | TEMPERATURE: 97.9 F

## 2021-01-01 VITALS
OXYGEN SATURATION: 99 % | RESPIRATION RATE: 16 BRPM | HEIGHT: 70 IN | TEMPERATURE: 97.9 F | WEIGHT: 119.6 LBS | BODY MASS INDEX: 17.12 KG/M2 | DIASTOLIC BLOOD PRESSURE: 74 MMHG | SYSTOLIC BLOOD PRESSURE: 123 MMHG | HEART RATE: 65 BPM

## 2021-01-01 VITALS
RESPIRATION RATE: 18 BRPM | SYSTOLIC BLOOD PRESSURE: 107 MMHG | TEMPERATURE: 97.2 F | DIASTOLIC BLOOD PRESSURE: 64 MMHG | HEART RATE: 104 BPM

## 2021-01-01 VITALS
HEART RATE: 76 BPM | RESPIRATION RATE: 18 BRPM | OXYGEN SATURATION: 99 % | SYSTOLIC BLOOD PRESSURE: 100 MMHG | TEMPERATURE: 97.1 F | DIASTOLIC BLOOD PRESSURE: 70 MMHG

## 2021-01-01 VITALS
RESPIRATION RATE: 18 BRPM | DIASTOLIC BLOOD PRESSURE: 75 MMHG | HEART RATE: 94 BPM | TEMPERATURE: 97 F | SYSTOLIC BLOOD PRESSURE: 127 MMHG

## 2021-01-01 VITALS
BODY MASS INDEX: 18.8 KG/M2 | HEART RATE: 68 BPM | TEMPERATURE: 96.3 F | WEIGHT: 131.3 LBS | DIASTOLIC BLOOD PRESSURE: 70 MMHG | SYSTOLIC BLOOD PRESSURE: 113 MMHG | HEIGHT: 70 IN | RESPIRATION RATE: 18 BRPM

## 2021-01-01 VITALS
DIASTOLIC BLOOD PRESSURE: 69 MMHG | SYSTOLIC BLOOD PRESSURE: 103 MMHG | TEMPERATURE: 97.1 F | WEIGHT: 134.1 LBS | OXYGEN SATURATION: 100 % | HEART RATE: 80 BPM | HEIGHT: 70 IN | BODY MASS INDEX: 19.2 KG/M2

## 2021-01-01 VITALS
TEMPERATURE: 97.1 F | HEART RATE: 69 BPM | DIASTOLIC BLOOD PRESSURE: 76 MMHG | RESPIRATION RATE: 16 BRPM | SYSTOLIC BLOOD PRESSURE: 108 MMHG | OXYGEN SATURATION: 100 % | HEIGHT: 70 IN | WEIGHT: 116.6 LBS | BODY MASS INDEX: 16.69 KG/M2

## 2021-01-01 VITALS
TEMPERATURE: 97.8 F | HEIGHT: 70 IN | WEIGHT: 124.4 LBS | OXYGEN SATURATION: 98 % | RESPIRATION RATE: 16 BRPM | SYSTOLIC BLOOD PRESSURE: 110 MMHG | DIASTOLIC BLOOD PRESSURE: 62 MMHG | BODY MASS INDEX: 17.81 KG/M2 | HEART RATE: 72 BPM

## 2021-01-01 VITALS
SYSTOLIC BLOOD PRESSURE: 101 MMHG | TEMPERATURE: 96.8 F | RESPIRATION RATE: 18 BRPM | DIASTOLIC BLOOD PRESSURE: 68 MMHG | HEART RATE: 81 BPM

## 2021-01-01 VITALS
HEIGHT: 70 IN | OXYGEN SATURATION: 96 % | HEART RATE: 67 BPM | WEIGHT: 116.1 LBS | SYSTOLIC BLOOD PRESSURE: 113 MMHG | BODY MASS INDEX: 16.62 KG/M2 | TEMPERATURE: 98 F | DIASTOLIC BLOOD PRESSURE: 73 MMHG | RESPIRATION RATE: 16 BRPM

## 2021-01-01 VITALS
SYSTOLIC BLOOD PRESSURE: 99 MMHG | BODY MASS INDEX: 18.81 KG/M2 | HEART RATE: 72 BPM | TEMPERATURE: 96.3 F | RESPIRATION RATE: 18 BRPM | OXYGEN SATURATION: 98 % | DIASTOLIC BLOOD PRESSURE: 60 MMHG | WEIGHT: 131.39 LBS | HEIGHT: 70 IN

## 2021-01-01 VITALS
TEMPERATURE: 96.4 F | SYSTOLIC BLOOD PRESSURE: 134 MMHG | DIASTOLIC BLOOD PRESSURE: 70 MMHG | RESPIRATION RATE: 20 BRPM | HEART RATE: 94 BPM

## 2021-01-01 VITALS
RESPIRATION RATE: 16 BRPM | HEIGHT: 70 IN | TEMPERATURE: 98 F | OXYGEN SATURATION: 97 % | DIASTOLIC BLOOD PRESSURE: 74 MMHG | SYSTOLIC BLOOD PRESSURE: 117 MMHG | BODY MASS INDEX: 18.61 KG/M2 | WEIGHT: 130 LBS | HEART RATE: 73 BPM

## 2021-01-01 VITALS
RESPIRATION RATE: 16 BRPM | DIASTOLIC BLOOD PRESSURE: 73 MMHG | SYSTOLIC BLOOD PRESSURE: 112 MMHG | HEART RATE: 85 BPM | TEMPERATURE: 98 F

## 2021-01-01 VITALS
RESPIRATION RATE: 16 BRPM | TEMPERATURE: 98.1 F | DIASTOLIC BLOOD PRESSURE: 75 MMHG | SYSTOLIC BLOOD PRESSURE: 95 MMHG | HEART RATE: 97 BPM

## 2021-01-01 VITALS
HEIGHT: 70 IN | TEMPERATURE: 97.2 F | SYSTOLIC BLOOD PRESSURE: 115 MMHG | RESPIRATION RATE: 18 BRPM | WEIGHT: 129.9 LBS | BODY MASS INDEX: 18.6 KG/M2 | DIASTOLIC BLOOD PRESSURE: 74 MMHG | HEART RATE: 75 BPM

## 2021-01-01 DIAGNOSIS — E63.9 POOR NUTRITION: ICD-10-CM

## 2021-01-01 DIAGNOSIS — E43 SEVERE PROTEIN-CALORIE MALNUTRITION (HCC): ICD-10-CM

## 2021-01-01 DIAGNOSIS — C25.1 MALIGNANT NEOPLASM OF BODY OF PANCREAS (HCC): ICD-10-CM

## 2021-01-01 DIAGNOSIS — C25.1 MALIGNANT NEOPLASM OF BODY OF PANCREAS (HCC): Primary | ICD-10-CM

## 2021-01-01 DIAGNOSIS — C78.6 PERITONEAL CARCINOMATOSIS (HCC): ICD-10-CM

## 2021-01-01 DIAGNOSIS — F32.89 OTHER DEPRESSION: ICD-10-CM

## 2021-01-01 DIAGNOSIS — I95.89 HYPOTENSION DUE TO HYPOVOLEMIA: ICD-10-CM

## 2021-01-01 DIAGNOSIS — R59.0 ABDOMINAL LYMPHADENOPATHY: ICD-10-CM

## 2021-01-01 DIAGNOSIS — F17.200 NICOTINE DEPENDENCE, UNCOMPLICATED, UNSPECIFIED NICOTINE PRODUCT TYPE: ICD-10-CM

## 2021-01-01 DIAGNOSIS — E88.09 HYPOALBUMINEMIA: ICD-10-CM

## 2021-01-01 DIAGNOSIS — C78.6 PERITONEAL CARCINOMATOSIS (HCC): Primary | ICD-10-CM

## 2021-01-01 DIAGNOSIS — Z51.11 CHEMOTHERAPY MANAGEMENT, ENCOUNTER FOR: ICD-10-CM

## 2021-01-01 DIAGNOSIS — K59.03 DRUG-INDUCED CONSTIPATION: ICD-10-CM

## 2021-01-01 DIAGNOSIS — C25.9 MALIGNANT NEOPLASM OF PANCREAS, UNSPECIFIED LOCATION OF MALIGNANCY (HCC): ICD-10-CM

## 2021-01-01 DIAGNOSIS — R11.0 CHEMOTHERAPY-INDUCED NAUSEA: ICD-10-CM

## 2021-01-01 DIAGNOSIS — G89.3 NEOPLASM RELATED PAIN (ACUTE) (CHRONIC): ICD-10-CM

## 2021-01-01 DIAGNOSIS — T45.1X5A CHEMOTHERAPY-INDUCED NAUSEA: ICD-10-CM

## 2021-01-01 DIAGNOSIS — R11.0 NAUSEA IN ADULT: ICD-10-CM

## 2021-01-01 DIAGNOSIS — C25.0 MALIGNANT NEOPLASM OF HEAD OF PANCREAS (HCC): ICD-10-CM

## 2021-01-01 DIAGNOSIS — R52 PAIN: ICD-10-CM

## 2021-01-01 DIAGNOSIS — R63.4 WEIGHT LOSS: ICD-10-CM

## 2021-01-01 DIAGNOSIS — R18.0 ASCITES, MALIGNANT: ICD-10-CM

## 2021-01-01 DIAGNOSIS — Z51.11 CHEMOTHERAPY MANAGEMENT, ENCOUNTER FOR: Primary | ICD-10-CM

## 2021-01-01 DIAGNOSIS — R10.13 ABDOMINAL PAIN, EPIGASTRIC: ICD-10-CM

## 2021-01-01 DIAGNOSIS — D64.89 ANEMIA DUE TO OTHER CAUSE, NOT CLASSIFIED: ICD-10-CM

## 2021-01-01 DIAGNOSIS — Z51.5 HOSPICE CARE: ICD-10-CM

## 2021-01-01 DIAGNOSIS — E87.6 HYPOKALEMIA: ICD-10-CM

## 2021-01-01 DIAGNOSIS — E86.1 HYPOTENSION DUE TO HYPOVOLEMIA: ICD-10-CM

## 2021-01-01 DIAGNOSIS — K86.1 CHRONIC PANCREATITIS, UNSPECIFIED PANCREATITIS TYPE (HCC): Primary | ICD-10-CM

## 2021-01-01 DIAGNOSIS — R53.0 NEOPLASTIC (MALIGNANT) RELATED FATIGUE: ICD-10-CM

## 2021-01-01 DIAGNOSIS — D69.59 CHEMOTHERAPY-INDUCED THROMBOCYTOPENIA: ICD-10-CM

## 2021-01-01 DIAGNOSIS — Z71.89 GOALS OF CARE, COUNSELING/DISCUSSION: ICD-10-CM

## 2021-01-01 DIAGNOSIS — F41.9 ANXIETY: ICD-10-CM

## 2021-01-01 DIAGNOSIS — G89.3 CANCER RELATED PAIN: ICD-10-CM

## 2021-01-01 DIAGNOSIS — Z71.89 DNR (DO NOT RESUSCITATE) DISCUSSION: ICD-10-CM

## 2021-01-01 DIAGNOSIS — K52.9 COLITIS: ICD-10-CM

## 2021-01-01 DIAGNOSIS — R74.01 TRANSAMINITIS: ICD-10-CM

## 2021-01-01 DIAGNOSIS — R63.0 POOR APPETITE: ICD-10-CM

## 2021-01-01 DIAGNOSIS — Z78.9 PROBLEM WITH VASCULAR ACCESS: Primary | ICD-10-CM

## 2021-01-01 DIAGNOSIS — C25.9 PANCREATIC ADENOCARCINOMA (HCC): ICD-10-CM

## 2021-01-01 DIAGNOSIS — R19.7 DIARRHEA, UNSPECIFIED TYPE: ICD-10-CM

## 2021-01-01 DIAGNOSIS — Z51.5 PALLIATIVE CARE ENCOUNTER: ICD-10-CM

## 2021-01-01 DIAGNOSIS — Z71.1 CONCERN ABOUT END OF LIFE: ICD-10-CM

## 2021-01-01 DIAGNOSIS — T45.1X5A CHEMOTHERAPY-INDUCED THROMBOCYTOPENIA: ICD-10-CM

## 2021-01-01 DIAGNOSIS — R10.9 ACUTE ABDOMINAL PAIN: ICD-10-CM

## 2021-01-01 LAB
ALBUMIN SERPL-MCNC: 2.3 G/DL (ref 3.5–5)
ALBUMIN SERPL-MCNC: 2.6 G/DL (ref 3.5–5)
ALBUMIN SERPL-MCNC: 3.1 G/DL (ref 3.5–5)
ALBUMIN SERPL-MCNC: 3.2 G/DL (ref 3.5–5)
ALBUMIN SERPL-MCNC: 3.3 G/DL (ref 3.5–5)
ALBUMIN SERPL-MCNC: 3.4 G/DL (ref 3.5–5)
ALBUMIN/GLOB SERPL: 0.7 {RATIO} (ref 1.1–2.2)
ALBUMIN/GLOB SERPL: 0.7 {RATIO} (ref 1.1–2.2)
ALBUMIN/GLOB SERPL: 0.9 {RATIO} (ref 1.1–2.2)
ALBUMIN/GLOB SERPL: 1 {RATIO} (ref 1.1–2.2)
ALBUMIN/GLOB SERPL: 1.3 {RATIO} (ref 1.1–2.2)
ALP SERPL-CCNC: 154 U/L (ref 45–117)
ALP SERPL-CCNC: 300 U/L (ref 45–117)
ALP SERPL-CCNC: 58 U/L (ref 45–117)
ALP SERPL-CCNC: 63 U/L (ref 45–117)
ALP SERPL-CCNC: 64 U/L (ref 45–117)
ALP SERPL-CCNC: 66 U/L (ref 45–117)
ALP SERPL-CCNC: 68 U/L (ref 45–117)
ALP SERPL-CCNC: 69 U/L (ref 45–117)
ALP SERPL-CCNC: 71 U/L (ref 45–117)
ALP SERPL-CCNC: 73 U/L (ref 45–117)
ALP SERPL-CCNC: 75 U/L (ref 45–117)
ALP SERPL-CCNC: 78 U/L (ref 45–117)
ALT SERPL-CCNC: 100 U/L (ref 12–78)
ALT SERPL-CCNC: 128 U/L (ref 12–78)
ALT SERPL-CCNC: 17 U/L (ref 12–78)
ALT SERPL-CCNC: 18 U/L (ref 12–78)
ALT SERPL-CCNC: 20 U/L (ref 12–78)
ALT SERPL-CCNC: 24 U/L (ref 12–78)
ALT SERPL-CCNC: 25 U/L (ref 12–78)
ALT SERPL-CCNC: 28 U/L (ref 12–78)
ALT SERPL-CCNC: 30 U/L (ref 12–78)
ALT SERPL-CCNC: 36 U/L (ref 12–78)
ALT SERPL-CCNC: 48 U/L (ref 12–78)
ALT SERPL-CCNC: 74 U/L (ref 12–78)
ANION GAP SERPL CALC-SCNC: 2 MMOL/L (ref 5–15)
ANION GAP SERPL CALC-SCNC: 3 MMOL/L (ref 5–15)
ANION GAP SERPL CALC-SCNC: 4 MMOL/L (ref 5–15)
ANION GAP SERPL CALC-SCNC: 5 MMOL/L (ref 5–15)
ANION GAP SERPL CALC-SCNC: 6 MMOL/L (ref 5–15)
ANION GAP SERPL CALC-SCNC: 7 MMOL/L (ref 5–15)
AST SERPL-CCNC: 15 U/L (ref 15–37)
AST SERPL-CCNC: 15 U/L (ref 15–37)
AST SERPL-CCNC: 16 U/L (ref 15–37)
AST SERPL-CCNC: 17 U/L (ref 15–37)
AST SERPL-CCNC: 18 U/L (ref 15–37)
AST SERPL-CCNC: 198 U/L (ref 15–37)
AST SERPL-CCNC: 25 U/L (ref 15–37)
AST SERPL-CCNC: 40 U/L (ref 15–37)
AST SERPL-CCNC: 68 U/L (ref 15–37)
AST SERPL-CCNC: 8 U/L (ref 15–37)
BASO+EOS+MONOS # BLD AUTO: 0.6 K/UL (ref 0.2–1.2)
BASO+EOS+MONOS # BLD AUTO: 0.8 K/UL (ref 0.2–1.2)
BASO+EOS+MONOS # BLD AUTO: 1 K/UL (ref 0.2–1.2)
BASO+EOS+MONOS # BLD AUTO: 1.2 K/UL (ref 0.2–1.2)
BASO+EOS+MONOS NFR BLD AUTO: 16 % (ref 3.2–16.9)
BASO+EOS+MONOS NFR BLD AUTO: 18 % (ref 3.2–16.9)
BASO+EOS+MONOS NFR BLD AUTO: 19 % (ref 3.2–16.9)
BASO+EOS+MONOS NFR BLD AUTO: 20 % (ref 3.2–16.9)
BASOPHILS # BLD: 0 K/UL (ref 0–0.1)
BASOPHILS # BLD: 0.1 K/UL (ref 0–0.1)
BASOPHILS NFR BLD: 0 % (ref 0–1)
BASOPHILS NFR BLD: 1 % (ref 0–1)
BASOPHILS NFR BLD: 2 % (ref 0–1)
BILIRUB SERPL-MCNC: 0.2 MG/DL (ref 0.2–1)
BILIRUB SERPL-MCNC: 0.2 MG/DL (ref 0.2–1)
BILIRUB SERPL-MCNC: 0.3 MG/DL (ref 0.2–1)
BILIRUB SERPL-MCNC: 0.4 MG/DL (ref 0.2–1)
BILIRUB SERPL-MCNC: 0.4 MG/DL (ref 0.2–1)
BILIRUB SERPL-MCNC: 0.5 MG/DL (ref 0.2–1)
BUN SERPL-MCNC: 10 MG/DL (ref 6–20)
BUN SERPL-MCNC: 11 MG/DL (ref 6–20)
BUN SERPL-MCNC: 13 MG/DL (ref 6–20)
BUN SERPL-MCNC: 16 MG/DL (ref 6–20)
BUN SERPL-MCNC: 17 MG/DL (ref 6–20)
BUN SERPL-MCNC: 8 MG/DL (ref 6–20)
BUN SERPL-MCNC: 9 MG/DL (ref 6–20)
BUN SERPL-MCNC: 9 MG/DL (ref 6–20)
BUN/CREAT SERPL: 10 (ref 12–20)
BUN/CREAT SERPL: 11 (ref 12–20)
BUN/CREAT SERPL: 12 (ref 12–20)
BUN/CREAT SERPL: 13 (ref 12–20)
BUN/CREAT SERPL: 14 (ref 12–20)
BUN/CREAT SERPL: 14 (ref 12–20)
BUN/CREAT SERPL: 17 (ref 12–20)
BUN/CREAT SERPL: 7 (ref 12–20)
BUN/CREAT SERPL: 8 (ref 12–20)
CALCIUM SERPL-MCNC: 7.9 MG/DL (ref 8.5–10.1)
CALCIUM SERPL-MCNC: 7.9 MG/DL (ref 8.5–10.1)
CALCIUM SERPL-MCNC: 8 MG/DL (ref 8.5–10.1)
CALCIUM SERPL-MCNC: 8.1 MG/DL (ref 8.5–10.1)
CALCIUM SERPL-MCNC: 8.2 MG/DL (ref 8.5–10.1)
CALCIUM SERPL-MCNC: 8.4 MG/DL (ref 8.5–10.1)
CALCIUM SERPL-MCNC: 8.4 MG/DL (ref 8.5–10.1)
CALCIUM SERPL-MCNC: 8.5 MG/DL (ref 8.5–10.1)
CALCIUM SERPL-MCNC: 8.6 MG/DL (ref 8.5–10.1)
CALCIUM SERPL-MCNC: 8.6 MG/DL (ref 8.5–10.1)
CALCIUM SERPL-MCNC: 8.7 MG/DL (ref 8.5–10.1)
CANCER AG19-9 SERPL-ACNC: 1317 U/ML (ref 0–35)
CANCER AG19-9 SERPL-ACNC: 1532 U/ML (ref 0–35)
CANCER AG19-9 SERPL-ACNC: 2594 U/ML (ref 0–35)
CANCER AG19-9 SERPL-ACNC: 2939 U/ML (ref 0–35)
CANCER AG19-9 SERPL-ACNC: 3076 U/ML (ref 0–35)
CANCER AG19-9 SERPL-ACNC: 372 U/ML (ref 0–35)
CANCER AG19-9 SERPL-ACNC: 6583 U/ML (ref 0–35)
CHLORIDE SERPL-SCNC: 105 MMOL/L (ref 97–108)
CHLORIDE SERPL-SCNC: 107 MMOL/L (ref 97–108)
CHLORIDE SERPL-SCNC: 107 MMOL/L (ref 97–108)
CHLORIDE SERPL-SCNC: 108 MMOL/L (ref 97–108)
CHLORIDE SERPL-SCNC: 109 MMOL/L (ref 97–108)
CHLORIDE SERPL-SCNC: 110 MMOL/L (ref 97–108)
CHLORIDE SERPL-SCNC: 110 MMOL/L (ref 97–108)
CHLORIDE SERPL-SCNC: 111 MMOL/L (ref 97–108)
CHLORIDE SERPL-SCNC: 112 MMOL/L (ref 97–108)
CHLORIDE SERPL-SCNC: 113 MMOL/L (ref 97–108)
CHLORIDE SERPL-SCNC: 114 MMOL/L (ref 97–108)
CO2 SERPL-SCNC: 24 MMOL/L (ref 21–32)
CO2 SERPL-SCNC: 25 MMOL/L (ref 21–32)
CO2 SERPL-SCNC: 26 MMOL/L (ref 21–32)
CO2 SERPL-SCNC: 27 MMOL/L (ref 21–32)
CO2 SERPL-SCNC: 28 MMOL/L (ref 21–32)
CO2 SERPL-SCNC: 29 MMOL/L (ref 21–32)
COMMENT, HOLDF: NORMAL
COMMENT, HOLDF: NORMAL
COVID-19 RAPID TEST, COVR: NOT DETECTED
CREAT SERPL-MCNC: 0.72 MG/DL (ref 0.7–1.3)
CREAT SERPL-MCNC: 0.76 MG/DL (ref 0.7–1.3)
CREAT SERPL-MCNC: 0.77 MG/DL (ref 0.7–1.3)
CREAT SERPL-MCNC: 0.78 MG/DL (ref 0.7–1.3)
CREAT SERPL-MCNC: 0.83 MG/DL (ref 0.7–1.3)
CREAT SERPL-MCNC: 0.9 MG/DL (ref 0.7–1.3)
CREAT SERPL-MCNC: 0.92 MG/DL (ref 0.7–1.3)
CREAT SERPL-MCNC: 0.96 MG/DL (ref 0.7–1.3)
CREAT SERPL-MCNC: 1.02 MG/DL (ref 0.7–1.3)
CREAT SERPL-MCNC: 1.02 MG/DL (ref 0.7–1.3)
CREAT SERPL-MCNC: 1.11 MG/DL (ref 0.7–1.3)
CREAT SERPL-MCNC: 1.19 MG/DL (ref 0.7–1.3)
CREAT SERPL-MCNC: 1.26 MG/DL (ref 0.7–1.3)
DIFFERENTIAL METHOD BLD: ABNORMAL
EOSINOPHIL # BLD: 0.1 K/UL (ref 0–0.4)
EOSINOPHIL # BLD: 0.2 K/UL (ref 0–0.4)
EOSINOPHIL # BLD: 0.2 K/UL (ref 0–0.4)
EOSINOPHIL # BLD: 0.3 K/UL (ref 0–0.4)
EOSINOPHIL # BLD: 0.4 K/UL (ref 0–0.4)
EOSINOPHIL # BLD: 0.5 K/UL (ref 0–0.4)
EOSINOPHIL NFR BLD: 2 % (ref 0–7)
EOSINOPHIL NFR BLD: 3 % (ref 0–7)
EOSINOPHIL NFR BLD: 3 % (ref 0–7)
EOSINOPHIL NFR BLD: 4 % (ref 0–7)
EOSINOPHIL NFR BLD: 5 % (ref 0–7)
EOSINOPHIL NFR BLD: 5 % (ref 0–7)
EOSINOPHIL NFR BLD: 7 % (ref 0–7)
EOSINOPHIL NFR BLD: 8 % (ref 0–7)
ERYTHROCYTE [DISTWIDTH] IN BLOOD BY AUTOMATED COUNT: 13.8 % (ref 11.5–14.5)
ERYTHROCYTE [DISTWIDTH] IN BLOOD BY AUTOMATED COUNT: 13.9 % (ref 11.5–14.5)
ERYTHROCYTE [DISTWIDTH] IN BLOOD BY AUTOMATED COUNT: 13.9 % (ref 11.5–14.5)
ERYTHROCYTE [DISTWIDTH] IN BLOOD BY AUTOMATED COUNT: 14.6 % (ref 11.5–14.5)
ERYTHROCYTE [DISTWIDTH] IN BLOOD BY AUTOMATED COUNT: 14.8 % (ref 11.5–14.5)
ERYTHROCYTE [DISTWIDTH] IN BLOOD BY AUTOMATED COUNT: 14.9 % (ref 11.5–14.5)
ERYTHROCYTE [DISTWIDTH] IN BLOOD BY AUTOMATED COUNT: 14.9 % (ref 11.5–14.5)
ERYTHROCYTE [DISTWIDTH] IN BLOOD BY AUTOMATED COUNT: 15.5 % (ref 11.5–14.5)
ERYTHROCYTE [DISTWIDTH] IN BLOOD BY AUTOMATED COUNT: 15.9 % (ref 11.8–15.8)
ERYTHROCYTE [DISTWIDTH] IN BLOOD BY AUTOMATED COUNT: 16.2 % (ref 11.8–15.8)
ERYTHROCYTE [DISTWIDTH] IN BLOOD BY AUTOMATED COUNT: 16.3 % (ref 11.5–14.5)
ERYTHROCYTE [DISTWIDTH] IN BLOOD BY AUTOMATED COUNT: 16.4 % (ref 11.5–14.5)
ERYTHROCYTE [DISTWIDTH] IN BLOOD BY AUTOMATED COUNT: 19.2 % (ref 11.8–15.8)
ERYTHROCYTE [DISTWIDTH] IN BLOOD BY AUTOMATED COUNT: 20.3 % (ref 11.8–15.8)
GLOBULIN SER CALC-MCNC: 2.7 G/DL (ref 2–4)
GLOBULIN SER CALC-MCNC: 3.1 G/DL (ref 2–4)
GLOBULIN SER CALC-MCNC: 3.2 G/DL (ref 2–4)
GLOBULIN SER CALC-MCNC: 3.4 G/DL (ref 2–4)
GLOBULIN SER CALC-MCNC: 3.5 G/DL (ref 2–4)
GLOBULIN SER CALC-MCNC: 3.5 G/DL (ref 2–4)
GLOBULIN SER CALC-MCNC: 3.6 G/DL (ref 2–4)
GLOBULIN SER CALC-MCNC: 3.6 G/DL (ref 2–4)
GLOBULIN SER CALC-MCNC: 3.9 G/DL (ref 2–4)
GLOBULIN SER CALC-MCNC: 3.9 G/DL (ref 2–4)
GLUCOSE SERPL-MCNC: 101 MG/DL (ref 65–100)
GLUCOSE SERPL-MCNC: 108 MG/DL (ref 65–100)
GLUCOSE SERPL-MCNC: 109 MG/DL (ref 65–100)
GLUCOSE SERPL-MCNC: 111 MG/DL (ref 65–100)
GLUCOSE SERPL-MCNC: 113 MG/DL (ref 65–100)
GLUCOSE SERPL-MCNC: 125 MG/DL (ref 65–100)
GLUCOSE SERPL-MCNC: 129 MG/DL (ref 65–100)
GLUCOSE SERPL-MCNC: 142 MG/DL (ref 65–100)
GLUCOSE SERPL-MCNC: 150 MG/DL (ref 65–100)
GLUCOSE SERPL-MCNC: 167 MG/DL (ref 65–100)
GLUCOSE SERPL-MCNC: 68 MG/DL (ref 65–100)
GLUCOSE SERPL-MCNC: 85 MG/DL (ref 65–100)
GLUCOSE SERPL-MCNC: 98 MG/DL (ref 65–100)
HCT VFR BLD AUTO: 28.6 % (ref 36.6–50.3)
HCT VFR BLD AUTO: 30.2 % (ref 36.6–50.3)
HCT VFR BLD AUTO: 30.8 % (ref 36.6–50.3)
HCT VFR BLD AUTO: 30.9 % (ref 36.6–50.3)
HCT VFR BLD AUTO: 31.3 % (ref 36.6–50.3)
HCT VFR BLD AUTO: 31.8 % (ref 36.6–50.3)
HCT VFR BLD AUTO: 33.6 % (ref 36.6–50.3)
HCT VFR BLD AUTO: 33.9 % (ref 36.6–50.3)
HCT VFR BLD AUTO: 34.5 % (ref 36.6–50.3)
HCT VFR BLD AUTO: 35.3 % (ref 36.6–50.3)
HCT VFR BLD AUTO: 36.6 % (ref 36.6–50.3)
HCT VFR BLD AUTO: 39.1 % (ref 36.6–50.3)
HGB BLD-MCNC: 10.3 G/DL (ref 12.1–17)
HGB BLD-MCNC: 10.4 G/DL (ref 12.1–17)
HGB BLD-MCNC: 10.6 G/DL (ref 12.1–17)
HGB BLD-MCNC: 10.7 G/DL (ref 12.1–17)
HGB BLD-MCNC: 10.7 G/DL (ref 12.1–17)
HGB BLD-MCNC: 11 G/DL (ref 12.1–17)
HGB BLD-MCNC: 11 G/DL (ref 12.1–17)
HGB BLD-MCNC: 11.2 G/DL (ref 12.1–17)
HGB BLD-MCNC: 11.5 G/DL (ref 12.1–17)
HGB BLD-MCNC: 11.7 G/DL (ref 12.1–17)
HGB BLD-MCNC: 11.7 G/DL (ref 12.1–17)
HGB BLD-MCNC: 12.4 G/DL (ref 12.1–17)
HGB BLD-MCNC: 9.5 G/DL (ref 12.1–17)
HGB BLD-MCNC: 9.9 G/DL (ref 12.1–17)
IMM GRANULOCYTES # BLD AUTO: 0 K/UL
IMM GRANULOCYTES # BLD AUTO: 0 K/UL (ref 0–0.04)
IMM GRANULOCYTES # BLD AUTO: 0.1 K/UL (ref 0–0.04)
IMM GRANULOCYTES NFR BLD AUTO: 0 %
IMM GRANULOCYTES NFR BLD AUTO: 0 % (ref 0–0.5)
IMM GRANULOCYTES NFR BLD AUTO: 1 % (ref 0–0.5)
IMM GRANULOCYTES NFR BLD AUTO: 1 % (ref 0–0.5)
LACTATE SERPL-SCNC: 0.9 MMOL/L (ref 0.4–2)
LIPASE SERPL-CCNC: 17 U/L (ref 73–393)
LYMPHOCYTES # BLD: 0.9 K/UL (ref 0.8–3.5)
LYMPHOCYTES # BLD: 1 K/UL (ref 0.8–3.5)
LYMPHOCYTES # BLD: 1.2 K/UL (ref 0.8–3.5)
LYMPHOCYTES # BLD: 1.2 K/UL (ref 0.8–3.5)
LYMPHOCYTES # BLD: 1.3 K/UL (ref 0.8–3.5)
LYMPHOCYTES # BLD: 1.4 K/UL (ref 0.8–3.5)
LYMPHOCYTES # BLD: 1.4 K/UL (ref 0.8–3.5)
LYMPHOCYTES # BLD: 1.5 K/UL (ref 0.8–3.5)
LYMPHOCYTES # BLD: 1.7 K/UL (ref 0.8–3.5)
LYMPHOCYTES # BLD: 1.8 K/UL (ref 0.8–3.5)
LYMPHOCYTES # BLD: 1.9 K/UL (ref 0.8–3.5)
LYMPHOCYTES # BLD: 2 K/UL (ref 0.8–3.5)
LYMPHOCYTES NFR BLD: 14 % (ref 12–49)
LYMPHOCYTES NFR BLD: 17 % (ref 12–49)
LYMPHOCYTES NFR BLD: 20 % (ref 12–49)
LYMPHOCYTES NFR BLD: 21 % (ref 12–49)
LYMPHOCYTES NFR BLD: 25 % (ref 12–49)
LYMPHOCYTES NFR BLD: 26 % (ref 12–49)
LYMPHOCYTES NFR BLD: 31 % (ref 12–49)
LYMPHOCYTES NFR BLD: 31 % (ref 12–49)
LYMPHOCYTES NFR BLD: 33 % (ref 12–49)
LYMPHOCYTES NFR BLD: 36 % (ref 12–49)
LYMPHOCYTES NFR BLD: 37 % (ref 12–49)
LYMPHOCYTES NFR BLD: 37 % (ref 12–49)
LYMPHOCYTES NFR BLD: 38 % (ref 12–49)
LYMPHOCYTES NFR BLD: 39 % (ref 12–49)
MCH RBC QN AUTO: 30.9 PG (ref 26–34)
MCH RBC QN AUTO: 31 PG (ref 26–34)
MCH RBC QN AUTO: 31.3 PG (ref 26–34)
MCH RBC QN AUTO: 31.8 PG (ref 26–34)
MCH RBC QN AUTO: 31.8 PG (ref 26–34)
MCH RBC QN AUTO: 32 PG (ref 26–34)
MCH RBC QN AUTO: 32.1 PG (ref 26–34)
MCH RBC QN AUTO: 32.6 PG (ref 26–34)
MCH RBC QN AUTO: 32.7 PG (ref 26–34)
MCH RBC QN AUTO: 32.8 PG (ref 26–34)
MCH RBC QN AUTO: 32.9 PG (ref 26–34)
MCH RBC QN AUTO: 33.1 PG (ref 26–34)
MCH RBC QN AUTO: 33.1 PG (ref 26–34)
MCH RBC QN AUTO: 33.5 PG (ref 26–34)
MCHC RBC AUTO-ENTMCNC: 31.4 G/DL (ref 30–36.5)
MCHC RBC AUTO-ENTMCNC: 31.7 G/DL (ref 30–36.5)
MCHC RBC AUTO-ENTMCNC: 31.8 G/DL (ref 30–36.5)
MCHC RBC AUTO-ENTMCNC: 31.9 G/DL (ref 30–36.5)
MCHC RBC AUTO-ENTMCNC: 32.4 G/DL (ref 30–36.5)
MCHC RBC AUTO-ENTMCNC: 32.5 G/DL (ref 30–36.5)
MCHC RBC AUTO-ENTMCNC: 32.7 G/DL (ref 30–36.5)
MCHC RBC AUTO-ENTMCNC: 32.8 G/DL (ref 30–36.5)
MCHC RBC AUTO-ENTMCNC: 33.1 G/DL (ref 30–36.5)
MCHC RBC AUTO-ENTMCNC: 33.2 G/DL (ref 30–36.5)
MCHC RBC AUTO-ENTMCNC: 33.3 G/DL (ref 30–36.5)
MCHC RBC AUTO-ENTMCNC: 33.9 G/DL (ref 30–36.5)
MCHC RBC AUTO-ENTMCNC: 34.2 G/DL (ref 30–36.5)
MCHC RBC AUTO-ENTMCNC: 34.4 G/DL (ref 30–36.5)
MCV RBC AUTO: 100 FL (ref 80–99)
MCV RBC AUTO: 100.3 FL (ref 80–99)
MCV RBC AUTO: 100.3 FL (ref 80–99)
MCV RBC AUTO: 100.6 FL (ref 80–99)
MCV RBC AUTO: 100.9 FL (ref 80–99)
MCV RBC AUTO: 92.8 FL (ref 80–99)
MCV RBC AUTO: 96.3 FL (ref 80–99)
MCV RBC AUTO: 96.6 FL (ref 80–99)
MCV RBC AUTO: 96.6 FL (ref 80–99)
MCV RBC AUTO: 97.4 FL (ref 80–99)
MCV RBC AUTO: 98 FL (ref 80–99)
MCV RBC AUTO: 98.1 FL (ref 80–99)
MCV RBC AUTO: 99.3 FL (ref 80–99)
MCV RBC AUTO: 99.5 FL (ref 80–99)
MONOCYTES # BLD: 0.5 K/UL (ref 0–1)
MONOCYTES # BLD: 0.5 K/UL (ref 0–1)
MONOCYTES # BLD: 0.6 K/UL (ref 0–1)
MONOCYTES # BLD: 0.7 K/UL (ref 0–1)
MONOCYTES # BLD: 0.8 K/UL (ref 0–1)
MONOCYTES # BLD: 0.9 K/UL (ref 0–1)
MONOCYTES # BLD: 1 K/UL (ref 0–1)
MONOCYTES # BLD: 1 K/UL (ref 0–1)
MONOCYTES NFR BLD: 10 % (ref 5–13)
MONOCYTES NFR BLD: 12 % (ref 5–13)
MONOCYTES NFR BLD: 13 % (ref 5–13)
MONOCYTES NFR BLD: 14 % (ref 5–13)
MONOCYTES NFR BLD: 18 % (ref 5–13)
MONOCYTES NFR BLD: 19 % (ref 5–13)
NEUTS SEG # BLD: 1.4 K/UL (ref 1.8–8)
NEUTS SEG # BLD: 1.4 K/UL (ref 1.8–8)
NEUTS SEG # BLD: 1.5 K/UL (ref 1.8–8)
NEUTS SEG # BLD: 1.5 K/UL (ref 1.8–8)
NEUTS SEG # BLD: 2 K/UL (ref 1.8–8)
NEUTS SEG # BLD: 2.2 K/UL (ref 1.8–8)
NEUTS SEG # BLD: 2.7 K/UL (ref 1.8–8)
NEUTS SEG # BLD: 3.2 K/UL (ref 1.8–8)
NEUTS SEG # BLD: 3.4 K/UL (ref 1.8–8)
NEUTS SEG # BLD: 3.7 K/UL (ref 1.8–8)
NEUTS SEG # BLD: 3.8 K/UL (ref 1.8–8)
NEUTS SEG # BLD: 6.6 K/UL (ref 1.8–8)
NEUTS SEG NFR BLD: 38 % (ref 32–75)
NEUTS SEG NFR BLD: 38 % (ref 32–75)
NEUTS SEG NFR BLD: 39 % (ref 32–75)
NEUTS SEG NFR BLD: 43 % (ref 32–75)
NEUTS SEG NFR BLD: 45 % (ref 32–75)
NEUTS SEG NFR BLD: 48 % (ref 32–75)
NEUTS SEG NFR BLD: 50 % (ref 32–75)
NEUTS SEG NFR BLD: 53 % (ref 32–75)
NEUTS SEG NFR BLD: 54 % (ref 32–75)
NEUTS SEG NFR BLD: 54 % (ref 32–75)
NEUTS SEG NFR BLD: 58 % (ref 32–75)
NEUTS SEG NFR BLD: 59 % (ref 32–75)
NEUTS SEG NFR BLD: 67 % (ref 32–75)
NEUTS SEG NFR BLD: 71 % (ref 32–75)
NRBC # BLD: 0 K/UL (ref 0–0.01)
NRBC BLD-RTO: 0 PER 100 WBC
PLATELET # BLD AUTO: 133 K/UL (ref 150–400)
PLATELET # BLD AUTO: 133 K/UL (ref 150–400)
PLATELET # BLD AUTO: 136 K/UL (ref 150–400)
PLATELET # BLD AUTO: 144 K/UL (ref 150–400)
PLATELET # BLD AUTO: 144 K/UL (ref 150–400)
PLATELET # BLD AUTO: 146 K/UL (ref 150–400)
PLATELET # BLD AUTO: 147 K/UL (ref 150–400)
PLATELET # BLD AUTO: 148 K/UL (ref 150–400)
PLATELET # BLD AUTO: 163 K/UL (ref 150–400)
PLATELET # BLD AUTO: 177 K/UL (ref 150–400)
PLATELET # BLD AUTO: 178 K/UL (ref 150–400)
PLATELET # BLD AUTO: 188 K/UL (ref 150–400)
PLATELET # BLD AUTO: 200 K/UL (ref 150–400)
PLATELET # BLD AUTO: 207 K/UL (ref 150–400)
PMV BLD AUTO: 9 FL (ref 8.9–12.9)
PMV BLD AUTO: 9.2 FL (ref 8.9–12.9)
PMV BLD AUTO: 9.4 FL (ref 8.9–12.9)
PMV BLD AUTO: 9.4 FL (ref 8.9–12.9)
PMV BLD AUTO: 9.5 FL (ref 8.9–12.9)
PMV BLD AUTO: 9.6 FL (ref 8.9–12.9)
PMV BLD AUTO: 9.6 FL (ref 8.9–12.9)
PMV BLD AUTO: 9.7 FL (ref 8.9–12.9)
POTASSIUM SERPL-SCNC: 2.6 MMOL/L (ref 3.5–5.1)
POTASSIUM SERPL-SCNC: 3 MMOL/L (ref 3.5–5.1)
POTASSIUM SERPL-SCNC: 3.2 MMOL/L (ref 3.5–5.1)
POTASSIUM SERPL-SCNC: 3.4 MMOL/L (ref 3.5–5.1)
POTASSIUM SERPL-SCNC: 3.5 MMOL/L (ref 3.5–5.1)
POTASSIUM SERPL-SCNC: 3.6 MMOL/L (ref 3.5–5.1)
POTASSIUM SERPL-SCNC: 3.7 MMOL/L (ref 3.5–5.1)
POTASSIUM SERPL-SCNC: 3.7 MMOL/L (ref 3.5–5.1)
POTASSIUM SERPL-SCNC: 3.9 MMOL/L (ref 3.5–5.1)
POTASSIUM SERPL-SCNC: 3.9 MMOL/L (ref 3.5–5.1)
POTASSIUM SERPL-SCNC: 4 MMOL/L (ref 3.5–5.1)
PROT SERPL-MCNC: 5.4 G/DL (ref 6.4–8.2)
PROT SERPL-MCNC: 6.1 G/DL (ref 6.4–8.2)
PROT SERPL-MCNC: 6.4 G/DL (ref 6.4–8.2)
PROT SERPL-MCNC: 6.4 G/DL (ref 6.4–8.2)
PROT SERPL-MCNC: 6.5 G/DL (ref 6.4–8.2)
PROT SERPL-MCNC: 6.5 G/DL (ref 6.4–8.2)
PROT SERPL-MCNC: 6.7 G/DL (ref 6.4–8.2)
PROT SERPL-MCNC: 6.7 G/DL (ref 6.4–8.2)
PROT SERPL-MCNC: 6.8 G/DL (ref 6.4–8.2)
PROT SERPL-MCNC: 6.9 G/DL (ref 6.4–8.2)
PROT SERPL-MCNC: 7 G/DL (ref 6.4–8.2)
PROT SERPL-MCNC: 7.3 G/DL (ref 6.4–8.2)
RBC # BLD AUTO: 2.96 M/UL (ref 4.1–5.7)
RBC # BLD AUTO: 3.04 M/UL (ref 4.1–5.7)
RBC # BLD AUTO: 3.16 M/UL (ref 4.1–5.7)
RBC # BLD AUTO: 3.19 M/UL (ref 4.1–5.7)
RBC # BLD AUTO: 3.2 M/UL (ref 4.1–5.7)
RBC # BLD AUTO: 3.33 M/UL (ref 4.1–5.7)
RBC # BLD AUTO: 3.42 M/UL (ref 4.1–5.7)
RBC # BLD AUTO: 3.44 M/UL (ref 4.1–5.7)
RBC # BLD AUTO: 3.45 M/UL (ref 4.1–5.7)
RBC # BLD AUTO: 3.46 M/UL (ref 4.1–5.7)
RBC # BLD AUTO: 3.53 M/UL (ref 4.1–5.7)
RBC # BLD AUTO: 3.57 M/UL (ref 4.1–5.7)
RBC # BLD AUTO: 3.68 M/UL (ref 4.1–5.7)
RBC # BLD AUTO: 3.9 M/UL (ref 4.1–5.7)
RBC MORPH BLD: ABNORMAL
REFERENCE LAB,REFLB: NORMAL
SAMPLES BEING HELD,HOLD: NORMAL
SAMPLES BEING HELD,HOLD: NORMAL
SODIUM SERPL-SCNC: 137 MMOL/L (ref 136–145)
SODIUM SERPL-SCNC: 139 MMOL/L (ref 136–145)
SODIUM SERPL-SCNC: 139 MMOL/L (ref 136–145)
SODIUM SERPL-SCNC: 140 MMOL/L (ref 136–145)
SODIUM SERPL-SCNC: 141 MMOL/L (ref 136–145)
SODIUM SERPL-SCNC: 142 MMOL/L (ref 136–145)
SODIUM SERPL-SCNC: 142 MMOL/L (ref 136–145)
SODIUM SERPL-SCNC: 144 MMOL/L (ref 136–145)
SODIUM SERPL-SCNC: 145 MMOL/L (ref 136–145)
SOURCE, COVRS: NORMAL
TEST DESCRIPTION:,ATST: NORMAL
TROPONIN I SERPL-MCNC: <0.05 NG/ML
WBC # BLD AUTO: 3.2 K/UL (ref 4.1–11.1)
WBC # BLD AUTO: 3.6 K/UL (ref 4.1–11.1)
WBC # BLD AUTO: 3.9 K/UL (ref 4.1–11.1)
WBC # BLD AUTO: 3.9 K/UL (ref 4.1–11.1)
WBC # BLD AUTO: 4.5 K/UL (ref 4.1–11.1)
WBC # BLD AUTO: 4.6 K/UL (ref 4.1–11.1)
WBC # BLD AUTO: 4.8 K/UL (ref 4.1–11.1)
WBC # BLD AUTO: 5.8 K/UL (ref 4.1–11.1)
WBC # BLD AUTO: 5.8 K/UL (ref 4.1–11.1)
WBC # BLD AUTO: 6 K/UL (ref 4.1–11.1)
WBC # BLD AUTO: 6.1 K/UL (ref 4.1–11.1)
WBC # BLD AUTO: 6.4 K/UL (ref 4.1–11.1)
WBC # BLD AUTO: 6.9 K/UL (ref 4.1–11.1)
WBC # BLD AUTO: 9.3 K/UL (ref 4.1–11.1)

## 2021-01-01 PROCEDURE — 86301 IMMUNOASSAY TUMOR CA 19-9: CPT

## 2021-01-01 PROCEDURE — 74011250636 HC RX REV CODE- 250/636: Performed by: INTERNAL MEDICINE

## 2021-01-01 PROCEDURE — 96415 CHEMO IV INFUSION ADDL HR: CPT

## 2021-01-01 PROCEDURE — 96374 THER/PROPH/DIAG INJ IV PUSH: CPT

## 2021-01-01 PROCEDURE — 96368 THER/DIAG CONCURRENT INF: CPT

## 2021-01-01 PROCEDURE — G0498 CHEMO EXTEND IV INFUS W/PUMP: HCPCS

## 2021-01-01 PROCEDURE — 85025 COMPLETE CBC W/AUTO DIFF WBC: CPT

## 2021-01-01 PROCEDURE — G0299 HHS/HOSPICE OF RN EA 15 MIN: HCPCS

## 2021-01-01 PROCEDURE — 36415 COLL VENOUS BLD VENIPUNCTURE: CPT

## 2021-01-01 PROCEDURE — 96375 TX/PRO/DX INJ NEW DRUG ADDON: CPT

## 2021-01-01 PROCEDURE — 97116 GAIT TRAINING THERAPY: CPT

## 2021-01-01 PROCEDURE — 74011250636 HC RX REV CODE- 250/636: Performed by: NURSE PRACTITIONER

## 2021-01-01 PROCEDURE — C1729 CATH, DRAINAGE: HCPCS

## 2021-01-01 PROCEDURE — 74011250637 HC RX REV CODE- 250/637: Performed by: FAMILY MEDICINE

## 2021-01-01 PROCEDURE — 99215 OFFICE O/P EST HI 40 MIN: CPT | Performed by: INTERNAL MEDICINE

## 2021-01-01 PROCEDURE — 74011000258 HC RX REV CODE- 258: Performed by: NURSE PRACTITIONER

## 2021-01-01 PROCEDURE — 96417 CHEMO IV INFUS EACH ADDL SEQ: CPT

## 2021-01-01 PROCEDURE — 74177 CT ABD & PELVIS W/CONTRAST: CPT

## 2021-01-01 PROCEDURE — 96523 IRRIG DRUG DELIVERY DEVICE: CPT

## 2021-01-01 PROCEDURE — 0656 HSPC GENERAL INPATIENT

## 2021-01-01 PROCEDURE — 0651 HSPC ROUTINE HOME CARE

## 2021-01-01 PROCEDURE — 74011250637 HC RX REV CODE- 250/637: Performed by: INTERNAL MEDICINE

## 2021-01-01 PROCEDURE — 77030012965 HC NDL HUBR BBMI -A

## 2021-01-01 PROCEDURE — 74011000258 HC RX REV CODE- 258: Performed by: INTERNAL MEDICINE

## 2021-01-01 PROCEDURE — 80053 COMPREHEN METABOLIC PANEL: CPT

## 2021-01-01 PROCEDURE — 77030016057 HC NDL HUBR APOL -B

## 2021-01-01 PROCEDURE — 96367 TX/PROPH/DG ADDL SEQ IV INF: CPT

## 2021-01-01 PROCEDURE — 74011250637 HC RX REV CODE- 250/637: Performed by: NURSE PRACTITIONER

## 2021-01-01 PROCEDURE — 96416 CHEMO PROLONG INFUSE W/PUMP: CPT

## 2021-01-01 PROCEDURE — 74011000636 HC RX REV CODE- 636: Performed by: RADIOLOGY

## 2021-01-01 PROCEDURE — 3336590001 HSPC ROOM AND BOARD

## 2021-01-01 PROCEDURE — 65270000029 HC RM PRIVATE

## 2021-01-01 PROCEDURE — C1894 INTRO/SHEATH, NON-LASER: HCPCS

## 2021-01-01 PROCEDURE — 0D9W30Z DRAINAGE OF PERITONEUM WITH DRAINAGE DEVICE, PERCUTANEOUS APPROACH: ICD-10-PCS | Performed by: RADIOLOGY

## 2021-01-01 PROCEDURE — 97161 PT EVAL LOW COMPLEX 20 MIN: CPT

## 2021-01-01 PROCEDURE — 96413 CHEMO IV INFUSION 1 HR: CPT

## 2021-01-01 PROCEDURE — 99284 EMERGENCY DEPT VISIT MOD MDM: CPT

## 2021-01-01 PROCEDURE — 2709999900 HC NON-CHARGEABLE SUPPLY

## 2021-01-01 PROCEDURE — 74011250636 HC RX REV CODE- 250/636: Performed by: HOSPITALIST

## 2021-01-01 PROCEDURE — 99283 EMERGENCY DEPT VISIT LOW MDM: CPT

## 2021-01-01 PROCEDURE — 99232 SBSQ HOSP IP/OBS MODERATE 35: CPT | Performed by: NURSE PRACTITIONER

## 2021-01-01 PROCEDURE — 74011250636 HC RX REV CODE- 250/636: Performed by: RADIOLOGY

## 2021-01-01 PROCEDURE — 77030010507 HC ADH SKN DERMBND J&J -B

## 2021-01-01 PROCEDURE — 99223 1ST HOSP IP/OBS HIGH 75: CPT | Performed by: INTERNAL MEDICINE

## 2021-01-01 PROCEDURE — 99222 1ST HOSP IP/OBS MODERATE 55: CPT | Performed by: NURSE PRACTITIONER

## 2021-01-01 PROCEDURE — 87635 SARS-COV-2 COVID-19 AMP PRB: CPT

## 2021-01-01 PROCEDURE — 99215 OFFICE O/P EST HI 40 MIN: CPT | Performed by: NURSE PRACTITIONER

## 2021-01-01 PROCEDURE — 3336500001 HSPC ELECTION

## 2021-01-01 PROCEDURE — 97165 OT EVAL LOW COMPLEX 30 MIN: CPT | Performed by: OCCUPATIONAL THERAPIST

## 2021-01-01 PROCEDURE — 83690 ASSAY OF LIPASE: CPT

## 2021-01-01 PROCEDURE — 96361 HYDRATE IV INFUSION ADD-ON: CPT

## 2021-01-01 PROCEDURE — HOSPICE MEDICATION HC HH HOSPICE MEDICATION

## 2021-01-01 PROCEDURE — 77030003560 HC NDL HUBR BARD -A

## 2021-01-01 PROCEDURE — 99152 MOD SED SAME PHYS/QHP 5/>YRS: CPT

## 2021-01-01 PROCEDURE — 96366 THER/PROPH/DIAG IV INF ADDON: CPT

## 2021-01-01 PROCEDURE — 74011250636 HC RX REV CODE- 250/636: Performed by: EMERGENCY MEDICINE

## 2021-01-01 PROCEDURE — 96365 THER/PROPH/DIAG IV INF INIT: CPT

## 2021-01-01 PROCEDURE — 49418 INSERT TUN IP CATH PERC: CPT

## 2021-01-01 PROCEDURE — 77030031139 HC SUT VCRL2 J&J -A

## 2021-01-01 PROCEDURE — 74011250637 HC RX REV CODE- 250/637: Performed by: HOSPITALIST

## 2021-01-01 PROCEDURE — 99232 SBSQ HOSP IP/OBS MODERATE 35: CPT | Performed by: INTERNAL MEDICINE

## 2021-01-01 PROCEDURE — 84484 ASSAY OF TROPONIN QUANT: CPT

## 2021-01-01 PROCEDURE — G0156 HHCP-SVS OF AIDE,EA 15 MIN: HCPCS

## 2021-01-01 PROCEDURE — 96411 CHEMO IV PUSH ADDL DRUG: CPT

## 2021-01-01 PROCEDURE — 83605 ASSAY OF LACTIC ACID: CPT

## 2021-01-01 PROCEDURE — P9047 ALBUMIN (HUMAN), 25%, 50ML: HCPCS | Performed by: HOSPITALIST

## 2021-01-01 PROCEDURE — 77030002986 HC SUT PROL J&J -A

## 2021-01-01 PROCEDURE — 99153 MOD SED SAME PHYS/QHP EA: CPT

## 2021-01-01 PROCEDURE — 80048 BASIC METABOLIC PNL TOTAL CA: CPT

## 2021-01-01 PROCEDURE — 74011000250 HC RX REV CODE- 250: Performed by: RADIOLOGY

## 2021-01-01 RX ORDER — OXYCODONE HYDROCHLORIDE 5 MG/1
5 TABLET ORAL
Qty: 45 TABLET | Refills: 0 | Status: SHIPPED | OUTPATIENT
Start: 2021-01-01 | End: 2021-01-01 | Stop reason: SDUPTHER

## 2021-01-01 RX ORDER — ENOXAPARIN SODIUM 100 MG/ML
40 INJECTION SUBCUTANEOUS DAILY
Status: DISCONTINUED | OUTPATIENT
Start: 2021-01-01 | End: 2021-01-01

## 2021-01-01 RX ORDER — ALBUTEROL SULFATE 0.83 MG/ML
2.5 SOLUTION RESPIRATORY (INHALATION) AS NEEDED
Status: CANCELLED
Start: 2021-01-01

## 2021-01-01 RX ORDER — ONDANSETRON 2 MG/ML
8 INJECTION INTRAMUSCULAR; INTRAVENOUS AS NEEDED
Status: CANCELLED | OUTPATIENT
Start: 2021-01-01

## 2021-01-01 RX ORDER — SODIUM CHLORIDE 0.9 % (FLUSH) 0.9 %
10 SYRINGE (ML) INJECTION AS NEEDED
Status: CANCELLED | OUTPATIENT
Start: 2021-01-01

## 2021-01-01 RX ORDER — SODIUM CHLORIDE 9 MG/ML
10 INJECTION INTRAMUSCULAR; INTRAVENOUS; SUBCUTANEOUS AS NEEDED
Status: DISCONTINUED | OUTPATIENT
Start: 2021-01-01 | End: 2021-01-01 | Stop reason: HOSPADM

## 2021-01-01 RX ORDER — HEPARIN 100 UNIT/ML
300-500 SYRINGE INTRAVENOUS AS NEEDED
Status: CANCELLED
Start: 2021-01-01

## 2021-01-01 RX ORDER — SODIUM CHLORIDE 0.9 % (FLUSH) 0.9 %
10 SYRINGE (ML) INJECTION AS NEEDED
Status: DISPENSED | OUTPATIENT
Start: 2021-01-01 | End: 2021-01-01

## 2021-01-01 RX ORDER — HYDROCORTISONE SODIUM SUCCINATE 100 MG/2ML
100 INJECTION, POWDER, FOR SOLUTION INTRAMUSCULAR; INTRAVENOUS AS NEEDED
Status: CANCELLED | OUTPATIENT
Start: 2021-01-01

## 2021-01-01 RX ORDER — OXYCODONE HCL 10 MG/1
10 TABLET, FILM COATED, EXTENDED RELEASE ORAL EVERY 12 HOURS
Qty: 60 TAB | Refills: 0 | Status: SHIPPED | OUTPATIENT
Start: 2021-01-01 | End: 2021-01-01 | Stop reason: SDUPTHER

## 2021-01-01 RX ORDER — SODIUM CHLORIDE 0.9 % (FLUSH) 0.9 %
10 SYRINGE (ML) INJECTION AS NEEDED
Status: DISCONTINUED | OUTPATIENT
Start: 2021-01-01 | End: 2021-01-01 | Stop reason: HOSPADM

## 2021-01-01 RX ORDER — HEPARIN 100 UNIT/ML
300-500 SYRINGE INTRAVENOUS AS NEEDED
Status: ACTIVE | OUTPATIENT
Start: 2021-01-01 | End: 2021-01-01

## 2021-01-01 RX ORDER — DIPHENHYDRAMINE HYDROCHLORIDE 50 MG/ML
50 INJECTION, SOLUTION INTRAMUSCULAR; INTRAVENOUS AS NEEDED
Status: CANCELLED
Start: 2021-01-01

## 2021-01-01 RX ORDER — EPINEPHRINE 1 MG/ML
0.3 INJECTION, SOLUTION, CONCENTRATE INTRAVENOUS AS NEEDED
Status: CANCELLED | OUTPATIENT
Start: 2021-01-01

## 2021-01-01 RX ORDER — ATROPINE SULFATE 0.4 MG/ML
0.4 INJECTION, SOLUTION ENDOTRACHEAL; INTRAMEDULLARY; INTRAMUSCULAR; INTRAVENOUS; SUBCUTANEOUS ONCE
Status: CANCELLED | OUTPATIENT
Start: 2021-01-01 | End: 2021-01-01

## 2021-01-01 RX ORDER — HEPARIN 100 UNIT/ML
300-500 SYRINGE INTRAVENOUS AS NEEDED
Status: DISCONTINUED | OUTPATIENT
Start: 2021-01-01 | End: 2021-01-01 | Stop reason: HOSPADM

## 2021-01-01 RX ORDER — HYDROMORPHONE HYDROCHLORIDE 1 MG/ML
1 INJECTION, SOLUTION INTRAMUSCULAR; INTRAVENOUS; SUBCUTANEOUS
Status: DISCONTINUED | OUTPATIENT
Start: 2021-01-01 | End: 2021-01-01

## 2021-01-01 RX ORDER — IBUPROFEN 200 MG
1 TABLET ORAL DAILY
Status: DISCONTINUED | OUTPATIENT
Start: 2021-01-01 | End: 2021-01-01

## 2021-01-01 RX ORDER — SODIUM CHLORIDE 9 MG/ML
10 INJECTION INTRAMUSCULAR; INTRAVENOUS; SUBCUTANEOUS AS NEEDED
Status: CANCELLED | OUTPATIENT
Start: 2021-01-01

## 2021-01-01 RX ORDER — ACETAMINOPHEN 325 MG/1
650 TABLET ORAL AS NEEDED
Status: CANCELLED
Start: 2021-01-01

## 2021-01-01 RX ORDER — PALONOSETRON 0.05 MG/ML
0.25 INJECTION, SOLUTION INTRAVENOUS ONCE
Status: COMPLETED | OUTPATIENT
Start: 2021-01-01 | End: 2021-01-01

## 2021-01-01 RX ORDER — DEXTROSE MONOHYDRATE 50 MG/ML
25 INJECTION, SOLUTION INTRAVENOUS CONTINUOUS
Status: DISPENSED | OUTPATIENT
Start: 2021-01-01 | End: 2021-01-01

## 2021-01-01 RX ORDER — SODIUM CHLORIDE 9 MG/ML
10 INJECTION INTRAMUSCULAR; INTRAVENOUS; SUBCUTANEOUS AS NEEDED
Status: ACTIVE | OUTPATIENT
Start: 2021-01-01 | End: 2021-01-01

## 2021-01-01 RX ORDER — DIPHENHYDRAMINE HYDROCHLORIDE 50 MG/ML
25 INJECTION, SOLUTION INTRAMUSCULAR; INTRAVENOUS AS NEEDED
Status: CANCELLED
Start: 2021-01-01

## 2021-01-01 RX ORDER — HEPARIN 100 UNIT/ML
500 SYRINGE INTRAVENOUS AS NEEDED
Status: DISCONTINUED | OUTPATIENT
Start: 2021-01-01 | End: 2021-01-01 | Stop reason: HOSPADM

## 2021-01-01 RX ORDER — FENTANYL CITRATE 50 UG/ML
25-100 INJECTION, SOLUTION INTRAMUSCULAR; INTRAVENOUS
Status: DISCONTINUED | OUTPATIENT
Start: 2021-01-01 | End: 2021-01-01 | Stop reason: HOSPADM

## 2021-01-01 RX ORDER — ATROPINE SULFATE 0.4 MG/ML
0.4 INJECTION, SOLUTION ENDOTRACHEAL; INTRAMEDULLARY; INTRAMUSCULAR; INTRAVENOUS; SUBCUTANEOUS ONCE
Status: COMPLETED | OUTPATIENT
Start: 2021-01-01 | End: 2021-01-01

## 2021-01-01 RX ORDER — HYDROMORPHONE HYDROCHLORIDE 2 MG/1
2 TABLET ORAL
Qty: 10 TABLET | Refills: 0 | Status: SHIPPED | OUTPATIENT
Start: 2021-01-01 | End: 2021-01-01

## 2021-01-01 RX ORDER — OXYCODONE HCL 10 MG/1
40 TABLET, FILM COATED, EXTENDED RELEASE ORAL EVERY 12 HOURS
Status: DISCONTINUED | OUTPATIENT
Start: 2021-01-01 | End: 2021-01-01 | Stop reason: HOSPADM

## 2021-01-01 RX ORDER — OXYCODONE HYDROCHLORIDE 5 MG/1
5 TABLET ORAL
Qty: 45 TAB | Refills: 0 | Status: SHIPPED | OUTPATIENT
Start: 2021-01-01 | End: 2021-01-01 | Stop reason: SDUPTHER

## 2021-01-01 RX ORDER — LEVOFLOXACIN 5 MG/ML
500 INJECTION, SOLUTION INTRAVENOUS
Status: COMPLETED | OUTPATIENT
Start: 2021-01-01 | End: 2021-01-01

## 2021-01-01 RX ORDER — DOCUSATE SODIUM 100 MG/1
100 CAPSULE, LIQUID FILLED ORAL DAILY
Status: DISCONTINUED | OUTPATIENT
Start: 2021-01-01 | End: 2021-01-01

## 2021-01-01 RX ORDER — HYDROMORPHONE HYDROCHLORIDE 2 MG/1
4 TABLET ORAL
Status: DISCONTINUED | OUTPATIENT
Start: 2021-01-01 | End: 2021-01-01 | Stop reason: HOSPADM

## 2021-01-01 RX ORDER — SENNOSIDES 8.8 MG/5ML
10 LIQUID ORAL DAILY
Status: DISCONTINUED | OUTPATIENT
Start: 2021-01-01 | End: 2021-01-01 | Stop reason: HOSPADM

## 2021-01-01 RX ORDER — POTASSIUM CHLORIDE AND SODIUM CHLORIDE 900; 300 MG/100ML; MG/100ML
INJECTION, SOLUTION INTRAVENOUS CONTINUOUS
Status: DISCONTINUED | OUTPATIENT
Start: 2021-01-01 | End: 2021-01-01

## 2021-01-01 RX ORDER — HYDROMORPHONE HYDROCHLORIDE 1 MG/ML
1 INJECTION, SOLUTION INTRAMUSCULAR; INTRAVENOUS; SUBCUTANEOUS
Status: DISCONTINUED | OUTPATIENT
Start: 2021-01-01 | End: 2021-01-01 | Stop reason: HOSPADM

## 2021-01-01 RX ORDER — DEXTROSE MONOHYDRATE 50 MG/ML
25 INJECTION, SOLUTION INTRAVENOUS CONTINUOUS
Status: CANCELLED
Start: 2021-01-01

## 2021-01-01 RX ORDER — MIDODRINE HYDROCHLORIDE 5 MG/1
10 TABLET ORAL ONCE
Status: COMPLETED | OUTPATIENT
Start: 2021-01-01 | End: 2021-01-01

## 2021-01-01 RX ORDER — POTASSIUM CHLORIDE 750 MG/1
20 TABLET, EXTENDED RELEASE ORAL DAILY
Qty: 60 TABLET | Refills: 2 | Status: SHIPPED | OUTPATIENT
Start: 2021-01-01 | End: 2021-01-01

## 2021-01-01 RX ORDER — PALONOSETRON 0.05 MG/ML
0.25 INJECTION, SOLUTION INTRAVENOUS ONCE
Status: CANCELLED | OUTPATIENT
Start: 2021-01-01 | End: 2021-01-01

## 2021-01-01 RX ORDER — POTASSIUM CHLORIDE 750 MG/1
40 TABLET, FILM COATED, EXTENDED RELEASE ORAL
Status: COMPLETED | OUTPATIENT
Start: 2021-01-01 | End: 2021-01-01

## 2021-01-01 RX ORDER — DEXAMETHASONE 4 MG/1
TABLET ORAL
Qty: 28 TABLET | Refills: 2 | Status: SHIPPED | OUTPATIENT
Start: 2021-01-01 | End: 2021-01-01

## 2021-01-01 RX ORDER — OXYCODONE HCL 10 MG/1
10 TABLET, FILM COATED, EXTENDED RELEASE ORAL EVERY 12 HOURS
Qty: 60 TABLET | Refills: 0 | Status: SHIPPED | OUTPATIENT
Start: 2021-01-01 | End: 2021-01-01 | Stop reason: SDUPTHER

## 2021-01-01 RX ORDER — OXYCODONE HCL 10 MG/1
10 TABLET, FILM COATED, EXTENDED RELEASE ORAL EVERY 12 HOURS
Qty: 20 TABLET | Refills: 0 | Status: SHIPPED | OUTPATIENT
Start: 2021-01-01 | End: 2021-01-01

## 2021-01-01 RX ORDER — ACETAMINOPHEN 650 MG/1
650 SUPPOSITORY RECTAL
Status: DISCONTINUED | OUTPATIENT
Start: 2021-01-01 | End: 2021-01-01 | Stop reason: HOSPADM

## 2021-01-01 RX ORDER — OXYCODONE HCL 10 MG/1
10 TABLET, FILM COATED, EXTENDED RELEASE ORAL ONCE
Status: COMPLETED | OUTPATIENT
Start: 2021-01-01 | End: 2021-01-01

## 2021-01-01 RX ORDER — SODIUM CHLORIDE 0.9 % (FLUSH) 0.9 %
5-40 SYRINGE (ML) INJECTION EVERY 8 HOURS
Status: DISCONTINUED | OUTPATIENT
Start: 2021-01-01 | End: 2021-01-01 | Stop reason: HOSPADM

## 2021-01-01 RX ORDER — HEPARIN 100 UNIT/ML
500 SYRINGE INTRAVENOUS AS NEEDED
Status: CANCELLED | OUTPATIENT
Start: 2021-01-01

## 2021-01-01 RX ORDER — OXYCODONE HCL 10 MG/1
10 TABLET, FILM COATED, EXTENDED RELEASE ORAL EVERY 12 HOURS
Status: DISCONTINUED | OUTPATIENT
Start: 2021-01-01 | End: 2021-01-01

## 2021-01-01 RX ORDER — OXYCODONE HYDROCHLORIDE 5 MG/1
5 TABLET ORAL
Qty: 45 TAB | Refills: 0 | Status: SHIPPED | OUTPATIENT
Start: 2021-01-01 | End: 2021-01-01

## 2021-01-01 RX ORDER — GLYCOPYRROLATE 0.2 MG/ML
0.2 INJECTION INTRAMUSCULAR; INTRAVENOUS
Status: DISCONTINUED | OUTPATIENT
Start: 2021-01-01 | End: 2021-01-01 | Stop reason: HOSPADM

## 2021-01-01 RX ORDER — ACETAMINOPHEN 325 MG/1
650 TABLET ORAL
Status: DISCONTINUED | OUTPATIENT
Start: 2021-01-01 | End: 2021-01-01 | Stop reason: HOSPADM

## 2021-01-01 RX ORDER — LORAZEPAM 2 MG/ML
1 CONCENTRATE ORAL
Status: DISCONTINUED | OUTPATIENT
Start: 2021-01-01 | End: 2021-01-01 | Stop reason: HOSPADM

## 2021-01-01 RX ORDER — OXYCODONE HCL 10 MG/1
10 TABLET, FILM COATED, EXTENDED RELEASE ORAL EVERY 12 HOURS
Qty: 14 TABLET | Refills: 0 | Status: SHIPPED | OUTPATIENT
Start: 2021-01-01 | End: 2021-01-01

## 2021-01-01 RX ORDER — OXYCODONE HCL 10 MG/1
20 TABLET, FILM COATED, EXTENDED RELEASE ORAL EVERY 12 HOURS
Status: DISCONTINUED | OUTPATIENT
Start: 2021-01-01 | End: 2021-01-01

## 2021-01-01 RX ORDER — ONDANSETRON 2 MG/ML
4 INJECTION INTRAMUSCULAR; INTRAVENOUS
Status: DISCONTINUED | OUTPATIENT
Start: 2021-01-01 | End: 2021-01-01 | Stop reason: HOSPADM

## 2021-01-01 RX ORDER — HYDROMORPHONE HYDROCHLORIDE 1 MG/ML
0.5 INJECTION, SOLUTION INTRAMUSCULAR; INTRAVENOUS; SUBCUTANEOUS
Status: COMPLETED | OUTPATIENT
Start: 2021-01-01 | End: 2021-01-01

## 2021-01-01 RX ORDER — ACETAMINOPHEN 650 MG/1
650 SUPPOSITORY RECTAL
Status: DISCONTINUED | OUTPATIENT
Start: 2021-01-01 | End: 2021-01-01

## 2021-01-01 RX ORDER — SODIUM CHLORIDE 0.9 % (FLUSH) 0.9 %
5-10 SYRINGE (ML) INJECTION AS NEEDED
Status: CANCELLED | OUTPATIENT
Start: 2021-01-01

## 2021-01-01 RX ORDER — HEPARIN 100 UNIT/ML
SYRINGE INTRAVENOUS
Status: DISCONTINUED
Start: 2021-01-01 | End: 2021-01-01 | Stop reason: HOSPADM

## 2021-01-01 RX ORDER — PANCRELIPASE 24000; 76000; 120000 [USP'U]/1; [USP'U]/1; [USP'U]/1
8 CAPSULE, DELAYED RELEASE PELLETS ORAL
Qty: 240 CAPSULE | Refills: 3 | Status: SHIPPED | OUTPATIENT
Start: 2021-01-01

## 2021-01-01 RX ORDER — HEPARIN 100 UNIT/ML
500 SYRINGE INTRAVENOUS
Status: ACTIVE | OUTPATIENT
Start: 2021-01-01 | End: 2021-01-01

## 2021-01-01 RX ORDER — DOCUSATE SODIUM 100 MG/1
100 CAPSULE, LIQUID FILLED ORAL 2 TIMES DAILY
Status: DISCONTINUED | OUTPATIENT
Start: 2021-01-01 | End: 2021-01-01

## 2021-01-01 RX ORDER — OXYCODONE HCL 10 MG/1
10 TABLET, FILM COATED, EXTENDED RELEASE ORAL EVERY 12 HOURS
Status: DISCONTINUED | OUTPATIENT
Start: 2021-01-01 | End: 2021-01-01 | Stop reason: HOSPADM

## 2021-01-01 RX ORDER — SENNOSIDES 8.6 MG/1
1 TABLET ORAL DAILY PRN
Status: DISCONTINUED | OUTPATIENT
Start: 2021-01-01 | End: 2021-01-01 | Stop reason: HOSPADM

## 2021-01-01 RX ORDER — SENNOSIDES 8.6 MG/1
2 TABLET ORAL DAILY
Status: DISCONTINUED | OUTPATIENT
Start: 2021-01-01 | End: 2021-01-01

## 2021-01-01 RX ORDER — KETOROLAC TROMETHAMINE 30 MG/ML
30 INJECTION, SOLUTION INTRAMUSCULAR; INTRAVENOUS
Status: ACTIVE | OUTPATIENT
Start: 2021-01-01 | End: 2021-01-01

## 2021-01-01 RX ORDER — MIDAZOLAM HYDROCHLORIDE 1 MG/ML
1-5 INJECTION, SOLUTION INTRAMUSCULAR; INTRAVENOUS
Status: DISCONTINUED | OUTPATIENT
Start: 2021-01-01 | End: 2021-01-01 | Stop reason: HOSPADM

## 2021-01-01 RX ORDER — SODIUM CHLORIDE 0.9 % (FLUSH) 0.9 %
5-40 SYRINGE (ML) INJECTION AS NEEDED
Status: DISCONTINUED | OUTPATIENT
Start: 2021-01-01 | End: 2021-01-01 | Stop reason: HOSPADM

## 2021-01-01 RX ORDER — OXYCODONE HYDROCHLORIDE 5 MG/1
5 TABLET ORAL
Qty: 10 TABLET | Refills: 0 | Status: SHIPPED | OUTPATIENT
Start: 2021-01-01 | End: 2021-01-01

## 2021-01-01 RX ORDER — ALBUMIN HUMAN 250 G/1000ML
25 SOLUTION INTRAVENOUS ONCE
Status: COMPLETED | OUTPATIENT
Start: 2021-01-01 | End: 2021-01-01

## 2021-01-01 RX ORDER — HYDROMORPHONE HYDROCHLORIDE 2 MG/1
2 TABLET ORAL
Status: DISCONTINUED | OUTPATIENT
Start: 2021-01-01 | End: 2021-01-01 | Stop reason: HOSPADM

## 2021-01-01 RX ORDER — SODIUM CHLORIDE 9 MG/ML
500 INJECTION, SOLUTION INTRAVENOUS CONTINUOUS
Status: DISPENSED | OUTPATIENT
Start: 2021-01-01 | End: 2021-01-01

## 2021-01-01 RX ORDER — BUPROPION HYDROCHLORIDE 75 MG/1
75 TABLET ORAL 2 TIMES DAILY
Status: DISCONTINUED | OUTPATIENT
Start: 2021-01-01 | End: 2021-01-01

## 2021-01-01 RX ORDER — HYDROMORPHONE HYDROCHLORIDE 2 MG/ML
2 INJECTION, SOLUTION INTRAMUSCULAR; INTRAVENOUS; SUBCUTANEOUS ONCE
Status: COMPLETED | OUTPATIENT
Start: 2021-01-01 | End: 2021-01-01

## 2021-01-01 RX ORDER — SODIUM CHLORIDE 9 MG/ML
500 INJECTION, SOLUTION INTRAVENOUS ONCE
Status: COMPLETED | OUTPATIENT
Start: 2021-01-01 | End: 2021-01-01

## 2021-01-01 RX ORDER — OXYCODONE HCL 10 MG/1
30 TABLET, FILM COATED, EXTENDED RELEASE ORAL EVERY 12 HOURS
Status: DISCONTINUED | OUTPATIENT
Start: 2021-01-01 | End: 2021-01-01

## 2021-01-01 RX ORDER — LIDOCAINE HYDROCHLORIDE 10 MG/ML
1-10 INJECTION INFILTRATION; PERINEURAL
Status: COMPLETED | OUTPATIENT
Start: 2021-01-01 | End: 2021-01-01

## 2021-01-01 RX ORDER — FACIAL-BODY WIPES
10 EACH TOPICAL DAILY PRN
Status: DISCONTINUED | OUTPATIENT
Start: 2021-01-01 | End: 2021-01-01 | Stop reason: HOSPADM

## 2021-01-01 RX ORDER — PROCHLORPERAZINE EDISYLATE 5 MG/ML
5 INJECTION INTRAMUSCULAR; INTRAVENOUS ONCE
Status: COMPLETED | OUTPATIENT
Start: 2021-01-01 | End: 2021-01-01

## 2021-01-01 RX ORDER — POLYETHYLENE GLYCOL 3350 17 G/17G
17 POWDER, FOR SOLUTION ORAL DAILY PRN
Status: DISCONTINUED | OUTPATIENT
Start: 2021-01-01 | End: 2021-01-01 | Stop reason: HOSPADM

## 2021-01-01 RX ORDER — POTASSIUM CHLORIDE 750 MG/1
60 TABLET, FILM COATED, EXTENDED RELEASE ORAL
Status: COMPLETED | OUTPATIENT
Start: 2021-01-01 | End: 2021-01-01

## 2021-01-01 RX ORDER — METRONIDAZOLE 500 MG/100ML
500 INJECTION, SOLUTION INTRAVENOUS
Status: COMPLETED | OUTPATIENT
Start: 2021-01-01 | End: 2021-01-01

## 2021-01-01 RX ORDER — HEPARIN SODIUM (PORCINE) LOCK FLUSH IV SOLN 100 UNIT/ML 100 UNIT/ML
500 SOLUTION INTRAVENOUS
Status: COMPLETED | OUTPATIENT
Start: 2021-01-01 | End: 2021-01-01

## 2021-01-01 RX ORDER — POTASSIUM CHLORIDE 20 MEQ/1
20 TABLET, EXTENDED RELEASE ORAL DAILY
Qty: 30 TABLET | Refills: 2 | Status: SHIPPED | OUTPATIENT
Start: 2021-01-01 | End: 2021-01-01

## 2021-01-01 RX ORDER — OXYCODONE HCL 10 MG/1
10 TABLET, FILM COATED, EXTENDED RELEASE ORAL EVERY 12 HOURS
Qty: 60 TAB | Refills: 0 | Status: SHIPPED | OUTPATIENT
Start: 2021-01-01 | End: 2021-01-01

## 2021-01-01 RX ORDER — LORAZEPAM 2 MG/ML
1 INJECTION INTRAMUSCULAR
Status: DISCONTINUED | OUTPATIENT
Start: 2021-01-01 | End: 2021-01-01 | Stop reason: HOSPADM

## 2021-01-01 RX ORDER — LOPERAMIDE HYDROCHLORIDE 2 MG/1
2 CAPSULE ORAL
Qty: 21 CAPSULE | Refills: 2 | Status: SHIPPED | OUTPATIENT
Start: 2021-01-01 | End: 2021-01-01

## 2021-01-01 RX ADMIN — DEXTROSE MONOHYDRATE 246 MG: 5 INJECTION, SOLUTION INTRAVENOUS at 12:45

## 2021-01-01 RX ADMIN — DEXAMETHASONE SODIUM PHOSPHATE 12 MG: 10 INJECTION, SOLUTION INTRAMUSCULAR; INTRAVENOUS at 09:57

## 2021-01-01 RX ADMIN — OXYCODONE HYDROCHLORIDE 30 MG: 10 TABLET, FILM COATED, EXTENDED RELEASE ORAL at 21:09

## 2021-01-01 RX ADMIN — HYDROMORPHONE HYDROCHLORIDE 1 MG: 1 INJECTION, SOLUTION INTRAMUSCULAR; INTRAVENOUS; SUBCUTANEOUS at 21:19

## 2021-01-01 RX ADMIN — PANCRELIPASE 2 CAPSULE: 24000; 76000; 120000 CAPSULE, DELAYED RELEASE PELLETS ORAL at 12:09

## 2021-01-01 RX ADMIN — Medication 10 ML: at 06:00

## 2021-01-01 RX ADMIN — Medication 10 ML: at 13:45

## 2021-01-01 RX ADMIN — PANCRELIPASE 2 CAPSULE: 24000; 76000; 120000 CAPSULE, DELAYED RELEASE PELLETS ORAL at 15:12

## 2021-01-01 RX ADMIN — SENNOSIDES 17.6 MG: 8.8 LIQUID ORAL at 09:30

## 2021-01-01 RX ADMIN — FENTANYL CITRATE 25 MCG: 50 INJECTION INTRAMUSCULAR; INTRAVENOUS at 10:20

## 2021-01-01 RX ADMIN — HYDROMORPHONE HYDROCHLORIDE 1 MG: 1 INJECTION, SOLUTION INTRAMUSCULAR; INTRAVENOUS; SUBCUTANEOUS at 17:50

## 2021-01-01 RX ADMIN — HYDROMORPHONE HYDROCHLORIDE 4 MG: 2 TABLET ORAL at 06:44

## 2021-01-01 RX ADMIN — PANCRELIPASE 2 CAPSULE: 24000; 76000; 120000 CAPSULE, DELAYED RELEASE PELLETS ORAL at 12:30

## 2021-01-01 RX ADMIN — DEXTROSE MONOHYDRATE 25 ML/HR: 5 INJECTION, SOLUTION INTRAVENOUS at 09:15

## 2021-01-01 RX ADMIN — OXYCODONE HYDROCHLORIDE 10 MG: 10 TABLET, FILM COATED, EXTENDED RELEASE ORAL at 20:58

## 2021-01-01 RX ADMIN — HYDROMORPHONE HYDROCHLORIDE 1 MG: 1 INJECTION, SOLUTION INTRAMUSCULAR; INTRAVENOUS; SUBCUTANEOUS at 13:21

## 2021-01-01 RX ADMIN — DEXTROSE MONOHYDRATE 260 MG: 5 INJECTION, SOLUTION INTRAVENOUS at 13:08

## 2021-01-01 RX ADMIN — OXYCODONE HYDROCHLORIDE 40 MG: 10 TABLET, FILM COATED, EXTENDED RELEASE ORAL at 09:16

## 2021-01-01 RX ADMIN — DEXTROSE MONOHYDRATE 25 ML/HR: 5 INJECTION, SOLUTION INTRAVENOUS at 09:50

## 2021-01-01 RX ADMIN — Medication 10 ML: at 14:46

## 2021-01-01 RX ADMIN — OXYCODONE HYDROCHLORIDE 10 MG: 10 TABLET, FILM COATED, EXTENDED RELEASE ORAL at 08:00

## 2021-01-01 RX ADMIN — OXYCODONE HYDROCHLORIDE 30 MG: 10 TABLET, FILM COATED, EXTENDED RELEASE ORAL at 08:41

## 2021-01-01 RX ADMIN — HYDROMORPHONE HYDROCHLORIDE 4 MG: 2 TABLET ORAL at 11:58

## 2021-01-01 RX ADMIN — HEPARIN 500 UNITS: 100 SYRINGE at 13:44

## 2021-01-01 RX ADMIN — DEXAMETHASONE SODIUM PHOSPHATE 12 MG: 10 INJECTION, SOLUTION INTRAMUSCULAR; INTRAVENOUS at 10:09

## 2021-01-01 RX ADMIN — LEUCOVORIN CALCIUM 644 MG: 100 INJECTION, POWDER, LYOPHILIZED, FOR SUSPENSION INTRAMUSCULAR; INTRAVENOUS at 13:11

## 2021-01-01 RX ADMIN — HYDROMORPHONE HYDROCHLORIDE 1 MG: 1 INJECTION, SOLUTION INTRAMUSCULAR; INTRAVENOUS; SUBCUTANEOUS at 17:55

## 2021-01-01 RX ADMIN — HYDROMORPHONE HYDROCHLORIDE 1 MG: 1 INJECTION, SOLUTION INTRAMUSCULAR; INTRAVENOUS; SUBCUTANEOUS at 05:31

## 2021-01-01 RX ADMIN — DEXTROSE MONOHYDRATE 258 MG: 50 INJECTION, SOLUTION INTRAVENOUS at 12:45

## 2021-01-01 RX ADMIN — HYDROMORPHONE HYDROCHLORIDE 1 MG: 1 INJECTION, SOLUTION INTRAMUSCULAR; INTRAVENOUS; SUBCUTANEOUS at 01:09

## 2021-01-01 RX ADMIN — HYDROMORPHONE HYDROCHLORIDE 1 MG: 1 INJECTION, SOLUTION INTRAMUSCULAR; INTRAVENOUS; SUBCUTANEOUS at 19:58

## 2021-01-01 RX ADMIN — ATROPINE SULFATE 0.4 MG: 0.4 INJECTION, SOLUTION INTRAMUSCULAR; INTRAVENOUS; SUBCUTANEOUS at 13:04

## 2021-01-01 RX ADMIN — HYDROMORPHONE HYDROCHLORIDE 4 MG: 2 TABLET ORAL at 20:16

## 2021-01-01 RX ADMIN — HYDROMORPHONE HYDROCHLORIDE 1 MG: 1 INJECTION, SOLUTION INTRAMUSCULAR; INTRAVENOUS; SUBCUTANEOUS at 19:40

## 2021-01-01 RX ADMIN — PANCRELIPASE 2 CAPSULE: 24000; 76000; 120000 CAPSULE, DELAYED RELEASE PELLETS ORAL at 10:18

## 2021-01-01 RX ADMIN — ATROPINE SULFATE 0.4 MG: 0.4 INJECTION, SOLUTION INTRAMUSCULAR; INTRAVENOUS; SUBCUTANEOUS at 12:58

## 2021-01-01 RX ADMIN — IRINOTECAN HYDROCHLORIDE 246 MG: 20 INJECTION, SOLUTION INTRAVENOUS at 12:49

## 2021-01-01 RX ADMIN — OXALIPLATIN 146 MG: 5 INJECTION, SOLUTION INTRAVENOUS at 10:51

## 2021-01-01 RX ADMIN — ATROPINE SULFATE 0.4 MG: 0.4 INJECTION, SOLUTION INTRAMUSCULAR; INTRAVENOUS; SUBCUTANEOUS at 13:12

## 2021-01-01 RX ADMIN — LORAZEPAM 1 MG: 2 INJECTION INTRAMUSCULAR; INTRAVENOUS at 01:09

## 2021-01-01 RX ADMIN — HYDROMORPHONE HYDROCHLORIDE 1 MG: 1 INJECTION, SOLUTION INTRAMUSCULAR; INTRAVENOUS; SUBCUTANEOUS at 03:27

## 2021-01-01 RX ADMIN — OXYCODONE HYDROCHLORIDE 10 MG: 10 TABLET, FILM COATED, EXTENDED RELEASE ORAL at 10:18

## 2021-01-01 RX ADMIN — HYDROMORPHONE HYDROCHLORIDE 4 MG: 2 TABLET ORAL at 18:36

## 2021-01-01 RX ADMIN — OXYCODONE HYDROCHLORIDE 20 MG: 10 TABLET, FILM COATED, EXTENDED RELEASE ORAL at 11:16

## 2021-01-01 RX ADMIN — DEXAMETHASONE SODIUM PHOSPHATE 12 MG: 4 INJECTION, SOLUTION INTRAMUSCULAR; INTRAVENOUS at 09:47

## 2021-01-01 RX ADMIN — LORAZEPAM 1 MG: 2 SOLUTION, CONCENTRATE ORAL at 20:17

## 2021-01-01 RX ADMIN — HYDROMORPHONE HYDROCHLORIDE 1 MG: 1 INJECTION, SOLUTION INTRAMUSCULAR; INTRAVENOUS; SUBCUTANEOUS at 23:14

## 2021-01-01 RX ADMIN — DEXTROSE MONOHYDRATE 25 ML/HR: 5 INJECTION, SOLUTION INTRAVENOUS at 09:23

## 2021-01-01 RX ADMIN — ATROPINE SULFATE 0.4 MG: 0.4 INJECTION, SOLUTION INTRAMUSCULAR; INTRAVENOUS; SUBCUTANEOUS at 12:50

## 2021-01-01 RX ADMIN — OXYCODONE HYDROCHLORIDE 30 MG: 10 TABLET, FILM COATED, EXTENDED RELEASE ORAL at 21:22

## 2021-01-01 RX ADMIN — OXYCODONE HYDROCHLORIDE 40 MG: 10 TABLET, FILM COATED, EXTENDED RELEASE ORAL at 20:45

## 2021-01-01 RX ADMIN — FLUOROURACIL 4128 MG: 50 INJECTION, SOLUTION INTRAVENOUS at 14:45

## 2021-01-01 RX ADMIN — LORAZEPAM 1 MG: 2 SOLUTION, CONCENTRATE ORAL at 19:30

## 2021-01-01 RX ADMIN — HYDROMORPHONE HYDROCHLORIDE 1 MG: 1 INJECTION, SOLUTION INTRAMUSCULAR; INTRAVENOUS; SUBCUTANEOUS at 17:39

## 2021-01-01 RX ADMIN — HYDROMORPHONE HYDROCHLORIDE 1 MG: 1 INJECTION, SOLUTION INTRAMUSCULAR; INTRAVENOUS; SUBCUTANEOUS at 23:18

## 2021-01-01 RX ADMIN — SODIUM CHLORIDE 500 ML: 9 INJECTION, SOLUTION INTRAVENOUS at 11:28

## 2021-01-01 RX ADMIN — OXALIPLATIN 147 MG: 5 INJECTION, SOLUTION INTRAVENOUS at 12:06

## 2021-01-01 RX ADMIN — DEXAMETHASONE SODIUM PHOSPHATE 12 MG: 10 INJECTION, SOLUTION INTRAMUSCULAR; INTRAVENOUS at 09:28

## 2021-01-01 RX ADMIN — SENNOSIDES 17.6 MG: 8.8 LIQUID ORAL at 09:57

## 2021-01-01 RX ADMIN — PALONOSETRON HYDROCHLORIDE 0.25 MG: 0.25 INJECTION, SOLUTION INTRAVENOUS at 09:15

## 2021-01-01 RX ADMIN — SENNOSIDES 17.6 MG: 8.8 LIQUID ORAL at 09:05

## 2021-01-01 RX ADMIN — HYDROMORPHONE HYDROCHLORIDE 1 MG: 1 INJECTION, SOLUTION INTRAMUSCULAR; INTRAVENOUS; SUBCUTANEOUS at 01:21

## 2021-01-01 RX ADMIN — HYDROMORPHONE HYDROCHLORIDE 1 MG: 1 INJECTION, SOLUTION INTRAMUSCULAR; INTRAVENOUS; SUBCUTANEOUS at 22:57

## 2021-01-01 RX ADMIN — OXALIPLATIN 147 MG: 5 INJECTION, SOLUTION INTRAVENOUS at 10:47

## 2021-01-01 RX ADMIN — FLUOROURACIL 3864 MG: 50 INJECTION, SOLUTION INTRAVENOUS at 14:46

## 2021-01-01 RX ADMIN — OXALIPLATIN 139.5 MG: 5 INJECTION, SOLUTION INTRAVENOUS at 10:28

## 2021-01-01 RX ADMIN — Medication 10 ML: at 15:23

## 2021-01-01 RX ADMIN — Medication 500 UNITS: at 13:38

## 2021-01-01 RX ADMIN — METRONIDAZOLE 500 MG: 500 INJECTION, SOLUTION INTRAVENOUS at 18:01

## 2021-01-01 RX ADMIN — DOCUSATE SODIUM 100 MG: 100 CAPSULE, LIQUID FILLED ORAL at 10:31

## 2021-01-01 RX ADMIN — Medication 10 ML: at 09:03

## 2021-01-01 RX ADMIN — OXYCODONE HYDROCHLORIDE 20 MG: 10 TABLET, FILM COATED, EXTENDED RELEASE ORAL at 09:08

## 2021-01-01 RX ADMIN — HYDROMORPHONE HYDROCHLORIDE 1 MG: 1 INJECTION, SOLUTION INTRAMUSCULAR; INTRAVENOUS; SUBCUTANEOUS at 12:08

## 2021-01-01 RX ADMIN — ATROPINE SULFATE 0.4 MG: 0.4 INJECTION, SOLUTION INTRAMUSCULAR; INTRAVENOUS; SUBCUTANEOUS at 12:45

## 2021-01-01 RX ADMIN — DEXTROSE MONOHYDRATE 258 MG: 50 INJECTION, SOLUTION INTRAVENOUS at 13:21

## 2021-01-01 RX ADMIN — SENNOSIDES 17.6 MG: 8.8 LIQUID ORAL at 09:13

## 2021-01-01 RX ADMIN — PALONOSETRON HYDROCHLORIDE 0.25 MG: 0.25 INJECTION, SOLUTION INTRAVENOUS at 09:38

## 2021-01-01 RX ADMIN — SODIUM CHLORIDE 10 ML: 9 INJECTION INTRAMUSCULAR; INTRAVENOUS; SUBCUTANEOUS at 13:56

## 2021-01-01 RX ADMIN — HYDROMORPHONE HYDROCHLORIDE 1 MG: 1 INJECTION, SOLUTION INTRAMUSCULAR; INTRAVENOUS; SUBCUTANEOUS at 03:35

## 2021-01-01 RX ADMIN — HYDROMORPHONE HYDROCHLORIDE 4 MG: 2 TABLET ORAL at 17:09

## 2021-01-01 RX ADMIN — FLUOROURACIL 4128 MG: 50 INJECTION, SOLUTION INTRAVENOUS at 14:59

## 2021-01-01 RX ADMIN — HYDROMORPHONE HYDROCHLORIDE 1 MG: 1 INJECTION, SOLUTION INTRAMUSCULAR; INTRAVENOUS; SUBCUTANEOUS at 03:01

## 2021-01-01 RX ADMIN — OXYCODONE HYDROCHLORIDE 20 MG: 10 TABLET, FILM COATED, EXTENDED RELEASE ORAL at 09:40

## 2021-01-01 RX ADMIN — SODIUM CHLORIDE 10 ML: 9 INJECTION INTRAMUSCULAR; INTRAVENOUS; SUBCUTANEOUS at 08:30

## 2021-01-01 RX ADMIN — LORAZEPAM 1 MG: 2 INJECTION INTRAMUSCULAR; INTRAVENOUS at 05:45

## 2021-01-01 RX ADMIN — OXYCODONE HYDROCHLORIDE 10 MG: 10 TABLET, FILM COATED, EXTENDED RELEASE ORAL at 11:15

## 2021-01-01 RX ADMIN — HYDROMORPHONE HYDROCHLORIDE 4 MG: 2 TABLET ORAL at 11:52

## 2021-01-01 RX ADMIN — LORAZEPAM 1 MG: 2 SOLUTION, CONCENTRATE ORAL at 06:44

## 2021-01-01 RX ADMIN — PALONOSETRON 0.25 MG: 0.05 INJECTION, SOLUTION INTRAVENOUS at 09:44

## 2021-01-01 RX ADMIN — SENNOSIDES 17.6 MG: 8.8 LIQUID ORAL at 08:52

## 2021-01-01 RX ADMIN — OXYCODONE HYDROCHLORIDE 30 MG: 10 TABLET, FILM COATED, EXTENDED RELEASE ORAL at 09:13

## 2021-01-01 RX ADMIN — OXYCODONE HYDROCHLORIDE 30 MG: 10 TABLET, FILM COATED, EXTENDED RELEASE ORAL at 21:41

## 2021-01-01 RX ADMIN — DEXTROSE MONOHYDRATE 242 MG: 5 INJECTION, SOLUTION INTRAVENOUS at 13:11

## 2021-01-01 RX ADMIN — HYDROMORPHONE HYDROCHLORIDE 1 MG: 1 INJECTION, SOLUTION INTRAMUSCULAR; INTRAVENOUS; SUBCUTANEOUS at 11:55

## 2021-01-01 RX ADMIN — HYDROMORPHONE HYDROCHLORIDE 1 MG: 1 INJECTION, SOLUTION INTRAMUSCULAR; INTRAVENOUS; SUBCUTANEOUS at 22:05

## 2021-01-01 RX ADMIN — HYDROMORPHONE HYDROCHLORIDE 1 MG: 1 INJECTION, SOLUTION INTRAMUSCULAR; INTRAVENOUS; SUBCUTANEOUS at 17:10

## 2021-01-01 RX ADMIN — ONDANSETRON 4 MG: 2 INJECTION INTRAMUSCULAR; INTRAVENOUS at 00:26

## 2021-01-01 RX ADMIN — DEXTROSE MONOHYDRATE 25 ML/HR: 50 INJECTION, SOLUTION INTRAVENOUS at 09:43

## 2021-01-01 RX ADMIN — PALONOSETRON 0.25 MG: 0.05 INJECTION, SOLUTION INTRAVENOUS at 11:05

## 2021-01-01 RX ADMIN — DEXTROSE MONOHYDRATE 258 MG: 50 INJECTION, SOLUTION INTRAVENOUS at 13:04

## 2021-01-01 RX ADMIN — LORAZEPAM 1 MG: 2 INJECTION INTRAMUSCULAR; INTRAVENOUS at 23:15

## 2021-01-01 RX ADMIN — LORAZEPAM 1 MG: 2 SOLUTION, CONCENTRATE ORAL at 03:40

## 2021-01-01 RX ADMIN — HEPARIN 500 UNITS: 100 SYRINGE at 13:54

## 2021-01-01 RX ADMIN — ALBUMIN (HUMAN) 25 G: 0.25 INJECTION, SOLUTION INTRAVENOUS at 00:29

## 2021-01-01 RX ADMIN — HYDROMORPHONE HYDROCHLORIDE 1 MG: 1 INJECTION, SOLUTION INTRAMUSCULAR; INTRAVENOUS; SUBCUTANEOUS at 17:49

## 2021-01-01 RX ADMIN — DEXTROSE MONOHYDRATE 25 ML/HR: 50 INJECTION, SOLUTION INTRAVENOUS at 09:15

## 2021-01-01 RX ADMIN — Medication 10 ML: at 13:50

## 2021-01-01 RX ADMIN — POTASSIUM CHLORIDE 40 MEQ: 750 TABLET, FILM COATED, EXTENDED RELEASE ORAL at 10:52

## 2021-01-01 RX ADMIN — HYDROMORPHONE HYDROCHLORIDE 1 MG: 1 INJECTION, SOLUTION INTRAMUSCULAR; INTRAVENOUS; SUBCUTANEOUS at 05:45

## 2021-01-01 RX ADMIN — HYDROMORPHONE HYDROCHLORIDE 1 MG: 1 INJECTION, SOLUTION INTRAMUSCULAR; INTRAVENOUS; SUBCUTANEOUS at 03:40

## 2021-01-01 RX ADMIN — Medication 10 ML: at 12:57

## 2021-01-01 RX ADMIN — DOCUSATE SODIUM 100 MG: 100 CAPSULE, LIQUID FILLED ORAL at 22:27

## 2021-01-01 RX ADMIN — DEXAMETHASONE SODIUM PHOSPHATE 12 MG: 10 INJECTION, SOLUTION INTRAMUSCULAR; INTRAVENOUS at 11:09

## 2021-01-01 RX ADMIN — DEXTROSE MONOHYDRATE 25 ML/HR: 50 INJECTION, SOLUTION INTRAVENOUS at 09:14

## 2021-01-01 RX ADMIN — DEXAMETHASONE SODIUM PHOSPHATE 12 MG: 10 INJECTION, SOLUTION INTRAMUSCULAR; INTRAVENOUS at 09:40

## 2021-01-01 RX ADMIN — Medication 10 ML: at 13:54

## 2021-01-01 RX ADMIN — Medication 10 ML: at 13:38

## 2021-01-01 RX ADMIN — POTASSIUM CHLORIDE 60 MEQ: 750 TABLET, FILM COATED, EXTENDED RELEASE ORAL at 12:34

## 2021-01-01 RX ADMIN — IOPAMIDOL 100 ML: 755 INJECTION, SOLUTION INTRAVENOUS at 08:55

## 2021-01-01 RX ADMIN — OXYCODONE HYDROCHLORIDE 40 MG: 10 TABLET, FILM COATED, EXTENDED RELEASE ORAL at 20:59

## 2021-01-01 RX ADMIN — OXYCODONE HYDROCHLORIDE 40 MG: 10 TABLET, FILM COATED, EXTENDED RELEASE ORAL at 09:28

## 2021-01-01 RX ADMIN — FLUOROURACIL 3936 MG: 50 INJECTION, SOLUTION INTRAVENOUS at 15:13

## 2021-01-01 RX ADMIN — MIDAZOLAM 1 MG: 1 INJECTION INTRAMUSCULAR; INTRAVENOUS at 10:20

## 2021-01-01 RX ADMIN — PALONOSETRON 0.25 MG: 0.05 INJECTION, SOLUTION INTRAVENOUS at 09:15

## 2021-01-01 RX ADMIN — HYDROMORPHONE HYDROCHLORIDE 1 MG: 1 INJECTION, SOLUTION INTRAMUSCULAR; INTRAVENOUS; SUBCUTANEOUS at 18:34

## 2021-01-01 RX ADMIN — OXYCODONE HYDROCHLORIDE 40 MG: 10 TABLET, FILM COATED, EXTENDED RELEASE ORAL at 09:52

## 2021-01-01 RX ADMIN — HYDROMORPHONE HYDROCHLORIDE 4 MG: 2 TABLET ORAL at 19:30

## 2021-01-01 RX ADMIN — FLUOROURACIL 4128 MG: 50 INJECTION, SOLUTION INTRAVENOUS at 14:31

## 2021-01-01 RX ADMIN — SODIUM CHLORIDE 1000 ML: 9 INJECTION, SOLUTION INTRAVENOUS at 15:07

## 2021-01-01 RX ADMIN — ATROPINE SULFATE 0.4 MG: 0.4 INJECTION, SOLUTION INTRAMUSCULAR; INTRAVENOUS; SUBCUTANEOUS at 13:02

## 2021-01-01 RX ADMIN — OXYCODONE HYDROCHLORIDE 30 MG: 10 TABLET, FILM COATED, EXTENDED RELEASE ORAL at 09:18

## 2021-01-01 RX ADMIN — DEXTROSE MONOHYDRATE 688 MG: 50 INJECTION, SOLUTION INTRAVENOUS at 13:04

## 2021-01-01 RX ADMIN — HYDROMORPHONE HYDROCHLORIDE 4 MG: 2 TABLET ORAL at 07:18

## 2021-01-01 RX ADMIN — HEPARIN SODIUM (PORCINE) LOCK FLUSH IV SOLN 100 UNIT/ML 500 UNITS: 100 SOLUTION at 14:00

## 2021-01-01 RX ADMIN — HYDROMORPHONE HYDROCHLORIDE 1 MG: 1 INJECTION, SOLUTION INTRAMUSCULAR; INTRAVENOUS; SUBCUTANEOUS at 08:54

## 2021-01-01 RX ADMIN — OXALIPLATIN 139.5 MG: 5 INJECTION, SOLUTION INTRAVENOUS at 10:34

## 2021-01-01 RX ADMIN — Medication 10 ML: at 14:00

## 2021-01-01 RX ADMIN — HYDROMORPHONE HYDROCHLORIDE 1 MG: 1 INJECTION, SOLUTION INTRAMUSCULAR; INTRAVENOUS; SUBCUTANEOUS at 13:12

## 2021-01-01 RX ADMIN — HYDROMORPHONE HYDROCHLORIDE 1 MG: 1 INJECTION, SOLUTION INTRAMUSCULAR; INTRAVENOUS; SUBCUTANEOUS at 14:55

## 2021-01-01 RX ADMIN — FLUOROURACIL 4152 MG: 50 INJECTION, SOLUTION INTRAVENOUS at 14:57

## 2021-01-01 RX ADMIN — OXALIPLATIN 146 MG: 5 INJECTION, SOLUTION INTRAVENOUS at 10:55

## 2021-01-01 RX ADMIN — DEXTROSE MONOHYDRATE 656 MG: 50 INJECTION, SOLUTION INTRAVENOUS at 12:48

## 2021-01-01 RX ADMIN — PROCHLORPERAZINE EDISYLATE 5 MG: 5 INJECTION INTRAMUSCULAR; INTRAVENOUS at 13:43

## 2021-01-01 RX ADMIN — HEPARIN 500 UNITS: 100 SYRINGE at 13:29

## 2021-01-01 RX ADMIN — FLUOROURACIL 4128 MG: 50 INJECTION, SOLUTION INTRAVENOUS at 14:40

## 2021-01-01 RX ADMIN — DEXTROSE MONOHYDRATE 25 ML/HR: 50 INJECTION, SOLUTION INTRAVENOUS at 09:51

## 2021-01-01 RX ADMIN — SENNOSIDES 17.6 MG: 8.8 LIQUID ORAL at 09:53

## 2021-01-01 RX ADMIN — PALONOSETRON 0.25 MG: 0.05 INJECTION, SOLUTION INTRAVENOUS at 09:46

## 2021-01-01 RX ADMIN — DEXTROSE MONOHYDRATE 25 ML/HR: 5 INJECTION, SOLUTION INTRAVENOUS at 09:47

## 2021-01-01 RX ADMIN — MIDODRINE HYDROCHLORIDE 10 MG: 5 TABLET ORAL at 00:26

## 2021-01-01 RX ADMIN — POTASSIUM CHLORIDE AND SODIUM CHLORIDE: 900; 300 INJECTION, SOLUTION INTRAVENOUS at 23:59

## 2021-01-01 RX ADMIN — HYDROMORPHONE HYDROCHLORIDE 4 MG: 2 TABLET ORAL at 20:06

## 2021-01-01 RX ADMIN — DEXTROSE MONOHYDRATE 260 MG: 50 INJECTION, SOLUTION INTRAVENOUS at 13:07

## 2021-01-01 RX ADMIN — PANCRELIPASE 2 CAPSULE: 24000; 76000; 120000 CAPSULE, DELAYED RELEASE PELLETS ORAL at 17:35

## 2021-01-01 RX ADMIN — SENNOSIDES 17.6 MG: 8.8 LIQUID ORAL at 09:19

## 2021-01-01 RX ADMIN — LORAZEPAM 1 MG: 2 SOLUTION, CONCENTRATE ORAL at 05:40

## 2021-01-01 RX ADMIN — DEXAMETHASONE SODIUM PHOSPHATE 12 MG: 10 INJECTION, SOLUTION INTRAMUSCULAR; INTRAVENOUS at 09:20

## 2021-01-01 RX ADMIN — OXYCODONE HYDROCHLORIDE 40 MG: 10 TABLET, FILM COATED, EXTENDED RELEASE ORAL at 09:05

## 2021-01-01 RX ADMIN — OXYCODONE HYDROCHLORIDE 20 MG: 10 TABLET, FILM COATED, EXTENDED RELEASE ORAL at 08:52

## 2021-01-01 RX ADMIN — Medication 10 ML: at 15:07

## 2021-01-01 RX ADMIN — DEXTROSE MONOHYDRATE 25 ML/HR: 50 INJECTION, SOLUTION INTRAVENOUS at 10:51

## 2021-01-01 RX ADMIN — HYDROMORPHONE HYDROCHLORIDE 1 MG: 1 INJECTION, SOLUTION INTRAMUSCULAR; INTRAVENOUS; SUBCUTANEOUS at 00:22

## 2021-01-01 RX ADMIN — SENNOSIDES 17.6 MG: 8.8 LIQUID ORAL at 08:57

## 2021-01-01 RX ADMIN — HYDROMORPHONE HYDROCHLORIDE 4 MG: 2 TABLET ORAL at 16:27

## 2021-01-01 RX ADMIN — SODIUM CHLORIDE 10 ML: 9 INJECTION INTRAMUSCULAR; INTRAVENOUS; SUBCUTANEOUS at 14:31

## 2021-01-01 RX ADMIN — SODIUM CHLORIDE 500 ML: 900 INJECTION, SOLUTION INTRAVENOUS at 10:20

## 2021-01-01 RX ADMIN — SENNOSIDES 17.6 MG: 8.8 LIQUID ORAL at 08:42

## 2021-01-01 RX ADMIN — DEXTROSE MONOHYDRATE 688 MG: 50 INJECTION, SOLUTION INTRAVENOUS at 12:44

## 2021-01-01 RX ADMIN — OXYCODONE HYDROCHLORIDE 30 MG: 10 TABLET, FILM COATED, EXTENDED RELEASE ORAL at 09:30

## 2021-01-01 RX ADMIN — IOPAMIDOL 100 ML: 755 INJECTION, SOLUTION INTRAVENOUS at 13:47

## 2021-01-01 RX ADMIN — OXYCODONE HYDROCHLORIDE 20 MG: 10 TABLET, FILM COATED, EXTENDED RELEASE ORAL at 09:02

## 2021-01-01 RX ADMIN — HYDROMORPHONE HYDROCHLORIDE 1 MG: 1 INJECTION, SOLUTION INTRAMUSCULAR; INTRAVENOUS; SUBCUTANEOUS at 07:02

## 2021-01-01 RX ADMIN — OXALIPLATIN 139.5 MG: 5 INJECTION, SOLUTION INTRAVENOUS at 10:29

## 2021-01-01 RX ADMIN — DOCUSATE SODIUM 100 MG: 100 CAPSULE, LIQUID FILLED ORAL at 11:17

## 2021-01-01 RX ADMIN — DEXTROSE MONOHYDRATE 656 MG: 5 INJECTION, SOLUTION INTRAVENOUS at 12:44

## 2021-01-01 RX ADMIN — PALONOSETRON 0.25 MG: 0.25 INJECTION, SOLUTION INTRAVENOUS at 09:47

## 2021-01-01 RX ADMIN — OXYCODONE HYDROCHLORIDE 10 MG: 10 TABLET, FILM COATED, EXTENDED RELEASE ORAL at 21:24

## 2021-01-01 RX ADMIN — Medication 10 ML: at 22:00

## 2021-01-01 RX ADMIN — DOCUSATE SODIUM 100 MG: 100 CAPSULE, LIQUID FILLED ORAL at 08:02

## 2021-01-01 RX ADMIN — OXYCODONE HYDROCHLORIDE 30 MG: 10 TABLET, FILM COATED, EXTENDED RELEASE ORAL at 08:56

## 2021-01-01 RX ADMIN — SENNOSIDES 17.6 MG: 8.8 LIQUID ORAL at 10:42

## 2021-01-01 RX ADMIN — ATROPINE SULFATE 0.4 MG: 0.4 INJECTION, SOLUTION INTRAMUSCULAR; INTRAVENOUS; SUBCUTANEOUS at 12:37

## 2021-01-01 RX ADMIN — IOPAMIDOL 100 ML: 755 INJECTION, SOLUTION INTRAVENOUS at 14:21

## 2021-01-01 RX ADMIN — OXYCODONE HYDROCHLORIDE 20 MG: 10 TABLET, FILM COATED, EXTENDED RELEASE ORAL at 20:55

## 2021-01-01 RX ADMIN — LEUCOVORIN CALCIUM 656 MG: 500 INJECTION, POWDER, LYOPHILIZED, FOR SOLUTION INTRAMUSCULAR; INTRAVENOUS at 13:14

## 2021-01-01 RX ADMIN — HYDROMORPHONE HYDROCHLORIDE 1 MG: 1 INJECTION, SOLUTION INTRAMUSCULAR; INTRAVENOUS; SUBCUTANEOUS at 08:23

## 2021-01-01 RX ADMIN — OXYCODONE HYDROCHLORIDE 20 MG: 10 TABLET, FILM COATED, EXTENDED RELEASE ORAL at 21:22

## 2021-01-01 RX ADMIN — DEXTROSE MONOHYDRATE 246 MG: 5 INJECTION, SOLUTION INTRAVENOUS at 12:48

## 2021-01-01 RX ADMIN — HYDROMORPHONE HYDROCHLORIDE 1 MG: 1 INJECTION, SOLUTION INTRAMUSCULAR; INTRAVENOUS; SUBCUTANEOUS at 23:28

## 2021-01-01 RX ADMIN — POTASSIUM CHLORIDE 40 MEQ: 750 TABLET, FILM COATED, EXTENDED RELEASE ORAL at 09:49

## 2021-01-01 RX ADMIN — FLUOROURACIL 3936 MG: 50 INJECTION, SOLUTION INTRAVENOUS at 14:24

## 2021-01-01 RX ADMIN — HYDROMORPHONE HYDROCHLORIDE 1 MG: 1 INJECTION, SOLUTION INTRAMUSCULAR; INTRAVENOUS; SUBCUTANEOUS at 15:46

## 2021-01-01 RX ADMIN — DEXTROSE MONOHYDRATE 25 ML/HR: 50 INJECTION, SOLUTION INTRAVENOUS at 09:44

## 2021-01-01 RX ADMIN — OXALIPLATIN 146 MG: 5 INJECTION, SOLUTION INTRAVENOUS at 10:11

## 2021-01-01 RX ADMIN — LORAZEPAM 1 MG: 2 INJECTION INTRAMUSCULAR; INTRAVENOUS at 13:11

## 2021-01-01 RX ADMIN — MIDAZOLAM 1 MG: 1 INJECTION INTRAMUSCULAR; INTRAVENOUS at 10:16

## 2021-01-01 RX ADMIN — SODIUM CHLORIDE 3936 MG: 9 INJECTION, SOLUTION INTRAVENOUS at 15:13

## 2021-01-01 RX ADMIN — HEPARIN 500 UNITS: 100 SYRINGE at 13:50

## 2021-01-01 RX ADMIN — DEXAMETHASONE SODIUM PHOSPHATE 12 MG: 10 INJECTION, SOLUTION INTRAMUSCULAR; INTRAVENOUS at 09:46

## 2021-01-01 RX ADMIN — HYDROMORPHONE HYDROCHLORIDE 4 MG: 2 TABLET ORAL at 10:47

## 2021-01-01 RX ADMIN — SODIUM CHLORIDE 10 ML: 9 INJECTION INTRAMUSCULAR; INTRAVENOUS; SUBCUTANEOUS at 09:03

## 2021-01-01 RX ADMIN — HYDROMORPHONE HYDROCHLORIDE 1 MG: 1 INJECTION, SOLUTION INTRAMUSCULAR; INTRAVENOUS; SUBCUTANEOUS at 19:27

## 2021-01-01 RX ADMIN — HEPARIN 500 UNITS: 100 SYRINGE at 13:46

## 2021-01-01 RX ADMIN — OXYCODONE HYDROCHLORIDE 20 MG: 10 TABLET, FILM COATED, EXTENDED RELEASE ORAL at 10:41

## 2021-01-01 RX ADMIN — OXALIPLATIN 137 MG: 5 INJECTION, SOLUTION INTRAVENOUS at 10:59

## 2021-01-01 RX ADMIN — PALONOSETRON HYDROCHLORIDE 0.25 MG: 0.25 INJECTION, SOLUTION INTRAVENOUS at 09:54

## 2021-01-01 RX ADMIN — PALONOSETRON 0.25 MG: 0.05 INJECTION, SOLUTION INTRAVENOUS at 09:17

## 2021-01-01 RX ADMIN — OXYCODONE HYDROCHLORIDE 30 MG: 10 TABLET, FILM COATED, EXTENDED RELEASE ORAL at 21:31

## 2021-01-01 RX ADMIN — SODIUM CHLORIDE 10 ML: 9 INJECTION INTRAMUSCULAR; INTRAVENOUS; SUBCUTANEOUS at 14:09

## 2021-01-01 RX ADMIN — HEPARIN 500 UNITS: 100 SYRINGE at 13:45

## 2021-01-01 RX ADMIN — Medication 10 ML: at 23:59

## 2021-01-01 RX ADMIN — HYDROMORPHONE HYDROCHLORIDE 4 MG: 2 TABLET ORAL at 13:24

## 2021-01-01 RX ADMIN — HYDROMORPHONE HYDROCHLORIDE 1 MG: 1 INJECTION, SOLUTION INTRAMUSCULAR; INTRAVENOUS; SUBCUTANEOUS at 04:11

## 2021-01-01 RX ADMIN — FLUOROURACIL 3936 MG: 50 INJECTION, SOLUTION INTRAVENOUS at 14:41

## 2021-01-01 RX ADMIN — PALONOSETRON 0.25 MG: 0.05 INJECTION, SOLUTION INTRAVENOUS at 09:50

## 2021-01-01 RX ADMIN — SODIUM CHLORIDE, POTASSIUM CHLORIDE, SODIUM LACTATE AND CALCIUM CHLORIDE 1000 ML: 600; 310; 30; 20 INJECTION, SOLUTION INTRAVENOUS at 23:00

## 2021-01-01 RX ADMIN — Medication 10 ML: at 12:58

## 2021-01-01 RX ADMIN — DEXTROSE MONOHYDRATE 656 MG: 50 INJECTION, SOLUTION INTRAVENOUS at 12:49

## 2021-01-01 RX ADMIN — DEXAMETHASONE SODIUM PHOSPHATE 12 MG: 4 INJECTION, SOLUTION INTRAMUSCULAR; INTRAVENOUS at 09:52

## 2021-01-01 RX ADMIN — HYDROMORPHONE HYDROCHLORIDE 4 MG: 2 TABLET ORAL at 05:39

## 2021-01-01 RX ADMIN — ATROPINE SULFATE 0.4 MG: 0.4 INJECTION, SOLUTION INTRAMUSCULAR; INTRAVENOUS; SUBCUTANEOUS at 12:40

## 2021-01-01 RX ADMIN — OXALIPLATIN 147 MG: 5 INJECTION, SOLUTION INTRAVENOUS at 10:52

## 2021-01-01 RX ADMIN — HYDROMORPHONE HYDROCHLORIDE 4 MG: 2 TABLET ORAL at 04:19

## 2021-01-01 RX ADMIN — Medication 500 UNITS: at 13:56

## 2021-01-01 RX ADMIN — DEXTROSE MONOHYDRATE 25 ML/HR: 50 INJECTION, SOLUTION INTRAVENOUS at 09:11

## 2021-01-01 RX ADMIN — HYDROMORPHONE HYDROCHLORIDE 1 MG: 1 INJECTION, SOLUTION INTRAMUSCULAR; INTRAVENOUS; SUBCUTANEOUS at 02:00

## 2021-01-01 RX ADMIN — LORAZEPAM 1 MG: 2 INJECTION INTRAMUSCULAR; INTRAVENOUS at 03:36

## 2021-01-01 RX ADMIN — LEVOFLOXACIN 500 MG: 5 INJECTION, SOLUTION INTRAVENOUS at 18:00

## 2021-01-01 RX ADMIN — OXYCODONE HYDROCHLORIDE 40 MG: 10 TABLET, FILM COATED, EXTENDED RELEASE ORAL at 09:58

## 2021-01-01 RX ADMIN — OXYCODONE HYDROCHLORIDE 20 MG: 10 TABLET, FILM COATED, EXTENDED RELEASE ORAL at 20:05

## 2021-01-01 RX ADMIN — SENNOSIDES 17.6 MG: 8.8 LIQUID ORAL at 09:29

## 2021-01-01 RX ADMIN — DEXTROSE MONOHYDRATE 25 ML/HR: 50 INJECTION, SOLUTION INTRAVENOUS at 11:59

## 2021-01-01 RX ADMIN — DEXTROSE MONOHYDRATE 656 MG: 50 INJECTION, SOLUTION INTRAVENOUS at 13:09

## 2021-01-01 RX ADMIN — OXALIPLATIN 139.5 MG: 5 INJECTION, SOLUTION INTRAVENOUS at 10:11

## 2021-01-01 RX ADMIN — ATROPINE SULFATE 0.4 MG: 0.4 INJECTION, SOLUTION INTRAMUSCULAR; INTRAVENOUS; SUBCUTANEOUS at 12:39

## 2021-01-01 RX ADMIN — Medication 10 ML: at 07:45

## 2021-01-01 RX ADMIN — HYDROMORPHONE HYDROCHLORIDE 4 MG: 2 TABLET ORAL at 07:21

## 2021-01-01 RX ADMIN — LIDOCAINE HYDROCHLORIDE 8 ML: 10 INJECTION, SOLUTION INFILTRATION; PERINEURAL at 10:16

## 2021-01-01 RX ADMIN — HYDROMORPHONE HYDROCHLORIDE 1 MG: 1 INJECTION, SOLUTION INTRAMUSCULAR; INTRAVENOUS; SUBCUTANEOUS at 07:41

## 2021-01-01 RX ADMIN — OXYCODONE HYDROCHLORIDE 20 MG: 10 TABLET, FILM COATED, EXTENDED RELEASE ORAL at 20:16

## 2021-01-01 RX ADMIN — DEXTROSE MONOHYDRATE 692 MG: 50 INJECTION, SOLUTION INTRAVENOUS at 14:26

## 2021-01-01 RX ADMIN — FENTANYL CITRATE 25 MCG: 50 INJECTION INTRAMUSCULAR; INTRAVENOUS at 10:16

## 2021-01-01 RX ADMIN — HYDROMORPHONE HYDROCHLORIDE 2 MG: 2 INJECTION INTRAMUSCULAR; INTRAVENOUS; SUBCUTANEOUS at 19:39

## 2021-01-01 RX ADMIN — OXYCODONE HYDROCHLORIDE 20 MG: 10 TABLET, FILM COATED, EXTENDED RELEASE ORAL at 21:16

## 2021-01-01 RX ADMIN — PANCRELIPASE 2 CAPSULE: 24000; 76000; 120000 CAPSULE, DELAYED RELEASE PELLETS ORAL at 07:59

## 2021-01-01 RX ADMIN — OXYCODONE HYDROCHLORIDE 40 MG: 10 TABLET, FILM COATED, EXTENDED RELEASE ORAL at 20:58

## 2021-01-01 RX ADMIN — SODIUM CHLORIDE 12 MG: 9 INJECTION, SOLUTION INTRAVENOUS at 09:50

## 2021-01-01 RX ADMIN — OXYCODONE HYDROCHLORIDE 30 MG: 10 TABLET, FILM COATED, EXTENDED RELEASE ORAL at 20:58

## 2021-01-01 RX ADMIN — Medication 10 ML: at 15:05

## 2021-01-01 RX ADMIN — PALONOSETRON 0.25 MG: 0.25 INJECTION, SOLUTION INTRAVENOUS at 10:07

## 2021-01-01 RX ADMIN — OXYCODONE HYDROCHLORIDE 40 MG: 10 TABLET, FILM COATED, EXTENDED RELEASE ORAL at 20:53

## 2021-01-01 RX ADMIN — SENNOSIDES 17.6 MG: 8.8 LIQUID ORAL at 09:17

## 2021-01-01 RX ADMIN — Medication 10 ML: at 13:43

## 2021-01-01 RX ADMIN — HYDROMORPHONE HYDROCHLORIDE 1 MG: 1 INJECTION, SOLUTION INTRAMUSCULAR; INTRAVENOUS; SUBCUTANEOUS at 10:31

## 2021-01-01 RX ADMIN — FENTANYL CITRATE 50 MCG: 50 INJECTION INTRAMUSCULAR; INTRAVENOUS at 10:06

## 2021-01-01 RX ADMIN — FLUOROURACIL 3936 MG: 50 INJECTION, SOLUTION INTRAVENOUS at 14:23

## 2021-01-01 RX ADMIN — SODIUM CHLORIDE 4152 MG: 9 INJECTION, SOLUTION INTRAVENOUS at 14:51

## 2021-01-01 RX ADMIN — OXALIPLATIN 146 MG: 5 INJECTION, SOLUTION INTRAVENOUS at 10:23

## 2021-01-01 RX ADMIN — ATROPINE SULFATE 0.4 MG: 0.4 INJECTION, SOLUTION INTRAMUSCULAR; INTRAVENOUS; SUBCUTANEOUS at 12:49

## 2021-01-01 RX ADMIN — HYDROMORPHONE HYDROCHLORIDE 4 MG: 2 TABLET ORAL at 17:46

## 2021-01-01 RX ADMIN — OXALIPLATIN 139.5 MG: 5 INJECTION, SOLUTION INTRAVENOUS at 10:54

## 2021-01-01 RX ADMIN — OXYCODONE HYDROCHLORIDE 20 MG: 10 TABLET, FILM COATED, EXTENDED RELEASE ORAL at 21:14

## 2021-01-01 RX ADMIN — IRINOTECAN HYDROCHLORIDE 246 MG: 20 INJECTION, SOLUTION INTRAVENOUS at 13:11

## 2021-01-01 RX ADMIN — WATER 2 G: 1 INJECTION INTRAMUSCULAR; INTRAVENOUS; SUBCUTANEOUS at 09:56

## 2021-01-01 RX ADMIN — Medication 500 UNITS: at 14:09

## 2021-01-01 RX ADMIN — OXYCODONE HYDROCHLORIDE 20 MG: 10 TABLET, FILM COATED, EXTENDED RELEASE ORAL at 08:02

## 2021-01-01 RX ADMIN — DEXTROSE MONOHYDRATE 258 MG: 5 INJECTION, SOLUTION INTRAVENOUS at 12:49

## 2021-01-01 RX ADMIN — DEXAMETHASONE SODIUM PHOSPHATE 12 MG: 10 INJECTION, SOLUTION INTRAMUSCULAR; INTRAVENOUS at 09:13

## 2021-01-01 RX ADMIN — PALONOSETRON 0.25 MG: 0.05 INJECTION, SOLUTION INTRAVENOUS at 09:12

## 2021-01-01 RX ADMIN — HYDROMORPHONE HYDROCHLORIDE 1 MG: 1 INJECTION, SOLUTION INTRAMUSCULAR; INTRAVENOUS; SUBCUTANEOUS at 12:29

## 2021-01-01 RX ADMIN — HYDROMORPHONE HYDROCHLORIDE 4 MG: 2 TABLET ORAL at 16:56

## 2021-01-01 RX ADMIN — PALONOSETRON 0.25 MG: 0.05 INJECTION, SOLUTION INTRAVENOUS at 09:25

## 2021-01-01 RX ADMIN — ATROPINE SULFATE 0.4 MG: 0.4 INJECTION, SOLUTION INTRAMUSCULAR; INTRAVENOUS; SUBCUTANEOUS at 14:20

## 2021-01-01 RX ADMIN — OXYCODONE HYDROCHLORIDE 40 MG: 10 TABLET, FILM COATED, EXTENDED RELEASE ORAL at 21:00

## 2021-01-01 RX ADMIN — OXYCODONE HYDROCHLORIDE 40 MG: 10 TABLET, FILM COATED, EXTENDED RELEASE ORAL at 09:13

## 2021-01-01 RX ADMIN — OXYCODONE HYDROCHLORIDE 20 MG: 10 TABLET, FILM COATED, EXTENDED RELEASE ORAL at 10:31

## 2021-01-01 RX ADMIN — IOPAMIDOL 100 ML: 755 INJECTION, SOLUTION INTRAVENOUS at 13:16

## 2021-01-01 RX ADMIN — LEUCOVORIN CALCIUM 692 MG: 500 INJECTION, POWDER, LYOPHILIZED, FOR SOLUTION INTRAMUSCULAR; INTRAVENOUS at 13:07

## 2021-01-01 RX ADMIN — ATROPINE SULFATE 0.4 MG: 0.4 INJECTION, SOLUTION INTRAMUSCULAR; INTRAVENOUS; SUBCUTANEOUS at 12:41

## 2021-01-01 RX ADMIN — HYDROMORPHONE HYDROCHLORIDE 4 MG: 2 TABLET ORAL at 03:43

## 2021-01-01 RX ADMIN — DEXTROSE MONOHYDRATE 688 MG: 50 INJECTION, SOLUTION INTRAVENOUS at 12:45

## 2021-01-01 RX ADMIN — DOCUSATE SODIUM 100 MG: 100 CAPSULE, LIQUID FILLED ORAL at 09:02

## 2021-01-01 RX ADMIN — DEXTROSE MONOHYDRATE 25 ML/HR: 50 INJECTION, SOLUTION INTRAVENOUS at 09:35

## 2021-01-01 RX ADMIN — PANCRELIPASE 2 CAPSULE: 24000; 76000; 120000 CAPSULE, DELAYED RELEASE PELLETS ORAL at 17:44

## 2021-01-01 RX ADMIN — DEXTROSE MONOHYDRATE 260 MG: 50 INJECTION, SOLUTION INTRAVENOUS at 14:26

## 2021-01-01 RX ADMIN — ATROPINE SULFATE 0.4 MG: 0.4 INJECTION, SOLUTION INTRAMUSCULAR; INTRAVENOUS; SUBCUTANEOUS at 13:00

## 2021-01-01 RX ADMIN — OXYCODONE HYDROCHLORIDE 30 MG: 10 TABLET, FILM COATED, EXTENDED RELEASE ORAL at 10:10

## 2021-01-01 RX ADMIN — DEXTROSE MONOHYDRATE 692 MG: 5 INJECTION, SOLUTION INTRAVENOUS at 13:08

## 2021-01-01 RX ADMIN — OXYCODONE HYDROCHLORIDE 40 MG: 10 TABLET, FILM COATED, EXTENDED RELEASE ORAL at 20:49

## 2021-01-01 RX ADMIN — SENNOSIDES 17.6 MG: 8.8 LIQUID ORAL at 09:08

## 2021-01-01 RX ADMIN — OXYCODONE HYDROCHLORIDE 20 MG: 10 TABLET, FILM COATED, EXTENDED RELEASE ORAL at 22:27

## 2021-01-01 RX ADMIN — DEXTROSE MONOHYDRATE 688 MG: 50 INJECTION, SOLUTION INTRAVENOUS at 13:21

## 2021-01-01 RX ADMIN — HYDROMORPHONE HYDROCHLORIDE 0.5 MG: 1 INJECTION, SOLUTION INTRAMUSCULAR; INTRAVENOUS; SUBCUTANEOUS at 15:00

## 2021-01-01 RX ADMIN — FLUOROURACIL 4152 MG: 50 INJECTION, SOLUTION INTRAVENOUS at 16:03

## 2021-01-01 RX ADMIN — IRINOTECAN HYDROCHLORIDE 246 MG: 20 INJECTION, SOLUTION INTRAVENOUS at 13:14

## 2021-01-01 RX ADMIN — SODIUM CHLORIDE 10 ML: 9 INJECTION INTRAMUSCULAR; INTRAVENOUS; SUBCUTANEOUS at 13:27

## 2021-01-06 NOTE — PROGRESS NOTES
[facility] [place] at [place] [address] 301 [facility], [address] [place] [address] W: [phone]  F: [phone] Hematology Oncology established visit Reason for Visit:  
Steven Vinson is a 64 y.o. male who is seen here for follow up of pancreatic cancer, new diagnosis. Hematology / Oncology Treatment History:  
 
Diagnosis: Pancreatic, adenocarcinoma, moderately to poorly diiferentiated Stage: IV [[id number]] Pathology: 2020 pancreatic body, FNA ;adenocarcinoma, moderately to poorly diiferentiated [employer] [facility] testing: Negative for reportable alterations [employer]: Germline testing negative for BRCA1/2 and 86 other genes. Prior Treatment: none Current Treatment:  FOLFIRINOX every 2 weeks, to start 20. History of Present Illness: Mr. Steven Vinson is a 64 y.o. male admitted on 2020 from the ED with worsening abd pain and constipation. Rates pain on admission 9/10. Recent dx of pancreatic cancer during last hospitalization. Pt reports has not had a BM since last Wed; stopped taking the Senna-docusate last week; he thought it was causing his stomach to hurt more. He was unaware that it was part of a bowel regimen and thought it was pain medication. Has had some nausea but denies vomiting. Decreased appetite. Denies SOB. ED CT scan shows subacute pancreatitis, pancreatic body adenocarcinoma, retroperitoneal remington metastases, peritoneal carcinomatosis. Pt would like his sister to be able to hear about the treatment plans; she is coming from [place] in the am. Pennelope [facility]ian to port placement; defininelty wants to have treatment. 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 Smokes cigarettes: 1 ppd x 30 yrs. Rare EtOH use. : 3 kids; twins 29 yrs old and 25 yr old. Interval History: Patient here for follow up of pancreatic cancer and C4 of FOLFIRINOX. Constipation managed w miralax twice daily. Lost 6 lbs. Reports poor appetite the week of treatment but notes improvement the following week. Abdominal pain managed with OxyContin. Taking oxycodone 5mg twice daily for breakthrough pain. Reports nausea after treatment managed with zofran, compazine and dex. Reports mild neuropathy in fingertips that is intermittent. Denies fevers, chills, CP, SOB, mucositis lesions. Blood pressure is lower than usual today. Reports intermittent episodes of dizziness when standing. Past Medical History:  
Diagnosis Date  Gout  Pancreatic cancer ([facility] [address]  Psoriasis  Tobacco abuse Past Surgical History:  
Procedure Laterality Date  HX ORTHOPAEDIC    
 femur repair  HX ORTHOPAEDIC    
 lumbar compressed fracture  IR INSERT TUNL CVC W PORT OVER 5 YEARS  11/24/2020 Social History Tobacco Use  Smoking status: Current Every Day Smoker Packs/day: 1.00  Smokeless tobacco: Never Used  Tobacco comment: last week was last cigarette Substance Use Topics  Alcohol use: Yes Family History Problem Relation Age of Onset  Pancreatic Cancer Brother Current Outpatient Medications Medication Sig  
 oxyCODONE ER (OxyCONTIN) 10 mg ER tablet Take 1 Tab by mouth every twelve (12) hours for 30 days. Max Daily Amount: 20 mg.  polyethylene glycol (MIRALAX) 17 gram packet Take 1 Packet by mouth daily as needed for Constipation.  senna-docusate (PERICOLACE) 8.6-50 mg per tablet Take 1 Tab by mouth daily. Indications: constipation  magic mouthwash solution Sig: 
Magic mouth wash Maalox Lidocaine 2% viscous Diphenhydramine oral solution Pharmacy to mix equal portions of ingredients to a total volume as indicated in the dispense amount. Swish and spit 10mL every 4 hours as needed for mouth pain, okay to swallow for throat pain.  prochlorperazine (Compazine) 10 mg tablet Take 1 Tab by mouth every six (6) hours as needed for Nausea or Vomiting.  ondansetron hcl (ZOFRAN) 8 mg tablet Take 1 Tab by mouth every eight (8) hours as needed for Nausea or Vomiting.  lidocaine-prilocaine (EMLA) topical cream Apply a dime size amount to port site 30 minutes before access to prevent pain.  dexAMETHasone (DECADRON) 4 mg tablet Take 8mg on days 2 and 3 after treatment to prevent nausea.  senna-docusate (PERICOLACE) 8.6-50 mg per tablet Take 2 Tabs by mouth daily. No current facility-administered medications for this visit. No Known Allergies Review of Systems: A complete review of systems was obtained, negative except as described above. Physical Exam:  
 
Visit Vitals BP 90/66 Pulse 78 Temp 97.8 °F (36.6 °C) Resp 20 Ht 5' 10\" (1.778 m) Wt 123 lb 14.4 oz (56.2 kg) SpO2 100% BMI 17.78 kg/m² ECOG PS: 1 General: No distress Eyes: Anicteric sclerae HENT: Atraumatic Neck: Supple Respiratory: Normal respiratory effort CV: No peripheral edema, port right upper chest.  
GI: Soft, nontender, nondistended, no masses, no hepatomegaly, no splenomegaly Skin: No rashes, ecchymoses, or petechiae Psych: Alert, oriented, appropriate affect, normal judgment/insight Results:  
 
Lab Results Component Value Date/Time WBC 3.9 (L) 01/11/2021 08:42 AM  
 HGB 10.3 (L) 01/11/2021 08:42 AM  
 HCT 30.9 (L) 01/11/2021 08:42 AM  
 PLATELET 481 78/97/4322 08:42 AM  
 MCV 92.8 01/11/2021 08:42 AM  
 ABS. NEUTROPHILS 1.5 (L) 01/11/2021 08:42 AM  
 
Lab Results Component Value Date/Time  Sodium 142 12/28/2020 08:45 AM  
 Potassium 3.9 12/28/2020 08:45 AM  
 Chloride 111 (H) 12/28/2020 08:45 AM  
 CO2 27 12/28/2020 08:45 AM  
 Glucose 97 12/28/2020 08:45 AM  
 BUN 12 12/28/2020 08:45 AM  
 Creatinine 1.01 12/28/2020 08:45 AM  
 GFR est AA >60 12/28/2020 08:45 AM  
 GFR est non-AA >60 12/28/2020 08:45 AM  
 Calcium 8.4 (L) 12/28/2020 08:45 AM  
 
Lab Results Component Value Date/Time Bilirubin, total 0.2 12/28/2020 08:45 AM  
 ALT (SGPT) 31 12/28/2020 08:45 AM  
 Alk. phosphatase 54 12/28/2020 08:45 AM  
 Protein, total 6.1 (L) 12/28/2020 08:45 AM  
 Albumin 3.3 (L) 12/28/2020 08:45 AM  
 Globulin 2.8 12/28/2020 08:45 AM  
 
Lab Results Component Value Date/Time Sed rate, automated 4 11/16/2017 03:56 PM  
 C-Reactive protein <0.29 11/16/2017 03:56 PM  
 TSH 3.36 11/12/2020 05:28 AM  
 Lipase 115 11/23/2020 06:22 AM  
 
Lab Results Component Value Date/Time INR 1.0 09/23/2010 03:05 AM  
 aPTT 28.0 09/23/2010 03:05 AM  
 
Lab Results Component Value Date/Time CEA 4.9 11/13/2020 05:29 AM  
 Carbohydrate Antigen 19-9, (CA 19-9) 6,483 (H) 11/30/2020 08:09 AM  
 
 
11/22/2020 XR ABD FLAT IMPRESSION: 
 No evidence for acute abnormality 11/23/2020 CT ABD PELV W CONT IMPRESSION:  
1. Subacute pancreatitis, with small walled-off necrosis (\"pseudocyst\") in the lesser sac. 2. Pancreatic body adenocarcinoma. 3. Occluded splenic vein. Consequent, extensive, submucosal, gastric varices in the cardia. 4. Encasement of the splenic artery. Abutment of the celiac artery, SMV, and SMA. 5. Retroperitoneal remington metastases. 6. Peritoneal carcinomatosis. 
  
 
Assessment and Recommendations:  
 
65 yo male with recent dx of pancreatic cancer admitted with abd pain. 1. Pancreatic adenocarcinoma: Germline and somatic genetic testing negative. Diagnosed 11/18/2020 and now appears to have Stage IV disease based on peritoneal carcinomatosis seen on CT. Also has retroperitoneal remington metastases and encasement of vasculature. Was scheduled to be seen in our clinic on 11/24 to discuss treatment options. Discussed with him today that his disease is not curable, but is treatable. Emphasized the importance of genetic testing, germline (negative) and somatic. His brother passed away from pancreas cancer. I recommend palliative chemotherapy with FOLFIRINOX regimen. We discussed the risks and benefits of FOLFIRINOX chemotherapy, including potential side effects. These include but are not limited to fatigue, nausea vomiting, diarrhea, neuropathy, taste changes, cold intolerance, esophageal spasm, allergic reactions, alopecia, mucositis, myelosuppression, risk for infection, infertility, and rarely, death. Rarely, a patient may have a condition where they do not metabolize fluorouracil appropriately (called DPD deficiency), and they may have excessive toxicity. A Port-A-Cath will be required in order to deliver the continuous infusion. The patient has consented to beginning therapy. CA 19-9 5607 at diagnosis. Supportive medications include: EMLA cream, zofran, compazine, dexamethasone. -- Proceed with cycle 4 FOLFIRNOX, MD/NP visit, pump removal on Wednesday. -- CT scan due after cycle 4 treatment. -- Follow up in 2 weeks for cycle 5, MD/NP visit, discuss CT results. 2. H/o subacute pancreatitis:  
Was treated with IVF in hospital. Currently stable. 3. F/E/N/G: 
Constipation 2/2 opioids and bowel regimen adjusted. Continue Miralax 1-2 times daily and Docusate/Senna 1-2 times daily. Encouraged supplementing with 1-2 high calorie protein drinks daily to prevent further WL. Given samples from our supply due to cost.  
-- Audi [address]. 4. Occluded splenic vein: Originally recommended anticoagulation, but after discussion with radiology, there is no clot. This is occlusion by the cancer and no anticoagulation is needed. 5. Neoplasm related pain:  
Well controlled on Oxycontin 10mg q12h and Percocet prn.   
-- Refilled Oxycontin 10mg q12h #60 on 1/11/21 (dated 1/20/21.) -- Refilled Oxycodone q6h PRN #45 on 1/11/21 (dated 1/18/21.) 6. Family h/o pancreatic cancer: 
Was concerning for genetic predisposition but germline genetic testing negative. 7. Chemotherapy induced neutropenia:  
Mild. Occurred after C2 treatment. Advised neutropenic precautions. 8. Hypotension:  
Chronically low. Normally asymptomatic but reports mild dizziness with sitting to standing. --  mL bolus today. I have personally seen and evaluated the patient in conjunction with Kiera Carballo NP. I find the patient's history and physical exam are consistent with the NP's documentation. I agree with the above assessment and plan, which I have edited if needed. Signed By: 
Date: Hodan Barrett MD  
01/11/21

## 2021-01-11 NOTE — PROGRESS NOTES
hospitals Progress Note Date: 2021 Name: Steven Vinson MRN: [id number] : 1960 
 
0820: Mr. Arabella Hoover Arrived ambulatory and in no distress for C4D1 of Folfirinox Regimen. Assessment was completed, no acute issues at this time, no new complaints voiced. Right chest wall port accessed without difficulty, labs drawn & sent for processing. Chemotherapy Flowsheet 2021 Cycle C4D1 Date 2021 Drug / Regimen Folfirinox Pre Meds -  
Notes - Patient proceed to appointment with Dr. Terrence Underwood. Mr. Pratima Wick vitals were reviewed. Visit Vitals BP 90/66 (BP 1 Location: Right arm, BP Patient Position: At rest;Sitting) Pulse 78 Temp 97.8 °F (36.6 °C) Resp 20 Ht 5' 10\" (1.778 m) Wt 56.2 kg (123 lb 14.4 oz) SpO2 100% BMI 17.78 kg/m² Lab results were obtained and reviewed. Recent Results (from the past 24 hour(s)) CBC WITH AUTOMATED DIFF Collection Time: 21  8:42 AM  
Result Value Ref Range WBC 3.9 (L) 4.1 - 11.1 K/uL  
 RBC 3.33 (L) 4.10 - 5.70 M/uL  
 HGB 10.3 (L) 12.1 - 17.0 g/dL HCT 30.9 (L) 36.6 - 50.3 % MCV 92.8 80.0 - 99.0 FL  
 MCH 30.9 26.0 - 34.0 PG  
 MCHC 33.3 30.0 - 36.5 g/dL  
 RDW 16.4 (H) 11.5 - 14.5 % PLATELET 968 489 - 324 K/uL MPV 9.0 8.9 - 12.9 FL  
 NRBC 0.0 0  WBC ABSOLUTE NRBC 0.00 0.00 - 0.01 K/uL NEUTROPHILS 39 32 - 75 % LYMPHOCYTES 37 12 - 49 % MONOCYTES 18 (H) 5 - 13 % EOSINOPHILS 4 0 - 7 % BASOPHILS 1 0 - 1 % IMMATURE GRANULOCYTES 0 0.0 - 0.5 % ABS. NEUTROPHILS 1.5 (L) 1.8 - 8.0 K/UL  
 ABS. LYMPHOCYTES 1.4 0.8 - 3.5 K/UL  
 ABS. MONOCYTES 0.7 0.0 - 1.0 K/UL  
 ABS. EOSINOPHILS 0.2 0.0 - 0.4 K/UL  
 ABS. BASOPHILS 0.0 0.0 - 0.1 K/UL  
 ABS. IMM. GRANS. 0.0 0.00 - 0.04 K/UL  
 DF AUTOMATED Ca 19-9 pending Medications: 
Medications Administered 0.9% sodium chloride infusion 500 mL Admin Date 
2021 Action New Bag Dose 
500 mL Rate 1,000 mL/hr Route IntraVENous Administered By 
Yvonne Montgomery RN  
  
  
 0.9% sodium chloride injection 10 mL Admin Date 
01/11/2021 Action Given Dose 
10 mL Route IntraVENous Administered By 
Yvonne Montgomery RN  
  
  
 atropine 0.4 mg/mL injection 0.4 mg   
 Admin Date 
01/11/2021 Action Given Dose 0.4 mg Route IntraVENous Administered By 
Yvonne Montgomery RN  
  
  
 dexamethasone (DECADRON) 12 mg in 0.9% sodium chloride 50 mL IVPB Admin Date 
01/11/2021 Action Given Dose 
12 mg Route IntraVENous Administered By 
Yvonne Montgomery RN  
  
  
 dextrose 5% infusion Admin Date 
01/11/2021 Action New Bag Dose 25 mL/hr Rate 25 mL/hr Route IntraVENous Administered By 
Yvonne Montgomery RN  
  
  
 fluorouraciL (ADRUCIL) 3,936 mg in 0.9% sodium chloride 100 mL CADD Cassette Admin Date 
01/11/2021 Action New Bag Dose 
3,936 mg Rate 2.2 mL/hr Route IntraVENous Administered By 
Yvonne Montgomery RN  
  
  
 irinotecan (CAMPTOSAR) 246 mg in dextrose 5% 250 mL, overfill volume 25 mL chemo infusion Admin Date 
01/11/2021 Action New Bag Dose 
246 mg Rate 
191.5 mL/hr Route IntraVENous Administered By 
Yvonne Montgomery RN  
  
  
 leucovorin (WELLCOVORIN) 656 mg in dextrose 5% 250 mL, overfill volume 25 mL IVPB Admin Date 
01/11/2021 Action New Bag Dose 656 mg Rate 
205.2 mL/hr Route IntraVENous Administered By 
Yvonne Montgomery RN  
  
  
 oxaliplatin (ELOXATIN) 139.5 mg in dextrose 5% 250 mL, overfill volume 25 mL chemo infusion Admin Date 
01/11/2021 Action New Bag Dose 
139.5 mg Rate 151.5 mL/hr Route IntraVENous Administered By 
Yvonne Montgomery RN  
  
  
 palonosetron HCl (ALOXI) injection 0.25 mg   
 Admin Date 
01/11/2021 Action Given Dose 0.25 mg Route IntraVENous Administered By 
Yvonne Montgomery RN  
  
  
 sodium chloride (NS) flush 10 mL Admin Date 
01/11/2021 Action Given Dose 
10 mL Route IntraVENous Administered By 
 Dorothey Skiff, RN  
  
  
  
 
 
Mr. Renetta Giordano tolerated treatment well and was discharged from [place] [address] in stable condition at 1520. Port flushed & connected to infusing CADD pump per order. He is to return on January 13 at 1330 for his next appointment. Gertrudis Quinn RN 
January 11, 2021

## 2021-01-11 NOTE — PROGRESS NOTES
Chief Complaint Patient presents with  Follow-up Roberta Biswas is a pleasant 64year old male who presents today as a follow up for pancreatic cancer. He denies pain today.

## 2021-01-11 NOTE — PATIENT INSTRUCTIONS
Scheduling will reach out to you to schedule an appointment time or you can call [phone] to schedule.

## 2021-01-13 NOTE — PROGRESS NOTES
[facility] Progress Note Date: 2021 Name: Jennifer Otero MRN: [id number] : 1960 
 
1340: Mr. Suzan Faust Arrived ambulatory and in no distress for Pump Removal.  Assessment was completed, no acute issues at this time, no new complaints voiced. CADD completed- 100 ml infused per order. Mr. Ariane Valdovinos vitals were reviewed. Visit Vitals /70 Pulse (!) 101 Temp (!) 96.2 °F (35.7 °C) Resp 18 SpO2 99% Medications Administered   
 heparin (porcine) pf 300-500 Units Admin Date 
2021 Action Given Dose 
500 Units Route InterCATHeter Administered By 
Yolanda Pagan RN  
  
  
 sodium chloride (NS) flush 10 mL Admin Date 
2021 Action Given Dose 
10 mL Route IntraVENous Administered By 
Yolanda Pagan RN  
  
  
  
 
 
 
Mr. Suzan Faust tolerated treatment well and was discharged from [place] [address] in stable condition at 1350. Port de-accessed, flushed & heparinized per protocol. He is to return on  at 0730 for his next appointment. Diana Anton RN 
2021

## 2021-02-01 NOTE — TELEPHONE ENCOUNTER
[address] at [facility]  [phone]        02/01/21 8:55 AM Attempted to call patient via cell phone number listed to check on him as he has not yet arrived for appointment today. No answer and voicemail is currently full. Attempted to call home number listed, number rang and rang--no answer and no voicemail.

## 2021-02-03 NOTE — PROGRESS NOTES
[facility] [place] at [address], [address] [facility] 100[address] [facility] W: [phone]  F: [phone] Hematology Oncology established visit Reason for Visit:  
Merline Garcia is a 64 y.o. male who is seen here for follow up of pancreatic cancer, new diagnosis. Hematology / Oncology Treatment History:  
 
Diagnosis: Pancreatic, adenocarcinoma, moderately to poorly diiferentiated Stage: IV [[id number]] Pathology: 2020 pancreatic body, FNA; adenocarcinoma, moderately to poorly diiferentiated Foundation One testing: Negative for reportable alterations. MS-stable. KRAS G12D. Tumor mutational burden 1 muts/mb [id number] [id number]. KDM6A Q611 TPS3 [id number] [employer]: Germline testing negative for BRCA1/2 and 86 other genes. Prior Treatment: none Current Treatment:  FOLFIRINOX every 2 weeks, to start 20. History of Present Illness: Mr. Merline Garcia is a 64 y.o. male admitted on 2020 from the ED with worsening abd pain and constipation. Rates pain on admission 9/10. Recent dx of pancreatic cancer during last hospitalization. Pt reports has not had a BM since last Wed; stopped taking the Senna-docusate last week; he thought it was causing his stomach to hurt more. He was unaware that it was part of a bowel regimen and thought it was pain medication. Has had some nausea but denies vomiting. Decreased appetite. Denies SOB. ED CT scan shows subacute pancreatitis, pancreatic body adenocarcinoma, retroperitoneal remington metastases, peritoneal carcinomatosis. Pt would like his sister to be able to hear about the treatment plans; she is coming from [place] in the am. Tiara Walter to port placement; defininelty wants to have treatment. 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 Smokes cigarettes: 1 ppd x 30 yrs. Rare EtOH use. : 3 kids; twins 29 yrs old and 25 yr old. Interval History:  
Patient here for follow up of pancreatic cancer and C5 of FOLFIRINOX. Had a CT scan. He missed several weeks due to weather and ride situations. Constipation managed w miralax twice daily. He gained weight. Reports poor appetite the week of treatment but notes improvement the following week. Abdominal pain managed with OxyContin. Oxycodone for breakthrough pain. Reports nausea after treatment managed with zofran, compazine and dex. Reports neuropathy in fingertips resolved during chemo break. Denies fevers, chills, CP, SOB, mucositis lesions. Past Medical History:  
Diagnosis Date  Gout  Pancreatic cancer ([address]  Psoriasis  Tobacco abuse Past Surgical History:  
Procedure Laterality Date  HX ORTHOPAEDIC    
 femur repair  HX ORTHOPAEDIC    
 lumbar compressed fracture  IR INSERT TUNL CVC W PORT OVER 5 YEARS  11/24/2020 Social History Tobacco Use  Smoking status: Current Every Day Smoker Packs/day: 1.00  Smokeless tobacco: Never Used  Tobacco comment: last week was last cigarette Substance Use Topics  Alcohol use: Yes Family History Problem Relation Age of Onset  Pancreatic Cancer Brother Current Outpatient Medications Medication Sig  
 oxyCODONE ER (OxyCONTIN) 10 mg ER tablet Take 1 Tab by mouth every twelve (12) hours for 30 days. Max Daily Amount: 20 mg.  
 oxyCODONE IR (ROXICODONE) 5 mg immediate release tablet Take 1 Tab by mouth every six (6) hours as needed for Pain for up to 30 days. Max Daily Amount: 20 mg.  polyethylene glycol (MIRALAX) 17 gram packet Take 1 Packet by mouth daily as needed for Constipation.  senna-docusate (PERICOLACE) 8.6-50 mg per tablet Take 1 Tab by mouth daily. Indications: constipation  magic mouthwash solution Sig: 
Magic mouth wash Maalox Lidocaine 2% viscous Diphenhydramine oral solution Pharmacy to mix equal portions of ingredients to a total volume as indicated in the dispense amount. Swish and spit 10mL every 4 hours as needed for mouth pain, okay to swallow for throat pain.  prochlorperazine (Compazine) 10 mg tablet Take 1 Tab by mouth every six (6) hours as needed for Nausea or Vomiting.  ondansetron hcl (ZOFRAN) 8 mg tablet Take 1 Tab by mouth every eight (8) hours as needed for Nausea or Vomiting.  lidocaine-prilocaine (EMLA) topical cream Apply a dime size amount to port site 30 minutes before access to prevent pain.  dexAMETHasone (DECADRON) 4 mg tablet Take 8mg on days 2 and 3 after treatment to prevent nausea.  senna-docusate (PERICOLACE) 8.6-50 mg per tablet Take 2 Tabs by mouth daily. No current facility-administered medications for this visit. No Known Allergies Review of Systems: A complete review of systems was obtained, negative except as described above. Physical Exam:  
 
Visit Vitals /73 Pulse 91 Temp 97.3 °F (36.3 °C) Resp 18 Ht 5' 10\" (1.778 m) Wt 127 lb (57.6 kg) SpO2 100% BMI 18.22 kg/m² ECOG PS: 1 General: No distress Eyes: Anicteric sclerae HENT: Atraumatic Neck: Supple Respiratory: Normal respiratory effort CV: No peripheral edema, port right upper chest.  
GI: Soft, nontender, nondistended, no masses, no hepatomegaly, no splenomegaly Skin: No rashes, ecchymoses, or petechiae Psych: Alert, oriented, appropriate affect, normal judgment/insight Results:  
 
Lab Results Component Value Date/Time WBC 6.1 02/08/2021 07:55 AM  
 HGB 11.7 (L) 02/08/2021 07:55 AM  
 HCT 34.5 (L) 02/08/2021 07:55 AM  
 PLATELET 252 29/99/0845 07:55 AM  
 MCV 96.6 02/08/2021 07:55 AM  
 ABS. NEUTROPHILS 3.2 02/08/2021 07:55 AM  
 
Lab Results Component Value Date/Time  Sodium 140 02/08/2021 07:55 AM  
 Potassium 3.6 2021 07:55 AM  
 Chloride 109 (H) 2021 07:55 AM  
 CO2 24 2021 07:55 AM  
 Glucose 150 (H) 2021 07:55 AM  
 BUN 11 2021 07:55 AM  
 Creatinine 0.92 2021 07:55 AM  
 GFR est AA >60 2021 07:55 AM  
 GFR est non-AA >60 2021 07:55 AM  
 Calcium 8.2 (L) 2021 07:55 AM  
 
Lab Results Component Value Date/Time Bilirubin, total 0.3 2021 07:55 AM  
 ALT (SGPT) 74 2021 07:55 AM  
 Alk. phosphatase 68 2021 07:55 AM  
 Protein, total 6.8 2021 07:55 AM  
 Albumin 3.4 (L) 2021 07:55 AM  
 Globulin 3.4 2021 07:55 AM  
 
Lab Results Component Value Date/Time Sed rate, automated 4 2017 03:56 PM  
 C-Reactive protein <0.29 2017 03:56 PM  
 TSH 3.36 2020 05:28 AM  
 Lipase 115 2020 06:22 AM  
 
Lab Results Component Value Date/Time INR 1.0 2010 03:05 AM  
 aPTT 28.0 2010 03:05 AM  
 
Lab Results Component Value Date/Time CEA 4.9 2020 05:29 AM  
 Carbohydrate Antigen 19-9, (CA 19-9) 1,317 (H) 2021 08:42 AM  
 
 
20 CA 19-9 5607 at diagnosis. 21 CA 19-9 6483 
20 started treatment 21 CA 19-9: 1317 Imagin2020 XR ABD FLAT IMPRESSION: 
 No evidence for acute abnormality 2020 CT ABD PELV W CONT IMPRESSION:  
1. Subacute pancreatitis, with small walled-off necrosis (\"pseudocyst\") in the lesser sac. 2. Pancreatic body adenocarcinoma. 3. Occluded splenic vein. Consequent, extensive, submucosal, gastric varices in the cardia. 4. Encasement of the splenic artery. Abutment of the celiac artery, SMV, and SMA. 5. Retroperitoneal remington metastases. 6. Peritoneal carcinomatosis. 
  
21 CT c/a/p: 
IMPRESSION 1. Interval resolution of free intraperitoneal fluid. 2. Pancreatic mass and retroperitoneal lymphadenopathy, unchanged and as 
described above. 3.Right hepatic lobe hypodensities, not significantly changed, as described 
above. 4. Recommend continued short interval surveillance. Addendum:  
There has been interval improvement of the omental nodularity. There has also 
been interval improvement of the ill-defined soft tissue anterior and inferior 
to the pancreatic body, previously seen on CT abdomen and pelvis dated November 22, 2020. The pancreatic body mass is difficult to measure but measures approximately 2.5 
cm x 2.2 cm and measured approximately 2.9 cm x 2.6 cm a prior CT dated November 2020. There has been interval resolution of free intraperitoneal fluid. Retroperitoneal lymphadenopathy, not significantly changed. Right hepatic lobe hypodensities, as described above, not significantly changed. Assessment and Recommendations:  
65 y/o male with metastatic pancreatic cancer on palliative chemotherapy. 1. Pancreatic adenocarcinoma: Stage IV, Germline and somatic genetic testing negative. Diagnosed 11/18/2020, has Stage IV disease with peritoneal carcinomatosis and retroperitoneal LAD, encasement of vasculature seen on CT. CA 19-9 5607 at diagnosis. Previously discussed with patient that his disease is not curable, but is treatable. I recommended palliative chemotherapy with FOLFIRINOX regimen. We discussed the risks and benefits of FOLFIRINOX chemotherapy, including potential side effects. These include but are not limited to fatigue, nausea vomiting, diarrhea, neuropathy, taste changes, cold intolerance, esophageal spasm, allergic reactions, alopecia, mucositis, myelosuppression, risk for infection, infertility, and rarely, death. Rarely, a patient may have a condition where they do not metabolize fluorouracil appropriately (called DPD deficiency), and they may have excessive toxicity. A Port-A-Cath will be required in order to deliver the continuous infusion. The patient has consented to beginning therapy. At time of disease progression, will use PROSCAP nomogram and consider Edgar-Abraxane in 2nd line. CT 1/21/21 shows response to treatment. Supportive medications include: EMLA cream, zofran, compazine, dexamethasone. -- Ask pathology to test for MSI- foundation MS-stable. -- Proceed with cycle 5 FOLFIRNOX, MD/NP visit, pump removal on Wednesday. -- CT scan due after cycle 8 treatment. -- Follow up in 2 weeks for cycle 6, MD/NP visit. 2. H/o subacute pancreatitis:  
Was treated with IVF in hospital. Currently stable. 3. F/E/N/G: 
Constipation 2/2 opioids and bowel regimen adjusted. Continue Miralax 1-2 times daily and Docusate/Senna 1-2 times daily. Encouraged supplementing with 1-2 high calorie protein drinks daily to prevent further WL. Given samples from our supply due to cost.  
-- Luis Enrique Banerjee RD. 4. Occluded splenic vein: Previously discussion with radiology, there is no clot. This is occlusion by the cancer and no anticoagulation is needed. 5. Neoplasm related pain:  
Well controlled on Oxycontin 10mg q12h and Percocet prn.   
-- Refilled Oxycontin 10mg q12h #60 on 1/11/21 (dated 1/20/21.) -- Refilled Oxycodone q6h PRN #45 on 2/8/21 (dated 2/15//21.) 6. Family h/o pancreatic cancer: 
Was concerning for genetic predisposition but germline genetic testing negative. 7. Chemotherapy induced neutropenia:  
Mild. Occurred after C2 treatment. Advised neutropenic precautions. 8. Hypotension:  
Chronically low. Asymptomatic today. I have personally seen and evaluated the patient in conjunction with Jassi Sewell NP. I find the patient's history and physical exam are consistent with the NP's documentation. I agree with the above assessment and plan, which I have edited if needed. Signed By: 
Date: Acacia Wilson MD  
02/08/21

## 2021-02-08 NOTE — PROGRESS NOTES
Chief Complaint Patient presents with  Pancreatic Cancer Vitals 2/8/2021 Blood Pressure 106/73 Pulse 91 Temp 97.3 Resp 18 Height 5' 10\" Weight 127 lb 3.2 oz SpO2 100 BSA 1.69 m2 BMI 18.25 kg/m2

## 2021-02-08 NOTE — PROGRESS NOTES
\[facility] [place] [facility]\"" Progress Note Date: 2021 Name: Chad Mallory MRN: [id number] : 1960 Mr. Leonor Vitale Arrived ambulatory and in no distress for C5D1 of Folforinox Regimen. Assessment was completed, no acute issues at this time, no new complaints voiced. Right chest wall port accessed without difficulty, labs drawn & sent for processing. Covid questionnaire completed. 1. Do you have any symptoms of COVID-19? SOB, coughing, fever, or generally not feeling well - no 
 
2. Have you been exposed to COVID-19 recently? - no 
 
3. Have you had any recent contact with family/friend that has a pending COVID test? - no Chemotherapy Flowsheet 2021 Cycle C5D1 Date 2021 Drug / Regimen Folfirinox Post Hydration -  
Pre Meds given Notes given Patient proceed to appointment with Dr. Julián Carrera. Mr. Macarena Tejada vitals were reviewed. Visit Vitals /69 (BP 1 Location: Right upper arm, BP Patient Position: Sitting) Pulse 85 Temp 97.3 °F (36.3 °C) Resp 18 Ht 5' 10\" (1.778 m) Wt 57.7 kg (127 lb 3.2 oz) SpO2 100% BMI 18.25 kg/m² Lab results were obtained and reviewed. Recent Results (from the past 12 hour(s)) CBC WITH 3 PART DIFF Collection Time: 21  7:55 AM  
Result Value Ref Range WBC 6.1 4.1 - 11.1 K/uL  
 RBC 3.57 (L) 4.10 - 5.70 M/uL  
 HGB 11.7 (L) 12.1 - 17.0 g/dL HCT 34.5 (L) 36.6 - 50.3 % MCV 96.6 80.0 - 99.0 FL  
 MCH 32.8 26.0 - 34.0 PG  
 MCHC 33.9 30.0 - 36.5 g/dL RDW 20.3 (H) 11.8 - 15.8 % PLATELET 600 396 - 358 K/uL NEUTROPHILS 53 32 - 75 % MIXED CELLS 16 3.2 - 16.9 % LYMPHOCYTES 31 12 - 49 % ABS. NEUTROPHILS 3.2 1.8 - 8.0 K/UL  
 ABS. MIXED CELLS 1.0 0.2 - 1.2 K/uL  
 ABS. LYMPHOCYTES 1.9 0.8 - 3.5 K/UL  
 DF AUTOMATED METABOLIC PANEL, COMPREHENSIVE Collection Time: 21  7:55 AM  
Result Value Ref Range Sodium 140 136 - 145 mmol/L  Potassium 3.6 3.5 - 5.1 mmol/L  
 Chloride 109 (H) 97 - 108 mmol/L  
 CO2 24 21 - 32 mmol/L Anion gap 7 5 - 15 mmol/L Glucose 150 (H) 65 - 100 mg/dL BUN 11 6 - 20 MG/DL Creatinine 0.92 0.70 - 1.30 MG/DL  
 BUN/Creatinine ratio 12 12 - 20 GFR est AA >60 >60 ml/min/1.73m2 GFR est non-AA >60 >60 ml/min/1.73m2 Calcium 8.2 (L) 8.5 - 10.1 MG/DL Bilirubin, total 0.3 0.2 - 1.0 MG/DL  
 ALT (SGPT) 74 12 - 78 U/L  
 AST (SGOT) 25 15 - 37 U/L Alk. phosphatase 68 45 - 117 U/L Protein, total 6.8 6.4 - 8.2 g/dL Albumin 3.4 (L) 3.5 - 5.0 g/dL Globulin 3.4 2.0 - 4.0 g/dL A-G Ratio 1.0 (L) 1.1 - 2.2 Medications: 
Medications Administered   
 atropine 0.4 mg/mL injection 0.4 mg   
 Admin Date 
02/08/2021 Action Given Dose 0.4 mg Route IntraVENous Administered By 
Lincoln Early RN  
  
  
 dexamethasone (DECADRON) 12 mg in 0.9% sodium chloride 50 mL IVPB Admin Date 
02/08/2021 Action Given Dose 
12 mg Route IntraVENous Administered By 
Lincoln Early RN  
  
  
 dextrose 5% infusion Admin Date 
02/08/2021 Action New Bag Dose 25 mL/hr Rate 25 mL/hr Route IntraVENous Administered By 
Lincoln Early RN  
  
  
 fluorouraciL (ADRUCIL) 3,936 mg in 0.9% sodium chloride 100 mL CADD Cassette Admin Date 
02/08/2021 Action New Bag Dose 
3,936 mg Rate 2.2 mL/hr Route IntraVENous Administered By 
Lincoln Early RN  
  
  
 irinotecan (CAMPTOSAR) 246 mg in dextrose 5% 250 mL, overfill volume 25 mL chemo infusion Admin Date 
02/08/2021 Action New Bag Dose 
246 mg Rate 
191.5 mL/hr Route IntraVENous Administered By 
Lincoln Early RN  
  
  
 leucovorin (WELLCOVORIN) 656 mg in dextrose 5% 250 mL, overfill volume 25 mL IVPB Admin Date 
02/08/2021 Action New Bag Dose 656 mg Rate 
205.2 mL/hr Route IntraVENous Administered By 
Lincoln Early RN  
  
  
 oxaliplatin (ELOXATIN) 139.5 mg in dextrose 5% 250 mL, overfill volume 25 mL chemo infusion Admin Date 02/08/2021 Action New Bag Dose 
139.5 mg Rate 151.5 mL/hr Route IntraVENous Administered By 
Andrea Marcelino RN  
  
  
 palonosetron HCl (ALOXI) injection 0.25 mg   
 Admin Date 
02/08/2021 Action Given Dose 0.25 mg Route IntraVENous Administered By 
Andrea Marcelino RN  
  
  
 sodium chloride (NS) flush 10 mL Admin Date 
02/08/2021 Action Given Dose 
10 mL Route IntraVENous Administered By 
Andrea Marcelino RN  
  
  
  
 
 
Mr. Anuja Contreras tolerated treatment well and was discharged from [place] [address] in stable condition at 1520. Port  flushed and CADD Cassette attached. Fluorouracil CADD Cassette infusing. He is to return on February 10 2021 at 1330 for his next appointment. Clare Contreras RN February 8, 2021

## 2021-02-10 NOTE — PROGRESS NOTES
[facility] Progress Note Date: February 10, 2021 Name: Chad Mallory MRN: [id number] : 1960 Mr. Leonor Vitale Arrived ambulatory and in no distress for Pump Removal.  Assessment was completed, no acute issues at this time, no new complaints voiced. CADD completed- 100 ml infused per order. Mr. Macarena Tejada vitals were reviewed. Visit Vitals /70 (BP 1 Location: Right upper arm, BP Patient Position: Sitting) Pulse 94 Temp (!) 96.4 °F (35.8 °C) Resp 20  
 
 
 
 
Mr. Dias tolerated treatment well and was discharged from [place] [address] in stable condition. Port de-accessed, flushed & heparinized per protocol. He is to return on  at 0730 for his next appointment. Liliana Martinez RN February 10, 2021

## 2021-02-17 NOTE — PROGRESS NOTES
[facility] [address] [place] [address], [address] [place] [address] W: [phone]  F: [phone] Hematology Oncology established visit Reason for Visit:  
Jennifer Connell is a 64 y.o. male who is seen here for follow up of pancreatic cancer, new diagnosis. Hematology / Oncology Treatment History:  
 
Diagnosis: Pancreatic, adenocarcinoma, moderately to poorly diiferentiated Stage: IV [[id number]] Pathology: 2020 pancreatic body, FNA; adenocarcinoma, moderately to poorly diiferentiated Foundation One testing: Negative for reportable alterations. MS-stable. KRAS G12D. Tumor mutational burden 1 muts/mb [id number] [id number]. KDM6A Q611 TPS3 [id number]*72 [employer]: Germline testing negative for BRCA1/2 and 86 other genes. Prior Treatment: none Current Treatment:  FOLFIRINOX every 2 weeks, to start 20. History of Present Illness: Mr. Jennifer Connell is a 64 y.o. male admitted on 2020 from the ED with worsening abd pain and constipation. Rates pain on admission 9/10. Recent dx of pancreatic cancer during last hospitalization. Pt reports has not had a BM since last Wed; stopped taking the Senna-docusate last week; he thought it was causing his stomach to hurt more. He was unaware that it was part of a bowel regimen and thought it was pain medication. Has had some nausea but denies vomiting. Decreased appetite. Denies SOB. ED CT scan shows subacute pancreatitis, pancreatic body adenocarcinoma, retroperitoneal remington metastases, peritoneal carcinomatosis. Pt would like his sister to be able to hear about the treatment plans; she is coming from [place] in the am. Aminata Greenfield to port placement; defininelty wants to have treatment. 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 Smokes cigarettes: 1 ppd x 30 yrs. Rare EtOH use.  
: 3 kids; twins 28 yrs old and 24 yr old.  
 
 
Interval History:  
Patient here for follow up of pancreatic cancer and C6 of FOLFIRINOX.  
Constipation managed w miralax twice daily. He gained weight. Reports poor appetite the week of treatment but notes improvement the following week. Abdominal pain managed with OxyContin. Oxycodone for breakthrough pain. Reports nausea after treatment managed with zofran, compazine and dex. Reports neuropathy in fingertips resolved during chemo break. Denies fevers, chills, CP, SOB, mucositis lesions.  
 
Past Medical History:  
Diagnosis Date  
• Gout   
• Pancreatic cancer (HCC)   
• Psoriasis   
• Tobacco abuse   
  
Past Surgical History:  
Procedure Laterality Date  
• HX ORTHOPAEDIC    
 femur repair  
• HX ORTHOPAEDIC    
 lumbar compressed fracture  
• IR INSERT TUNL CVC W PORT OVER 5 YEARS  11/24/2020  
  
Social History  
 
Tobacco Use  
• Smoking status: Current Every Day Smoker  
  Packs/day: 1.00  
• Smokeless tobacco: Never Used  
• Tobacco comment: last week was last cigarette  
Substance Use Topics  
• Alcohol use: Yes  
  
Family History  
Problem Relation Age of Onset  
• Pancreatic Cancer Brother   
 
Current Outpatient Medications  
Medication Sig  
• oxyCODONE IR (ROXICODONE) 5 mg immediate release tablet Take 1 Tab by mouth every six (6) hours as needed for Pain for up to 30 days. Max Daily Amount: 20 mg.  
• oxyCODONE ER (OxyCONTIN) 10 mg ER tablet Take 1 Tab by mouth every twelve (12) hours for 30 days. Max Daily Amount: 20 mg.  
• polyethylene glycol (MIRALAX) 17 gram packet Take 1 Packet by mouth daily as needed for Constipation.  
• senna-docusate (PERICOLACE) 8.6-50 mg per tablet Take 1 Tab by mouth daily. Indications: constipation  
• magic mouthwash solution Sig: 
Magic mouth wash  
Maalox  
 Lidocaine 2% viscous Diphenhydramine oral solution Pharmacy to mix equal portions of ingredients to a total volume as indicated in the dispense amount. Swish and spit 10mL every 4 hours as needed for mouth pain, okay to swallow for throat pain.  prochlorperazine (Compazine) 10 mg tablet Take 1 Tab by mouth every six (6) hours as needed for Nausea or Vomiting.  ondansetron hcl (ZOFRAN) 8 mg tablet Take 1 Tab by mouth every eight (8) hours as needed for Nausea or Vomiting.  lidocaine-prilocaine (EMLA) topical cream Apply a dime size amount to port site 30 minutes before access to prevent pain.  dexAMETHasone (DECADRON) 4 mg tablet Take 8mg on days 2 and 3 after treatment to prevent nausea.  senna-docusate (PERICOLACE) 8.6-50 mg per tablet Take 2 Tabs by mouth daily. No current facility-administered medications for this visit. No Known Allergies Review of Systems: A complete review of systems was obtained, negative except as described above. Physical Exam:  
 
Visit Vitals /72 Pulse 72 Temp (!) 96.6 °F (35.9 °C) Resp 16 Wt 130 lb (59 kg) SpO2 97% BMI 18.65 kg/m² ECOG PS: 1 General: No distress Eyes: Anicteric sclerae HENT: Atraumatic Neck: Supple Respiratory: Normal respiratory effort CV: No peripheral edema, port right upper chest.  
GI: Soft, nontender, nondistended, no masses, no hepatomegaly, no splenomegaly Skin: No rashes, ecchymoses, or petechiae Psych: Alert, oriented, appropriate affect, normal judgment/insight Results:  
 
Lab Results Component Value Date/Time WBC 4.5 02/22/2021 07:58 AM  
 HGB 10.6 (L) 02/22/2021 07:58 AM  
 HCT 30.8 (L) 02/22/2021 07:58 AM  
 PLATELET 348 (L) 44/93/4118 07:58 AM  
 MCV 96.3 02/22/2021 07:58 AM  
 ABS. NEUTROPHILS 2.2 02/22/2021 07:58 AM  
 
Lab Results Component Value Date/Time  Sodium 140 02/08/2021 07:55 AM  
 Potassium 3.6 02/08/2021 07:55 AM  
 Chloride 109 (H) 02/08/2021 07:55 AM  
 CO2 24 2021 07:55 AM  
 Glucose 150 (H) 2021 07:55 AM  
 BUN 11 2021 07:55 AM  
 Creatinine 0.92 2021 07:55 AM  
 GFR est AA >60 2021 07:55 AM  
 GFR est non-AA >60 2021 07:55 AM  
 Calcium 8.2 (L) 2021 07:55 AM  
 
Lab Results Component Value Date/Time Bilirubin, total 0.3 2021 07:55 AM  
 ALT (SGPT) 74 2021 07:55 AM  
 Alk. phosphatase 68 2021 07:55 AM  
 Protein, total 6.8 2021 07:55 AM  
 Albumin 3.4 (L) 2021 07:55 AM  
 Globulin 3.4 2021 07:55 AM  
 
Lab Results Component Value Date/Time Sed rate, automated 4 2017 03:56 PM  
 C-Reactive protein <0.29 2017 03:56 PM  
 TSH 3.36 2020 05:28 AM  
 Lipase 115 2020 06:22 AM  
 
Lab Results Component Value Date/Time INR 1.0 2010 03:05 AM  
 aPTT 28.0 2010 03:05 AM  
 
Lab Results Component Value Date/Time CEA 4.9 2020 05:29 AM  
 Carbohydrate Antigen 19-9, (CA 19-9) 1,317 (H) 2021 08:42 AM  
 
 
20 CA 19-9 5607 at diagnosis. 21 CA 19-9 6483 
20 started treatment 21 CA 19-9: 1317 Imagin2020 XR ABD FLAT IMPRESSION: 
 No evidence for acute abnormality 2020 CT ABD PELV W CONT IMPRESSION:  
1. Subacute pancreatitis, with small walled-off necrosis (\"pseudocyst\") in the lesser sac. 2. Pancreatic body adenocarcinoma. 3. Occluded splenic vein. Consequent, extensive, submucosal, gastric varices in the cardia. 4. Encasement of the splenic artery. Abutment of the celiac artery, SMV, and SMA. 5. Retroperitoneal remington metastases. 6. Peritoneal carcinomatosis. 
  
21 CT c/a/p: 
IMPRESSION 1. Interval resolution of free intraperitoneal fluid. 2. Pancreatic mass and retroperitoneal lymphadenopathy, unchanged and as 
described above. 3.Right hepatic lobe hypodensities, not significantly changed, as described 
above. 4. Recommend continued short interval surveillance. Addendum:  
There has been interval improvement of the omental nodularity. There has also 
been interval improvement of the ill-defined soft tissue anterior and inferior 
to the pancreatic body, previously seen on CT abdomen and pelvis dated November 22, 2020. The pancreatic body mass is difficult to measure but measures approximately 2.5 
cm x 2.2 cm and measured approximately 2.9 cm x 2.6 cm a prior CT dated November 2020. There has been interval resolution of free intraperitoneal fluid. Retroperitoneal lymphadenopathy, not significantly changed. Right hepatic lobe hypodensities, as described above, not significantly changed. Assessment and Recommendations:  
65 y/o male with metastatic pancreatic cancer on palliative chemotherapy. 1. Pancreatic adenocarcinoma:  
Stage IV, Germline and somatic genetic testing negative. Diagnosed 11/18/2020, has Stage IV disease with peritoneal carcinomatosis and retroperitoneal LAD, encasement of vasculature seen on CT. CA 19-9 5607 at diagnosis. Previously discussed with patient that his disease is not curable, but is treatable. I recommended palliative chemotherapy with FOLFIRINOX regimen. We discussed the risks and benefits of FOLFIRINOX chemotherapy, including potential side effects. These include but are not limited to fatigue, nausea vomiting, diarrhea, neuropathy, taste changes, cold intolerance, esophageal spasm, allergic reactions, alopecia, mucositis, myelosuppression, risk for infection, infertility, and rarely, death. Rarely, a patient may have a condition where they do not metabolize fluorouracil appropriately (called DPD deficiency), and they may have excessive toxicity. A Port-A-Cath will be required in order to deliver the continuous infusion. The patient has consented to beginning therapy. At time of disease progression, will use PROSCAP nomogram and consider [place]-Abraxane in 2nd line. CT 1/21/21 shows response to treatment. Supportive medications include: EMLA cream, zofran, compazine, dexamethasone. -- Foundation One: MS-stable. -- Proceed with cycle 6 FOLFIRNOX, MD/NP visit, pump removal on Wednesday. -- CT scan due after cycle 8 treatment. -- Follow up in 2 weeks for cycle 7, MD/NP visit. 2. H/o subacute pancreatitis:  
Was treated with IVF in hospital. Currently stable. 3. F/E/N/G: 
Constipation 2/2 opioids and bowel regimen adjusted. Continue Miralax 1-2 times daily and Docusate/Senna 1-2 times daily. Encouraged supplementing with 1-2 high calorie protein drinks daily to prevent further WL. Given samples from our supply due to cost. Miky Little RD. 4. Occluded splenic vein: 
Previously discussion with radiology, there is no clot. This is occlusion by the cancer and no anticoagulation is needed. 5. Neoplasm related pain:  
Well controlled on Oxycontin 10mg q12h and Percocet prn.   
-- Refilled Oxycontin 10mg q12h #60 on 2/22/21 (dated 3/1/21.) -- Refilled Oxycodone q6h PRN #45 on 2/8/21 (dated 2/15/21.) 6. Family h/o pancreatic cancer: 
Was concerning for genetic predisposition but germline genetic testing negative. 7. Chemotherapy induced neutropenia:  
Mild. Occurred after C2 treatment. Advised neutropenic precautions. 8. Chemotherapy induced thrombocytopenia: Mild. Bleeding precautions. I have personally seen and evaluated the patient in conjunction with Tamika Bergeron NP. I find the patient's history and physical exam are consistent with the NP's documentation. I agree with the above assessment and plan, which I have edited if needed. Signed By: 
Date: Yuniel Macias MD  
02/22/21

## 2021-02-22 NOTE — PROGRESS NOTES
Xavier Camarillo is a 64 y.o. male here for follow up of pancreatic cancer. Patient with complaints of abdominal pain, rates as a 5 out of 10.

## 2021-02-22 NOTE — PROGRESS NOTES
[facility] Progress Note Date: 2021 Name: Maira Clark MRN: [id number] : 1960 Mr. Noemy Shay Arrived ambulatory and in no distress for C6D1 of Folfirinox Regimen. Assessment was completed, no acute issues at this time, no new complaints voiced. R chest wall port accessed without difficulty, labs drawn & sent for processing. Covid Questionnaire completed. 1. Do you have any symptoms of covid 19? SOB, coughing, fever, or generally not feeling well? - no 
2. Have you been exposed to covid 19 recently? - no 
3. Have you had any recent contact with family/friend that has a pending covid test? no 
 
 
Chemotherapy Flowsheet 2021 Cycle C6D1 Date 2021 Drug / Regimen Folfirinox Post Hydration -  
Pre Meds given Notes given Patient proceed to MD appointment. Patient returned with no change in treatment. Mr. Shaji Hawkins vitals were reviewed. Visit Vitals /74 Pulse 73 Temp 98 °F (36.7 °C) Resp 16 Ht 5' 10\" (1.778 m) Wt 59 kg (130 lb) SpO2 97% BMI 18.65 kg/m² Lab results were obtained and reviewed. Recent Results (from the past 12 hour(s)) CBC WITH 3 PART DIFF Collection Time: 21  7:58 AM  
Result Value Ref Range WBC 4.5 4.1 - 11.1 K/uL  
 RBC 3.20 (L) 4.10 - 5.70 M/uL  
 HGB 10.6 (L) 12.1 - 17.0 g/dL HCT 30.8 (L) 36.6 - 50.3 % MCV 96.3 80.0 - 99.0 FL  
 MCH 33.1 26.0 - 34.0 PG  
 MCHC 34.4 30.0 - 36.5 g/dL  
 RDW 19.2 (H) 11.8 - 15.8 % PLATELET 985 (L) 163 - 400 K/uL NEUTROPHILS 48 32 - 75 % MIXED CELLS 19 (H) 3.2 - 16.9 % LYMPHOCYTES 33 12 - 49 % ABS. NEUTROPHILS 2.2 1.8 - 8.0 K/UL  
 ABS. MIXED CELLS 0.8 0.2 - 1.2 K/uL  
 ABS. LYMPHOCYTES 1.5 0.8 - 3.5 K/UL  
 DF AUTOMATED Medications: 
Medications Administered   
 atropine 0.4 mg/mL injection 0.4 mg   
 Admin Date 
2021 Action Given Dose 0.4 mg Route IntraVENous Administered By 
Saint Mechanic, RN  
  
  
  dexamethasone (DECADRON) 12 mg in 0.9% sodium chloride 50 mL IVPB   
 Admin Date 
02/22/2021 Action 
Given Dose 
12 mg Route 
IntraVENous Administered By 
Soila Davila RN  
  
  
 dextrose 5% infusion   
 Admin Date 
02/22/2021 Action 
New Bag Dose 
25 mL/hr Rate 
25 mL/hr Route 
IntraVENous Administered By 
Soila Davila RN  
  
  
 fluorouraciL (ADRUCIL) 3,936 mg in 0.9% sodium chloride 100 mL CADD Cassette   
 Admin Date 
02/22/2021 Action 
New Bag Dose 
3,936 mg Rate 
2.2 mL/hr Route 
IntraVENous Administered By 
Soila Davila RN  
  
  
 irinotecan (CAMPTOSAR) 246 mg in dextrose 5% 250 mL, overfill volume 25 mL chemo infusion   
 Admin Date 
02/22/2021 Action 
New Bag Dose 
246 mg Rate 
191.5 mL/hr Route 
IntraVENous Administered By 
Soila Davila RN  
  
  
 leucovorin (WELLCOVORIN) 656 mg in dextrose 5% 250 mL, overfill volume 25 mL IVPB   
 Admin Date 
02/22/2021 Action 
New Bag Dose 
656 mg Rate 
205.2 mL/hr Route 
IntraVENous Administered By 
Soila Davila RN  
  
  
 oxaliplatin (ELOXATIN) 139.5 mg in dextrose 5% 250 mL, overfill volume 25 mL chemo infusion   
 Admin Date 
02/22/2021 Action 
New Bag Dose 
139.5 mg Rate 
151.5 mL/hr Route 
IntraVENous Administered By 
Soila Davila RN  
  
  
 palonosetron HCl (ALOXI) injection 0.25 mg   
 Admin Date 
02/22/2021 Action 
Given Dose 
0.25 mg Route 
IntraVENous Administered By 
Soila Davila RN  
  
  
  
 
 
 
Mr. Dias tolerated treatment well and [facility]   CADD pump infusing. He is to return on February 24 at 1330 for his next appointment. 
 
Soila Davila RN 
February 22, 2021

## 2021-02-24 NOTE — PROGRESS NOTES
[place] [address] Visit Progress Note 1350 Patient admitted to [facility] for Pump DC in stable condition. Assessment completed. No new concerns voiced. Covid Screening Questions: 
1) Do you have any symptoms of COVID-19? SOB, coughing, fever, or generally not feeling well? no 
2) Have you been exposed to COVID-19 recently? no 
3) Have you had any recent contact with family/friend that has a pending COVID test? no 
 
 
VS 
Patient Vitals for the past 12 hrs: 
 Temp Pulse Resp BP SpO2  
02/24/21 1351 97.2 °F (36.2 °C) 87 18 116/74 96 % PAC with positive blood return. No visible med remains in cassette. Pt reports that pump stopped at approx 1230 today. 1357 Patient tolerated treatment well. Patient discharged from [place] [address] in no distress. Patient aware of next appointment.

## 2021-03-10 NOTE — ADDENDUM NOTE
Encounter addended by: Johnney Barthel, RN on: 3/10/2021 12:52 PM 
 Actions taken: Order Reconciliation Section accessed

## 2021-03-15 NOTE — TELEPHONE ENCOUNTER
0415 Te[address]  Social Work Navigator Encounter     Patient Name:  Earl Catalan    Medical History: pancreatic cancer    Advance Directives: none on file; conversation deferred    Narrative: Social work referral received to assist with transportation barriers. Spoke with patient on the phone and he shared with me his daughter usually bring him to appointments but was unable to today. Advised patient has Medicaid transportation and encouraged him to use it as needed. The phone number is listed on the back on his insurance card. Patient voiced understanding. Barriers to Care: transportation     Plan:  1. Referred patient to Medicaid transportation.      Referral:   Transportation referral    Thank you,  Guillermo Zhao LCSW

## 2021-03-15 NOTE — TELEPHONE ENCOUNTER
[email or web address] [facility]  [phone]        03/15/21 8:23 AM Called patient to check on him as he has not arrived for [place] appointment yet today. Patient states he had attempted to call but was on hold for length of time. Patient states he does not have transportation for appointment today and unable to attend. Patient had also missed 03/08 appointment. Patient reported that he had cold symptoms that persisted for 3-4 days. States he did not want to get anyone else sick. Denies any fevers at that time, as well as denies fevers at time of call. He reported resolution of symptoms. Advised this nurse would update provider and [facility]  would contact him regarding future appointment. Patient voiced understanding.  CB # [phone]

## 2021-03-23 NOTE — PROGRESS NOTES
[facility] [place] at [address], [address] [place] [address] W: [phone]  F: [phone] Hematology Oncology established visit Reason for Visit:  
Gerald Walker is a 64 y.o. male who is seen here for follow up of pancreatic cancer, new diagnosis. Hematology / Oncology Treatment History:  
 
Diagnosis: Pancreatic, adenocarcinoma, moderately to poorly diiferentiated Stage: IV [[id number]] Pathology: 2020 pancreatic body, FNA; adenocarcinoma, moderately to poorly diiferentiated [employer] [facility] testing: Negative for reportable alterations. MS-stable. KRAS G12D. Tumor mutational burden 1 muts/mb WPB7O2T R183W. KDM6A Q611 TPS3 [id number] [employer]: Germline testing negative for BRCA1/2 and 86 other genes. Prior Treatment: None Current Treatment:  FOLFIRINOX every 2 weeks, 20 - current. History of Present Illness: Mr. Gerald Walker is a 64 y.o. male admitted on 2020 from the ED with worsening abd pain and constipation. Rates pain on admission 9/10. Recent dx of pancreatic cancer during last hospitalization. Pt reports has not had a BM since last Wed; stopped taking the Senna-docusate last week; he thought it was causing his stomach to hurt more. He was unaware that it was part of a bowel regimen and thought it was pain medication. Has had some nausea but denies vomiting. Decreased appetite. Denies SOB. ED CT scan shows subacute pancreatitis, pancreatic body adenocarcinoma, retroperitoneal remington metastases, peritoneal carcinomatosis. Pt would like his sister to be able to hear about the treatment plans; she is coming from [place] in the am. [facility] to port placement; defininelty wants to have treatment. 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 Smokes cigarettes: 1 ppd x 30 yrs.  Rare EtOH use.  
: 3 kids; twins 29 yrs old and 25 yr old. Interval History: 
Patient here for follow up of pancreatic cancer and C7 of FOLFIRINOX. Reports he is 4 weeks late for treatment because he had cold symptoms, mainly runny nose. Reports improvement. Constipation managed w miralax twice daily. He gained weight. Reports poor appetite the week of treatment but notes improvement the following week. Gained weight. Abdominal pain managed with OxyContin. Oxycodone for breakthrough pain. Denies fevers, chills, CP, SOB, mucositis lesions. Neuropathy occurs with cold temperatures. No n/v. Past Medical History:  
Diagnosis Date  Gout  Pancreatic cancer ([address]  Psoriasis  Tobacco abuse Past Surgical History:  
Procedure Laterality Date  HX ORTHOPAEDIC    
 femur repair  HX ORTHOPAEDIC    
 lumbar compressed fracture  IR INSERT TUNL CVC W PORT OVER 5 YEARS  11/24/2020 Social History Tobacco Use  Smoking status: Current Every Day Smoker Packs/day: 1.00  Smokeless tobacco: Never Used  Tobacco comment: last week was last cigarette Substance Use Topics  Alcohol use: Yes Family History Problem Relation Age of Onset  Pancreatic Cancer Brother Current Outpatient Medications Medication Sig  
 oxyCODONE ER (OxyCONTIN) 10 mg ER tablet Take 1 Tab by mouth every twelve (12) hours for 30 days. Max Daily Amount: 20 mg.  
 senna-docusate (PERICOLACE) 8.6-50 mg per tablet Take 1 Tab by mouth daily. Indications: constipation  magic mouthwash solution Sig: 
Magic mouth wash Maalox Lidocaine 2% viscous Diphenhydramine oral solution Pharmacy to mix equal portions of ingredients to a total volume as indicated in the dispense amount. Swish and spit 10mL every 4 hours as needed for mouth pain, okay to swallow for throat pain.   
 prochlorperazine (Compazine) 10 mg tablet Take 1 Tab by mouth every six (6) hours as needed for Nausea or Vomiting.  ondansetron hcl (ZOFRAN) 8 mg tablet Take 1 Tab by mouth every eight (8) hours as needed for Nausea or Vomiting.  lidocaine-prilocaine (EMLA) topical cream Apply a dime size amount to port site 30 minutes before access to prevent pain.  dexAMETHasone (DECADRON) 4 mg tablet Take 8mg on days 2 and 3 after treatment to prevent nausea.  polyethylene glycol (MIRALAX) 17 gram packet Take 1 Packet by mouth daily as needed for Constipation. No current facility-administered medications for this visit. No Known Allergies Review of Systems: A complete review of systems was obtained, negative except as described above. Physical Exam:  
 
Visit Vitals /69 Pulse 80 Temp 97.1 °F (36.2 °C) Ht 5' 10\" (1.778 m) Wt 134 lb 1.6 oz (60.8 kg) SpO2 100% BMI 19.24 kg/m² ECOG PS: 1 General: No distress Eyes: Anicteric sclerae HENT: Atraumatic Neck: Supple Respiratory: Normal respiratory effort CV: No peripheral edema, port right upper chest.  
GI: Soft, nontender, nondistended, no masses, no hepatomegaly, no splenomegaly Skin: No rashes, ecchymoses, or petechiae Psych: Alert, oriented, appropriate affect, normal judgment/insight Results:  
 
Lab Results Component Value Date/Time WBC 5.8 03/23/2021 08:15 AM  
 HGB 11.7 (L) 03/23/2021 08:15 AM  
 HCT 35.3 (L) 03/23/2021 08:15 AM  
 PLATELET 677 88/11/6590 08:15 AM  
 .0 (H) 03/23/2021 08:15 AM  
 ABS. NEUTROPHILS 3.4 03/23/2021 08:15 AM  
 
Lab Results Component Value Date/Time Sodium 140 03/23/2021 08:15 AM  
 Potassium 3.7 03/23/2021 08:15 AM  
 Chloride 110 (H) 03/23/2021 08:15 AM  
 CO2 25 03/23/2021 08:15 AM  
 Glucose 68 03/23/2021 08:15 AM  
 BUN 10 03/23/2021 08:15 AM  
 Creatinine 0.96 03/23/2021 08:15 AM  
 GFR est AA >60 03/23/2021 08:15 AM  
 GFR est non-AA >60 03/23/2021 08:15 AM  
 Calcium 8.4 (L) 03/23/2021 08:15 AM  
 
Lab Results Component Value Date/Time  Bilirubin, total 0.2 2021 08:15 AM  
 ALT (SGPT) 100 (H) 2021 08:15 AM  
 Alk. phosphatase 75 2021 08:15 AM  
 Protein, total 6.9 2021 08:15 AM  
 Albumin 3.4 (L) 2021 08:15 AM  
 Globulin 3.5 2021 08:15 AM  
 
Lab Results Component Value Date/Time Sed rate, automated 4 2017 03:56 PM  
 C-Reactive protein <0.29 2017 03:56 PM  
 TSH 3.36 2020 05:28 AM  
 Lipase 115 2020 06:22 AM  
 
Lab Results Component Value Date/Time INR 1.0 2010 03:05 AM  
 aPTT 28.0 2010 03:05 AM  
 
Lab Results Component Value Date/Time CEA 4.9 2020 05:29 AM  
 Carbohydrate Antigen 19-9, (CA 19-9) 1,317 (H) 2021 08:42 AM  
 
 
20 CA 19-9 5607 at diagnosis. 21 CA 19-9 6483 
20 started treatment 21 CA 19-9: 1317 Imagin2020 XR ABD FLAT IMPRESSION: 
 No evidence for acute abnormality 2020 CT ABD PELV W CONT IMPRESSION:  
1. Subacute pancreatitis, with small walled-off necrosis (\"pseudocyst\") in the lesser sac. 2. Pancreatic body adenocarcinoma. 3. Occluded splenic vein. Consequent, extensive, submucosal, gastric varices in the cardia. 4. Encasement of the splenic artery. Abutment of the celiac artery, SMV, and SMA. 5. Retroperitoneal remington metastases. 6. Peritoneal carcinomatosis. 
  
21 CT c/a/p: 
IMPRESSION 1. Interval resolution of free intraperitoneal fluid. 2. Pancreatic mass and retroperitoneal lymphadenopathy, unchanged and as 
described above. 3.Right hepatic lobe hypodensities, not significantly changed, as described 
above. 4. Recommend continued short interval surveillance. Addendum:  
There has been interval improvement of the omental nodularity. There has also 
been interval improvement of the ill-defined soft tissue anterior and inferior 
to the pancreatic body, previously seen on CT abdomen and pelvis dated 2020.  
The pancreatic body mass is difficult to measure but measures approximately 2.5 
cm x 2.2 cm and measured approximately 2.9 cm x 2.6 cm a prior CT dated November 2020. There has been interval resolution of free intraperitoneal fluid. Retroperitoneal lymphadenopathy, not significantly changed. Right hepatic lobe hypodensities, as described above, not significantly changed. Assessment and Recommendations:  
63 y/o male with metastatic pancreatic cancer on palliative chemotherapy. 1. Pancreatic adenocarcinoma:  
Stage IV, CANDIE, Germline and somatic genetic testing negative. Diagnosed 11/18/2020, has Stage IV disease with peritoneal carcinomatosis and retroperitoneal LAD, encasement of vasculature seen on CT. CA 19-9 5607 at diagnosis. Previously discussed with patient that his disease is not curable, but is treatable. I recommended palliative chemotherapy with FOLFIRINOX regimen. We discussed the risks and benefits of FOLFIRINOX chemotherapy, including potential side effects. These include but are not limited to fatigue, nausea vomiting, diarrhea, neuropathy, taste changes, cold intolerance, esophageal spasm, allergic reactions, alopecia, mucositis, myelosuppression, risk for infection, infertility, and rarely, death. Rarely, a patient may have a condition where they do not metabolize fluorouracil appropriately (called DPD deficiency), and they may have excessive toxicity. A Port-A-Cath will be required in order to deliver the continuous infusion. The patient has consented to beginning therapy. At time of disease progression, will use PROSCAP nomogram and consider Chisago-Abraxane in 2nd line. CT 1/21/21 showed response to treatment. Supportive medications include: EMLA cream, zofran, compazine, dexamethasone. -- Proceed with cycle 7 FOLFIRNOX, MD/NP visit, pump removal on Wednesday. -- CT scan due after cycle 8 treatment. -- Follow up in 2 weeks for cycle 8, MD/NP visit. 2. F/E/N/G: 
Constipation 2/2 opioids and bowel regimen adjusted.  Continue Miralax 1-2 times daily and Docusate/Senna 1-2 times daily. Encouraged supplementing with 1-2 high calorie protein drinks daily to prevent further WL. Given samples from our supply due to cost. Talia Chang RD. 3. Occluded splenic vein: 
Reviewed with Radiology. There is no clot and no anticoagulation needed. 4. Neoplasm related pain:  
Well controlled on Oxycontin ER 10mg q12h and Oxycodone IR prn.   
-- Refilled Oxycontin 10mg q12h #60 on 3/23/21 (dated 3/31/21.) -- Refilled Oxycodone 5mg q6h PRN #45 on 3/23/21.  
 
5. Family h/o pancreatic cancer: 
Was concerning for genetic predisposition but germline genetic testing negative. 6. Transaminits: Mild. Monitor. I have personally seen and evaluated the patient in conjunction with Myriam Bloom NP. I find the patient's history and physical exam are consistent with the NP's documentation. I agree with the above assessment and plan, which I have edited if needed. Tolerating treatment well without n/v, still requiring pain meds. Signed By: 
Date: Lesia Alvarado MD  
03/23/21

## 2021-03-23 NOTE — PROGRESS NOTES
Patria Borrero is a 64 y.o. male here for follow up of pancreatic cancer. Patient with no complaints of pain at this time.

## 2021-03-23 NOTE — PROGRESS NOTES
[facility] Progress Note Date: 2021 Name: Jaci Ramirez MRN: [id number] : 1960 
 
0800: 
Mr. Sigrid Hendrickson Arrived ambulatory and in no distress for C7D1 of Folfirinox Regimen. Assessment was completed, no acute issues at this time, no new complaints voiced. Weight gain noted, patient states his appetite has been good. Right chest wall port accessed without difficulty, labs drawn & sent for processing. Chemotherapy Flowsheet 3/23/2021 Cycle C7D1 Date 3/23/2021 Drug / Regimen Folfirinox Post Hydration -  
Pre Meds given Notes given Patient proceed to appointment with Dr. Sylvia Hurst. Mr. Win Méndez vitals were reviewed. Visit Vitals /69 (BP 1 Location: Right upper arm, BP Patient Position: At rest;Sitting) Pulse 80 Temp 97.1 °F (36.2 °C) Resp 20 Ht 5' 10\" (1.778 m) Wt 60.8 kg (134 lb 1.6 oz) SpO2 100% BMI 19.24 kg/m² Lab results were obtained and reviewed. Recent Results (from the past 12 hour(s)) CBC WITH 3 PART DIFF Collection Time: 21  8:15 AM  
Result Value Ref Range WBC 5.8 4.1 - 11.1 K/uL  
 RBC 3.53 (L) 4.10 - 5.70 M/uL  
 HGB 11.7 (L) 12.1 - 17.0 g/dL HCT 35.3 (L) 36.6 - 50.3 % .0 (H) 80.0 - 99.0 FL  
 MCH 33.1 26.0 - 34.0 PG  
 MCHC 33.1 30.0 - 36.5 g/dL  
 RDW 15.9 (H) 11.8 - 15.8 % PLATELET 927 450 - 363 K/uL NEUTROPHILS 58 32 - 75 % MIXED CELLS 20 (H) 3.2 - 16.9 % LYMPHOCYTES 21 12 - 49 % ABS. NEUTROPHILS 3.4 1.8 - 8.0 K/UL  
 ABS. MIXED CELLS 1.2 0.2 - 1.2 K/uL  
 ABS. LYMPHOCYTES 1.2 0.8 - 3.5 K/UL  
 DF AUTOMATED METABOLIC PANEL, COMPREHENSIVE Collection Time: 21  8:15 AM  
Result Value Ref Range Sodium 140 136 - 145 mmol/L Potassium 3.7 3.5 - 5.1 mmol/L Chloride 110 (H) 97 - 108 mmol/L  
 CO2 25 21 - 32 mmol/L Anion gap 5 5 - 15 mmol/L Glucose 68 65 - 100 mg/dL BUN 10 6 - 20 MG/DL  Creatinine 0.96 0.70 - 1.30 MG/DL  
 BUN/Creatinine ratio 10 (L) 12 - 20 GFR est AA >60 >60 ml/min/1.73m2 GFR est non-AA >60 >60 ml/min/1.73m2 Calcium 8.4 (L) 8.5 - 10.1 MG/DL Bilirubin, total 0.2 0.2 - 1.0 MG/DL  
 ALT (SGPT) 100 (H) 12 - 78 U/L  
 AST (SGOT) 68 (H) 15 - 37 U/L Alk. phosphatase 75 45 - 117 U/L Protein, total 6.9 6.4 - 8.2 g/dL Albumin 3.4 (L) 3.5 - 5.0 g/dL Globulin 3.5 2.0 - 4.0 g/dL A-G Ratio 1.0 (L) 1.1 - 2.2 Medications: 
Medications Administered   
 atropine 0.4 mg/mL injection 0.4 mg   
 Admin Date 
03/23/2021 Action Given Dose 0.4 mg Route IntraVENous Administered By 
Evelia Corrales RN  
  
  
 dexamethasone (DECADRON) 12 mg in 0.9% sodium chloride 50 mL IVPB Admin Date 
03/23/2021 Action Given Dose 
12 mg Route IntraVENous Administered By 
Evelia Corrales RN  
  
  
 dextrose 5% infusion Admin Date 
03/23/2021 Action New Bag Dose 25 mL/hr Rate 25 mL/hr Route IntraVENous Administered By 
Evelia Corrales RN  
  
  
 fluorouraciL (ADRUCIL) 4,152 mg in 0.9% sodium chloride 100 mL CADD Cassette Admin Date 
03/23/2021 Action New Bag Dose 4,152 mg Rate 2.2 mL/hr Route IntraVENous Administered By 
Evelia Corrales RN  
  
  
 irinotecan (CAMPTOSAR) 260 mg in dextrose 5% 250 mL, overfill volume 25 mL chemo infusion Admin Date 
03/23/2021 Action New Bag Dose 
260 mg Rate 192 mL/hr Route IntraVENous Administered By 
Evelia Corrales RN  
  
  
 leucovorin (WELLCOVORIN) 692 mg in dextrose 5% 250 mL, overfill volume 25 mL IVPB Admin Date 
03/23/2021 Action New Bag Dose 692 mg Rate 
206.4 mL/hr Route IntraVENous Administered By 
Evelia Corrales RN  
  
  
 oxaliplatin (ELOXATIN) 147 mg in dextrose 5% 250 mL, overfill volume 25 mL chemo infusion Admin Date 
03/23/2021 Action New Bag Dose 
147 mg Rate 
152.2 mL/hr Route IntraVENous Administered By 
Evelia Corrales RN  
  
  
 palonosetron HCl (ALOXI) injection 0.25 mg   
 Admin Date 
03/23/2021 Action Given Dose 0.25 mg Route 
IntraVENous Administered By 
Alessia Fortune RN  
  
  
 sodium chloride (NS) flush 10 mL   
 Admin Date 
03/23/2021 Action 
Given Dose 
10 mL Route 
IntraVENous Administered By 
Alessia Fortune RN  
   
  
 Admin Date 
03/23/2021 Action 
Given Dose 
10 mL Route 
IntraVENous Administered By 
Alessia Fortune RN  
   
  
 Admin Date 
03/23/2021 Action 
Given Dose 
10 mL Route 
IntraVENous Administered By 
Alessia Fortune RN  
  
  
  
 
 
Mr. Dias tolerated treatment well and [facility] at 1500.   Port flushed and connected to infusing CADD pump per order. He is to return on March 25 at 1330 for his next appointment. 
 
Alessia Fortune RN 
March 23, 2021

## 2021-03-25 NOTE — PROGRESS NOTES
[facility] Progress Note Date: 2021 Name: Callie Pryor MRN: [id number] : 1960 Mr. Ignacio Taylor Arrived ambulatory and in no distress for Pump Removal.  No acute issues noted. CADD completed- 100 ml infused per order. Patient states pump completed at 1300. Mr. Cuate Zapien vitals were reviewed. Visit Vitals /68 Pulse 81 Temp 96.8 °F (36 °C) Resp 18  
 
 
 
 
Mr. Dias tolerated treatment well and was discharged from [place] [address] in stable condition. Port de-accessed, flushed & heparinized per protocol. He is aware of future appointments. Future Appointments Date Time Provider Tennille Moreno 2021  8:00 AM [facility]  
2021  9:15 AM Cameron Rodriguez NP [facility]  
2021  1:30 PM [facility]  
2021  8:00 AM [facility]  
2021  8:45 AM Cameron Rodriguez NP [facility]  
2021  1:30 PM SS INF7 CH2 <1H [facility] Christian Lundberg RN 
2021

## 2021-04-06 NOTE — TELEPHONE ENCOUNTER
[address] at [place]  [phone]        04/06/21 11:57 AM Attempted to call patient via cell phone number to check on him as he missed his [facility] and MD appointments today. No answer and voicemail currently full. Attempted to call home number listed but number is no longer in service. Will notify provider. 04/09/21 9:57 AM Attempted to call patient via cell phone number listed. No answer and voicemail is currently full. Attempted to call home number listed and number is no longer in service. Sent patient [employer] message to check on him and request that he call office back. Need to verify if he is able to attend 04/12 appointments or if office can assist further with any other needs or concerns.

## 2021-04-09 NOTE — PROGRESS NOTES
[facility] at [address], [address] [facility] 100[address] [facility] W: [phone]  F: [phone] Hematology Oncology established visit Reason for Visit:  
Jeison Cannon is a 64 y.o. male who is seen here for follow up of pancreatic cancer, new diagnosis. Hematology / Oncology Treatment History:  
 
Diagnosis: Pancreatic, adenocarcinoma, moderately to poorly diiferentiated Stage: IV [[id number]] Pathology: 2020 pancreatic body, FNA; adenocarcinoma, moderately to poorly diiferentiated [employer] [facility] testing: Negative for reportable alterations. MS-stable. KRAS G12D. Tumor mutational burden 1 muts/mb [id number]. KDM6A Q611 TPS3 [id number] [employer]: Germline testing negative for BRCA1/2 and 86 other genes. Prior Treatment: None Current Treatment:  FOLFIRINOX every 2 weeks, 20 - current. History of Present Illness: Mr. Jeison Cannon is a 64 y.o. male admitted on 2020 from the ED with worsening abd pain and constipation. Rates pain on admission 9/10. Recent dx of pancreatic cancer during last hospitalization. Pt reports has not had a BM since last Wed; stopped taking the Senna-docusate last week; he thought it was causing his stomach to hurt more. He was unaware that it was part of a bowel regimen and thought it was pain medication. Has had some nausea but denies vomiting. Decreased appetite. Denies SOB. ED CT scan shows subacute pancreatitis, pancreatic body adenocarcinoma, retroperitoneal remington metastases, peritoneal carcinomatosis. Pt would like his sister to be able to hear about the treatment plans; she is coming from [place] in the am. Felicity Verdin to port placement; defininelty wants to have treatment. 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 Smokes cigarettes: 1 ppd x 30 yrs.  Rare EtOH use.  
: 3 kids; twins 29 yrs old and 25 yr old. Interval History: 
Patient here for follow up of pancreatic cancer and C8 of FOLFIRINOX. He is one week late for treatment due to death in the family. He was in [place] for the  and returned on Saturday. Reports fatigue today. Constipation managed w miralax twice daily. He maintained weight. Reports poor appetite the week of treatment but notes improvement the following week. Abdominal pain managed with OxyContin. Oxycodone for breakthrough pain. Denies fevers, chills, CP, SOB, mucositis lesions. Neuropathy occurs with cold temperatures. No n/v. Past Medical History:  
Diagnosis Date  Gout  Pancreatic cancer ([facility] [address]  Psoriasis  Tobacco abuse Past Surgical History:  
Procedure Laterality Date  HX ORTHOPAEDIC    
 femur repair  HX ORTHOPAEDIC    
 lumbar compressed fracture  IR INSERT TUNL CVC W PORT OVER 5 YEARS  2020 Social History Tobacco Use  Smoking status: Current Every Day Smoker Packs/day: 1.00  Smokeless tobacco: Never Used  Tobacco comment: last week was last cigarette Substance Use Topics  Alcohol use: Yes Family History Problem Relation Age of Onset  Pancreatic Cancer Brother Current Outpatient Medications Medication Sig  
 oxyCODONE IR (ROXICODONE) 5 mg immediate release tablet Take 1 Tab by mouth every six (6) hours as needed for Pain for up to 30 days. Max Daily Amount: 20 mg.  
 oxyCODONE ER (OxyCONTIN) 10 mg ER tablet Take 1 Tab by mouth every twelve (12) hours for 30 days. Max Daily Amount: 20 mg.  polyethylene glycol (MIRALAX) 17 gram packet Take 1 Packet by mouth daily as needed for Constipation.  senna-docusate (PERICOLACE) 8.6-50 mg per tablet Take 1 Tab by mouth daily. Indications: constipation  magic mouthwash solution Sig: 
Magic mouth wash Maalox Lidocaine 2% viscous Diphenhydramine oral solution Pharmacy to mix equal portions of ingredients to a total volume as indicated in the dispense amount. Swish and spit 10mL every 4 hours as needed for mouth pain, okay to swallow for throat pain.  prochlorperazine (Compazine) 10 mg tablet Take 1 Tab by mouth every six (6) hours as needed for Nausea or Vomiting.  ondansetron hcl (ZOFRAN) 8 mg tablet Take 1 Tab by mouth every eight (8) hours as needed for Nausea or Vomiting.  lidocaine-prilocaine (EMLA) topical cream Apply a dime size amount to port site 30 minutes before access to prevent pain.  dexAMETHasone (DECADRON) 4 mg tablet Take 8mg on days 2 and 3 after treatment to prevent nausea. No current facility-administered medications for this visit. No Known Allergies Review of Systems: A complete review of systems was obtained, negative except as described above. Physical Exam:  
 
Visit Vitals /61 Pulse 74 Temp (!) 96.5 °F (35.8 °C) Ht 5' 10\" (1.778 m) Wt 134 lb 1.6 oz (60.8 kg) SpO2 100% BMI 19.24 kg/m² ECOG PS: 1 General: No distress Eyes: Anicteric sclerae HENT: Atraumatic Neck: Supple Respiratory: Normal respiratory effort CV: No peripheral edema, port right upper chest.  
GI: Soft, nontender, nondistended, no masses, no hepatomegaly, no splenomegaly Skin: No rashes, ecchymoses, or petechiae Psych: Alert, oriented, appropriate affect, normal judgment/insight Results:  
 
Lab Results Component Value Date/Time WBC 4.8 04/12/2021 08:15 AM  
 HGB 11.2 (L) 04/12/2021 08:15 AM  
 HCT 34.5 (L) 04/12/2021 08:15 AM  
 PLATELET 668 (L) 92/13/2495 08:15 AM  
 .9 (H) 04/12/2021 08:15 AM  
 ABS. NEUTROPHILS 2.0 04/12/2021 08:15 AM  
 
Lab Results Component Value Date/Time  Sodium 140 03/23/2021 08:15 AM  
 Potassium 3.7 03/23/2021 08:15 AM  
 Chloride 110 (H) 03/23/2021 08:15 AM  
 CO2 25 03/23/2021 08:15 AM  
 Glucose 68 03/23/2021 08:15 AM  
 BUN 10 03/23/2021 08:15 AM  
 Creatinine 0.96 03/23/2021 08:15 AM GFR est AA >60 2021 08:15 AM  
 GFR est non-AA >60 2021 08:15 AM  
 Calcium 8.4 (L) 2021 08:15 AM  
 
Lab Results Component Value Date/Time Bilirubin, total 0.2 2021 08:15 AM  
 ALT (SGPT) 100 (H) 2021 08:15 AM  
 Alk. phosphatase 75 2021 08:15 AM  
 Protein, total 6.9 2021 08:15 AM  
 Albumin 3.4 (L) 2021 08:15 AM  
 Globulin 3.5 2021 08:15 AM  
 
Lab Results Component Value Date/Time Sed rate, automated 4 2017 03:56 PM  
 C-Reactive protein <0.29 2017 03:56 PM  
 TSH 3.36 2020 05:28 AM  
 Lipase 115 2020 06:22 AM  
 
Lab Results Component Value Date/Time INR 1.0 2010 03:05 AM  
 aPTT 28.0 2010 03:05 AM  
 
Lab Results Component Value Date/Time CEA 4.9 2020 05:29 AM  
 Carbohydrate Antigen 19-9, (CA 19-9) 1,317 (H) 2021 08:42 AM  
 
 
20 CA 19-9 5607 at diagnosis. 21 CA 19-9 6483 
20 started treatment 21 CA 19-9: 1317 Imagin2020 XR ABD FLAT IMPRESSION: 
 No evidence for acute abnormality 2020 CT ABD PELV W CONT IMPRESSION:  
1. Subacute pancreatitis, with small walled-off necrosis (\"pseudocyst\") in the lesser sac. 2. Pancreatic body adenocarcinoma. 3. Occluded splenic vein. Consequent, extensive, submucosal, gastric varices in the cardia. 4. Encasement of the splenic artery. Abutment of the celiac artery, SMV, and SMA. 5. Retroperitoneal remington metastases. 6. Peritoneal carcinomatosis. 
  
21 CT c/a/p: 
IMPRESSION 1. Interval resolution of free intraperitoneal fluid. 2. Pancreatic mass and retroperitoneal lymphadenopathy, unchanged and as 
described above. 3.Right hepatic lobe hypodensities, not significantly changed, as described 
above. 4. Recommend continued short interval surveillance. Addendum:  
There has been interval improvement of the omental nodularity.  There has also 
been interval improvement of the ill-defined soft tissue anterior and inferior 
to the pancreatic body, previously seen on CT abdomen and pelvis dated November 22, 2020. The pancreatic body mass is difficult to measure but measures approximately 2.5 
cm x 2.2 cm and measured approximately 2.9 cm x 2.6 cm a prior CT dated November 2020. There has been interval resolution of free intraperitoneal fluid. Retroperitoneal lymphadenopathy, not significantly changed. Right hepatic lobe hypodensities, as described above, not significantly changed. Assessment and Recommendations:  
65 y/o male with metastatic pancreatic cancer on palliative chemotherapy. 1. Pancreatic adenocarcinoma:  
Stage IV, CANDIE, Germline and somatic genetic testing negative. Diagnosed 11/18/2020, has Stage IV disease with peritoneal carcinomatosis and retroperitoneal LAD, encasement of vasculature seen on CT. CA 19-9 5607 at diagnosis. Previously discussed with patient that his disease is not curable, but is treatable. I recommended palliative chemotherapy with FOLFIRINOX regimen. We discussed the risks and benefits of FOLFIRINOX chemotherapy, including potential side effects. These include but are not limited to fatigue, nausea vomiting, diarrhea, neuropathy, taste changes, cold intolerance, esophageal spasm, allergic reactions, alopecia, mucositis, myelosuppression, risk for infection, infertility, and rarely, death. Rarely, a patient may have a condition where they do not metabolize fluorouracil appropriately (called DPD deficiency), and they may have excessive toxicity. A Port-A-Cath will be required in order to deliver the continuous infusion. The patient has consented to beginning therapy. At time of disease progression, will use PROSCAP nomogram and consider Dixie-Abraxane in 2nd line. CT 1/21/21 showed response to treatment. Supportive medications include: EMLA cream, zofran, compazine, dexamethasone.   
-- Proceed with cycle 8 FOLFIRNOX, MD/NP visit, pump removal on Wednesday. -- CT scan due after cycle 8 treatment. -- Follow up in 2 weeks for cycle 9, MD/NP visit. 2. F/E/N/GI: 
Constipation 2/2 opioids and bowel regimen adjusted. Continue Miralax 1-2 times daily and Docusate/Senna 1-2 times daily. Encouraged supplementing with 1-2 high calorie protein drinks daily to prevent further WL. Given samples from our supply due to cost. Christian Chung RD. 3. Occluded splenic vein: 
Reviewed with Radiology. There is no clot and no anticoagulation needed. 4. Neoplasm related pain:  
Well controlled on Oxycontin ER 10mg q12h and Oxycodone IR prn.   
-- Refilled Oxycontin 10mg q12h #60 on 3/23/21 (dated 3/31/21.) -- Refilled Oxycodone 5mg q6h PRN #45 on 3/23/21.  
 
5. Family h/o pancreatic cancer: 
Was concerning for genetic predisposition but germline genetic testing negative. 6. Transaminitis: Mild. Monitor. I have personally seen and evaluated the patient in conjunction with Trista Pop NP. I find the patient's history and physical exam are consistent with the NP's documentation. I agree with the above assessment and plan, which I have edited if needed. Tolerating treatment well, will proceed with C8 today without dose changes. Due for scans. Signed By: 
Date: Chandrika Joshi MD  
04/12/21

## 2021-04-12 NOTE — PROGRESS NOTES
[facility] [place] Progress Note Date: 2021 Name: Obi Paulino MRN: [id number] : 1960 Mr. Radha Whitten Arrived ambulatory and in no distress for C8D1 of Folfirinox Regimen. Assessment was completed, no acute issues at this time, no new complaints voiced. Right chest wall port accessed without difficulty, labs drawn & sent for processing. Chemotherapy Flowsheet 3/23/2021 Cycle C7D1 Date 3/23/2021 Drug / Regimen Folfirinox Post Hydration -  
Pre Meds given Notes given Patient proceed to appointment with Dr. Fitz Mix.. Mr. Ernestine Mena vitals were reviewed. Visit Vitals /61 Pulse 74 Temp (!) 96.5 °F (35.8 °C) Resp 16 Ht 5' 10\" (1.778 m) Wt 60.8 kg (134 lb 1.6 oz) SpO2 100% BMI 19.24 kg/m² Lab results were obtained and reviewed. Recent Results (from the past 12 hour(s)) CBC WITH AUTOMATED DIFF Collection Time: 21  8:15 AM  
Result Value Ref Range WBC 4.8 4.1 - 11.1 K/uL  
 RBC 3.42 (L) 4.10 - 5.70 M/uL  
 HGB 11.2 (L) 12.1 - 17.0 g/dL HCT 34.5 (L) 36.6 - 50.3 % .9 (H) 80.0 - 99.0 FL  
 MCH 32.7 26.0 - 34.0 PG  
 MCHC 32.5 30.0 - 36.5 g/dL  
 RDW 13.9 11.5 - 14.5 % PLATELET 686 (L) 007 - 400 K/uL MPV 9.4 8.9 - 12.9 FL  
 NRBC 0.0 0  WBC ABSOLUTE NRBC 0.00 0.00 - 0.01 K/uL NEUTROPHILS 43 32 - 75 % LYMPHOCYTES 36 12 - 49 % MONOCYTES 13 5 - 13 % EOSINOPHILS 7 0 - 7 % BASOPHILS 1 0 - 1 % IMMATURE GRANULOCYTES 0 0.0 - 0.5 % ABS. NEUTROPHILS 2.0 1.8 - 8.0 K/UL  
 ABS. LYMPHOCYTES 1.7 0.8 - 3.5 K/UL  
 ABS. MONOCYTES 0.6 0.0 - 1.0 K/UL  
 ABS. EOSINOPHILS 0.3 0.0 - 0.4 K/UL  
 ABS. BASOPHILS 0.1 0.0 - 0.1 K/UL  
 ABS. IMM. GRANS. 0.0 0.00 - 0.04 K/UL  
 DF AUTOMATED Medications: 
 
 
Mr. Radha Whitten tolerated treatment well and was discharged from [place] [address] in stable condition. Patient discharged with cadd cassette pump infusing.   He is to return on 21 at 1330 or once pump has completed infusion Karyn Zayas RN April 12, 2021

## 2021-04-12 NOTE — PROGRESS NOTES
Mercedes Polanco is a 64 y.o. male here for follow up of pancreatic cancer. Patient with no complaints of pain.

## 2021-04-14 NOTE — PROGRESS NOTES
[facility] Progress Note Date: 2021 Name: Debbie Meyer MRN: [id number] : 1960 Mr. Sahara Conley Arrived ambulatory and in no distress for Pump Removal.  Assessment was completed, patient reports new neuropathy to toes after last treatment. CADD completed- 100 ml infused per order. BP running low today-  Patient is asymptomatic. Reports he is drinking \"half a gallon\" of water a day. Encouraged patient to call MD office should he become symptomatic- patient verbalizes understanding. Mr. Elisa Gonzalez vitals were reviewed. Patient Vitals for the past 12 hrs: 
 Temp Pulse Resp BP SpO2  
21 1337 98 °F (36.7 °C) 73 18 99/69 98 % Mr. Sahara Conley tolerated treatment well and was discharged from [place] [address] in stable condition. Port de-accessed, flushed & heparinized per protocol. He is to return on 21for his next appointment. Rachele Sauceda RN 2021

## 2021-04-19 NOTE — TELEPHONE ENCOUNTER
Patient called and wanted to know when his Ct was schedule for this week. Is he suppose to get it done this week?      # [phone]

## 2021-04-19 NOTE — TELEPHONE ENCOUNTER
[facility] [employer] at [facility] [address]  [phone]        04/19/21 4:17 PM Per chart review, patient due for CT scan after cycle 8. Imaging not currently scheduled. Sent message to  department requesting assistance in scheduling scan. Imaging to be done prior to 04/26 MD visit. Will continue to monitor. 04/20/21 9:01 AM Received message from [employer] [address] that they attempted to call patient yesterday, but were unable to reach him. Unable to leave message on mobile number and home number listed in chart is not currently in service. Sent [place] message to patient to notify him of above. Requested that he call scheduling department to schedule his CT prior to his next appointment. Provided patient with contact phone number and encouraged him to call office if he has any additional questions or if office can assist further. Will also update Dr. Ranjit Biggs and Barrett Montana of above.

## 2021-04-23 NOTE — PROGRESS NOTES
Cancer [place] at [address], [address] [facility] W: [phone]  F: [phone] Hematology Oncology established visit Reason for Visit:  
Rock Lucia is a 64 y.o. male who is seen here for follow up of pancreatic cancer, new diagnosis. Hematology / Oncology Treatment History:  
 
Diagnosis: Pancreatic, adenocarcinoma, moderately to poorly diiferentiated Stage: IV [[id number]] Pathology: 2020 pancreatic body, FNA; adenocarcinoma, moderately to poorly diiferentiated [employer] testing: Negative for reportable alterations. MS-stable. KRAS G12D. Tumor mutational burden 1 muts/mb [id number]. KDM6A Q611 TPS3 [id number]*80 [employer]: Germline testing negative for BRCA1/2 and 86 other genes. Prior Treatment: None Current Treatment:  FOLFIRINOX every 2 weeks, 20 - current. History of Present Illness: Mr. Rock Lucia is a 64 y.o. male admitted on 2020 from the ED with worsening abd pain and constipation. Rates pain on admission 9/10. Recent dx of pancreatic cancer during last hospitalization. Pt reports has not had a BM since last Wed; stopped taking the Senna-docusate last week; he thought it was causing his stomach to hurt more. He was unaware that it was part of a bowel regimen and thought it was pain medication. Has had some nausea but denies vomiting. Decreased appetite. Denies SOB. ED CT scan shows subacute pancreatitis, pancreatic body adenocarcinoma, retroperitoneal remington metastases, peritoneal carcinomatosis. Pt would like his sister to be able to hear about the treatment plans; she is coming from [facility] in the am. Dick Payne to port placement; defininelty wants to have treatment. 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 Smokes cigarettes: 1 ppd x 30 yrs.  Rare EtOH use.  
: 3 kids; twins 29 yrs old and 25 yr old. Interval History: 
Patient here for follow up of pancreatic cancer and C9 of FOLFIRINOX. Constipation managed w miralax twice daily. He maintained weight. Reports poor appetite the week of treatment but notes improvement the following week. He lost 3 lbs. Abdominal pain managed with OxyContin. Oxycodone for breakthrough pain. Denies fevers, chills, CP, SOB, mucositis lesions. Neuropathy occurs with cold temperatures. No n/v. Blood pressure is low today. Denies dizziness. Plans to drink more water. Past Medical History:  
Diagnosis Date  Gout  Pancreatic cancer ([address]  Psoriasis  Tobacco abuse Past Surgical History:  
Procedure Laterality Date  HX ORTHOPAEDIC    
 femur repair  HX ORTHOPAEDIC    
 lumbar compressed fracture  IR INSERT TUNL CVC W PORT OVER 5 YEARS  11/24/2020 Social History Tobacco Use  Smoking status: Current Every Day Smoker Packs/day: 1.00  Smokeless tobacco: Never Used  Tobacco comment: last week was last cigarette Substance Use Topics  Alcohol use: Yes Family History Problem Relation Age of Onset  Pancreatic Cancer Brother Current Outpatient Medications Medication Sig  
 oxyCODONE ER (OxyCONTIN) 10 mg ER tablet Take 1 Tab by mouth every twelve (12) hours for 30 days. Max Daily Amount: 20 mg.  polyethylene glycol (MIRALAX) 17 gram packet Take 1 Packet by mouth daily as needed for Constipation.  senna-docusate (PERICOLACE) 8.6-50 mg per tablet Take 1 Tab by mouth daily. Indications: constipation  magic mouthwash solution Sig: 
Magic mouth wash Maalox Lidocaine 2% viscous Diphenhydramine oral solution Pharmacy to mix equal portions of ingredients to a total volume as indicated in the dispense amount. Swish and spit 10mL every 4 hours as needed for mouth pain, okay to swallow for throat pain.   
 prochlorperazine (Compazine) 10 mg tablet Take 1 Tab by mouth every six (6) hours as needed for Nausea or Vomiting.  ondansetron hcl (ZOFRAN) 8 mg tablet Take 1 Tab by mouth every eight (8) hours as needed for Nausea or Vomiting.  lidocaine-prilocaine (EMLA) topical cream Apply a dime size amount to port site 30 minutes before access to prevent pain.  dexAMETHasone (DECADRON) 4 mg tablet Take 8mg on days 2 and 3 after treatment to prevent nausea. No current facility-administered medications for this visit. No Known Allergies Review of Systems: A complete review of systems was obtained, negative except as described above. Physical Exam:  
 
Visit Vitals BP 99/60 Pulse 72 Temp (!) 96.3 °F (35.7 °C) Resp 18 Ht 5' 10\" (1.778 m) Wt 131 lb 6.3 oz (59.6 kg) SpO2 98% BMI 18.85 kg/m² ECOG PS: 1 General: No distress Eyes: Anicteric sclerae HENT: Atraumatic Neck: Supple Respiratory: Normal respiratory effort CV: No peripheral edema, port right upper chest.  
GI: Soft, nontender, nondistended, no masses, no hepatomegaly, no splenomegaly Skin: No rashes, ecchymoses, or petechiae Psych: Alert, oriented, appropriate affect, normal judgment/insight Results:  
 
Lab Results Component Value Date/Time WBC 4.6 04/26/2021 08:54 AM  
 HGB 10.4 (L) 04/26/2021 08:54 AM  
 HCT 31.8 (L) 04/26/2021 08:54 AM  
 PLATELET 670 (L) 27/16/8079 08:54 AM  
 .6 (H) 04/26/2021 08:54 AM  
 ABS. NEUTROPHILS 2.7 04/26/2021 08:54 AM  
 
Lab Results Component Value Date/Time Sodium 140 04/26/2021 08:54 AM  
 Potassium 4.0 04/26/2021 08:54 AM  
 Chloride 113 (H) 04/26/2021 08:54 AM  
 CO2 25 04/26/2021 08:54 AM  
 Glucose 85 04/26/2021 08:54 AM  
 BUN 17 04/26/2021 08:54 AM  
 Creatinine 1.02 04/26/2021 08:54 AM  
 GFR est AA >60 04/26/2021 08:54 AM  
 GFR est non-AA >60 04/26/2021 08:54 AM  
 Calcium 8.4 (L) 04/26/2021 08:54 AM  
 
Lab Results Component Value Date/Time  Bilirubin, total 0.2 04/26/2021 08:54 AM  
 ALT (SGPT) 20 2021 08:54 AM  
 Alk. phosphatase 71 2021 08:54 AM  
 Protein, total 6.5 2021 08:54 AM  
 Albumin 3.3 (L) 2021 08:54 AM  
 Globulin 3.2 2021 08:54 AM  
 
Lab Results Component Value Date/Time Sed rate, automated 4 2017 03:56 PM  
 C-Reactive protein <0.29 2017 03:56 PM  
 TSH 3.36 2020 05:28 AM  
 Lipase 115 2020 06:22 AM  
 
Lab Results Component Value Date/Time INR 1.0 2010 03:05 AM  
 aPTT 28.0 2010 03:05 AM  
 
Lab Results Component Value Date/Time CEA 4.9 2020 05:29 AM  
 Carbohydrate Antigen 19-9, (CA 19-9) 372 (H) 2021 08:15 AM  
 
 
20 CA 19-9 5607 at diagnosis. 21 CA 19-9 6483 
20 started treatment 21 CA 19-9: 1317 Imagin2020 XR ABD FLAT IMPRESSION: 
 No evidence for acute abnormality 2020 CT ABD PELV W CONT IMPRESSION:  
1. Subacute pancreatitis, with small walled-off necrosis (\"pseudocyst\") in the lesser sac. 2. Pancreatic body adenocarcinoma. 3. Occluded splenic vein. Consequent, extensive, submucosal, gastric varices in the cardia. 4. Encasement of the splenic artery. Abutment of the celiac artery, SMV, and SMA. 5. Retroperitoneal remington metastases. 6. Peritoneal carcinomatosis. 
  
21 CT c/a/p: 
IMPRESSION 1. Interval resolution of free intraperitoneal fluid. 2. Pancreatic mass and retroperitoneal lymphadenopathy, unchanged and as described above. 3.Right hepatic lobe hypodensities, not significantly changed, as described 
above. 4. Recommend continued short interval surveillance. Addendum:  
There has been interval improvement of the omental nodularity. There has also 
been interval improvement of the ill-defined soft tissue anterior and inferior 
to the pancreatic body, previously seen on CT abdomen and pelvis dated 2020.  
The pancreatic body mass is difficult to measure but measures approximately 2.5 cm x 2.2 cm and measured approximately 2.9 cm x 2.6 cm a prior CT dated November 2020. There has been interval resolution of free intraperitoneal fluid. Retroperitoneal lymphadenopathy, not significantly changed. Right hepatic lobe hypodensities, as described above, not significantly changed. 4/24/21 CT c/a/p: 
NONTARGET LESIONS: 
1. Pancreatic body mass. Difficult to reproducibly measure. Retro-pancreatic extension. Pancreatic body mass is ill-defined, difficult to measure, approximately 22 x 28 mm in size. 2. Tiny omental nodular densities best demonstrated in the left paracolic 
region. IMPRESSION Stable pancreatic mass with retroperitoneal extension. Right hepatic lobe hypodensities, unchanged. Subtle omental nodularity is also stable. Assessment and Recommendations:  
65 y/o male with metastatic pancreatic cancer on palliative chemotherapy. 1. Pancreatic adenocarcinoma:  
Stage IV, CANDIE, Germline and somatic genetic testing negative. Diagnosed 11/18/2020, has Stage IV disease with peritoneal carcinomatosis and retroperitoneal LAD, encasement of vasculature seen on CT. CA 19-9 5607 at diagnosis. Previously discussed with patient that his disease is not curable, but is treatable. I recommended palliative chemotherapy with FOLFIRINOX regimen. We discussed the risks and benefits of FOLFIRINOX chemotherapy, including potential side effects. These include but are not limited to fatigue, nausea vomiting, diarrhea, neuropathy, taste changes, cold intolerance, esophageal spasm, allergic reactions, alopecia, mucositis, myelosuppression, risk for infection, infertility, and rarely, death. Rarely, a patient may have a condition where they do not metabolize fluorouracil appropriately (called DPD deficiency), and they may have excessive toxicity. A Port-A-Cath will be required in order to deliver the continuous infusion. The patient has consented to beginning therapy.  At time of disease progression, will use PROSCAP nomogram and consider [place]-Abraxane in 2nd line. CT 4/24/21 showed response to treatment. Supportive medications include: EMLA cream, zofran, compazine, dexamethasone. -- Proceed with cycle 9 FOLFIRNOX, MD/NP visit, pump removal on Wednesday. -- CT scan due after cycle 12 treatment. -- Follow up in 2 weeks for cycle 10, MD/NP visit. -- Additional NS 500mL bolus today given hypotension. 2. F/E/N/GI: 
Constipation 2/2 opioids and bowel regimen adjusted. Continue Miralax 1-2 times daily and Docusate/Senna 1-2 times daily. Encouraged supplementing with 1-2 high calorie protein drinks daily to prevent further WL. Given samples from our supply due to cost. Tara Cummings RD. 3. Occluded splenic vein: 
Reviewed with Radiology. There is no clot and no anticoagulation needed. 4. Neoplasm related pain:  
Well controlled on Oxycontin ER 10mg q12h and Oxycodone IR prn.   
-- Refilled Oxycontin 10mg q12h #60 on 3/23/21 (dated 3/31/21.) -- Refilled Oxycodone 5mg q6h PRN #45 on 4/26/21, next fill date 5/26/21.  
 
5. Family h/o pancreatic cancer: 
Was concerning for genetic predisposition but germline genetic testing negative. 6. Transaminitis: Mild. Monitor. I have personally seen and evaluated the patient in conjunction with Barton Dandy, NP. I find the patient's history and physical exam are consistent with the NP's documentation. I agree with the above assessment and plan, which I have edited if needed. C9 FOLFIRINOX today, tolerating treatment well, scans show response. Signed By: 
Date: Jonas Petit MD  
04/26/21

## 2021-04-26 NOTE — PROGRESS NOTES
Chief Complaint Patient presents with  Follow-up Rachel Abhi is a pleasant 64year old male who presents today as a follow up for pancreas cancer. He denies pain

## 2021-04-26 NOTE — PROGRESS NOTES
[facility] - Chemotherapy Progress Note 0845 Pt admit to [facility] for C9D1 Folfirinox in stable condition. Assessment completed. No new concerns voiced. PAC with positive blood return. Labs were drawn and sent for processing. Pt proceeded to scheduled appt. Chemotherapy Flowsheet 4/26/2021 Cycle C9D1 Date 4/26/2021 Drug / Regimen Folfirinox Post Hydration -  
Pre Meds -  
Notes -  
 
 
 
VS 
Patient Vitals for the past 12 hrs: 
 Temp Pulse Resp BP  
04/26/21 1607  68  113/70  
04/26/21 0850 (!) 96.3 °F (35.7 °C) 72 18 99/60 Medications: 
Medications Administered 0.9% sodium chloride infusion 500 mL Admin Date 
04/26/2021 Action New Bag Dose 
500 mL Rate 
1,000 mL/hr Route IntraVENous Administered By Octaviano Rojas RN  
  
  
 0.9% sodium chloride injection 10 mL Admin Date 
04/26/2021 Action Given Dose 
10 mL Route IntraVENous Administered By Leda Null RN  
  
  
 atropine 0.4 mg/mL injection 0.4 mg   
 Admin Date 
04/26/2021 Action Given Dose 0.4 mg Route IntraVENous Administered By Soco Rojas RN  
  
  
 dexamethasone (DECADRON) 12 mg in 0.9% sodium chloride 50 mL IVPB Admin Date 
04/26/2021 Action Given Dose 
12 mg Route IntraVENous Administered By Octaviano Rojas RN  
  
  
 dextrose 5% infusion Admin Date 
04/26/2021 Action New Bag Dose 25 mL/hr Rate 25 mL/hr Route IntraVENous Administered By Soco Rojas RN  
  
  
 fluorouraciL (ADRUCIL) 4,152 mg in 0.9% sodium chloride 100 mL CADD Cassette Admin Date 
04/26/2021 Action New Bag Dose 4,152 mg Rate 2.2 mL/hr Route IntraVENous Administered By Soco Rojas RN  
  
  
 irinotecan (CAMPTOSAR) 260 mg in dextrose 5% 250 mL, overfill volume 25 mL chemo infusion Admin Date 
04/26/2021 Action New Bag Dose 
260 mg Rate 192 mL/hr Route IntraVENous Administered By Leda Null RN  
  
  
 leucovorin (WELLCOVORIN) 692 mg in dextrose 5% 250 mL, overfill volume 25 mL IVPB Admin Date 
04/26/2021 Action New Bag Dose 692 mg Rate 
206.7 mL/hr Route IntraVENous Administered By Soco Rojas RN  
  
  
 oxaliplatin (ELOXATIN) 147 mg in dextrose 5% 250 mL, overfill volume 25 mL chemo infusion Admin Date 
04/26/2021 Action New Bag Dose 
147 mg Rate 
152.2 mL/hr Route IntraVENous Administered By Kit Mullins RN  
  
  
 palonosetron HCl (ALOXI) injection 0.25 mg   
 Admin Date 
04/26/2021 Action Given Dose 0.25 mg Route IntraVENous Administered By Soco Rojas RN  
  
  
 sodium chloride (NS) flush 10 mL Admin Date 
04/26/2021 Action Given Dose 
10 mL Route IntraVENous Administered By Kit Mullins RN  
  
  
  
 
 
 
1610 Pt tolerated treatment well. PAC maintained positive blood return throughout treatment, flushed with positive blood return at conclusion and connected to infusing CADD pump Genmaba D/c home in no distress. Pt aware of next appointment scheduled for Wednesday at 1330. Labs Recent Results (from the past 12 hour(s)) CBC WITH AUTOMATED DIFF Collection Time: 04/26/21  8:54 AM  
Result Value Ref Range WBC 4.6 4.1 - 11.1 K/uL  
 RBC 3.16 (L) 4.10 - 5.70 M/uL  
 HGB 10.4 (L) 12.1 - 17.0 g/dL HCT 31.8 (L) 36.6 - 50.3 % .6 (H) 80.0 - 99.0 FL  
 MCH 32.9 26.0 - 34.0 PG  
 MCHC 32.7 30.0 - 36.5 g/dL  
 RDW 13.8 11.5 - 14.5 % PLATELET 995 (L) 437 - 400 K/uL MPV 9.7 8.9 - 12.9 FL  
 NRBC 0.0 0  WBC ABSOLUTE NRBC 0.00 0.00 - 0.01 K/uL NEUTROPHILS 59 32 - 75 % LYMPHOCYTES 20 12 - 49 % MONOCYTES 19 (H) 5 - 13 % EOSINOPHILS 2 0 - 7 % BASOPHILS 0 0 - 1 % IMMATURE GRANULOCYTES 0 %  
 ABS. NEUTROPHILS 2.7 1.8 - 8.0 K/UL  
 ABS. LYMPHOCYTES 0.9 0.8 - 3.5 K/UL  
 ABS. MONOCYTES 0.9 0.0 - 1.0 K/UL  
 ABS. EOSINOPHILS 0.1 0.0 - 0.4 K/UL  
 ABS. BASOPHILS 0.0 0.0 - 0.1 K/UL  
 ABS. IMM. GRANS. 0.0 K/UL DF MANUAL    
 RBC COMMENTS NORMOCYTIC, NORMOCHROMIC METABOLIC PANEL, COMPREHENSIVE Collection Time: 04/26/21  8:54 AM  
Result Value Ref Range Sodium 140 136 - 145 mmol/L Potassium 4.0 3.5 - 5.1 mmol/L Chloride 113 (H) 97 - 108 mmol/L  
 CO2 25 21 - 32 mmol/L Anion gap 2 (L) 5 - 15 mmol/L Glucose 85 65 - 100 mg/dL BUN 17 6 - 20 MG/DL Creatinine 1.02 0.70 - 1.30 MG/DL  
 BUN/Creatinine ratio 17 12 - 20 GFR est AA >60 >60 ml/min/1.73m2 GFR est non-AA >60 >60 ml/min/1.73m2 Calcium 8.4 (L) 8.5 - 10.1 MG/DL Bilirubin, total 0.2 0.2 - 1.0 MG/DL  
 ALT (SGPT) 20 12 - 78 U/L  
 AST (SGOT) 16 15 - 37 U/L Alk. phosphatase 71 45 - 117 U/L Protein, total 6.5 6.4 - 8.2 g/dL Albumin 3.3 (L) 3.5 - 5.0 g/dL Globulin 3.2 2.0 - 4.0 g/dL A-G Ratio 1.0 (L) 1.1 - 2.2

## 2021-04-28 NOTE — PROGRESS NOTES
[facility]    Date: 2021    Name: Natacha Lezama    MRN: [id number]         : 1960    Mr. Jenn Sanchez Arrived ambulatory and in no distress for Pump Removal.  CADD completed- 100 ml infused per order. Pump completed in the [facility] at 1337. Mr. Megan Beard vitals were reviewed. Visit Vitals  /70   Pulse 76   Temp 97.1 °F (36.2 °C)   Resp 18   SpO2 99%       Mr. Jenn Sanchez tolerated treatment well and was discharged from [place] [address] in stable condition. Port de-accessed, flushed & heparinized per protocol. He is aware of future appointments.     Future Appointments   Date Time Provider Tennille Moreno   5/10/2021  7:30 AM SS INF1 CH2 >7H [facility]   5/10/2021  8:15 AM Laron Rodriguez, BHAVYA [facility] BS AMB   2021  1:30 PM SS INF7 CH2 <1H [facility]   2021  7:30 AM [facility]   2021  1:30 PM SS INF7 CH2 <1H [facility]   2021  7:30 AM SS INF2 CH2 >7H [facility]   2021  1:30 PM SS INF5 CH4 <1H [facility] Minna Gonzalez RN  2021

## 2021-05-10 NOTE — TELEPHONE ENCOUNTER
[address] at [place]  [phone]        05/10/21 9:53 AM Called patient to check on him as he has not arrived for [facility]/MD appointments. Patient has complaints of diarrhea that started last night. Patient states he is unable to come in today due to symptoms. Patient estimates he has had about 3-4 loose/foamy stools. Has complaints of \"mild\" abdominal pain and positional dizziness. Denies fevers. Patient has not taken any medications to alleviate symptoms, stating symptoms generally resolve on their own. Patient is not taking Miralax or pericolace as listed in medication list. He reports that he usually alternates between either having diarrhea or constipation. Patient also states he is hydrating well with water. Advised this nurse will forward above to Dr. Nicky Myers and Zhanna Iraheta and call back if any additional recommendations or changes. Patient voiced understanding. 1:59 PM Attempted to call patient via mobile number listed. No answer, left message requesting return call. Provided office phone number as well.     05/11/21 9:52 AM Called patient. He states loose stools have resolved. Reviewed medication recommendations per Zhanna Iraheta, BHAVYA, in the event patient experiences loose stools in the future. Patient declines, stating he would prefer not to take additional medications and that symptoms usually resolve on their own. Reviewed signs/symptoms on when to contact office as well. Patient voiced understand. He is agreeable to return to clinic next week for treatment. Message sent to [facility]  for assistance with making these appointments. No further questions or concerns at this time.

## 2021-05-17 NOTE — TELEPHONE ENCOUNTER
[email or web address] at [facility]  [phone]        05/17/21 2:11 PM Attempted to call patient via cell phone number listed to check on him as he missed his [facility] and MD appointments today. No answer, left message requesting return call. Provided office phone number as well.

## 2021-05-18 NOTE — PROGRESS NOTES
[facility] [place] at [address], [address] [facility] 1007 [facility] W: [phone]  F: [phone] Hematology Oncology established visit Reason for Visit:  
Callie Pryor is a 64 y.o. male who is seen here for follow up of pancreatic cancer, new diagnosis. Hematology / Oncology Treatment History:  
 
Diagnosis: Pancreatic, adenocarcinoma, moderately to poorly diiferentiated Stage: IV [[id number]] Pathology: 2020 pancreatic body, FNA; adenocarcinoma, moderately to poorly diiferentiated [employer] testing: Negative for reportable alterations. MS-stable. KRAS G12D. Tumor mutational burden 1 muts/mb [id number] R183W. KDM6A Q611 TPS3 [id number]*67 [employer]: Germline testing negative for BRCA1/2 and 86 other genes. Prior Treatment: None Current Treatment:  FOLFIRINOX every 2 weeks, 20 - current. History of Present Illness: Mr. Callie Pryor is a 64 y.o. male admitted on 2020 from the ED with worsening abd pain and constipation. Rates pain on admission 9/10. Recent dx of pancreatic cancer during last hospitalization. Pt reports has not had a BM since last Wed; stopped taking the Senna-docusate last week; he thought it was causing his stomach to hurt more. He was unaware that it was part of a bowel regimen and thought it was pain medication. Has had some nausea but denies vomiting. Decreased appetite. Denies SOB. ED CT scan shows subacute pancreatitis, pancreatic body adenocarcinoma, retroperitoneal remington metastases, peritoneal carcinomatosis. Pt would like his sister to be able to hear about the treatment plans; she is coming from [facility] in the am. Trisha Solders to port placement; defininelty wants to have treatment. 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 Smokes cigarettes: 1 ppd x 30 yrs.  Rare EtOH use.  
: 3 kids; twins 29 yrs old and 25 yr old. Interval History: 
Patient here for follow up of pancreatic cancer and C10 of FOLFIRINOX. Denies any current n/v/d/c. No recent infections. Last chemo treatment was 4 weeks ago due to scheduling issues on his end. He describes his mid abdominal pain as mild to moderate, well controlled with using Oxycontin and Oxycodone for breakthrough pain. PMHx: Gout, Psoriasis, Pancreatic cancer Review of Systems: A complete review of systems was obtained, negative except as described above. Physical Exam:  
 
Visit Vitals /74 Pulse 79 Temp 97.5 °F (36.4 °C) Ht 5' 10\" (1.778 m) Wt 129 lb 14.4 oz (58.9 kg) SpO2 99% BMI 18.64 kg/m² ECOG PS: 1 General: No distress Eyes: Anicteric sclerae HENT: Atraumatic Neck: Supple Respiratory: Normal respiratory effort CV: No peripheral edema, Regular rhythm, nl rate, port right upper chest.  
GI: Soft, nontender, nondistended, no masses Skin: No rashes, ecchymoses, or petechiae Psych: Alert, oriented, appropriate affect, normal judgment/insight Results:  
 
Lab Results Component Value Date/Time WBC 6.0 05/24/2021 08:01 AM  
 HGB 11.5 (L) 05/24/2021 08:01 AM  
 HCT 36.6 05/24/2021 08:01 AM  
 PLATELET 642 (L) 62/72/3112 08:01 AM  
 MCV 99.5 (H) 05/24/2021 08:01 AM  
 ABS. NEUTROPHILS 3.2 05/24/2021 08:01 AM  
 
Lab Results Component Value Date/Time Sodium 140 04/26/2021 08:54 AM  
 Potassium 4.0 04/26/2021 08:54 AM  
 Chloride 113 (H) 04/26/2021 08:54 AM  
 CO2 25 04/26/2021 08:54 AM  
 Glucose 85 04/26/2021 08:54 AM  
 BUN 17 04/26/2021 08:54 AM  
 Creatinine 1.02 04/26/2021 08:54 AM  
 GFR est AA >60 04/26/2021 08:54 AM  
 GFR est non-AA >60 04/26/2021 08:54 AM  
 Calcium 8.4 (L) 04/26/2021 08:54 AM  
 
Lab Results Component Value Date/Time Bilirubin, total 0.2 04/26/2021 08:54 AM  
 ALT (SGPT) 20 04/26/2021 08:54 AM  
 Alk.  phosphatase 71 04/26/2021 08:54 AM  
 Protein, total 6.5 2021 08:54 AM  
 Albumin 3.3 (L) 2021 08:54 AM  
 Globulin 3.2 2021 08:54 AM  
 
Lab Results Component Value Date/Time Sed rate, automated 4 2017 03:56 PM  
 C-Reactive protein <0.29 2017 03:56 PM  
 TSH 3.36 2020 05:28 AM  
 Lipase 115 2020 06:22 AM  
 
Lab Results Component Value Date/Time INR 1.0 2010 03:05 AM  
 aPTT 28.0 2010 03:05 AM  
 
Lab Results Component Value Date/Time CEA 4.9 2020 05:29 AM  
 Carbohydrate Antigen 19-9, (CA 19-9) 372 (H) 2021 08:15 AM  
 
 
20 CA 19-9 5607 at diagnosis. 21 CA 19-9 6483 
20 started treatment 21 CA 19-9: 1317 21: CA 19-9 372 Imagin2020 XR ABD FLAT IMPRESSION: 
 No evidence for acute abnormality 2020 CT ABD PELV W CONT IMPRESSION:  
1. Subacute pancreatitis, with small walled-off necrosis (\"pseudocyst\") in the lesser sac. 2. Pancreatic body adenocarcinoma. 3. Occluded splenic vein. Consequent, extensive, submucosal, gastric varices in the cardia. 4. Encasement of the splenic artery. Abutment of the celiac artery, SMV, and SMA. 5. Retroperitoneal remington metastases. 6. Peritoneal carcinomatosis. 
  
21 CT c/a/p: 
IMPRESSION 1. Interval resolution of free intraperitoneal fluid. 2. Pancreatic mass and retroperitoneal lymphadenopathy, unchanged and as described above. 3.Right hepatic lobe hypodensities, not significantly changed, as described 
above. 4. Recommend continued short interval surveillance. Addendum:  
There has been interval improvement of the omental nodularity. There has also 
been interval improvement of the ill-defined soft tissue anterior and inferior 
to the pancreatic body, previously seen on CT abdomen and pelvis dated 2020.  
The pancreatic body mass is difficult to measure but measures approximately 2.5 cm x 2.2 cm and measured approximately 2.9 cm x 2.6 cm a prior CT dated November 2020. There has been interval resolution of free intraperitoneal fluid. Retroperitoneal lymphadenopathy, not significantly changed. Right hepatic lobe hypodensities, as described above, not significantly changed. 4/24/21 CT c/a/p: 
NONTARGET LESIONS: 
1. Pancreatic body mass. Difficult to reproducibly measure. Retro-pancreatic extension. Pancreatic body mass is ill-defined, difficult to measure, approximately 22 x 28 mm in size. 2. Tiny omental nodular densities best demonstrated in the left paracolic 
region. IMPRESSION Stable pancreatic mass with retroperitoneal extension. Right hepatic lobe hypodensities, unchanged. Subtle omental nodularity is also stable. Assessment and Recommendations:  
65 y/o male with metastatic pancreatic cancer on palliative chemotherapy. 1. Pancreatic adenocarcinoma:  
Stage IV, CANDIE, Germline and somatic genetic testing negative. Diagnosed 11/18/2020, has Stage IV disease with peritoneal carcinomatosis and retroperitoneal LAD, encasement of vasculature seen on CT. CA 19-9 5607 at diagnosis. Previously discussed with patient that his disease is not curable, but is treatable. I recommended palliative chemotherapy with FOLFIRINOX regimen. We discussed the risks and benefits of FOLFIRINOX chemotherapy, including potential side effects. These include but are not limited to fatigue, nausea vomiting, diarrhea, neuropathy, taste changes, cold intolerance, esophageal spasm, allergic reactions, alopecia, mucositis, myelosuppression, risk for infection, infertility, and rarely, death. Rarely, a patient may have a condition where they do not metabolize fluorouracil appropriately (called DPD deficiency), and they may have excessive toxicity. A Port-A-Cath will be required in order to deliver the continuous infusion. The patient has consented to beginning therapy. At time of disease progression, will use PROSCAP nomogram and consider Novelty-Abraxane in 2nd line.  CT 4/24/21 showed response to treatment. Supportive medications include: EMLA cream, zofran, compazine, dexamethasone. -- Proceed with cycle 10 FOLFIRNOX, MD/NP visit, pump removal on Wednesday. -- CT scan due after cycle 12 treatment. -- Follow up in 2 weeks for cycle 11, MD/NP visit. -- Refuses COVID vaccine. 2. F/E/N/GI: 
Constipation 2/2 opioids and bowel regimen adjusted. Continue Miralax 1-2 times daily and Docusate/Senna 1-2 times daily. Encouraged supplementing with 1-2 high calorie protein drinks daily to prevent further WL. Given samples from our supply due to cost. Chelo Orr RD. 3. Occluded splenic vein: 
Reviewed with Radiology. There is no clot and no anticoagulation needed. 4. Neoplasm related pain:  
Well controlled on Oxycontin ER 10mg q12h and Oxycodone IR prn.   
-- Refilled Oxycontin 10mg q12h #60 on 5/24/21 
-- Refilled Oxycodone 5mg q6h PRN #45 on 5/24/21 5. Family h/o pancreatic cancer: 
Was concerning for genetic predisposition but germline genetic testing negative. 6. Transaminitis: Mild. Monitor. Signed By: 
Date: Kelsey Brennan MD  
05/24/21

## 2021-05-18 NOTE — TELEPHONE ENCOUNTER
[address] at [place]  [phone]        05/18/21 10:11 AM Called patient to check on him. Patient stated he had called infusion center on 05/14 to verify next appointment and was told he was scheduled to return on 06/01. Patient stated he was not notified of 05/17 appointment. Patient also shared he was out of town last week and did not have his phone. He borrowed someone else's phone to place call to infusion center. Advised this nurse would forward above to MD and NP and call patient back with clarification regarding when he should return to infusion center/clinic. Patient voiced understanding.

## 2021-05-24 NOTE — PROGRESS NOTES
Mercedes Polanco is a 64 y.o. male here for follow up of pancreatic cancer. Patient with complaints of abdominal pain, rates as a 4 out of 10.

## 2021-05-24 NOTE — PROGRESS NOTES
[facility] Progress Note    Date: May 24, 2021    Name: Natacha Lezama    MRN: [id number]         : 1960    Mr. Jenn Sanchez Arrived ambulatory and in no distress for cycle 10 day 1 of Folfirinox regimen. Assessment was completed, no acute issues at this time, no new complaints voiced. Patient does report that he has some tingling in bilateral hands for about 5 days after treatment and has cold sensitivity in mouth. Additionally reports occasional abdominal pain. No other changes at this time. Port accessed without difficulty, labs drawn and processed. Prior to treatment, patient was screened for COVID 19. Denies any signs or symptoms of COVID. Denies any known physical contact with anyone exposed to, diagnosed with or with pending or positive COVID test. Denies any pending or positive COVID test themself. Chemotherapy Flowsheet 2021   Cycle C10D1   Date 2021   Drug / Regimen Oxaliplatin/irinotecan/leucovorin/fluorouracil   Dosage 146mg/258mg/688mg/4128mg   Time Up 1023   Time Down D/C with  CADD   Pre Hydration none   Post Hydration none   Pre Meds given   Notes given         Mr. Megan Beard vitals were reviewed. Patient Vitals for the past 12 hrs:   Temp Pulse Resp BP   21 1430 97.2 °F (36.2 °C) 75 18 115/74   21 0746 97.5 °F (36.4 °C) 79 18 108/74         Lab results were obtained and reviewed.   Recent Results (from the past 12 hour(s))   CBC WITH AUTOMATED DIFF    Collection Time: 21  8:01 AM   Result Value Ref Range    WBC 6.0 4.1 - 11.1 K/uL    RBC 3.68 (L) 4.10 - 5.70 M/uL    HGB 11.5 (L) 12.1 - 17.0 g/dL    HCT 36.6 36.6 - 50.3 %    MCV 99.5 (H) 80.0 - 99.0 FL    MCH 31.3 26.0 - 34.0 PG    MCHC 31.4 30.0 - 36.5 g/dL    RDW 14.9 (H) 11.5 - 14.5 %    PLATELET 210 (L) 624 - 400 K/uL    MPV 9.6 8.9 - 12.9 FL    NRBC 0.0 0  WBC    ABSOLUTE NRBC 0.00 0.00 - 0.01 K/uL    NEUTROPHILS 54 32 - 75 %    LYMPHOCYTES 25 12 - 49 %    MONOCYTES 12 5 - 13 %    EOSINOPHILS 8 (H) 0 - 7 %    BASOPHILS 1 0 - 1 %    IMMATURE GRANULOCYTES 0 0.0 - 0.5 %    ABS. NEUTROPHILS 3.2 1.8 - 8.0 K/UL    ABS. LYMPHOCYTES 1.5 0.8 - 3.5 K/UL    ABS. MONOCYTES 0.7 0.0 - 1.0 K/UL    ABS. EOSINOPHILS 0.5 (H) 0.0 - 0.4 K/UL    ABS. BASOPHILS 0.1 0.0 - 0.1 K/UL    ABS. IMM.  GRANS. 0.0 0.00 - 0.04 K/UL    DF AUTOMATED         Medications Administered     0.9% sodium chloride injection 10 mL     Admin Date  05/24/2021 Action  Given Dose  10 mL Route  IntraVENous Administered By  Leonce Masker          atropine 0.4 mg/mL injection 0.4 mg     Admin Date  05/24/2021 Action  Given Dose  0.4 mg Route  IntraVENous Administered By  Leonce Masker          dexamethasone (DECADRON) 12 mg in 0.9% sodium chloride 50 mL IVPB     Admin Date  05/24/2021 Action  New Bag Dose  12 mg Route  IntraVENous Administered By  Leonce Masker          dextrose 5% infusion     Admin Date  05/24/2021 Action  New Bag Dose  25 mL/hr Rate  25 mL/hr Route  IntraVENous Administered By  Leonce Masker          fluorouraciL (ADRUCIL) 4,128 mg in 0.9% sodium chloride 100 mL CADD Cassette     Admin Date  05/24/2021 Action  New Bag Dose  4,128 mg Rate  2.2 mL/hr Route  IntraVENous Administered By  Leonce Masker          irinotecan (CAMPTOSAR) 258 mg in dextrose 5% 250 mL, overfill volume 25 mL chemo infusion     Admin Date  05/24/2021 Action  New Bag Dose  258 mg Rate  191.9 mL/hr Route  IntraVENous Administered By  Leonce Masker          leucovorin (WELLCOVORIN) 688 mg in dextrose 5% 250 mL, overfill volume 25 mL IVPB     Admin Date  05/24/2021 Action  New Bag Dose  688 mg Rate  206.3 mL/hr Route  IntraVENous Administered By  Leonce Masker          oxaliplatin (ELOXATIN) 146 mg in dextrose 5% 250 mL, overfill volume 25 mL chemo infusion     Admin Date  05/24/2021 Action  New Bag Dose  146 mg Rate  152.1 mL/hr Route  IntraVENous Administered By  Leonce Masker          palonosetron HCl (ALOXI) injection 0.25 mg     Admin Date  05/24/2021 Action  Given Dose  0.25 mg Route  IntraVENous Administered By  Kelsey Brennan          sodium chloride (NS) flush 10 mL     Admin Date  05/24/2021 Action  Given Dose  10 mL Route  IntraVENous Administered By  Kelsey Brennan                  Mr. Erin Hall tolerated treatment well. Port maintained positive blood return throughout the course of treatment. CADD pump was verified by RN x 2 and was in full working order prior to discharge. Patient is aware of his next appointment.     Future Appointments   Date Time Provider Tennille Moreno   5/26/2021  1:30 PM SS INF5 CH4 <1H [facility]   6/7/2021  7:30 [facility]   6/7/2021  8:45 AM Jonnie Rodriguez NP [facility] AM[facility]   6/9/2021  1:30 PM [facility]   6/21/2021  7:30 AM [facility]   6/21/2021  8:45 AM Jonnie Rodriguez NP ON[facility] AMB   6/23/2021  1:30 [facility]   7/5/2021  7:30 AM [facility]   7/7/2021  1:30 PM SS INF5 CH4 <1H [facility]  May 24, 2021

## 2021-05-26 NOTE — PROGRESS NOTES
[facility] Progress Note Date: May 26, 2021 Name: Melia Vences MRN: [id number] : 1960 Mr. Frank Riley Arrived ambulatory and in no distress for Pump Removal.  Assessment was completed, no changes since receiving chemo on Monday. CADD completed- 100 ml infused per order. Mr. Eva Cross vitals were reviewed. Patient Vitals for the past 12 hrs: 
 Temp Pulse Resp BP  
21 1352 97.2 °F (36.2 °C) (!) 104 18 107/64 Mr. Dias tolerated treatment well and was discharged from [place] [id number] in stable condition. Port de-accessed, flushed & heparinized per protocol. He is to return on 21 for his next appointment. Kane Espinoza RN May 26, 2021

## 2021-06-07 NOTE — TELEPHONE ENCOUNTER
[address]  Social Work Navigator Encounter     Patient Name:  Reba Clifford    Medical History: pancreatic cancer    Advance Directives: none on file; conversation deferred    Narrative: Social work referral received from [facility] Nurse Manager regarding patient's missed appointment and transportation barriers. Call placed to patient and voicemail left requesting a return call to assess barriers to care. Called patient's listed home phone and cell phone. No answer and unable to leave voicemail message. Will attempt to call patient again. 6/11/21 - Call placed to patient to discuss the above. No answer and unable to leave a message due to full mailbox.        Thank you,  Reva Ruff LCSW

## 2021-06-11 NOTE — TELEPHONE ENCOUNTER
[employer]  Social Work Navigator Encounter     Patient Name:  Jeff Phillips    Narrative: Received call from patient inquiring when his next treatment appointment is. Patient missed appointment on 6/7. Patient reports it was his birthday and although he had planned to attend his appointment he opted to go the beach with his son. He reports he lost his phone at the beach and was unable to call our office to let us know he would not be at the appointment. Patient denies transportation issues or any concerns at this time. Strongly encouraged patient to update the office if he is unable to attend appointment in the future. Patient's next office visit/treatment is scheduled for 6/21. Patient declined offer to check if appointment can be moved up sooner.      Thank you,  Dashawn Sanchez LCSW

## 2021-06-16 NOTE — PROGRESS NOTES
[facility] [place] [facility] [place] [address] [address] [facility]  Kati Germain: [phone]  F: [phone]    Hematology Oncology established visit     Reason for Visit:   Emilia Moran is a 63 y.o. male who is seen here for follow up of pancreatic cancer, new diagnosis. Hematology / Oncology Treatment History:     Diagnosis: Pancreatic, adenocarcinoma, moderately to poorly diiferentiated    Stage: IV [[id number]]    Pathology: 2020 pancreatic body, FNA; adenocarcinoma, moderately to poorly diiferentiated  Foundation One testing: Negative for reportable alterations. MS-stable. KRAS G12D. Tumor mutational burden 1 muts/mb [id number] [id number]. KDM6A Q611 TPS3 [id number]*95  [employer]: Germline testing negative for BRCA1/2 and 86 other genes. Prior Treatment: None    Current Treatment:  FOLFIRINOX every 2 weeks, 20 - current. History of Present Illness:   Mr. Emilia Moran is a 63 y.o. male with metastatic pancreatic cancer coming in for follow up and chemotherapy. He is overdue. Last treatment was 21. He is due for C11 of FOLFIRINOX today. Reports abdominal pain is slightly worse today. Pain is controlled using oxycontin and oxycodone for breakthrough pain. Denies any current n/v/d/c. No recent infections. Discussed cancellation policy. He plans to send [employer] message and call if he needs to reschedule an appointment. 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 Smokes cigarettes: 1 ppd x 30 yrs. Rare EtOH use. : 3 kids; twins 29 yrs old and 25 yr old. Review of Systems: A complete review of systems was obtained, negative except as described above.     Physical Exam:     Visit Vitals  /81   Pulse 100   Temp 98 °F (36.7 °C)   Resp 18   Ht 5' 10\" (1.778 m)   Wt 126 lb 15.8 oz (57.6 kg) SpO2 97%   BMI 18.22 kg/m²     ECOG PS: 1  General: No distress, thin   Eyes: Anicteric sclerae  HENT: Atraumatic  Neck: Supple  Respiratory: Normal respiratory effort  CV: No peripheral edema, Regular rhythm, nl rate, port right upper chest.   GI: Soft, nontender, nondistended, no masses  Skin: No rashes, ecchymoses, or petechiae  Psych: Alert, oriented, appropriate affect, normal judgment/insight    Results:     Lab Results   Component Value Date/Time    WBC 6.4 06/21/2021 08:05 AM    HGB 12.4 06/21/2021 08:05 AM    HCT 39.1 06/21/2021 08:05 AM    PLATELET 103 19/49/0048 08:05 AM    .3 (H) 06/21/2021 08:05 AM    ABS. NEUTROPHILS 3.2 06/21/2021 08:05 AM     Lab Results   Component Value Date/Time    Sodium 141 06/21/2021 08:05 AM    Potassium 3.5 06/21/2021 08:05 AM    Chloride 109 (H) 06/21/2021 08:05 AM    CO2 25 06/21/2021 08:05 AM    Glucose 108 (H) 06/21/2021 08:05 AM    BUN 16 06/21/2021 08:05 AM    Creatinine 1.26 06/21/2021 08:05 AM    GFR est AA >60 06/21/2021 08:05 AM    GFR est non-AA 59 (L) 06/21/2021 08:05 AM    Calcium 8.7 06/21/2021 08:05 AM     Lab Results   Component Value Date/Time    Bilirubin, total 0.5 06/21/2021 08:05 AM    ALT (SGPT) 25 06/21/2021 08:05 AM    Alk. phosphatase 78 06/21/2021 08:05 AM    Protein, total 7.3 06/21/2021 08:05 AM    Albumin 3.4 (L) 06/21/2021 08:05 AM    Globulin 3.9 06/21/2021 08:05 AM     Lab Results   Component Value Date/Time    Sed rate, automated 4 11/16/2017 03:56 PM    C-Reactive protein <0.29 11/16/2017 03:56 PM    TSH 3.36 11/12/2020 05:28 AM    Lipase 115 11/23/2020 06:22 AM     Lab Results   Component Value Date/Time    INR 1.0 09/23/2010 03:05 AM    aPTT 28.0 09/23/2010 03:05 AM     Lab Results   Component Value Date/Time    CEA 4.9 11/13/2020 05:29 AM    Carbohydrate Antigen 19-9, (CA 19-9) 372 (H) 04/12/2021 08:15 AM       11/12/20 CA 19-9 5607 at diagnosis.    11/20/21 CA 19-9 6483  11/30/20 started treatment   01/11/21 CA 19-9: 1317 21: CA 19-9 372    Imagin2020 XR ABD FLAT  IMPRESSION:   No evidence for acute abnormality    2020 CT ABD PELV W CONT  IMPRESSION:   1. Subacute pancreatitis, with small walled-off necrosis (\"pseudocyst\") in the lesser sac. 2. Pancreatic body adenocarcinoma. 3. Occluded splenic vein. Consequent, extensive, submucosal, gastric varices in the cardia. 4. Encasement of the splenic artery. Abutment of the celiac artery, SMV, and SMA. 5. Retroperitoneal remington metastases. 6. Peritoneal carcinomatosis.     21 CT c/a/p:  IMPRESSION  1. Interval resolution of free intraperitoneal fluid. 2. Pancreatic mass and retroperitoneal lymphadenopathy, unchanged and as described above. 3.Right hepatic lobe hypodensities, not significantly changed, as described  above. 4. Recommend continued short interval surveillance. Addendum:   There has been interval improvement of the omental nodularity. There has also  been interval improvement of the ill-defined soft tissue anterior and inferior  to the pancreatic body, previously seen on CT abdomen and pelvis dated 2020. The pancreatic body mass is difficult to measure but measures approximately 2.5 cm x 2.2 cm and measured approximately 2.9 cm x 2.6 cm a prior CT dated 2020. There has been interval resolution of free intraperitoneal fluid. Retroperitoneal lymphadenopathy, not significantly changed. Right hepatic lobe hypodensities, as described above, not significantly changed. 21 CT c/a/p:  NONTARGET LESIONS:  1. Pancreatic body mass. Difficult to reproducibly measure. Retro-pancreatic extension. Pancreatic body mass is ill-defined, difficult to measure, approximately 22 x 28 mm in size. 2. Tiny omental nodular densities best demonstrated in the left paracolic  region. IMPRESSION  Stable pancreatic mass with retroperitoneal extension. Right hepatic lobe hypodensities, unchanged.     Subtle omental nodularity is also stable. Assessment and Recommendations:   63 y/o male with metastatic pancreatic cancer on palliative chemotherapy. 1. Pancreatic adenocarcinoma:   Stage IV, CANDIE, Germline and somatic genetic testing negative. Diagnosed 11/18/2020, has Stage IV disease with peritoneal carcinomatosis and retroperitoneal LAD, encasement of vasculature seen on CT. CA 19-9 5607 at diagnosis. Previously discussed with patient that his disease is not curable, but is treatable. I recommended palliative chemotherapy with FOLFIRINOX regimen. We discussed the risks and benefits of FOLFIRINOX chemotherapy, including potential side effects. These include but are not limited to fatigue, nausea vomiting, diarrhea, neuropathy, taste changes, cold intolerance, esophageal spasm, allergic reactions, alopecia, mucositis, myelosuppression, risk for infection, infertility, and rarely, death. Rarely, a patient may have a condition where they do not metabolize fluorouracil appropriately (called DPD deficiency), and they may have excessive toxicity. A Port-A-Cath will be required in order to deliver the continuous infusion. The patient has consented to beginning therapy. At time of disease progression, will use PROSCAP nomogram and consider Rowan-Abraxane in 2nd line. CT 4/24/21 showed response to treatment. Supportive medications include: EMLA cream, zofran, compazine, dexamethasone. -- Proceed with cycle 11 FOLFIRNOX, MD/NP visit, pump removal on Wednesday. -- CT scan due after cycle 12 treatment. -- Follow up in 2 weeks for cycle 12, MD/NP visit. -- Refuses COVID vaccine. 2. F/E/N/GI:  Continued WL. Constipation 2/2 opioids and bowel regimen adjusted. Continue Miralax 1-2 times daily and Docusate/Senna 1-2 times daily. Encouraged supplementing with 1-2 high calorie protein drinks daily to prevent further WL. Given samples from our supply due to cost. Kaylen Baeza RD.     3. Occluded splenic vein:  Reviewed with Radiology.  There is no clot and no anticoagulation needed. 4. Neoplasm related pain:   Well controlled on Oxycontin ER 10mg q12h and Oxycodone IR prn.  checked 6/21/21. -- Refilled Oxycontin 10mg q12h #60 on 6/24/21. -- Refilled Oxycodone 5mg q6h PRN #45 on 6/24/21     5. Family h/o pancreatic cancer:  Was concerning for genetic predisposition but germline genetic testing negative. 6. Transaminitis: Mild. Monitor. I have personally seen and evaluated the patient in conjunction with Aung Masters NP. I find the patient's history and physical exam are consistent with the NP's documentation. I agree with the above assessment and plan, which I have modified as needed. He is feeling ready to resume therapy today after a bit of a break for personal reasons. Proceed with treatment.       Signed By:  Date: Ayleen Newberry MD   06/21/21

## 2021-06-21 NOTE — PROGRESS NOTES
Chief Complaint Patient presents with  Follow-up Emilia Moran is a pleasant 63year old male who presents as a follow up for pancreatic cancer. He reports mild stomach pain

## 2021-06-21 NOTE — PROGRESS NOTES
[facility] Progress Note    Date: 2021    Name: Kvng Cortes    MRN: [id number]         : 1960    Mr. Franck Rocha Arrived ambulatory and in no distress for C11D1 of Folfirinox Regimen. Assessment was completed, no acute issues at this time, no new complaints voiced. Right chest wall port accessed without difficulty, labs drawn & sent for processing. Patient proceed to appointment with Dr. Tylor Flores. Mr. Hood Patient vitals were reviewed. Patient Vitals for the past 12 hrs:   Temp Pulse Resp BP SpO2   21 1444 98 °F (36.7 °C) 73 18 121/77    21 0751 98 °F (36.7 °C) 100 18 137/81 97 %       Lab results were obtained and reviewed. Recent Results (from the past 12 hour(s))   CBC WITH AUTOMATED DIFF    Collection Time: 21  8:05 AM   Result Value Ref Range    WBC 6.4 4.1 - 11.1 K/uL    RBC 3.90 (L) 4.10 - 5.70 M/uL    HGB 12.4 12.1 - 17.0 g/dL    HCT 39.1 36.6 - 50.3 %    .3 (H) 80.0 - 99.0 FL    MCH 31.8 26.0 - 34.0 PG    MCHC 31.7 30.0 - 36.5 g/dL    RDW 14.8 (H) 11.5 - 14.5 %    PLATELET 188 147 - 981 K/uL    MPV 9.4 8.9 - 12.9 FL    NRBC 0.0 0  WBC    ABSOLUTE NRBC 0.00 0.00 - 0.01 K/uL    NEUTROPHILS 50 32 - 75 %    LYMPHOCYTES 31 12 - 49 %    MONOCYTES 12 5 - 13 %    EOSINOPHILS 5 0 - 7 %    BASOPHILS 1 0 - 1 %    IMMATURE GRANULOCYTES 1 (H) 0.0 - 0.5 %    ABS. NEUTROPHILS 3.2 1.8 - 8.0 K/UL    ABS. LYMPHOCYTES 2.0 0.8 - 3.5 K/UL    ABS. MONOCYTES 0.8 0.0 - 1.0 K/UL    ABS. EOSINOPHILS 0.3 0.0 - 0.4 K/UL    ABS. BASOPHILS 0.1 0.0 - 0.1 K/UL    ABS. IMM.  GRANS. 0.0 0.00 - 0.04 K/UL    DF AUTOMATED     METABOLIC PANEL, COMPREHENSIVE    Collection Time: 21  8:05 AM   Result Value Ref Range    Sodium 141 136 - 145 mmol/L    Potassium 3.5 3.5 - 5.1 mmol/L    Chloride 109 (H) 97 - 108 mmol/L    CO2 25 21 - 32 mmol/L    Anion gap 7 5 - 15 mmol/L    Glucose 108 (H) 65 - 100 mg/dL    BUN 16 6 - 20 MG/DL    Creatinine 1.26 0.70 - 1.30 MG/DL    BUN/Creatinine ratio 13 12 - 20      GFR est AA >60 >60 ml/min/1.73m2    GFR est non-AA 59 (L) >60 ml/min/1.73m2    Calcium 8.7 8.5 - 10.1 MG/DL    Bilirubin, total 0.5 0.2 - 1.0 MG/DL    ALT (SGPT) 25 12 - 78 U/L    AST (SGOT) 18 15 - 37 U/L    Alk.  phosphatase 78 45 - 117 U/L    Protein, total 7.3 6.4 - 8.2 g/dL    Albumin 3.4 (L) 3.5 - 5.0 g/dL    Globulin 3.9 2.0 - 4.0 g/dL    A-G Ratio 0.9 (L) 1.1 - 2.2         Medications:  Medications Administered     atropine 0.4 mg/mL injection 0.4 mg     Admin Date  06/21/2021 Action  Given Dose  0.4 mg Route  IntraVENous Administered By  [place], [place]          dexamethasone (DECADRON) 12 mg in 0.9% sodium chloride 50 mL IVPB     Admin Date  06/21/2021 Action  New Bag Dose  12 mg Route  IntraVENous Administered By  [place], [place]          dextrose 5% infusion     Admin Date  06/21/2021 Action  New Bag Dose  25 mL/hr Rate  25 mL/hr Route  IntraVENous Administered By  [place], [place]          fluorouraciL (ADRUCIL) 4,128 mg in 0.9% sodium chloride 100 mL CADD Cassette     Admin Date  06/21/2021 Action  New Bag Dose  4,128 mg Rate  2.2 mL/hr Route  IntraVENous Administered By  [place], [place]          irinotecan (CAMPTOSAR) 258 mg in dextrose 5% 250 mL, overfill volume 25 mL chemo infusion     Admin Date  06/21/2021 Action  New Bag Dose  258 mg Rate  191.9 mL/hr Route  IntraVENous Administered By  [place], [place]          leucovorin (WELLCOVORIN) 688 mg in dextrose 5% 250 mL, overfill volume 25 mL IVPB     Admin Date  06/21/2021 Action  New Bag Dose  688 mg Rate  206.3 mL/hr Route  IntraVENous Administered By  [place], [place]          oxaliplatin (ELOXATIN) 146 mg in dextrose 5% 250 mL, overfill volume 25 mL chemo infusion     Admin Date  06/21/2021 Action  New Bag Dose  146 mg Rate  152.1 mL/hr Route  IntraVENous Administered By  [facility], [place]          palonosetron HCl (ALOXI) injection 0.25 mg     Admin Date  06/21/2021 Action  Given Dose  0.25 mg Route  IntraVENous Administered By  Shavon Fraire, [place]                Mr. Prabha Bloom tolerated treatment well and was discharged from [place] [address] in stable condition at [phone]. Port de-accessed, flushed & heparinized per protocol. He is to return on June 23 at 1330 for his next appointment.     [facility]  June 21, 2021

## 2021-06-22 NOTE — ED NOTES
After triage, pt taken to decon room to be decontaminated d/t chemo exposure by this RN. Contaminated clothing/items placed into yellow chemo bag to be disposed of. After pt was properly decontaminated, pt placed in clean gown and taken to room 14.

## 2021-06-22 NOTE — ED TRIAGE NOTES
Pt reports he is currently getting chemotherapy treatment for pancreatic cancer. Reports his port was accessed and his chemo treatment was started at the infusion center yesterday and he went home with it running. Was supposed to go back tomorrow and have it de-accessed but today he rolled over in bed and accidentally pulled it out and the medication dripped all over him. Needs port reaccessed in order to complete chemo treatment.

## 2021-06-23 NOTE — ED PROVIDER NOTES
65-year-old male with a history of pancreatic cancer on chemotherapy. Presents with a vascular access problem. His port and pump for his chemotherapy were partially deaccessed. He was supposed to have it running while at home for the next 2 days. Today he rolled over in bed and accidentally pulled it out in the medication spilled all over him. His chemotherapy was spilled in the waiting room. He states that he needs to have his port reaccessed in order to complete chemotherapy. Past Medical History:  
Diagnosis Date  Gout  Pancreatic cancer ([address]  Psoriasis  Tobacco abuse Past Surgical History:  
Procedure Laterality Date  HX ORTHOPAEDIC    
 femur repair  HX ORTHOPAEDIC    
 lumbar compressed fracture  IR INSERT TUNL CVC W PORT OVER 5 YEARS  11/24/2020 Family History:  
Problem Relation Age of Onset  Pancreatic Cancer Brother Social History Socioeconomic History  Marital status: SINGLE Spouse name: Not on file  Number of children: Not on file  Years of education: Not on file  Highest education level: Not on file Occupational History  Not on file Tobacco Use  Smoking status: Current Every Day Smoker Packs/day: 1.00  Smokeless tobacco: Never Used  Tobacco comment: last week was last cigarette Substance and Sexual Activity  Alcohol use: Yes  Drug use: Not on file  Sexual activity: Not on file Other Topics Concern  Not on file Social History Narrative  Not on file Social Determinants of Health Financial Resource Strain:  Difficulty of Paying Living Expenses:   
Food Insecurity:  Worried About [address] in the Last Year:   
[address] in the Last Year:   
Transportation Needs:   
 Lack of Transportation (Medical):  Lack of Transportation (Non-Medical): Physical Activity:   
 Days of Exercise per Week:  Minutes of Exercise per Session:   
Stress:  Feeling of Stress :   
Social Connections:  Frequency of Communication with Friends and Family:  Frequency of Social Gatherings with Friends and Family:  Attends Orthodox Services:  Active Member of Clubs or Organizations:  Attends Club or Organization Meetings:  Marital Status: Intimate Partner Violence:  Fear of Current or Ex-Partner:  Emotionally Abused:   
 Physically Abused:   
 Sexually Abused: ALLERGIES: Patient has no known allergies. Review of Systems All other systems reviewed and are negative. Vitals:  
 06/22/21 1801 BP: 103/72 Pulse: 82 Resp: 18 Temp: 97.5 °F (36.4 °C) SpO2: 100% Weight: 57.6 kg (126 lb 15.8 oz) Height: 5' 10\" (1.778 m) Physical Exam 
Vitals and nursing note reviewed. Constitutional:   
   Appearance: He is well-developed. HENT:  
   Head: Normocephalic and atraumatic. Eyes:  
   General: No scleral icterus. Neck:  
   Trachea: No tracheal deviation. Cardiovascular:  
   Rate and Rhythm: Normal rate. Pulmonary:  
   Effort: Pulmonary effort is normal.  
   Comments: Port in left chest without erythema. Abdominal:  
   General: There is no distension. Musculoskeletal:     
   General: No deformity. Cervical back: No rigidity. Skin: 
   General: Skin is warm and dry. Neurological:  
   General: No focal deficit present. Mental Status: He is alert. Psychiatric:     
   Mood and Affect: Mood normal.  
 
  
 
MDM Number of Diagnoses or Management Options Problem with vascular access Diagnosis management comments: Patient was decontaminated and his pump was placed in bag. He will follow-up closely with the infusion center tomorrow. He can have his chemotherapy restarted tomorrow. No other emergent problems in the emergency department today. Procedures

## 2021-06-23 NOTE — PROGRESS NOTES
[facility] Progress Note    Date: 2021    Name: Raji Kellogg    MRN: [id number]         : 1960    Mr. Malick Dumont Arrived ambulatory and in no distress for pump removal Regimen. Patient arrived to [facility] with gordon needle de-accessed and CADD pump in chemotherapy spill kit yellow bag. Patient states last night as he was sleeping ,he rolled over and the needle came out. Patient immediately went to the ER. Patient states ER stopped the CADD pump and placed it in chemotherapy bag and de-contaminated patient. Patient was then discharged home. Patient states he will be leaving town tomorrow to [employer] and will not be able to come back and get pump removed if he had to be re-accessed for treatment. RN notified MD office, spoke with Kurt Sears regarding this. RN received verbal orders ok to discharged patient and no need to re-accessed patient for treatment. Covid Questionnaire completed. 1. Do you have any symptoms of covid 19? SOB, coughing, fever, or generally not feeling well? - nio  2. Have you been exposed to covid 19 recently? - no  3. Have you had any recent contact with family/friend that has a pending covid test? no        Mr. Kavitha Rodriguez vitals were reviewed. Visit Vitals  /70 (BP 1 Location: Right upper arm, BP Patient Position: Sitting)   Pulse 83   Temp 97.9 °F (36.6 °C)   Resp 18   SpO2 99%       Mr. Malick Dumont  was discharged from [place] [address] in stable condition at 1420.  He is to return on     Future Appointments   Date Time Provider [place] More[place]   2021  7:30 AM [facility]   2021  8:15 AM Robinett, Jeremiah Pallas, NP [facility] BS AMB   2021  1:30 PM [facility]   2021  7:30 AM [facility]   2021  8:15 AM Robinett, Jeremiah Pallas, NP ONCSF [facility] AMB   2021  1:30 PM [facility]   2021  7:30 AM SS INF2 CH2 >7H [facility]   2021  1:30 PM SS INF7 [facility]1H [facility]   8/23/2021  7:30 AM [facility]   8/23/2021  8:15 AM Juan J Rodriguez, BHAVYA ONCSF BS AMB   8/25/2021  1:30 PM [facility]   9/7/2021  7:30 AM [facility]   9/7/2021  8:45 AM Juan J Rodriguez, BHAVYA ONCSF BS AMB   9/9/2021  1:30 PM SS INF7 [facility]

## 2021-06-23 NOTE — ED NOTES
Dr. Lino Gilliam reviewed discharge instructions with the patient. The patient verbalized understanding. The patient was given opportunity for questions. Patient discharged in stable condition to the waiting room via ambulatory.

## 2021-07-08 NOTE — PROGRESS NOTES
[facility] [place] at [address], [address]  Kareen Leo: [phone]  F: [phone]    Hematology Oncology established visit     Reason for Visit:   Jeanna Lafleur is a 63 y.o. male who is seen here for follow up of pancreatic cancer, new diagnosis. Hematology / Oncology Treatment History:     Diagnosis: Pancreatic, adenocarcinoma, moderately to poorly diiferentiated    Stage: IV [[id number]]    Pathology: 2020 pancreatic body, FNA; adenocarcinoma, moderately to poorly diiferentiated  Foundation One testing: Negative for reportable alterations. MS-stable. KRAS G12D. Tumor mutational burden 1 muts/mb [id number] [id number]. KDM6A Q611 TPS3 [id number]*74  [employer]: Germline testing negative for BRCA1/2 and 86 other genes. Prior Treatment: None    Current Treatment:  FOLFIRINOX every 2 weeks, 20 - current. History of Present Illness:   Mr. Jeanna Lafleur is a 63 y.o. male with metastatic pancreatic cancer coming in for follow up and chemotherapy. He has missed several appts and is overdue for treatment. Last treatment was 21. Reports abdominal pain is stable today. Pain is controlled using oxycontin and oxycodone for breakthrough pain. Reports mild mild nausea and decreased appetite for few days after treatment. Denies any current constipation/diarrhea. No recent infections, fevers or chills. 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 Smokes cigarettes: 1 ppd x 30 yrs. Rare EtOH use. : 3 kids; twins 29 yrs old and 25 yr old. Review of Systems: A complete review of systems was obtained, negative except as described above.     Physical Exam:     Visit Vitals  /62 (BP 1 Location: Left arm, BP Patient Position: Sitting, BP Cuff Size: Small adult)   Pulse 72   Temp 97.8 °F (36.6 °C) (Temporal)   Resp 16   Ht 5' 10\" (1.778 m)   Wt 124 lb 6.4 oz (56.4 kg)   SpO2 98%   BMI 17.85 kg/m²     ECOG PS: 1  General: No distress, thin   Eyes: Anicteric sclerae  HENT: Atraumatic  Neck: Supple  Respiratory: Normal respiratory effort  CV: No peripheral edema, Regular rhythm, nl rate, port right upper chest.   GI: Soft, nontender, nondistended, no masses  Skin: No rashes, ecchymoses, or petechiae  Psych: Alert, oriented, appropriate affect, normal judgment/insight    Results:     Lab Results   Component Value Date/Time    WBC 3.9 (L) 07/12/2021 08:20 AM    HGB 11.0 (L) 07/12/2021 08:20 AM    HCT 33.9 (L) 07/12/2021 08:20 AM    PLATELET 073 (L) 90/45/5148 08:20 AM    MCV 98.0 07/12/2021 08:20 AM    ABS. NEUTROPHILS 1.5 (L) 07/12/2021 08:20 AM     Lab Results   Component Value Date/Time    Sodium 144 07/12/2021 08:20 AM    Potassium 3.4 (L) 07/12/2021 08:20 AM    Chloride 112 (H) 07/12/2021 08:20 AM    CO2 27 07/12/2021 08:20 AM    Glucose 101 (H) 07/12/2021 08:20 AM    BUN 8 07/12/2021 08:20 AM    Creatinine 1.11 07/12/2021 08:20 AM    GFR est AA >60 07/12/2021 08:20 AM    GFR est non-AA >60 07/12/2021 08:20 AM    Calcium 8.1 (L) 07/12/2021 08:20 AM     Lab Results   Component Value Date/Time    Bilirubin, total 0.3 07/12/2021 08:20 AM    ALT (SGPT) 18 07/12/2021 08:20 AM    Alk.  phosphatase 63 07/12/2021 08:20 AM    Protein, total 6.4 07/12/2021 08:20 AM    Albumin 3.2 (L) 07/12/2021 08:20 AM    Globulin 3.2 07/12/2021 08:20 AM     Lab Results   Component Value Date/Time    Sed rate, automated 4 11/16/2017 03:56 PM    C-Reactive protein <0.29 11/16/2017 03:56 PM    TSH 3.36 11/12/2020 05:28 AM    Lipase 115 11/23/2020 06:22 AM     Lab Results   Component Value Date/Time    INR 1.0 09/23/2010 03:05 AM    aPTT 28.0 09/23/2010 03:05 AM     Lab Results   Component Value Date/Time    CEA 4.9 11/13/2020 05:29 AM    Carbohydrate Antigen 19-9, (CA 19-9) 1,532 (H) 06/21/2021 08:06 AM       11/12/20 CA 19-9 5607 at diagnosis. 21 CA 19-9 6483  20 started treatment   21 CA 19-9: 1317   21: CA 19-9 372  21: CA 19-9 1532     Imagin2020 XR ABD FLAT  IMPRESSION:   No evidence for acute abnormality    2020 CT ABD PELV W CONT  IMPRESSION:   1. Subacute pancreatitis, with small walled-off necrosis (\"pseudocyst\") in the lesser sac. 2. Pancreatic body adenocarcinoma. 3. Occluded splenic vein. Consequent, extensive, submucosal, gastric varices in the cardia. 4. Encasement of the splenic artery. Abutment of the celiac artery, SMV, and SMA. 5. Retroperitoneal remington metastases. 6. Peritoneal carcinomatosis.     21 CT c/a/p:  IMPRESSION  1. Interval resolution of free intraperitoneal fluid. 2. Pancreatic mass and retroperitoneal lymphadenopathy, unchanged and as described above. 3.Right hepatic lobe hypodensities, not significantly changed, as described  above. 4. Recommend continued short interval surveillance. Addendum:   There has been interval improvement of the omental nodularity. There has also  been interval improvement of the ill-defined soft tissue anterior and inferior  to the pancreatic body, previously seen on CT abdomen and pelvis dated 2020. The pancreatic body mass is difficult to measure but measures approximately 2.5 cm x 2.2 cm and measured approximately 2.9 cm x 2.6 cm a prior CT dated 2020. There has been interval resolution of free intraperitoneal fluid. Retroperitoneal lymphadenopathy, not significantly changed. Right hepatic lobe hypodensities, as described above, not significantly changed. 21 CT c/a/p:  NONTARGET LESIONS:  1. Pancreatic body mass. Difficult to reproducibly measure. Retro-pancreatic extension. Pancreatic body mass is ill-defined, difficult to measure, approximately 22 x 28 mm in size. 2. Tiny omental nodular densities best demonstrated in the left paracolic  region.   IMPRESSION  Stable pancreatic mass with retroperitoneal extension. Right hepatic lobe hypodensities, unchanged. Subtle omental nodularity is also stable. Assessment and Recommendations:   65 y/o male with metastatic pancreatic cancer on palliative chemotherapy. 1. Pancreatic adenocarcinoma:   Stage IV, CANDIE, Germline and somatic genetic testing negative. Diagnosed 11/18/2020, has Stage IV disease with peritoneal carcinomatosis and retroperitoneal LAD, encasement of vasculature seen on CT. CA 19-9 5607 at diagnosis. Previously discussed with patient that his disease is not curable, but is treatable. I recommended palliative chemotherapy with FOLFIRINOX regimen. We discussed the risks and benefits of FOLFIRINOX chemotherapy, including potential side effects. These include but are not limited to fatigue, nausea vomiting, diarrhea, neuropathy, taste changes, cold intolerance, esophageal spasm, allergic reactions, alopecia, mucositis, myelosuppression, risk for infection, infertility, and rarely, death. Rarely, a patient may have a condition where they do not metabolize fluorouracil appropriately (called DPD deficiency), and they may have excessive toxicity. A Port-A-Cath will be required in order to deliver the continuous infusion. The patient has consented to beginning therapy. At time of disease progression, will use PROSCAP nomogram and consider Savage-Abraxane in 2nd line. CT 4/24/21 showed response to treatment. Discussed with patient that rising CA 19-9 reflects likely disease progression, most likely due to several missed appointments. Has only received treatment once a month for the past 2 months. Supportive medications include: EMLA cream, zofran, compazine, dexamethasone. -- Proceed with cycle 12 FOLFIRNOX, MD/NP visit, pump removal on Wednesday. -- CT scan due after cycle 12 treatment. Ordered. -- Follow up in 2 weeks for cycle 13, MD/NP visit. -- Refuses COVID vaccine. 2. F/E/N/GI:  Continued WL. Constipation 2/2 opioids and bowel regimen adjusted. Continue Miralax 1-2 times daily and Docusate/Senna 1-2 times daily. Encouraged supplementing with 2-3 high calorie protein drinks daily to prevent further WL. Given samples from our supply due to cost. Shira Erazo RD.     3. Occluded splenic vein:  Reviewed with Radiology. There is no clot and no anticoagulation needed. 4. Neoplasm related pain:   Well controlled on Oxycontin ER 10mg q12h and Oxycodone IR prn.  checked 6/21/21. -- Refilled Oxycontin 10mg q12h #60 on 6/24/21. -- Refilled Oxycodone 5mg q6h PRN #45 on 6/24/21     5. Family h/o pancreatic cancer:  Was concerning for genetic predisposition but germline genetic testing negative. 6. Transaminitis: Mild. Monitor. I have personally seen and evaluated the patient in conjunction with Ananya Stapleton NP. I find the patient's history and physical exam are consistent with the NP's documentation. I agree with the above assessment and plan, which I have modified as needed. Reviewed with patient that treatment will not be effective if only given every 4 weeks. Will proceed with treatment today and repeat imaging due next week.       Signed By:  Date: Es Medina MD   07/12/21

## 2021-07-12 NOTE — PROGRESS NOTES
Chief Complaint   Patient presents with    Pancreatic Cancer     2 wk f/u       Visit Vitals  /62 (BP 1 Location: Left arm, BP Patient Position: Sitting, BP Cuff Size: Small adult)   Pulse 72   Temp 97.8 °F (36.6 °C) (Temporal)   Resp 16   Ht 5' 10\" (1.778 m)   Wt 124 lb 6.4 oz (56.4 kg)   SpO2 98%   BMI 17.85 kg/m²       1. Have you been to the ER, urgent care clinic since your last visit? Hospitalized since your last visit? YES 6/22/21 port fell out [facility]. 2. Have you seen or consulted any other health care providers outside of the [address] [place] [address] since your last visit? Include any pap smears or colon screening.  No

## 2021-07-12 NOTE — PROGRESS NOTES
[facility] VISIT NOTE  Date: 2021    Name: Lizeth Cabral    MRN: [id number]         : 1960    0805  Mr. Gui Farah Arrived ambulatory and in no distress for C12D1 of Folfirinox Regimen. Assessment was completed, no acute issues at this time, no new complaints voiced. Right chest wall port accessed without difficulty, labs drawn & sent for processing. 1. Do you have any symptoms of COVID-19? SOB, coughing, fever, or generally not feeling well NO    2. Have you been exposed to COVID-19 recently? NO    3. Have you had any recent contact with family/friend that has a pending COVID test? NO       Chemotherapy Flowsheet 2021   Cycle C12D1   Date 2021   Drug / Regimen Folfirinox   Dosage -   Time Up -   Time Down -   Pre Hydration -   Post Hydration -   Pre Meds given   Notes given           Mr. Jemma Rosa vitals were reviewed. Patient Vitals for the past 12 hrs:   Temp Pulse Resp BP SpO2   21 1501  65  113/75    21 0809 98 °F (36.7 °C) 76 18 107/72 99 %         Lab results were obtained and reviewed. Recent Results (from the past 12 hour(s))   METABOLIC PANEL, COMPREHENSIVE    Collection Time: 21  8:20 AM   Result Value Ref Range    Sodium 144 136 - 145 mmol/L    Potassium 3.4 (L) 3.5 - 5.1 mmol/L    Chloride 112 (H) 97 - 108 mmol/L    CO2 27 21 - 32 mmol/L    Anion gap 5 5 - 15 mmol/L    Glucose 101 (H) 65 - 100 mg/dL    BUN 8 6 - 20 MG/DL    Creatinine 1.11 0.70 - 1.30 MG/DL    BUN/Creatinine ratio 7 (L) 12 - 20      GFR est AA >60 >60 ml/min/1.73m2    GFR est non-AA >60 >60 ml/min/1.73m2    Calcium 8.1 (L) 8.5 - 10.1 MG/DL    Bilirubin, total 0.3 0.2 - 1.0 MG/DL    ALT (SGPT) 18 12 - 78 U/L    AST (SGOT) 16 15 - 37 U/L    Alk.  phosphatase 63 45 - 117 U/L    Protein, total 6.4 6.4 - 8.2 g/dL    Albumin 3.2 (L) 3.5 - 5.0 g/dL    Globulin 3.2 2.0 - 4.0 g/dL    A-G Ratio 1.0 (L) 1.1 - 2.2     CBC WITH AUTOMATED DIFF    Collection Time: 21  8:20 AM   Result Value Ref Range    WBC 3.9 (L) 4.1 - 11.1 K/uL    RBC 3.46 (L) 4.10 - 5.70 M/uL    HGB 11.0 (L) 12.1 - 17.0 g/dL    HCT 33.9 (L) 36.6 - 50.3 %    MCV 98.0 80.0 - 99.0 FL    MCH 31.8 26.0 - 34.0 PG    MCHC 32.4 30.0 - 36.5 g/dL    RDW 14.6 (H) 11.5 - 14.5 %    PLATELET 408 (L) 188 - 400 K/uL    MPV 9.6 8.9 - 12.9 FL    NRBC 0.0 0  WBC    ABSOLUTE NRBC 0.00 0.00 - 0.01 K/uL    NEUTROPHILS 38 32 - 75 %    LYMPHOCYTES 38 12 - 49 %    MONOCYTES 14 (H) 5 - 13 %    EOSINOPHILS 8 (H) 0 - 7 %    BASOPHILS 2 (H) 0 - 1 %    IMMATURE GRANULOCYTES 0 0.0 - 0.5 %    ABS. NEUTROPHILS 1.5 (L) 1.8 - 8.0 K/UL    ABS. LYMPHOCYTES 1.5 0.8 - 3.5 K/UL    ABS. MONOCYTES 0.5 0.0 - 1.0 K/UL    ABS. EOSINOPHILS 0.3 0.0 - 0.4 K/UL    ABS. BASOPHILS 0.1 0.0 - 0.1 K/UL    ABS. IMM.  GRANS. 0.0 0.00 - 0.04 K/UL    DF AUTOMATED         Medications received:  Medications Administered     atropine 0.4 mg/mL injection 0.4 mg     Admin Date  07/12/2021 Action  Given Dose  0.4 mg Route  IntraVENous Administered By  Rod Camejo RN          dexamethasone (DECADRON) 12 mg in 0.9% sodium chloride 50 mL IVPB     Admin Date  07/12/2021 Action  New Bag Dose  12 mg Route  IntraVENous Administered By  Rod Camejo RN          dextrose 5% infusion     Admin Date  07/12/2021 Action  New Bag Dose  25 mL/hr Rate  25 mL/hr Route  IntraVENous Administered By  Rod Camejo RN          fluorouraciL (ADRUCIL) 4,128 mg in 0.9% sodium chloride 100 mL CADD Cassette     Admin Date  07/12/2021 Action  New Bag Dose  4,128 mg Rate  2.2 mL/hr Route  IntraVENous Administered By  Rod Camejo RN          irinotecan (CAMPTOSAR) 258 mg in dextrose 5% 250 mL, overfill volume 25 mL chemo infusion     Admin Date  07/12/2021 Action  New Bag Dose  258 mg Rate  191.9 mL/hr Route  IntraVENous Administered By  Rod Camejo RN          leucovorin (WELLCOVORIN) 688 mg in dextrose 5% 250 mL, overfill volume 25 mL IVPB     Admin Date  07/12/2021 Action  New Bag Dose  688 mg Rate  206.3 mL/hr Route  IntraVENous Administered By  Oletta Goldberg, RN          oxaliplatin (ELOXATIN) 146 mg in dextrose 5% 250 mL, overfill volume 25 mL chemo infusion     Admin Date  07/12/2021 Action  New Bag Dose  146 mg Rate  152.1 mL/hr Route  IntraVENous Administered By  Oletta Goldberg, RN          palonosetron HCl (ALOXI) injection 0.25 mg     Admin Date  07/12/2021 Action  Given Dose  0.25 mg Route  IntraVENous Administered By  Oletta Goldberg, RN          potassium chloride SR (KLOR-CON 10) tablet 40 mEq     Admin Date  07/12/2021 Action  Given Dose  40 mEq Route  Oral Administered By  Oletta Goldberg, RN          prochlorperazine (COMPAZINE) injection 5 mg     Admin Date  07/12/2021 Action  Given Dose  5 mg Route  IntraVENous Administered By  Oletta Goldberg, RN                 Mr. Everardo Valentine tolerated treatment well and was discharged from [place] [address] in stable condition at 1505. Port flushed and connected to infusing CADD pump. He is to return on  July 14, 2021 at 1330 for his next appointment.     Ailyn Hair RN  July 12, 2021    Future Appointments:  Future Appointments   Date Time Provider Tennille Moreno   7/14/2021  1:30 PM SS INF7 CH2 <1H [facility]   7/26/2021  7:30 AM SS INF2 CH2 >7H [facility]   7/26/2021  8:15 AM Melvina Rodriguez NP [facility] AMB   7/28/2021  1:30 PM SS INF7 CH2 <1H [facility]   8/9/2021  7:30 AM SS INF2 CH2 >7H [facility]   8/11/2021  1:30 PM SS INF7 CH2 <1H [facility]   8/23/2021  7:30 AM SS INF2 CH2 >7H [facility]   8/23/2021  8:15 AM Melvina Rodriguez NP [facility] AMB   8/25/2021  1:30 PM SS INF7 CH2 <1H [facility]   9/7/2021  7:30 AM SS INF3 CH2 >7H [facility]   9/7/2021  8:45 AM Melvina Rodriguez, NP [facility] AMB   9/9/2021  1:30 PM SS INF7 CH2 <1H [facility]les

## 2021-07-14 NOTE — PROGRESS NOTES
[facility] Progress Note    Date: 2021    Name: Yanelis Sharp    MRN: [id number]         : 1960    Mr. Ulices Gutierrez Arrived ambulatory and in no distress for Pump Removal.  Assessment was completed, no acute issues at this time, pt c/o nausea. CADD completed- 100 ml infused per order. Mr. Viral Almaguer vitals were reviewed. Visit Vitals  BP 95/75   Pulse 97   Temp 98.1 °F (36.7 °C)   Resp 16       Medications Administered     heparin (porcine) pf 300-500 Units     Admin Date  2021 Action  Given Dose  500 Units Route  InterCATHeter Administered By  Mendy Rausch RN          sodium chloride (NS) flush 10 mL     Admin Date  2021 Action  Given Dose  10 mL Route  IntraVENous Administered By  Mendy Rausch RN              Mr. Ulices Gutierrez  was discharged from [place] [address] in stable condition at [phone]. Port de-accessed, flushed & heparinized per protocol. He is to return on  at 0730 for his next appointment.     Kem Allen RN  2021

## 2021-07-26 NOTE — TELEPHONE ENCOUNTER
[facility]  [phone]        07/26/21 9:48 [facility] Shimon Hu NP, ordered Potassium 60 mEQ to be given today in [facility]. Patient to start outpatient prescription for potassium 20 mEQ daily tomorrow. Requested that nurse also review foods high in potassium with patient. Chan George voiced understanding and states she will notify primary nurse of above.

## 2021-07-26 NOTE — PROGRESS NOTES
[facility] Progress Note    Date: 2021    Name: Amadeo Kunz    MRN: [id number]         : 1960    Mr. Devin Navarrete Arrived ambulatory and in no distress for C13D1 of Folfirinox Regimen. Assessment was completed, no acute issues at this time, no new complaints voiced. Right chest wall port accessed without difficulty, labs drawn & sent for processing. Chemotherapy Flowsheet 2021   Cycle C13D1   Date 2021   Drug / Regimen Folfirinox   Dosage -   Time Up -   Time Down -   Pre Hydration -   Post Hydration -   Pre Meds given   Notes given     Patient proceed to appointment with Dr. Candi Moya. Mr. Rusty Manuel vitals were reviewed. Visit Vitals  /70 (BP 1 Location: Right arm, BP Patient Position: Sitting)   Pulse 83   Temp 97.9 °F (36.6 °C)   Resp 16   Ht 5' 10\" (1.778 m)   Wt 54.3 kg (119 lb 9.6 oz)   SpO2 100%   BMI 17.16 kg/m²       Lab results were obtained and reviewed. Recent Results (from the past 8 hour(s))   METABOLIC PANEL, COMPREHENSIVE    Collection Time: 21  7:54 AM   Result Value Ref Range    Sodium 145 136 - 145 mmol/L    Potassium 2.6 (LL) 3.5 - 5.1 mmol/L    Chloride 114 (H) 97 - 108 mmol/L    CO2 26 21 - 32 mmol/L    Anion gap 5 5 - 15 mmol/L    Glucose 111 (H) 65 - 100 mg/dL    BUN 11 6 - 20 MG/DL    Creatinine 1.02 0.70 - 1.30 MG/DL    BUN/Creatinine ratio 11 (L) 12 - 20      GFR est AA >60 >60 ml/min/1.73m2    GFR est non-AA >60 >60 ml/min/1.73m2    Calcium 8.1 (L) 8.5 - 10.1 MG/DL    Bilirubin, total 0.4 0.2 - 1.0 MG/DL    ALT (SGPT) 17 12 - 78 U/L    AST (SGOT) 15 15 - 37 U/L    Alk.  phosphatase 58 45 - 117 U/L    Protein, total 6.4 6.4 - 8.2 g/dL    Albumin 3.2 (L) 3.5 - 5.0 g/dL    Globulin 3.2 2.0 - 4.0 g/dL    A-G Ratio 1.0 (L) 1.1 - 2.2     CBC WITH AUTOMATED DIFF    Collection Time: 21  7:54 AM   Result Value Ref Range    WBC 3.6 (L) 4.1 - 11.1 K/uL    RBC 3.45 (L) 4.10 - 5.70 M/uL    HGB 10.7 (L) 12.1 - 17.0 g/dL    HCT 33.6 (L) 36.6 - 50.3 %    MCV 97.4 80.0 - 99.0 FL    MCH 31.0 26.0 - 34.0 PG    MCHC 31.8 30.0 - 36.5 g/dL    RDW 14.9 (H) 11.5 - 14.5 %    PLATELET 479 (L) 873 - 400 K/uL    MPV 9.5 8.9 - 12.9 FL    NRBC 0.0 0  WBC    ABSOLUTE NRBC 0.00 0.00 - 0.01 K/uL    NEUTROPHILS 38 32 - 75 %    LYMPHOCYTES 39 12 - 49 %    MONOCYTES 14 (H) 5 - 13 %    EOSINOPHILS 8 (H) 0 - 7 %    BASOPHILS 1 0 - 1 %    IMMATURE GRANULOCYTES 0 0.0 - 0.5 %    ABS. NEUTROPHILS 1.4 (L) 1.8 - 8.0 K/UL    ABS. LYMPHOCYTES 1.4 0.8 - 3.5 K/UL    ABS. MONOCYTES 0.5 0.0 - 1.0 K/UL    ABS. EOSINOPHILS 0.3 0.0 - 0.4 K/UL    ABS. BASOPHILS 0.0 0.0 - 0.1 K/UL    ABS. IMM. GRANS. 0.0 0.00 - 0.04 K/UL    DF AUTOMATED       Lab called regarding critical potassium. BHAVYA Conteh added 60 meq of oral potassium and sent a prescription to the pharmacy for the patient. Patient notified of this.      Medications:  Medications Administered     atropine 0.4 mg/mL injection 0.4 mg     Admin Date  07/26/2021 Action  Given Dose  0.4 mg Route  IntraVENous Administered By  Gilford Mountain, RN          dexamethasone (DECADRON) 12 mg in 0.9% sodium chloride 50 mL IVPB     Admin Date  07/26/2021 Action  New Bag Dose  12 mg Route  IntraVENous Administered By  Gilford Mountain, RN          dextrose 5% infusion     Admin Date  07/26/2021 Action  New Bag Dose  25 mL/hr Rate  25 mL/hr Route  IntraVENous Administered By  Gilford Mountain, RN          fluorouraciL (ADRUCIL) 4,128 mg in 0.9% sodium chloride 100 mL CADD Cassette     Admin Date  07/26/2021 Action  New Bag Dose  4,128 mg Rate  2.2 mL/hr Route  IntraVENous Administered By  Gilford Mountain, RN          irinotecan (CAMPTOSAR) 258 mg in dextrose 5% 250 mL, overfill volume 25 mL chemo infusion     Admin Date  07/26/2021 Action  New Bag Dose  258 mg Rate  191.9 mL/hr Route  IntraVENous Administered By  Gilford Mountain, RN          leucovorin (WELLCOVORIN) 688 mg in dextrose 5% 250 mL, overfill volume 25 mL IVPB     Admin Date  07/26/2021 Action  New Bag Dose  688 mg Rate  206.3 mL/hr Route  IntraVENous Administered By  Addis Frey RN          oxaliplatin (ELOXATIN) 146 mg in dextrose 5% 250 mL, overfill volume 25 mL chemo infusion     Admin Date  07/26/2021 Action  New Bag Dose  146 mg Rate  152.1 mL/hr Route  IntraVENous Administered By  Addis Frey RN          palonosetron HCl (ALOXI) injection 0.25 mg     Admin Date  07/26/2021 Action  Given Dose  0.25 mg Route  IntraVENous Administered By  Addis Frey RN          potassium chloride SR (KLOR-CON 10) tablet 60 mEq     Admin Date  07/26/2021 Action  Given Dose  60 mEq Route  Oral Administered By  Addis Frey RN                Mr. Guillermo Chandler tolerated treatment well and was discharged from [place] [address] in stable condition at 1450. Port  flushed & attached to 5FU CADD cassette. Cassette infusing. He is to return on July 28 2021 at 1330  for his next appointment.     Swathi Perry RN  July 26, 2021

## 2021-07-26 NOTE — PROGRESS NOTES
Cancer [place] at [facility] 88  [address] [facility], 2[address] [facility] 1007 [facility]  Author Fast: [phone]  F: [phone]    Hematology Oncology established visit     Reason for Visit:   Marlena Sheppard is a 63 y.o. male who is seen here for follow up of pancreatic cancer, new diagnosis. Hematology / Oncology Treatment History:     Diagnosis: Pancreatic, adenocarcinoma, moderately to poorly diiferentiated    Stage: IV [[id number]]    Pathology: 2020 pancreatic body, FNA; adenocarcinoma, moderately to poorly diiferentiated  Foundation One testing: Negative for reportable alterations. MS-stable. KRAS G12D. Tumor mutational burden 1 muts/mb IVH9O6T R183W. KDM6A Q611 TPS3 [id number]*26  Invitae: Germline testing negative for BRCA1/2 and 86 other genes. Prior Treatment: None    Current Treatment:  FOLFIRINOX every 2 weeks, 20 - current. History of Present Illness:   Mr. Marlena Sheppard is a 63 y.o. male with metastatic pancreatic cancer coming in for follow up and chemotherapy. Reports abdominal pain is stable today. Pain is controlled using oxycodone for breakthrough pain. Reports he was not able to  oxycontin due to cost and asks if we can look into this. Reports mild mild nausea and decreased appetite for few days after treatment. He is taking steroid supportive medication. Denies any current constipation/diarrhea. Does report some diarrhea immediately after treatment. No recent infections, fevers or chills. 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 Smokes cigarettes: 1 ppd x 30 yrs. Rare EtOH use. : 3 kids; twins 29 yrs old and 25 yr old. Review of Systems: A complete review of systems was obtained, negative except as described above.     Physical Exam:     Visit Vitals  /70   Pulse 83   Temp 97.9 °F (36.6 °C)   Ht 5' 10\" (1.778 m)   SpO2 100%   BMI 17.85 kg/m²     ECOG PS: 1  General: No distress, thin   Eyes: Anicteric sclerae  HENT: Atraumatic  Neck: Supple  Respiratory: Normal respiratory effort  CV: No peripheral edema, Regular rhythm, nl rate, port right upper chest.   GI: Soft, nontender, nondistended, no masses  Skin: No rashes, ecchymoses, or petechiae  Psych: Alert, oriented, appropriate affect, normal judgment/insight    Results:     Lab Results   Component Value Date/Time    WBC 3.6 (L) 07/26/2021 07:54 AM    HGB 10.7 (L) 07/26/2021 07:54 AM    HCT 33.6 (L) 07/26/2021 07:54 AM    PLATELET 843 (L) 84/76/1066 07:54 AM    MCV 97.4 07/26/2021 07:54 AM    ABS. NEUTROPHILS 1.4 (L) 07/26/2021 07:54 AM     Lab Results   Component Value Date/Time    Sodium 144 07/12/2021 08:20 AM    Potassium 3.4 (L) 07/12/2021 08:20 AM    Chloride 112 (H) 07/12/2021 08:20 AM    CO2 27 07/12/2021 08:20 AM    Glucose 101 (H) 07/12/2021 08:20 AM    BUN 8 07/12/2021 08:20 AM    Creatinine 1.11 07/12/2021 08:20 AM    GFR est AA >60 07/12/2021 08:20 AM    GFR est non-AA >60 07/12/2021 08:20 AM    Calcium 8.1 (L) 07/12/2021 08:20 AM     Lab Results   Component Value Date/Time    Bilirubin, total 0.3 07/12/2021 08:20 AM    ALT (SGPT) 18 07/12/2021 08:20 AM    Alk. phosphatase 63 07/12/2021 08:20 AM    Protein, total 6.4 07/12/2021 08:20 AM    Albumin 3.2 (L) 07/12/2021 08:20 AM    Globulin 3.2 07/12/2021 08:20 AM     Lab Results   Component Value Date/Time    Sed rate, automated 4 11/16/2017 03:56 PM    C-Reactive protein <0.29 11/16/2017 03:56 PM    TSH 3.36 11/12/2020 05:28 AM    Lipase 115 11/23/2020 06:22 AM     Lab Results   Component Value Date/Time    INR 1.0 09/23/2010 03:05 AM    aPTT 28.0 09/23/2010 03:05 AM     Lab Results   Component Value Date/Time    CEA 4.9 11/13/2020 05:29 AM    Carbohydrate Antigen 19-9, (CA 19-9) 1,532 (H) 06/21/2021 08:06 AM       11/12/20 CA 19-9 5607 at diagnosis.    11/20/21 CA 19-9 6483  20 started treatment   21 CA 19-9: 1317   21: CA 19-9 372  21: CA 19-9 1532     Imagin2020 XR ABD FLAT  IMPRESSION:   No evidence for acute abnormality    2020 CT ABD PELV W CONT  IMPRESSION:   1. Subacute pancreatitis, with small walled-off necrosis (\"pseudocyst\") in the lesser sac. 2. Pancreatic body adenocarcinoma. 3. Occluded splenic vein. Consequent, extensive, submucosal, gastric varices in the cardia. 4. Encasement of the splenic artery. Abutment of the celiac artery, SMV, and SMA. 5. Retroperitoneal remington metastases. 6. Peritoneal carcinomatosis.     21 CT c/a/p:  IMPRESSION  1. Interval resolution of free intraperitoneal fluid. 2. Pancreatic mass and retroperitoneal lymphadenopathy, unchanged and as described above. 3.Right hepatic lobe hypodensities, not significantly changed, as described  above. 4. Recommend continued short interval surveillance. Addendum:   There has been interval improvement of the omental nodularity. There has also  been interval improvement of the ill-defined soft tissue anterior and inferior  to the pancreatic body, previously seen on CT abdomen and pelvis dated 2020. The pancreatic body mass is difficult to measure but measures approximately 2.5 cm x 2.2 cm and measured approximately 2.9 cm x 2.6 cm a prior CT dated 2020. There has been interval resolution of free intraperitoneal fluid. Retroperitoneal lymphadenopathy, not significantly changed. Right hepatic lobe hypodensities, as described above, not significantly changed. 21 CT c/a/p:  NONTARGET LESIONS:  1. Pancreatic body mass. Difficult to reproducibly measure. Retro-pancreatic extension. Pancreatic body mass is ill-defined, difficult to measure, approximately 22 x 28 mm in size. 2. Tiny omental nodular densities best demonstrated in the left paracolic  region.   IMPRESSION  Stable pancreatic mass with retroperitoneal extension. Right hepatic lobe hypodensities, unchanged. Subtle omental nodularity is also stable. 7/24/21 CT c/a/p:  1. Pancreatic body mass. Difficult to reproducibly measure. Likely increased. Retro-pancreatic extension. 2. Tiny omental nodular densities best demonstrated in the left paracolic  region. IMPRESSION  Slightly increased pancreatic mass with retroperitoneal extension. Right hepatic lobe hypodensities, unchanged. Assessment and Recommendations:   63 y/o male with metastatic pancreatic cancer on palliative chemotherapy. 1. Pancreatic adenocarcinoma:   Stage IV, CANDIE, Germline and somatic genetic testing negative. Diagnosed 11/18/2020, has Stage IV disease with peritoneal carcinomatosis and retroperitoneal LAD, encasement of vasculature seen on CT. CA 19-9 5607 at diagnosis. Previously discussed with patient that his disease is not curable, but is treatable. I recommended palliative chemotherapy with FOLFIRINOX regimen. We discussed the risks and benefits of FOLFIRINOX chemotherapy, including potential side effects. These include but are not limited to fatigue, nausea vomiting, diarrhea, neuropathy, taste changes, cold intolerance, esophageal spasm, allergic reactions, alopecia, mucositis, myelosuppression, risk for infection, infertility, and rarely, death. Rarely, a patient may have a condition where they do not metabolize fluorouracil appropriately (called DPD deficiency), and they may have excessive toxicity. A Port-A-Cath will be required in order to deliver the continuous infusion. The patient has consented to beginning therapy. At time of disease progression, will use PROSCAP nomogram and consider [place]-Abraxane in 2nd line. CT 7/24/21 showed mild disease progression supported by rising CA 19-9 due to pt not coming for treatment at 2 week intervals. Discussed with patient and emphasized how the chemotherapy works. Will continue on current treatment.    Supportive medications include: EMLA cream, zofran, compazine, dexamethasone. -- Proceed with cycle 13 FOLFIRNOX, MD/NP visit, pump removal on Wednesday. -- CT scan due after cycle 16 treatment. -- Follow up in 2 weeks for cycle 14, MD/NP visit. -- Refuses COVID vaccine. 2. F/E/N/G / Nausea:  Continued WL. Mild nausea and diarrhea during week of treatment. Advised antiemetics once daily for 2 days after treatment. Reminded pt of Miralax and Docusate/Senna prn for constipation and imodium for diarrhea. Previously discussed high calorie nutrition supplements. -- KCL 60 meq in [facility] today. Start KCL 20meq at home. Rx sent. 3. Neoplasm related pain:   Well controlled on Oxycontin ER 10mg q12h and Oxycodone IR prn.  checked 6/21/21. -- Refilled Oxycontin 10mg q12h #60 on 6/24/21- unable to fill due to cost. Chan Davila, financial liaison consulted. -- Refilled Oxycodone 5mg q6h PRN #45 on 6/24/21     4. Family h/o pancreatic cancer:  Was concerning for genetic predisposition but germline genetic testing negative. I have personally seen and evaluated the patient in conjunction with Ericka Strong NP. I find the patient's history and physical exam are consistent with the NP's documentation. I agree with the above assessment and plan, which I have modified as needed. Proceed with C13 FOLFIRINOX today. Discussed mild disease progression due to noncompliance with treatment schedule. Pt will make a better effort to attend appts as advised.        Signed By:  Date: Heyward Crigler, MD   07/26/21

## 2021-07-29 NOTE — PROGRESS NOTES
[facility] Progress Note    Date: 2021    Name: Radha Estrella    MRN: [id number]         : 1960    Mr. Lissette Meier Arrived ambulatory and in no distress for Pump Removal.  Assessment was completed, no acute issues at this time, no new complaints voiced. CADD completed- 100 ml infused per order. Patient Vitals for the past 12 hrs:   Temp Pulse Resp BP   21 1344 97 °F (36.1 °C) 94 18 127/75     Recent Results (from the past 12 hour(s))   METABOLIC PANEL, BASIC    Collection Time: 21  1:52 PM   Result Value Ref Range    Sodium 141 136 - 145 mmol/L    Potassium 3.5 3.5 - 5.1 mmol/L    Chloride 109 (H) 97 - 108 mmol/L    CO2 26 21 - 32 mmol/L    Anion gap 6 5 - 15 mmol/L    Glucose 129 (H) 65 - 100 mg/dL    BUN 13 6 - 20 MG/DL    Creatinine 0.90 0.70 - 1.30 MG/DL    BUN/Creatinine ratio 14 12 - 20      GFR est AA >60 >60 ml/min/1.73m2    GFR est non-AA >60 >60 ml/min/1.73m2    Calcium 8.0 (L) 8.5 - 10.1 MG/DL     Patient reports he is taking potassium at home as prescribed. Mr. Lissette Meier tolerated treatment well and was discharged from [place] [address] in stable condition. Port de-accessed, flushed & heparinized per protocol. He is to return on 21 for his next appointment.     Noris Romero RN  2021

## 2021-08-11 NOTE — PROGRESS NOTES
[employer]  Social Work Navigator Encounter     Patient Name:  Rafaela Kearns     Medical History: Pancreatic, adenocarcinoma, moderately to poorly differentiated, Stage IV     Advance Directives: Patient does not have an advanced medical directive, and did not express interest in completing one today. Narrative:   Received a referral regarding transportation. The patient would like to utilize his Medicaid transportation benefits, but cannot locate his insurance card. Provided information for his Medicaid Transportation Benefit Phone Number -- P#[phone], and insurance information -- [employer], Member ID, [id number], Group Number [id number]. Also provided the address for [facility] at [address] [place] [address]. The patient feels confident with the process for arranging his own transportation. He expressed no questions or concerns at this time. Provided this 's contact information, and offered continued support as needed. Barriers to Care:   Transportation     Plan:  Ongoing psychosocial support as desired by patient.       Referral:   Transportation referral  Christine Darling LCSW

## 2021-08-16 NOTE — TELEPHONE ENCOUNTER
[address] at [facility]  [phone]        08/16/21 11:32 AM Attempted to call patient via cell phone number listed. Patient informed staff that he was unable to attend today's appointment as his transportation did not arrive. Need to clarify if patient was able to schedule transportation with Medicaid benefits, and then this service did not arrive today. Also need to inquire if patient is able to obtain transportation for appointment this week rather than next week. Last [facility] appointment 07/26/21. No answer, left message requesting return call. Provided office phone number as well.

## 2021-08-20 NOTE — PROGRESS NOTES
Cancer [place] at [place] [address] [facility]  [facility]: [phone]  F: [phone]    Hematology Oncology established visit     Reason for Visit:   Johnna Cortez is a 63 y.o. male who is seen here for follow up of pancreatic cancer, new diagnosis. Hematology / Oncology Treatment History:     Diagnosis: Pancreatic, adenocarcinoma, moderately to poorly diiferentiated    Stage: IV [dO0S1T6]    Pathology: 2020 pancreatic body, FNA; adenocarcinoma, moderately to poorly diiferentiated  Foundation One testing: Negative for reportable alterations. MS-stable. KRAS G12D. Tumor mutational burden 1 muts/mb [id number] R183W. KDM6A Q611 TPS3 [id number]*14  Invitae: Germline testing negative for BRCA1/2 and 86 other genes. Prior Treatment: None    Current Treatment:  FOLFIRINOX every 2 weeks, 20 - current. History of Present Illness:   Mr. Johnna Cortez is a 63 y.o. male with metastatic pancreatic cancer coming in for follow up and chemotherapy. He is several weeks late since medicare ride did not show up. Reports on Saturday/ developed a sharp shooting  pain present in right upper quadrant that radiated to right upper chest and right shoulder blade that has since resolved. Continues to have chronic dull ache in left upper quadrant. Pain is managed on current regimen. Weight is down. Reports eating and hydrating. Currently denies CP, SOB, nausea, rashes or neuropathy. Denies diarrhea or constipation. Reports during the week of chemo he has some loose to semi-formed stools. 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 Smokes cigarettes: 1 ppd x 30 yrs. Rare EtOH use. : 3 kids; twins 29 yrs old and 25 yr old.      Review of Systems: A complete review of systems was obtained, negative except as described above. Physical Exam:     Visit Vitals  /70   Pulse 83   Temp 97.4 °F (36.3 °C)   Ht 5' 10\" (1.778 m)   Wt 118 lb 8 oz (53.8 kg)   SpO2 100%   BMI 17.00 kg/m²     ECOG PS: 1  General: No distress, thin   Eyes: Anicteric sclerae  HENT: Atraumatic  Neck: Supple  Respiratory: Normal respiratory effort  CV: No peripheral edema, Regular rhythm, nl rate, port right upper chest.   GI: Soft, nontender, nondistended, no masses  Skin: No rashes, ecchymoses, or petechiae  Psych: Alert, oriented, appropriate affect, normal judgment/insight    Results:     Lab Results   Component Value Date/Time    WBC 6.9 08/23/2021 07:59 AM    HGB 11.0 (L) 08/23/2021 07:59 AM    HCT 34.5 (L) 08/23/2021 07:59 AM    PLATELET 653 00/11/6391 07:59 AM    .3 (H) 08/23/2021 07:59 AM    ABS. NEUTROPHILS 3.7 08/23/2021 07:59 AM     Lab Results   Component Value Date/Time    Sodium 139 08/23/2021 07:59 AM    Potassium 3.9 08/23/2021 07:59 AM    Chloride 108 08/23/2021 07:59 AM    CO2 26 08/23/2021 07:59 AM    Glucose 167 (H) 08/23/2021 07:59 AM    BUN 9 08/23/2021 07:59 AM    Creatinine 1.19 08/23/2021 07:59 AM    GFR est AA >60 08/23/2021 07:59 AM    GFR est non-AA >60 08/23/2021 07:59 AM    Calcium 8.6 08/23/2021 07:59 AM     Lab Results   Component Value Date/Time    Bilirubin, total 0.4 08/23/2021 07:59 AM    ALT (SGPT) 30 08/23/2021 07:59 AM    Alk.  phosphatase 73 08/23/2021 07:59 AM    Protein, total 7.0 08/23/2021 07:59 AM    Albumin 3.4 (L) 08/23/2021 07:59 AM    Globulin 3.6 08/23/2021 07:59 AM     Lab Results   Component Value Date/Time    Sed rate, automated 4 11/16/2017 03:56 PM    C-Reactive protein <0.29 11/16/2017 03:56 PM    TSH 3.36 11/12/2020 05:28 AM    Lipase 115 11/23/2020 06:22 AM     Lab Results   Component Value Date/Time    INR 1.0 09/23/2010 03:05 AM    aPTT 28.0 09/23/2010 03:05 AM     Lab Results   Component Value Date/Time    CEA 4.9 11/13/2020 05:29 AM    Carbohydrate Antigen 19-9, (CA 19-9) 3,076 (H) 2021 07:54 AM       20 CA 19-9 5607 at diagnosis. 21 CA 19-9 6483  20 started treatment   21 CA 19-9: 1317   21: CA 19-9 372  21: CA 19-9 1532   21: CA 19-9 3076    Imagin2020 XR ABD FLAT  IMPRESSION:   No evidence for acute abnormality    2020 CT ABD PELV W CONT  IMPRESSION:   1. Subacute pancreatitis, with small walled-off necrosis (\"pseudocyst\") in the lesser sac. 2. Pancreatic body adenocarcinoma. 3. Occluded splenic vein. Consequent, extensive, submucosal, gastric varices in the cardia. 4. Encasement of the splenic artery. Abutment of the celiac artery, SMV, and SMA. 5. Retroperitoneal remington metastases. 6. Peritoneal carcinomatosis.     21 CT c/a/p:  IMPRESSION  1. Interval resolution of free intraperitoneal fluid. 2. Pancreatic mass and retroperitoneal lymphadenopathy, unchanged and as described above. 3.Right hepatic lobe hypodensities, not significantly changed, as described  above. 4. Recommend continued short interval surveillance. Addendum:   There has been interval improvement of the omental nodularity. There has also  been interval improvement of the ill-defined soft tissue anterior and inferior  to the pancreatic body, previously seen on CT abdomen and pelvis dated 2020. The pancreatic body mass is difficult to measure but measures approximately 2.5 cm x 2.2 cm and measured approximately 2.9 cm x 2.6 cm a prior CT dated 2020. There has been interval resolution of free intraperitoneal fluid. Retroperitoneal lymphadenopathy, not significantly changed. Right hepatic lobe hypodensities, as described above, not significantly changed. 21 CT c/a/p:  NONTARGET LESIONS:  1. Pancreatic body mass. Difficult to reproducibly measure. Retro-pancreatic extension. Pancreatic body mass is ill-defined, difficult to measure, approximately 22 x 28 mm in size.   2. Tiny omental nodular densities best demonstrated in the left paracolic  region. IMPRESSION  Stable pancreatic mass with retroperitoneal extension. Right hepatic lobe hypodensities, unchanged. Subtle omental nodularity is also stable. 7/24/21 CT c/a/p:  1. Pancreatic body mass. Difficult to reproducibly measure. Likely increased. Retro-pancreatic extension. 2. Tiny omental nodular densities best demonstrated in the left paracolic  region. IMPRESSION  Slightly increased pancreatic mass with retroperitoneal extension. Right hepatic lobe hypodensities, unchanged. Assessment and Recommendations:   63 y/o male with metastatic pancreatic cancer on palliative chemotherapy. 1. Pancreatic adenocarcinoma:   Stage IV, CANDIE, Germline and somatic genetic testing negative. Diagnosed 11/18/2020, has Stage IV disease with peritoneal carcinomatosis and retroperitoneal LAD, encasement of vasculature seen on CT. CA 19-9 5607 at diagnosis. Previously discussed with patient that his disease is not curable, but is treatable. I recommended palliative chemotherapy with FOLFIRINOX regimen. We discussed the risks and benefits of FOLFIRINOX chemotherapy, including potential side effects. These include but are not limited to fatigue, nausea vomiting, diarrhea, neuropathy, taste changes, cold intolerance, esophageal spasm, allergic reactions, alopecia, mucositis, myelosuppression, risk for infection, infertility, and rarely, death. Rarely, a patient may have a condition where they do not metabolize fluorouracil appropriately (called DPD deficiency), and they may have excessive toxicity. A Port-A-Cath will be required in order to deliver the continuous infusion. The patient has consented to beginning therapy. At time of disease progression, will use PROSCAP nomogram and consider [place]-Abraxane in 2nd line. CT 7/24/21 showed mild disease progression supported by rising CA 19-9 due to pt not coming for treatment at 2 week intervals.  Discussed with patient and emphasized how the chemotherapy works. Will continue on current treatment. Supportive medications include: EMLA cream, zofran, compazine, dexamethasone. -- Proceed with cycle 14 FOLFIRNOX, MD/NP visit, pump removal on Wednesday. -- CT scan due after cycle 16 treatment. -- Follow up in 2 weeks for cycle 15, MD/NP visit. Will be on Tuesday due to holiday then back to Mondays. -- Refuses COVID vaccine. 2. F/E/N/G / Nausea:  Continued WL. Mild nausea and diarrhea during week of treatment. Advised antiemetics once daily for 2 days after treatment and dex. Reminded pt of Miralax and Docusate/Senna prn for constipation and imodium for diarrhea. Again discussed drinking at least 3 high calorie nutrition supplements and provided samples from our supply. -- KCL 20meq at home. 3. Neoplasm related pain:   Well controlled on Oxycontin ER 10mg q12h and Oxycodone IR prn.  checked 8/23/21. -- Refilled Oxycontin 10mg q12h #60 on 8/25/21. -- Refilled Oxycodone 5mg q6h PRN #45 on 8/25/21.    4. Family h/o pancreatic cancer:  Was concerning for genetic predisposition but germline genetic testing negative. 5. RUQ pain: May be due to neoplasm. Occurred over the weekend and radiated to right chest and scapula. Resolved today. -- Check troponin. I have personally seen and evaluated the patient in conjunction with Vikram Carlson NP. I find the patient's history and physical exam are consistent with the NP's documentation. I agree with the above assessment and plan, which I have edited if needed. Proceed with C14 of FOLFIRINOX. Pt continues to struggle with transportation and obtaining treatments on schedule. He is working with a new transportation company now.       Signed By:  Date: Rey Guerra MD   08/23/21

## 2021-08-23 NOTE — PROGRESS NOTES
[facility] [place] Progress Note    Date: 2021    Name: Manny Dong    MRN: [id number]         : 1960    Mr. Monica Dickerson Arrived ambulatory and in no distress for C14D1 of Folfirinox Regimen. Assessment was completed, Patient having 7/10 left and right sided abdominal pain. This past weekend, abdominal pain radiated to the right side of the abdomen, up to chest and to the upper back on the right side. Emily Arita, RN notified of this new pain. Troponin added by Katerine Akins NP. Right chest wall port accessed without difficulty, labs drawn & sent for processing. Covid questionnaire completed. 1. Do you have any symptoms of COVID-19? SOB, coughing, fever, or generally not feeling well - no    2. Have you been exposed to COVID-19 recently? - no    3. Have you had any recent contact with family/friend that has a pending COVID test? - no    Chemotherapy Flowsheet 2021   Cycle C14D1   Date 2021   Drug / Regimen Folfirinox   Dosage -   Time Up -   Time Down -   Pre Hydration -   Post Hydration -   Pre Meds given   Notes given       Patient proceed to appointment with Dr. Emily Merida. Mr. Cristine Vital vitals were reviewed. Visit Vitals  /70 (BP 1 Location: Left arm, BP Patient Position: Sitting)   Pulse 83   Temp 97.4 °F (36.3 °C)   Resp 18   Ht 5' 10\" (1.778 m)   Wt 53.8 kg (118 lb 8 oz)   SpO2 100%   BMI 17.00 kg/m²       Lab results were obtained and reviewed.   Recent Results (from the past 8 hour(s))   CBC WITH AUTOMATED DIFF    Collection Time: 21  7:59 AM   Result Value Ref Range    WBC 6.9 4.1 - 11.1 K/uL    RBC 3.44 (L) 4.10 - 5.70 M/uL    HGB 11.0 (L) 12.1 - 17.0 g/dL    HCT 34.5 (L) 36.6 - 50.3 %    .3 (H) 80.0 - 99.0 FL    MCH 32.0 26.0 - 34.0 PG    MCHC 31.9 30.0 - 36.5 g/dL    RDW 15.5 (H) 11.5 - 14.5 %    PLATELET 954 150 - 894 K/uL    MPV 9.5 8.9 - 12.9 FL    NRBC 0.0 0  WBC    ABSOLUTE NRBC 0.00 0.00 - 0.01 K/uL    NEUTROPHILS 54 32 - 75 %    LYMPHOCYTES 26 12 - 49 % MONOCYTES 14 (H) 5 - 13 %    EOSINOPHILS 5 0 - 7 %    BASOPHILS 1 0 - 1 %    IMMATURE GRANULOCYTES 0 0.0 - 0.5 %    ABS. NEUTROPHILS 3.7 1.8 - 8.0 K/UL    ABS. LYMPHOCYTES 1.8 0.8 - 3.5 K/UL    ABS. MONOCYTES 1.0 0.0 - 1.0 K/UL    ABS. EOSINOPHILS 0.4 0.0 - 0.4 K/UL    ABS. BASOPHILS 0.1 0.0 - 0.1 K/UL    ABS. IMM. GRANS. 0.0 0.00 - 0.04 K/UL    DF AUTOMATED     METABOLIC PANEL, COMPREHENSIVE    Collection Time: 08/23/21  7:59 AM   Result Value Ref Range    Sodium 139 136 - 145 mmol/L    Potassium 3.9 3.5 - 5.1 mmol/L    Chloride 108 97 - 108 mmol/L    CO2 26 21 - 32 mmol/L    Anion gap 5 5 - 15 mmol/L    Glucose 167 (H) 65 - 100 mg/dL    BUN 9 6 - 20 MG/DL    Creatinine 1.19 0.70 - 1.30 MG/DL    BUN/Creatinine ratio 8 (L) 12 - 20      GFR est AA >60 >60 ml/min/1.73m2    GFR est non-AA >60 >60 ml/min/1.73m2    Calcium 8.6 8.5 - 10.1 MG/DL    Bilirubin, total 0.4 0.2 - 1.0 MG/DL    ALT (SGPT) 30 12 - 78 U/L    AST (SGOT) 15 15 - 37 U/L    Alk.  phosphatase 73 45 - 117 U/L    Protein, total 7.0 6.4 - 8.2 g/dL    Albumin 3.4 (L) 3.5 - 5.0 g/dL    Globulin 3.6 2.0 - 4.0 g/dL    A-G Ratio 0.9 (L) 1.1 - 2.2     TROPONIN I    Collection Time: 08/23/21  7:59 AM   Result Value Ref Range    Troponin-I, Qt. <0.05 <0.05 ng/mL     Medications:  Medications Administered     atropine 0.4 mg/mL injection 0.4 mg     Admin Date  08/23/2021 Action  Given Dose  0.4 mg Route  IntraVENous Administered By  Gilford Mountain, BHARATH          dexamethasone (DECADRON) 12 mg in 0.9% sodium chloride 50 mL IVPB     Admin Date  08/23/2021 Action  New Bag Dose  12 mg Route  IntraVENous Administered By  Gilford Mountain, RN          dextrose 5% infusion     Admin Date  08/23/2021 Action  New Bag Dose  25 mL/hr Rate  25 mL/hr Route  IntraVENous Administered By  Gilford Mountain, RN          fluorouraciL (ADRUCIL) 3,936 mg in 0.9% sodium chloride 100 mL CADD Cassette     Admin Date  08/23/2021 Action  New Bag Dose  3,936 mg Rate  2.2 mL/hr Route  IntraVENous Administered By  Govind Kohler RN          irinotecan (CAMPTOSAR) 246 mg in dextrose 5% 250 mL, overfill volume 25 mL chemo infusion     Admin Date  08/23/2021 Action  New Bag Dose  246 mg Rate  191.5 mL/hr Route  IntraVENous Administered By  Govind Kohler RN          leucovorin (WELLCOVORIN) 656 mg in dextrose 5% 250 mL, overfill volume 25 mL IVPB     Admin Date  08/23/2021 Action  New Bag Dose  656 mg Rate  205.2 mL/hr Route  IntraVENous Administered By  Govind Kohler RN          oxaliplatin (ELOXATIN) 139.5 mg in dextrose 5% 250 mL, overfill volume 25 mL chemo infusion     Admin Date  08/23/2021 Action  New Bag Dose  139.5 mg Rate  151.5 mL/hr Route  IntraVENous Administered By  Govind Kohler RN          palonosetron HCl (ALOXI) injection 0.25 mg     Admin Date  08/23/2021 Action  Given Dose  0.25 mg Route  IntraVENous Administered By  Govind Kohler RN                Mr. Joaan Sumner tolerated treatment well and was discharged from [place] [address] in stable condition at [phone]. Port flushed & attached to 5FU CADD cassette. Cassette infusing. He is to return on August 25 2021 at 1330 for his next appointment.     Arnoldo Fitzgerald RN  August 23, 2021

## 2021-08-23 NOTE — PROGRESS NOTES
Elkin Rivas is a 63 y.o. male here for follow up of pancreatic cancer. Patient with complaints of abdominal pain, rates as a 5 out of 10.

## 2021-08-25 NOTE — PROGRESS NOTES
Outpatient Infusion Center Progress Note    1400 Pt admit to [facility] for pump d/c ambulatory in stable condition. Assessment completed. No new concerns voiced. Patient denies any probllems at home with his pump. Reports it completed around 1200. Pump interrogated and shows 100mL given with 0mL remaining. Port maintained positive blood return throughout the course of treatment. Port flushed, heparinized and de-accessed per protocol. Discharged in stable condition. Prior to treatment, patient was screened for COVID 19. Denies any signs or symptoms of COVID. Denies any known physical contact with anyone exposed to, diagnosed with or with pending or positive COVID test. Denies any pending or positive COVID test themself.        Visit Vitals  /70 (BP 1 Location: Right arm, BP Patient Position: Sitting)   Pulse 86   Temp 98.2 °F (36.8 °C)   Resp 16   SpO2 98%       Medications Administered     0.9% sodium chloride injection 10 mL     Admin Date  08/25/2021 Action  Given Dose  10 mL Route  IntraVENous Administered By  Jens Buerger          heparin (porcine) pf 300-500 Units     Admin Date  08/25/2021 Action  Given Dose  500 Units Route  InterCATHeter Administered By  Vidal Ascencio RN

## 2021-08-30 NOTE — PROGRESS NOTES
[facility] at [address], [address] [place] [address] Road [address]  [place]: [phone]  F: [phone]    Hematology Oncology established visit     Reason for Visit:   Loraine Cates is a 63 y.o. male who is seen here for follow up of pancreatic cancer, new diagnosis. Hematology / Oncology Treatment History:     Diagnosis: Pancreatic, adenocarcinoma, moderately to poorly diiferentiated    Stage: IV [[id number]]    Pathology: 2020 pancreatic body, FNA; adenocarcinoma, moderately to poorly diiferentiated  [employer] testing: Negative for reportable alterations. MS-stable. KRAS G12D. Tumor mutational burden 1 muts/mb [id number] R183W. KDM6A Q611 TPS3 P458YM*45  [employer]: Germline testing negative for BRCA1/2 and 86 other genes. Prior Treatment: None    Current Treatment:  FOLFIRINOX every 2 weeks, 20 - current. History of Present Illness:   Mr. Loraine Cates is a 63 y.o. male with metastatic pancreatic cancer coming in for follow up and chemotherapy. He states he has mild nausea for a few days after chemo, but then feels fine the week after treatment. His prior RUQ pain resolved. He still has the chronic mid lower abdominal pain. He has not yet picked up his scripts prescribed on 21. No diarrhea or constipation. No recent infections, fevers, chills. 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 Smokes cigarettes: 1 ppd x 30 yrs. Rare EtOH use. : 3 kids; twins 29 yrs old and 25 yr old. Review of Systems: A complete review of systems was obtained, negative except as described above.     Physical Exam:     Visit Vitals  /68   Pulse 73   Temp 98 °F (36.7 °C)   Ht 5' 10\" (1.778 m)   Wt 116 lb 1.6 oz (52.7 kg)   SpO2 96%   BMI 16.66 kg/m²     ECOG PS: 1  General: No distress, thin Eyes: Anicteric sclerae  HENT: Atraumatic  Neck: Supple  Respiratory: Normal respiratory effort  CV: No peripheral edema, Regular rhythm, nl rate, port right upper chest.   GI: Soft, nontender, nondistended, no masses  Skin: No rashes, ecchymoses, or petechiae  Psych: Alert, oriented, appropriate affect, normal judgment/insight    Results:     Lab Results   Component Value Date/Time    WBC 3.2 (L) 09/07/2021 07:59 AM    HGB 10.7 (L) 09/07/2021 07:59 AM    HCT 31.3 (L) 09/07/2021 07:59 AM    PLATELET 242 (L) 25/88/4608 07:59 AM    MCV 98.1 09/07/2021 07:59 AM    ABS. NEUTROPHILS 1.4 (L) 09/07/2021 07:59 AM     Lab Results   Component Value Date/Time    Sodium 139 08/23/2021 07:59 AM    Potassium 3.9 08/23/2021 07:59 AM    Chloride 108 08/23/2021 07:59 AM    CO2 26 08/23/2021 07:59 AM    Glucose 167 (H) 08/23/2021 07:59 AM    BUN 9 08/23/2021 07:59 AM    Creatinine 1.19 08/23/2021 07:59 AM    GFR est AA >60 08/23/2021 07:59 AM    GFR est non-AA >60 08/23/2021 07:59 AM    Calcium 8.6 08/23/2021 07:59 AM     Lab Results   Component Value Date/Time    Bilirubin, total 0.4 08/23/2021 07:59 AM    ALT (SGPT) 30 08/23/2021 07:59 AM    Alk. phosphatase 73 08/23/2021 07:59 AM    Protein, total 7.0 08/23/2021 07:59 AM    Albumin 3.4 (L) 08/23/2021 07:59 AM    Globulin 3.6 08/23/2021 07:59 AM     Lab Results   Component Value Date/Time    Sed rate, automated 4 11/16/2017 03:56 PM    C-Reactive protein <0.29 11/16/2017 03:56 PM    TSH 3.36 11/12/2020 05:28 AM    Lipase 115 11/23/2020 06:22 AM     Lab Results   Component Value Date/Time    INR 1.0 09/23/2010 03:05 AM    aPTT 28.0 09/23/2010 03:05 AM     Lab Results   Component Value Date/Time    CEA 4.9 11/13/2020 05:29 AM    Carbohydrate Antigen 19-9, (CA 19-9) 2,594 (H) 08/23/2021 07:59 AM       11/12/20 CA 19-9 5607 at diagnosis.    11/20/21 CA 19-9 6483  11/30/20 started treatment   01/11/21 CA 19-9: 1317   4/12/21: CA 19-9 372  6/22/21: CA 19-9 1532   7/26/21: CA 19-9 3076  21: 7898    Imagin2020 XR ABD FLAT  IMPRESSION:   No evidence for acute abnormality    2020 CT ABD PELV W CONT  IMPRESSION:   1. Subacute pancreatitis, with small walled-off necrosis (\"pseudocyst\") in the lesser sac. 2. Pancreatic body adenocarcinoma. 3. Occluded splenic vein. Consequent, extensive, submucosal, gastric varices in the cardia. 4. Encasement of the splenic artery. Abutment of the celiac artery, SMV, and SMA. 5. Retroperitoneal remington metastases. 6. Peritoneal carcinomatosis.     21 CT c/a/p:  IMPRESSION  1. Interval resolution of free intraperitoneal fluid. 2. Pancreatic mass and retroperitoneal lymphadenopathy, unchanged and as described above. 3.Right hepatic lobe hypodensities, not significantly changed, as described  above. 4. Recommend continued short interval surveillance. Addendum:   There has been interval improvement of the omental nodularity. There has also  been interval improvement of the ill-defined soft tissue anterior and inferior  to the pancreatic body, previously seen on CT abdomen and pelvis dated 2020. The pancreatic body mass is difficult to measure but measures approximately 2.5 cm x 2.2 cm and measured approximately 2.9 cm x 2.6 cm a prior CT dated 2020. There has been interval resolution of free intraperitoneal fluid. Retroperitoneal lymphadenopathy, not significantly changed. Right hepatic lobe hypodensities, as described above, not significantly changed. 21 CT c/a/p:  NONTARGET LESIONS:  1. Pancreatic body mass. Difficult to reproducibly measure. Retro-pancreatic extension. Pancreatic body mass is ill-defined, difficult to measure, approximately 22 x 28 mm in size. 2. Tiny omental nodular densities best demonstrated in the left paracolic  region. IMPRESSION  Stable pancreatic mass with retroperitoneal extension. Right hepatic lobe hypodensities, unchanged.     Subtle omental nodularity is also stable. 7/24/21 CT c/a/p:  1. Pancreatic body mass. Difficult to reproducibly measure. Likely increased. Retro-pancreatic extension. 2. Tiny omental nodular densities best demonstrated in the left paracolic  region. IMPRESSION  Slightly increased pancreatic mass with retroperitoneal extension. Right hepatic lobe hypodensities, unchanged. Assessment and Recommendations:   63 y.o. male with metastatic pancreatic cancer on palliative chemotherapy. 1. Pancreatic adenocarcinoma:   Stage IV, CANDIE, Germline and somatic genetic testing negative. Diagnosed 11/18/2020, has Stage IV disease with peritoneal carcinomatosis and retroperitoneal LAD, encasement of vasculature seen on CT. CA 19-9 5607 at diagnosis. Previously discussed with patient that his disease is not curable, but is treatable. I recommended palliative chemotherapy with FOLFIRINOX regimen. We discussed the risks and benefits of FOLFIRINOX chemotherapy, including potential side effects. These include but are not limited to fatigue, nausea vomiting, diarrhea, neuropathy, taste changes, cold intolerance, esophageal spasm, allergic reactions, alopecia, mucositis, myelosuppression, risk for infection, infertility, and rarely, death. Rarely, a patient may have a condition where they do not metabolize fluorouracil appropriately (called DPD deficiency), and they may have excessive toxicity. A Port-A-Cath will be required in order to deliver the continuous infusion. The patient has consented to beginning therapy. At time of disease progression, will use PROSCAP nomogram and consider [place]-Abraxane in 2nd line. CT 7/24/21 showed mild disease progression supported by rising CA 19-9 due to pt not coming for treatment at 2 week intervals. Discussed with patient and emphasized how the chemotherapy works. Will continue on current treatment. Supportive medications include: EMLA cream, zofran, compazine, dexamethasone.    -- Proceed with cycle 15 FOLFIRNOX, MD/NP visit, pump removal on Wednesday. -- CT scan due after cycle 16 treatment. -- Follow up in 2 weeks for cycle 15, MD/NP visit. Michelle Rued -- Refuses COVID vaccine. 2. F/E/N/G / Nausea:  Continued weight loss. Mild nausea and diarrhea during week of treatment. Previously advised antiemetics once daily for 2 days after treatment and dex. Reminded pt of Miralax and Docusate/Senna prn for constipation and imodium for diarrhea. Again discussed drinking at least 3 high calorie nutrition supplements and provided samples from our supply. -- KCL 20meq at home and increase food. 3. Neoplasm related pain:   Well controlled on Oxycontin ER 10mg q12h and Oxycodone IR prn.  checked 8/23/21. Refilled Oxycontin 10mg q12h #60 and Oxycodone 5mg q6h PRN #45 on 8/25/21.     4. Family h/o pancreatic cancer:  Was concerning for genetic predisposition but germline genetic testing negative.          Signed By:  Date: Barbra Koroma MD   09/07/21

## 2021-09-07 NOTE — PROGRESS NOTES
[facility] Progress Note    Date: 2021    Name: Amadeo Kunz    MRN: [id number]         : 1960    Mr. Devin Navarrete Arrived ambulatory and in no distress for C15D1 of Folfirinox Regimen. Assessment was completed, patient with very flat affect and does not have a lot of energy in between treatments. R chest wall port accessed without difficulty by Chris Hanson RN, labs drawn & sent for processing. Covid Questionnaire completed. 1. Do you have any symptoms of covid 19? SOB, coughing, fever, or generally not feeling well? - no  2. Have you been exposed to covid 19 recently? - no  3. Have you had any recent contact with family/friend that has a pending covid test? no      Chemotherapy Flowsheet 2021   Cycle C15D1   Date 2021   Drug / Regimen Folfirinox   Dosage -   Time Up -   Time Down -   Pre Hydration -   Post Hydration -   Pre Meds given   Notes given       Patient proceed to MD appointment. Patient returned with no change in treatment. Mr. Rusty Manuel vitals were reviewed. Visit Vitals  /68   Pulse 73   Temp 98 °F (36.7 °C)   Resp 16   Ht 5' 10\" (1.778 m)   Wt 52.7 kg (116 lb 1.6 oz)   SpO2 96%   BMI 16.66 kg/m²       Lab results were obtained and reviewed. Recent Results (from the past 12 hour(s))   CBC WITH 3 PART DIFF    Collection Time: 21  7:59 AM   Result Value Ref Range    WBC 3.2 (L) 4.1 - 11.1 K/uL    RBC 3.19 (L) 4.10 - 5.70 M/uL    HGB 10.7 (L) 12.1 - 17.0 g/dL    HCT 31.3 (L) 36.6 - 50.3 %    MCV 98.1 80.0 - 99.0 FL    MCH 33.5 26.0 - 34.0 PG    MCHC 34.2 30.0 - 36.5 g/dL    RDW 16.2 (H) 11.8 - 15.8 %    PLATELET 688 (L) 824 - 400 K/uL    NEUTROPHILS 45 32 - 75 %    MIXED CELLS 18 (H) 3.2 - 16.9 %    LYMPHOCYTES 37 12 - 49 %    ABS. NEUTROPHILS 1.4 (L) 1.8 - 8.0 K/UL    ABS. MIXED CELLS 0.6 0.2 - 1.2 K/uL    ABS.  LYMPHOCYTES 1.2 0.8 - 3.5 K/UL    DF AUTOMATED       Medications:  Medications Administered     atropine 0.4 mg/mL injection 0.4 mg     Admin Date  09/07/2021 Action  Given Dose  0.4 mg Route  IntraVENous Administered By  Cortney Parmar, BHARATH          dexamethasone (DECADRON) 12 mg in 0.9% sodium chloride 50 mL IVPB     Admin Date  09/07/2021 Action  New Bag Dose  12 mg Route  IntraVENous Administered By  Cortney Parmar, RN          dextrose 5% infusion     Admin Date  09/07/2021 Action  New Bag Dose  25 mL/hr Rate  25 mL/hr Route  IntraVENous Administered By  Cortney Parmar, BHARATH          fluorouraciL (ADRUCIL) 3,936 mg in 0.9% sodium chloride 100 mL CADD Cassette     Admin Date  09/07/2021 Action  New Bag Dose  3,936 mg Rate  2.2 mL/hr Route  IntraVENous Administered By  Cortney Parmar, BHARATH          irinotecan (CAMPTOSAR) 246 mg in dextrose 5% 250 mL, overfill volume 25 mL chemo infusion     Admin Date  09/07/2021 Action  New Bag Dose  246 mg Rate  191.5 mL/hr Route  IntraVENous Administered By  Cortney Parmar, BHARATH          leucovorin (WELLCOVORIN) 656 mg in dextrose 5% 250 mL, overfill volume 25 mL IVPB     Admin Date  09/07/2021 Action  New Bag Dose  656 mg Rate  205.2 mL/hr Route  IntraVENous Administered By  Cortney Parmar, BHARATH          oxaliplatin (ELOXATIN) 139.5 mg in dextrose 5% 250 mL, overfill volume 25 mL chemo infusion     Admin Date  09/07/2021 Action  New Bag Dose  139.5 mg Rate  151.5 mL/hr Route  IntraVENous Administered By  Cortney Parmar, BHARATH          palonosetron HCl (ALOXI) injection 0.25 mg     Admin Date  09/07/2021 Action  Given Dose  0.25 mg Route  IntraVENous Administered By  Cortney Parmar, BHARATH                  Mr. Ulices Gutierrez tolerated treatment well and was discharged from [place] [address] in stable condition. CADD pump infusing. He is to return on September 9 for his next appointment.     Karen Cottrell RN  September 7, 2021

## 2021-09-07 NOTE — PROGRESS NOTES
Jackelyn Arias is a 63 y.o. male here for follow up of pancreatic cancer. Patient with complaints of abdominal pain, rates as a 2 out of 10.

## 2021-09-07 NOTE — PROGRESS NOTES
Your potassium was low on Tuesday. Please increase your potassium supplements to 2 tablets daily for 3 days, then you can go back to once daily.

## 2021-09-09 NOTE — PROGRESS NOTES
Outpatient Infusion Center Short Visit Progress Note     Patient admitted to [facility] for pump DC ambulatory in stable condition. Assessment completed. No new concerns voiced. Covid Screening      1. Do you have any symptoms of COVID-19? SOB, coughing, fever, or generally not feeling well ? NO  2. Have you been exposed to COVID-19 recently? NO  3. Have you had any recent contact with family/friend that has a pending COVID test? NO    Vital Signs:  Visit Vitals  /79   Pulse 67   Temp (!) 96.5 °F (35.8 °C)   Resp 16   SpO2 99%         Right chest port with positive blood return and flushed, heparinized, and de-accessed. Lab Results  None        Medications:  None       Patient tolerated treatment well. Patient discharged from [place] [address] ambulatory in no distress. Patient aware of next appointment.     Future Appointments   Date Time Provider [place] Moreno   9/20/2021  7:30 AM SS INF5 CH1 >7H [facility]   9/20/2021  8:15 AM Rayne Rodriguez NP [facility]   9/22/2021  1:30 PM SS INF5 CH4 <1H [facility]   10/4/2021  7:30 AM SS INF5 CH1 >7H [facility]   10/4/2021  8:45 AM Rayne Rodriguez NP [facility]   10/6/2021  1:30 PM SS INF4 CH4 <1H [facility]   10/18/2021  7:30 AM SS INF5 CH1 >7H [facility] [address] [place] [address]

## 2021-09-16 NOTE — TELEPHONE ENCOUNTER
[address] at [facility]  [phone]        09/16/21 9:44 AM Attempted to call patient via cell phone number listed to remind him of upcoming appointment. No answer, left message and provided office phone number if patient has any additional questions.

## 2021-09-23 NOTE — TELEPHONE ENCOUNTER
Patient called about his missed treatment this week and wanted to know if he could still come in in 2 weeks. After talking with Jam Hughes she told me that the patient should really come in next week for his treatment. Told patient that we really wanted him to come in next week since he missed is treatment this week. He told me that he could not come in next week he had to work and his ride that he gets have already been set up for his next treatments and he did not want to move them. Patient stated that he would see us on October 4.

## 2021-09-28 NOTE — TELEPHONE ENCOUNTER
[address] at [facility]  [phone]        09/28/21 2:18 PM Attempted to call patient via cell phone number listed to remind him of his upcoming appointments on Monday, 10/04, at 7:30 AM with MD visit to follow. No answer and voicemail currently full.

## 2021-09-29 NOTE — PROGRESS NOTES
[facility] [place] at [place] [address]  [address] [facility], 2[address] [facility] 1007 [facility] [place]: [phone]  F: [phone]    Hematology Oncology established visit     Reason for Visit:   Gerald Walker is a 63 y.o. male who is seen here for follow up of pancreatic cancer, new diagnosis. Hematology / Oncology Treatment History:     Diagnosis: Pancreatic, adenocarcinoma, moderately to poorly diiferentiated    Stage: IV [fC2C3G9]    Pathology: 2020 pancreatic body, FNA; adenocarcinoma, moderately to poorly diiferentiated  Foundation One testing: Negative for reportable alterations. MS-stable. KRAS G12D. Tumor mutational burden 1 muts/mb [id number] [id number]. KDM6A Q611 TPS3 [id number]  Invitae: Germline testing negative for BRCA1/2 and 86 other genes. Prior Treatment: None    Current Treatment:  FOLFIRINOX every 2 weeks, 20 - current. History of Present Illness:   Mr. Gerald aWlker is a 63 y.o. male with metastatic pancreatic cancer coming in for follow up and chemotherapy. Last treatment was 21. Has had difficulty attending appts due to various problems including transportation problems. Reports he is drinking 1-2 supplement drinks per day. Reports nausea the week after treatment. No vomiting. Reports diarrhea with each meal, sanjuana colored. Has not tried imodium. Reports pain in lower stomach is controlled with oxycodone. No fevers or chills. 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 Smokes cigarettes: 1 ppd x 30 yrs. Rare EtOH use. : 3 kids; twins 29 yrs old and 25 yr old. Review of Systems: A complete review of systems was obtained, negative except as described above.     Physical Exam:     Visit Vitals  /69   Pulse 70   Temp 97.1 °F (36.2 °C)   Resp 16   Ht 5' 10\" (1.778 m)   Wt 116 lb 10 oz (52.9 kg)   SpO2 100%   BMI 16.73 kg/m²     ECOG PS: 1  General: No distress, thin   Eyes: Anicteric sclerae  HENT: Atraumatic  Neck: Supple  Respiratory: Normal respiratory effort  CV: No peripheral edema, Regular rhythm, nl rate, port right upper chest.   GI: Soft, nontender, nondistended, no masses  Skin: No rashes, ecchymoses, or petechiae  Psych: Alert, oriented, appropriate affect, normal judgment/insight    Results:     Lab Results   Component Value Date/Time    WBC 9.3 10/04/2021 08:16 AM    HGB 9.9 (L) 10/04/2021 08:16 AM    HCT 30.2 (L) 10/04/2021 08:16 AM    PLATELET 832 (L) 99/30/6919 08:16 AM    MCV 99.3 (H) 10/04/2021 08:16 AM    ABS. NEUTROPHILS 6.6 10/04/2021 08:16 AM     Lab Results   Component Value Date/Time    Sodium 142 10/04/2021 08:16 AM    Potassium 3.2 (L) 10/04/2021 08:16 AM    Chloride 110 (H) 10/04/2021 08:16 AM    CO2 26 10/04/2021 08:16 AM    Glucose 109 (H) 10/04/2021 08:16 AM    BUN 10 10/04/2021 08:16 AM    Creatinine 0.78 10/04/2021 08:16 AM    GFR est AA >60 10/04/2021 08:16 AM    GFR est non-AA >60 10/04/2021 08:16 AM    Calcium 8.1 (L) 10/04/2021 08:16 AM     Lab Results   Component Value Date/Time    Bilirubin, total 0.3 10/04/2021 08:16 AM    ALT (SGPT) 28 10/04/2021 08:16 AM    Alk. phosphatase 64 10/04/2021 08:16 AM    Protein, total 6.7 10/04/2021 08:16 AM    Albumin 3.2 (L) 10/04/2021 08:16 AM    Globulin 3.5 10/04/2021 08:16 AM     Lab Results   Component Value Date/Time    Sed rate, automated 4 11/16/2017 03:56 PM    C-Reactive protein <0.29 11/16/2017 03:56 PM    TSH 3.36 11/12/2020 05:28 AM    Lipase 115 11/23/2020 06:22 AM     Lab Results   Component Value Date/Time    INR 1.0 09/23/2010 03:05 AM    aPTT 28.0 09/23/2010 03:05 AM     Lab Results   Component Value Date/Time    CEA 4.9 11/13/2020 05:29 AM    Carbohydrate Antigen 19-9, (CA 19-9) 2,939 (H) 09/07/2021 07:59 AM       11/12/20 CA 19-9 5607 at diagnosis.    11/20/21 CA 19-9 6483  11/30/20 started treatment 21 CA 19-9: 1317   21: CA 19-9 372  21: CA 19-9 1532   21: CA 19-9 3076  21: CA 19-9 2594  21: CA 19-9 2939    Imagin2020 XR ABD FLAT  IMPRESSION:   No evidence for acute abnormality    2020 CT ABD PELV W CONT  IMPRESSION:   1. Subacute pancreatitis, with small walled-off necrosis (\"pseudocyst\") in the lesser sac. 2. Pancreatic body adenocarcinoma. 3. Occluded splenic vein. Consequent, extensive, submucosal, gastric varices in the cardia. 4. Encasement of the splenic artery. Abutment of the celiac artery, SMV, and SMA. 5. Retroperitoneal remington metastases. 6. Peritoneal carcinomatosis.     21 CT c/a/p:  IMPRESSION  1. Interval resolution of free intraperitoneal fluid. 2. Pancreatic mass and retroperitoneal lymphadenopathy, unchanged and as described above. 3.Right hepatic lobe hypodensities, not significantly changed, as described  above. 4. Recommend continued short interval surveillance. Addendum:   There has been interval improvement of the omental nodularity. There has also  been interval improvement of the ill-defined soft tissue anterior and inferior  to the pancreatic body, previously seen on CT abdomen and pelvis dated 2020. The pancreatic body mass is difficult to measure but measures approximately 2.5 cm x 2.2 cm and measured approximately 2.9 cm x 2.6 cm a prior CT dated 2020. There has been interval resolution of free intraperitoneal fluid. Retroperitoneal lymphadenopathy, not significantly changed. Right hepatic lobe hypodensities, as described above, not significantly changed. 21 CT c/a/p:  NONTARGET LESIONS:  1. Pancreatic body mass. Difficult to reproducibly measure. Retro-pancreatic extension. Pancreatic body mass is ill-defined, difficult to measure, approximately 22 x 28 mm in size. 2. Tiny omental nodular densities best demonstrated in the left paracolic  region.   IMPRESSION  Stable pancreatic mass with retroperitoneal extension. Right hepatic lobe hypodensities, unchanged. Subtle omental nodularity is also stable. 7/24/21 CT c/a/p:  1. Pancreatic body mass. Difficult to reproducibly measure. Likely increased. Retro-pancreatic extension. 2. Tiny omental nodular densities best demonstrated in the left paracolic  region. IMPRESSION  Slightly increased pancreatic mass with retroperitoneal extension. Right hepatic lobe hypodensities, unchanged. Assessment and Recommendations:   63 y.o. male with metastatic pancreatic cancer on palliative chemotherapy. 1. Pancreatic adenocarcinoma:   Stage IV, CANDIE, Germline and somatic genetic testing negative. Diagnosed 11/18/2020, has Stage IV disease with peritoneal carcinomatosis and retroperitoneal LAD, encasement of vasculature seen on CT. CA 19-9 5607 at diagnosis. Previously discussed with patient that his disease is not curable, but is treatable. I recommended palliative chemotherapy with FOLFIRINOX regimen. We discussed the risks and benefits of FOLFIRINOX chemotherapy, including potential side effects. These include but are not limited to fatigue, nausea vomiting, diarrhea, neuropathy, taste changes, cold intolerance, esophageal spasm, allergic reactions, alopecia, mucositis, myelosuppression, risk for infection, infertility, and rarely, death. Rarely, a patient may have a condition where they do not metabolize fluorouracil appropriately (called DPD deficiency), and they may have excessive toxicity. A Port-A-Cath will be required in order to deliver the continuous infusion. The patient has consented to beginning therapy. At time of disease progression, will use PROSCAP nomogram and consider [place]-Abraxane in 2nd line. CT 7/24/21 showed mild disease progression supported by rising CA 19-9 due to pt not coming for treatment at 2 week intervals. Discussed with patient and emphasized how the chemotherapy works. Will continue on current treatment. Supportive medications include: EMLA cream, zofran, compazine, dexamethasone. -- Proceed with cycle 16 FOLFIRNOX, MD/NP visit, pump removal on Wednesday. -- CT scan due after cycle 16 treatment. Ordered. -- Follow up in 2 weeks for cycle 17, MD/NP visit. Conrado Ra -- Refuses COVID vaccine. 2. F/E/N/G / Nausea:  Continued weight loss. Advised antiemetics once daily for 2 days after treatment and dex. Reminded pt of Miralax and Docusate/Senna prn for constipation and imodium for diarrhea. Discussed drinking at least 3 high calorie nutrition supplements and provided samples from our supply. -- KCL 20meq at home. Not taking, sent in new prescription and advised to take. KCL 40meq in [facility] today. -- Michael July, RD to discuss adding creon. -- Advised imodium prn diarrhea. Reviewed instructions. Rx sent. 3. Neoplasm related pain:   Well controlled on Oxycontin ER 10mg q12h and Oxycodone IR prn.  checked 10/4/21. Refilled Oxycontin 10mg q12h #60 and Oxycodone 5mg q6h PRN #45 on 10/4/21.     4. Family h/o pancreatic cancer:  Was concerning for genetic predisposition but germline genetic testing negative. I have personally seen and evaluated the patient in conjunction with Pura Krabbe, NP. I find the patient's history and physical exam are consistent with the NP's documentation. I agree with the above assessment and plan, which I have modified as needed. Proceed with C16 of FOLFIRINOX today. Patient's diarrhea seems more related to pancreatic insufficiency rather than chemotherapy effect. Advised we will look into obtaining Creon, but cost is an issue. Refilling pain medications. Due for imaging next week.        Signed By:  Date: Mary Lennon MD   10/04/21

## 2021-10-04 NOTE — PROGRESS NOTES
[facility] Progress Note    Date: 2021    Name: Santana Zurita    MRN: [id number]         : 1960    Mr. Adolfo Cruz Arrived ambulatory and in no distress for C16D1 of Folfirinox Regimen. Assessment was completed by Jorge Hogan RN , no acute issues at this time, no new complaints voiced. Right chest wall port accessed without difficulty, labs drawn & sent for processing. Patient proceed to appointment with Dr. Nora Anderson. Mr. Barry Hirsch vitals were reviewed. Visit Vitals  /69   Pulse 70   Temp 97.1 °F (36.2 °C)   Resp 16   Ht 5' 10\" (1.778 m)   Wt 52.9 kg (116 lb 9.6 oz)   SpO2 100%   BMI 16.73 kg/m²       Lab results were obtained and reviewed, within parameters for treatment. Potassium by mouth administered during [facility] visit. Recent Results (from the past 12 hour(s))   CBC WITH AUTOMATED DIFF    Collection Time: 10/04/21  8:16 AM   Result Value Ref Range    WBC 9.3 4.1 - 11.1 K/uL    RBC 3.04 (L) 4.10 - 5.70 M/uL    HGB 9.9 (L) 12.1 - 17.0 g/dL    HCT 30.2 (L) 36.6 - 50.3 %    MCV 99.3 (H) 80.0 - 99.0 FL    MCH 32.6 26.0 - 34.0 PG    MCHC 32.8 30.0 - 36.5 g/dL    RDW 16.3 (H) 11.5 - 14.5 %    PLATELET 940 (L) 210 - 400 K/uL    MPV 9.2 8.9 - 12.9 FL    NRBC 0.0 0  WBC    ABSOLUTE NRBC 0.00 0.00 - 0.01 K/uL    NEUTROPHILS 71 32 - 75 %    LYMPHOCYTES 14 12 - 49 %    MONOCYTES 10 5 - 13 %    EOSINOPHILS 3 0 - 7 %    BASOPHILS 1 0 - 1 %    IMMATURE GRANULOCYTES 1 (H) 0.0 - 0.5 %    ABS. NEUTROPHILS 6.6 1.8 - 8.0 K/UL    ABS. LYMPHOCYTES 1.3 0.8 - 3.5 K/UL    ABS. MONOCYTES 1.0 0.0 - 1.0 K/UL    ABS. EOSINOPHILS 0.3 0.0 - 0.4 K/UL    ABS. BASOPHILS 0.1 0.0 - 0.1 K/UL    ABS. IMM.  GRANS. 0.1 (H) 0.00 - 0.04 K/UL    DF AUTOMATED     METABOLIC PANEL, COMPREHENSIVE    Collection Time: 10/04/21  8:16 AM   Result Value Ref Range    Sodium 142 136 - 145 mmol/L    Potassium 3.2 (L) 3.5 - 5.1 mmol/L    Chloride 110 (H) 97 - 108 mmol/L    CO2 26 21 - 32 mmol/L    Anion gap 6 5 - 15 mmol/L    Glucose 109 (H) 65 - 100 mg/dL    BUN 10 6 - 20 MG/DL    Creatinine 0.78 0.70 - 1.30 MG/DL    BUN/Creatinine ratio 13 12 - 20      GFR est AA >60 >60 ml/min/1.73m2    GFR est non-AA >60 >60 ml/min/1.73m2    Calcium 8.1 (L) 8.5 - 10.1 MG/DL    Bilirubin, total 0.3 0.2 - 1.0 MG/DL    ALT (SGPT) 28 12 - 78 U/L    AST (SGOT) 8 (L) 15 - 37 U/L    Alk.  phosphatase 64 45 - 117 U/L    Protein, total 6.7 6.4 - 8.2 g/dL    Albumin 3.2 (L) 3.5 - 5.0 g/dL    Globulin 3.5 2.0 - 4.0 g/dL    A-G Ratio 0.9 (L) 1.1 - 2.2         Medications:  Medications Administered     atropine 0.4 mg/mL injection 0.4 mg     Admin Date  10/04/2021 Action  Given Dose  0.4 mg Route  IntraVENous Administered By  [place], [place]          dexamethasone (DECADRON) 12 mg in 0.9% sodium chloride 50 mL IVPB     Admin Date  10/04/2021 Action  New Bag Dose  12 mg Route  IntraVENous Administered By  [place], [place]          dextrose 5% infusion     Admin Date  10/04/2021 Action  New Bag Dose  25 mL/hr Rate  25 mL/hr Route  IntraVENous Administered By  [place], [place]          fluorouraciL (ADRUCIL) 3,864 mg in 0.9% sodium chloride 100 mL CADD Cassette     Admin Date  10/04/2021 Action  New Bag Dose  3,864 mg Rate  2.2 mL/hr Route  IntraVENous Administered By  [place], [place]          irinotecan (CAMPTOSAR) 242 mg in dextrose 5% 250 mL, overfill volume 25 mL chemo infusion     Admin Date  10/04/2021 Action  New Bag Dose  242 mg Rate  191.4 mL/hr Route  IntraVENous Administered By  [place], [place]          leucovorin (WELLCOVORIN) 644 mg in dextrose 5% 250 mL, overfill volume 25 mL IVPB     Admin Date  10/04/2021 Action  New Bag Dose  644 mg Rate  204.8 mL/hr Route  IntraVENous Administered By  [place], [place]          oxaliplatin (ELOXATIN) 137 mg in dextrose 5% 250 mL, overfill volume 25 mL chemo infusion     Admin Date  10/04/2021 Action  New Bag Dose  137 mg Rate  151.2 mL/hr Route  IntraVENous Administered By  [facility] [employer], [place]          palonosetron HCl (ALOXI) injection 0.25 mg     Admin Date  10/04/2021 Action  Given Dose  0.25 mg Route  IntraVENous Administered By  [place], [place]          potassium chloride SR (KLOR-CON 10) tablet 40 mEq     Admin Date  10/04/2021 Action  Given Dose  40 mEq Route  Oral Administered By  [place], [place]                  Mr. Leonardo Gibbs tolerated treatment well and was discharged from [place] [address] in stable condition at 1450. Port remains accessed with Fluororacil infusing via CADD pump per order. He is to return on October 6 at 1330 for his next appointment.     [facility]  October 4, 2021

## 2021-10-04 NOTE — PATIENT INSTRUCTIONS
1. For diarrhea: take Imodium as needed. I sent in a prescription  2. Your potassium level is low. Please start taking your potassium tablets. I sent in a new prescription today. 3. Please get your CT scan scheduled before the next visit. 4. Our dietitian, Morgan Rushing, will call you about starting on creon. This medication will help digest your food and may decrease diarrhea.

## 2021-10-04 NOTE — PROGRESS NOTES
Chief Complaint   Patient presents with   Contreras Galeas is a pleasant 63year old male who presents as a follow up for pancreatic cancer.  He reports stomach pain

## 2021-10-05 NOTE — TELEPHONE ENCOUNTER
[facility] [place] at [address], [address] [place] [address]  W: [phone]  F: [phone]    Medical Nutrition Therapy  Nutrition Referral:    Referral received. Patient with pancreatic cancer. Called patient on cell phone, unable to leave a voicemail as mailbox was full. Home phone number called, but just rang and rang. Notes reviewed from office visit yesterday and patient would benefit from starting Creon. Type of treatment:   Chemotherapy Flowsheet 10/4/2021   Cycle C16D1   Date 10/4/2021   Drug / Regimen Folfirinox   Dosage -   Time Up -   Time Down -   Pre Hydration -   Post Hydration -   Pre Meds given   Notes given     Wt Readings from Last 5 Encounters:   10/04/21 116 lb 10 oz (52.9 kg)   10/04/21 116 lb 9.6 oz (52.9 kg)   09/07/21 116 lb 1.6 oz (52.7 kg)   09/07/21 116 lb 1.6 oz (52.7 kg)   08/23/21 118 lb 8 oz (53.8 kg)       Estimated Nutrition Needs:   Calorie Range: 1960-2256kcal/day    Protein Range: 65-75g/day     Fluid Needs: 2000ml    Plan:  -Suggest Creon 24,000 units - 2 capsules with the first bite of full meals and 1 capsule with snacks.        Signed By: Arnold Gómez, [address], 15[facility]

## 2021-10-06 NOTE — PROGRESS NOTES
[facility] Short Visit Progress Note    1325 Patient admitted to [facility] for CADD pump removal ambulatory in stable condition. Assessment completed. No new concerns voiced. Vital Signs:  Visit Vitals  /73   Pulse 85   Temp 98 °F (36.7 °C)   Resp 16         Right chest wall port  with positive blood return. Medications:  Medications Administered     0.9% sodium chloride injection 10 mL     Admin Date  10/06/2021 Action  Given Dose  10 mL Route  IntraVENous Administered By  Talita Aguilar RN          heparin (porcine) pf 300-500 Units     Admin Date  10/06/2021 Action  Given Dose  500 Units Route  InterCATHeter Administered By  Talita Aguilar, BHARATH                6718  Patient tolerated treatment well. Patient discharged from [place] [address] ambulatory in no distress. Patient aware of next appointment on 10/18/21.     Clint Patton RN    Future Appointments   Date Time Provider [place]   10/18/2021  7:30 AM SS INF5 CH1 >7H [facility]   10/18/2021  8:15 AM Angelica Rodriguez, NP [facility] AMB   10/20/2021  1:00 PM [facility]   11/1/2021  7:30 AM SS INF3 CH2 >7H [facility]   11/3/2021  1:00 PM [facility]   11/15/2021  7:30 AM SS [facility]   11/17/2021  1:30 PM SS [facility]   11/29/2021  7:30 AM SS INF5 CH1 >7H [facility]   12/1/2021  1:30 PM SS INF7 CH2 <1H [facility] [address] [place] [address]

## 2021-10-06 NOTE — TELEPHONE ENCOUNTER
[facility] [place] at [address], [address] [place] [address]  W: [phone]  F: [phone]    Medical Nutrition Therapy  Nutrition Referral:    Referral received. Patient with pancreatic cancer. Called patient and spoke with him. He reports loose stools after eating. Feels like food is just going straight through to him. He says he is eating fairly well, usually 3 meals a day along with 2 supplements. Discussed starting Creon and reviewed how to take it. Type of treatment:   Chemotherapy Flowsheet 10/4/2021   Cycle C16D1   Date 10/4/2021   Drug / Regimen Folfirinox   Dosage -   Time Up -   Time Down -   Pre Hydration -   Post Hydration -   Pre Meds given   Notes given     Wt Readings from Last 5 Encounters:   10/04/21 116 lb 9.6 oz (52.9 kg)   10/04/21 116 lb 10 oz (52.9 kg)   09/07/21 116 lb 1.6 oz (52.7 kg)   09/07/21 116 lb 1.6 oz (52.7 kg)   08/23/21 118 lb 8 oz (53.8 kg)       Estimated Nutrition Needs:   Calorie Range: 1960-2256kcal/day    Protein Range: 65-75g/day     Fluid Needs: 2000ml    Plan:  -Suggest Creon 24,000 units - 2 capsules with the first bite of full meals and 1 capsule with snacks.        Signed By: Eleuterio Christianson, [address], 2450 [facility]

## 2021-10-11 NOTE — TELEPHONE ENCOUNTER
[address] at [facility]  [phone]        10/11/21 10:17 AM Attempted to call patient via cell phone number listed to remind him to schedule his CT chest/abd/pelv prior to [facility]/MD visits on 10/18. No answer and voicemail currently full. Attempted to call home number listed, number is no longer in service. Sent patient Actual[employer]art message. Will attempt to call patient again later if return call not received and Actual[employer] message not viewed. 10/12/21 11:05 AM Attempted to call patient via cell phone number listed. No answer and voicemail currently full. Per encounters, patient has not viewed [employer] message. 10/13/21 10:30 AM Called patient and discussed above. Provided patient with contact phone number for  department. Patient states he is going to attempt to schedule CT for this Saturday. While on phone, patient shared that he has not been feeling well this week. Patient describes increased abdominal pain which he attributes to his diagnosis. Denies nausea/vomiting and fevers. Patient also states he is still eating and drinking okay. Advised this nurse would update Dr. Sarahi Reeves and Yaron Andrea of above. Encouraged patient to contact office if any further needs arise. He voiced understanding and expressed appreciation.

## 2021-11-01 NOTE — TELEPHONE ENCOUNTER
[address] at [facility]  [phone]        11/01/21 3:22 PM Attempted to call patient via cell phone number listed to check on him as he did not arrive for [facility]/MD visits today. Per chart review, patient no-showed appointment for CT chest/abd/pelv on 10/30. No answer, unable to leave message as voicemail is full. Per Katie Brunson, unable to reschedule treatment until patient has CT scan completed to evaluate for response to treatment. Would like to coordinate MD appointment to discuss goals of care. Will attempt to call patient again later if no return call received. 11/02/21 11:35 AM Attempted to call patient via cell phone number listed. No answer, voicemail presently full. 11/03/21 11:41 AM Attempted to call patient again via cell phone number listed. No answer and voicemail is presently full. Will notify Dr. Saida Lantigua and Katie Brunson that this nurse has attempted to call patient x 3 with no call back yet. 11/04/21 3:13 PM Per Katie Brunson, drafted letter requesting that patient contact office. Also provided  phone number to reschedule his CT scan. Will mail once reviewed and signed.

## 2021-11-04 NOTE — TELEPHONE ENCOUNTER
11/04/21 12:29 PM Called pt cell phone- rang to VM and VM is full. House phone is not a working number. Will send letter to notify patient of missed appointments. Problem: Restraint Use - Nonviolent/Non-Self-Destructive Behavior:  Goal: Absence of restraint indications  Description: Absence of restraint indications  10/21/2020 0802 by Wannetta Najjar  Outcome: Not Met This Shift     Problem: Restraint Use - Nonviolent/Non-Self-Destructive Behavior:  Goal: Absence of restraint-related injury  Description: Absence of restraint-related injury  10/21/2020 0802 by Wannetta Najjar  Outcome: Met This Shift

## 2021-11-09 PROBLEM — C25.8 OVERLAPPING MALIGNANT NEOPLASM OF PANCREAS (HCC): Status: ACTIVE | Noted: 2021-01-01

## 2021-11-09 NOTE — TELEPHONE ENCOUNTER
Patient called and stated he is not doing to good and needs a refill on his medications (Oxyxontin) and ( Oxyxodone). Patient requested for a call back when this medication has been refilled. Please advise         CB# [phone]

## 2021-11-09 NOTE — PROGRESS NOTES
11/09/21 1:58 PM Refilled oxycodone IR #10 tabs prn q6h and oxycontin ER #14 tabs x 7 day supply. Patient needs to follow up in person for all future refills.

## 2021-11-09 NOTE — TELEPHONE ENCOUNTER
[address] at [place]  [phone]      11/09/21 1:15 PM Called patient. Advised of NP response. Patient voiced understanding. He stated that he had arranged transportation for past visits, but they never showed up. Patient declines sooner appointment stating he would not have a way to get to office. Patient also requesting if provider can refill at least one of his pain medications in the meantime. Patient has complaints of abdominal pain in RLQ. Patient states he has been out of his pain medication for a couple days. Patient also shared that he plans on attending his dad's 95th birthday party this weekend. Advised this nurse would forward above to Dr. Devyn Mondragon and Tim Paulino and call patient back. He voiced understanding. 1:48 PM Called patient and advised of NP response. He voiced understanding. No further questions or concerns at this time.

## 2021-11-15 PROBLEM — D64.9 ANEMIA: Status: ACTIVE | Noted: 2021-01-01

## 2021-11-15 PROBLEM — R18.0 ASCITES, MALIGNANT: Status: ACTIVE | Noted: 2021-01-01

## 2021-11-15 PROBLEM — E43 SEVERE PROTEIN-CALORIE MALNUTRITION (HCC): Status: ACTIVE | Noted: 2021-01-01

## 2021-11-15 PROBLEM — G89.3 CANCER RELATED PAIN: Status: ACTIVE | Noted: 2021-01-01

## 2021-11-15 NOTE — TELEPHONE ENCOUNTER
[address] [place]  [phone]        11/15/21 10:17 AM Called patient. He has complaints of sharp pains in RUQ, difficult to lie in certain positions. Patient also states his abdomen is a little bloated but skin is tight. Patient states he is still eating and drinking. He continues to have loose stools alternating with constipation, though notes stools have been more solid recently. Advised of NP recommendations. Patient voiced understanding and states he will contact his sister who lives in [place] to see if she is available to transport him to clinic one day this week. If she does not have an upcoming day off of work, patient states he might call EMS tomorrow. Again reiterated that Jacqueline Osorio recommends being seen today. Patient voiced understanding. Will update Dr. Darren Sprague and Jacqueline Osorio of above.

## 2021-11-15 NOTE — ED PROVIDER NOTES
India Galarza is a 62 yo M with pancreatic cancer who came to the ED by EMS for evaluation for increasing lower abdominal pain. Pain is in the middle of his upper abdomen. It is increased by sitting up right or laying on his back but is improved by laying on his side. His pain is also increased with eating and he feels lightheaded. Past Medical History:   Diagnosis Date    Gout     Pancreatic cancer ([address]     Psoriasis     Tobacco abuse        Past Surgical History:   Procedure Laterality Date    HX ORTHOPAEDIC      femur repair    HX ORTHOPAEDIC      lumbar compressed fracture    IR INSERT TUNL CVC W PORT OVER 5 YEARS  11/24/2020         Family History:   Problem Relation Age of Onset    Pancreatic Cancer Brother        Social History     Socioeconomic History    Marital status: SINGLE     Spouse name: Not on file    Number of children: Not on file    Years of education: Not on file    Highest education level: Not on file   Occupational History    Not on file   Tobacco Use    Smoking status: Current Every Day Smoker     Packs/day: 1.00    Smokeless tobacco: Never Used    Tobacco comment: last week was last cigarette   Substance and Sexual Activity    Alcohol use: Yes    Drug use: Not on file    Sexual activity: Not on file   Other Topics Concern    Not on file   Social History Narrative    Not on file     Social Determinants of Health     Financial Resource Strain:     Difficulty of Paying Living Expenses: Not on file   Food Insecurity:     Worried About Running Out of Food in the Last Year: Not on file    Edwardo of Food in the Last Year: Not on file   Transportation Needs:     Lack of Transportation (Medical): Not on file    Lack of Transportation (Non-Medical):  Not on file   Physical Activity:     Days of Exercise per Week: Not on file    Minutes of Exercise per Session: Not on file   Stress:     Feeling of Stress : Not on file   Social Connections:     Frequency of Communication with Friends and Family: Not on file    Frequency of Social Gatherings with Friends and Family: Not on file    Attends Jew Services: Not on file    Active Member of Clubs or Organizations: Not on file    Attends Club or Organization Meetings: Not on file    Marital Status: Not on file   Intimate Partner Violence:     Fear of Current or Ex-Partner: Not on file    Emotionally Abused: Not on file    Physically Abused: Not on file    Sexually Abused: Not on file   Housing Stability:     Unable to Pay for Housing in the Last Year: Not on file    Number of [place] in the Last Year: Not on file    Unstable Housing in the Last Year: Not on file         ALLERGIES: Patient has no known allergies. Review of Systems   Constitutional: Negative for fever. HENT: Negative for sore throat. Eyes: Negative for visual disturbance. Respiratory: Negative for cough. Cardiovascular: Negative for chest pain. Gastrointestinal: Positive for abdominal pain. Negative for vomiting. Genitourinary: Negative for dysuria. Musculoskeletal: Negative for back pain. Skin: Negative for rash. Neurological: Negative for headaches. Vitals:    11/15/21 1242 11/15/21 1415   BP: 90/64    Pulse: 81    Resp: 20    Temp: 96.8 °F (36 °C)    SpO2: 99% 99%   Weight: 52.9 kg (116 lb 10 oz)    Height: 5' 10\" (1.778 m)             Physical Exam  Vitals and nursing note reviewed. Constitutional:       General: He is not in acute distress. Appearance: He is well-developed. HENT:      Head: Normocephalic and atraumatic. Eyes:      Conjunctiva/sclera: Conjunctivae normal.   Neck:      Trachea: Phonation normal.   Cardiovascular:      Rate and Rhythm: Normal rate. Pulmonary:      Effort: Pulmonary effort is normal. No respiratory distress. Abdominal:      General: There is no distension. Tenderness: There is abdominal tenderness in the epigastric area.    Musculoskeletal:         General: No tenderness. Normal range of motion. Cervical back: Normal range of motion. Skin:     General: Skin is warm and dry. Neurological:      Mental Status: He is alert. He is not disoriented. Motor: No abnormal muscle tone. [facility]  ED Course as of 11/15/21 1446   Mon Nov 15, 2021   1300 Patient reports history of stage IV pancreatic cancer. States he is followed by Dr. Brielle Hagen. Reports over the past 3 to 4 days, he is worsening epigastric and right upper quadrant abdominal pain and he feels more bloated than his baseline. States he has had loose stools over the past month but over the past couple of days it seems more solid. Notes he is on an opioid for pain control. 1:00 PM  I have evaluated the patient as the Provider in Triage. I have reviewed His vital signs and the triage nurse assessment. I have talked with the patient and any available family and advised that I am the provider in triage and have ordered the appropriate study to initiate their work up based on the clinical presentation during my assessment. I have advised that the patient will be accommodated in the Main ED as soon as possible. I have also requested to contact the triage nurse or myself immediately if the patient experiences any changes in their condition during this brief waiting period. Edie Almanza [MW]      ED Course User Index  [MW] [facility] Serve Consult for Admission  5:27 PM    ED Room Number: V82/Y19  Patient Name and age:  Brett Gil 63 y.o.  male  Working Diagnosis:   1. Chronic pancreatitis, unspecified pancreatitis type ([address]    2. Pancreatic adenocarcinoma (HCC)    3. Abdominal pain, epigastric    4.  Colitis        COVID-19 Suspicion:  no  Sepsis present:  yes  Reassessment needed: yes  Code Status:  Full Code  Readmission: no  Isolation Requirements:  no  Recommended Level of Care:  telemetry  Department:[facility] ED - [phone]  Other:  PAtient with pancreatic CA and chronic pancreatitis. Increased pain and decreased PO intake for several days. BP91/59, CT with new ascites, mild increase in pancreatic body mass, and infectious/inflammatory changes in sigmoid colon. Have ordered metronidazole and levaquin and IVNS bolus.     Procedures

## 2021-11-15 NOTE — ED TRIAGE NOTES
Ems reports pt has had abdominal pain for a couple of days, worse this morning. Hx of stage 4 pancreatic cancer associated with chronic abdominal pain. VSS in route.

## 2021-11-15 NOTE — TELEPHONE ENCOUNTER
Patient called and stated his transportation never showed up. He also stated that his stomach is blown up, tight and sharp pains. I informed the patient that I could see if a nurse was available, he stated that their is nothing they can do over the phone. Please advise.       CB# [phone]

## 2021-11-16 PROBLEM — I82.890 SPLENIC VEIN THROMBOSIS: Status: RESOLVED | Noted: 2020-11-23 | Resolved: 2021-01-01

## 2021-11-16 NOTE — PROGRESS NOTES
Bedside and Verbal shift change report given to Herb (oncoming nurse) by  Brandon Spearing nurseSaeed. Report included the following information SBAR, OR Summary, Procedure Summary and Intake/Output.

## 2021-11-16 NOTE — ED NOTES
TRANSFER - OUT REPORT:    Verbal report given to 4th floor RN (name) on Arnette Spurling  being transferred to 4th floor med/surg (unit) for routine progression of care       Report consisted of patients Situation, Background, Assessment and   Recommendations(SBAR). Information from the following report(s) SBAR, ED Summary, [facility] and Recent Results was reviewed with the receiving nurse. Lines:   Venous Access Device 11/24/20 Upper chest (subclavicular area, right (Active)       Venous Access Device Upper chest (subclavicular area, right (Active)       Peripheral IV 11/15/21 Left Antecubital (Active)        Opportunity for questions and clarification was provided.       Patient transported with:   [employer]

## 2021-11-16 NOTE — H&P
[facility]  [address], [facility] [address]  [phone]    Admission History and Physical      NAME:       Donal Powell   :       1960   MRN:      [id number]     PCP:      None     Date of service:   11/15/2021     Chief  Complaint:  Abdominal pain, decreased oral intake    History Of Presenting Illness:       Mr. Natalie Araujo is a 63 y.o. male who is being admitted for pancreatic cancer with acute on chronic abdominal pain. Mr. Natalie Araujo presented to our Emergency Department today complaining of a moderately severe, epigastric pain, not well controlled by current pain regimen. No fever or chills. He has a hx of pancreatic cancer on chemotherapy. He has a poor oral intake. He remains weak. A CT scan abdomen and pelvis done showed  large volume ascites, new. No definite change in small submental soft tissue densities left mid and upper abdomen. Possible slight increase in ill-defined pancreatic body mass. Slight worsening of focal narrowing/short segment occlusion at the junction of the  superior mesenteric vein and portal vein. Slightly nodular border of the liver with no discrete liver mass. Irregular wall thickening of the sigmoid colon/rectosigmoid junction may be due to a chronic infectious/inflammatory process, with small fluid collection in the wall the sigmoid colon slightly larger than prior study. Neoplastic etiology cannot be excluded. He will be admitted for further management. No Known Allergies    Prior to Admission medications    Medication Sig Start Date End Date Taking? Authorizing Provider   oxyCODONE IR (ROXICODONE) 5 mg immediate release tablet Take 1 Tablet by mouth every six (6) hours as needed for Pain for up to 7 days.  Max Daily Amount: 20 mg. 21  Tyler Rodriguez NP   oxyCODONE ER (OxyCONTIN) 10 mg ER tablet Take 1 Tablet by mouth every twelve (12) hours for 7 days. Max Daily Amount: 20 mg. 11/9/21 11/16/21  Herminio Rodriguez NP   lipase-protease-amylase (Creon) 24,000-76,000 -120,000 unit capsule Take 8 Capsules by mouth daily as needed (up to 8 per day). Take 2 capsules with the first bite of a full meal and 1 capsule with first bite of snack. Indications: exocrine pancreatic insufficiency 10/6/21   Herminio Rodriguez NP   loperamide (Imodium A-D) 2 mg capsule Take 1 Capsule by mouth three (3) times daily as needed for Diarrhea. 10/4/21   Herminio Rodriguez NP   potassium chloride (KLOR-CON) 10 mEq tablet Take 2 Tablets by mouth daily. 10/4/21   Herminio Rodriguez NP   dexAMETHasone (DECADRON) 4 mg tablet Take 8mg on days 2 and 3 after treatment to prevent nausea. 7/12/21   Marleni Subramanian MD   polyethylene glycol (MIRALAX) 17 gram packet Take 1 Packet by mouth daily as needed for Constipation. 11/30/20   Herminio Rodriguez NP   senna-docusate (PERICOLACE) 8.6-50 mg per tablet Take 1 Tab by mouth daily. Indications: constipation 11/30/20   Herminio Rodriguez NP   prochlorperazine (Compazine) 10 mg tablet Take 1 Tab by mouth every six (6) hours as needed for Nausea or Vomiting. 11/25/20   Herminio Rodriguez NP   ondansetron hcl (ZOFRAN) 8 mg tablet Take 1 Tab by mouth every eight (8) hours as needed for Nausea or Vomiting.  11/25/20   Herminio Rodriguez NP       Past Medical History:   Diagnosis Date    Gout     [address] [address]     Psoriasis     Tobacco abuse         Past Surgical History:   Procedure Laterality Date    HX ORTHOPAEDIC      femur repair    HX ORTHOPAEDIC      lumbar compressed fracture    IR INSERT TUNL CVC W PORT OVER 5 YEARS  11/24/2020       Social History     Tobacco Use    Smoking status: Current Every Day Smoker     Packs/day: 1.00    Smokeless tobacco: Never Used    Tobacco comment: last week was last cigarette   Substance Use Topics    Alcohol use: Yes        Family History   Problem Relation Age of Onset    Pancreatic Cancer Brother         Review of Systems:    Constitutional ROS: no fever, chills, rigors or night sweats  Respiratory ROS: no cough, sputum, hemoptysis, dyspnea or pleuritic pain. Cardiovascular ROS: no chest pain, palpitations, orthopnea, PND or syncope  Endocrine ROS: no polydispsia, polyuria, heat or cold intolerance or major weight change. Gastrointestinal ROS: no dysphagia but generalized abdominal pain, nausea and a poor oral intake. Genito-Urinary ROS: no dysuria, frequency, hematuria, retention or flank pain  Musculoskeletal ROS: no joint pain, swelling or muscular tenderness  Neurological ROS: no headache, confusion, focal weakness or any other neurological symptoms  Psychiatric ROS: no depression, anxiety, mood swings  Dermatological ROS: no rash, pruritis, or urticaria  Heme-Lymph ROS: no swollen glands, bleeding    Examination:    Constitutional:    Visit Vitals  BP 92/62 (BP 1 Location: Right arm, BP Patient Position: Lying left side)   Pulse 81   Temp 97.7 °F (36.5 °C)   Resp 18   Ht 5' 10\" (1.778 m)   Wt 52.9 kg (116 lb 10 oz)   SpO2 99%   BMI 16.73 kg/m²       General:  Weak, cachectic and ill looking patient in no acute distress  Eyes: Pink conjunctivae, PERRLA with no discharge. Normal eye movements  Ear, Nose, Mouth & Throat: No ottorrhea, rhinorrhea, non tender sinuses, dry mucous membranes  Respiratory:  No accessory muscle use, decreased breath sounds without crackles or wheezes  Cardiovascular:  No JVD or murmurs, regular and normal S1, S2 without thrills, bruits or peripheral edema. Capillary refil+, good distal pulses  GI & :  Soft abdomen, generalized discomfort, + distended, normoactive bowel sounds.     Musculoskeletal:  No cyanosis, clubbing, atrophy or deformities  Skin:  No rashes, bruising or ulcers   Neurological: Awake and alert, speech is clear, CNs 2-12 are grossly intact and otherwise non focal  Psychiatric:  Has a depressed mood   ________________________________________________________________________    Data Review:    Labs:    Recent Labs     11/15/21  1307   WBC 5.8   HGB 9.5*   HCT 28.6*        Recent Labs     11/15/21  1307      K 3.5      CO2 26   *   BUN 11   CREA 0.83   CA 8.5   ALB 2.6*   ALT 48     No components found for: GLPOC  No results for input(s): PH, PCO2, PO2, HCO3, FIO2 in the last 72 hours. No results for input(s): INR, INREXT, INREXT in the last 72 hours. Imaging Studies:      CT scan abdomen and pelvis - reviewed     I have also reviewed available old medical records. Assessment & Impression:     Mr. Enrique Poon is a 63 y.o. male being evaluated for:     Principal Problem:    Pancreatic cancer ([address] ()    Active Problems:    Acute abdominal pain (11/11/2020)      Peritoneal carcinomatosis ([address] (11/23/2020)      Severe protein-calorie malnutrition ([address] (11/15/2021)      Cancer related pain (11/15/2021)      Anemia (11/15/2021)      Ascites, malignant (11/15/2021)         Plan of management:    Pancreatic cancer ([address] / Peritoneal carcinomatosis ([address] (11/23/2020) / Ascites, malignant (11/15/2021) POA: I think his pain is due to the cancer. Now not controlled. Admit to hospital. Start on clears. IV fluids. IV dilaudid PRN. Consult both oncology and palliative care. Acute abdominal pain (11/11/2020) POA: likely due to the cancer. There is mention of colonic wall thickening. Doubt infectious etiology. Given IV Levofloxacin and metronidazole in the ED. Hold off antibiotics and monitor clinical progress    Severe protein-calorie malnutrition ([address] (11/15/2021) POA: due to poor intake, cancer. Consult nutritional services. Diet as tolerated    Cancer related pain (11/15/2021) POA: start IV dilaudid for now. Palliative care to assist with symptoms management    Anemia (11/15/2021) POA: likely from the cancer +/- chemo.  Monitor and transfuse as needed to keep Hgb >7     Code Status:  Full    Surrogate decision maker: Family    Risk of deterioration: high      Total time spent for the care of the patient: [address] discussed with: Patient, Nursing Staff and ED physician    Discussed:  Code Status, Care Plan and D/C Planning    Prophylaxis:  Lovenox    Probable Disposition:  Home w/Family           ___________________________________________________    Attending Physician: Leroy Dobson MD

## 2021-11-16 NOTE — PROGRESS NOTES
11/16/21  4:08 PM    Care Management Initial Evaluation:    Reason for Admission:   Abdominal Pain  Hx of Pancreatic Cancer               RUR Score:     16%  Level: Low  Value Based Purchasing: No  Bundle: No    PAYOR:  [employer]             PCP: First and Last name:   None     Name of Practice:    Are you a current patient: Yes/No:    Approximate date of last visit:    Can you participate in a virtual visit if needed:     Do you (patient/family) have any concerns for transition/discharge? At this time, patient is concerned about staying with his daughter since she is getting  and he doesn't want to burden her. He talked briefly about reaching out to his family to discuss other options including possibly staying with his sister who is nurse. Plan for utilizing home health:  No Home health history     Current Advanced Directive/Advance Care Plan:  DNR      Healthcare Decision Maker:   Click here to complete [address] including selection of the Healthcare Decision Maker Relationship (ie \"Primary\")            Primary Decision Maker: Jeevan Cunningham - Daughter - [phone]    Transition of Care Plan:        Home: One story private residence with 4 entry steps  DME: None  PTA: Independent. He does not want to be a burden to his family and worries about his future and the disease continues to progress. 1). Patient admitted for medical management  2). Oncology/Hemo following  3). PT/OT- no skilled needs  4). Hospice consult- CM discussed with patient, he is interested in a hospice information session with [address] [place] [address]. CM sent a referral via [facility]. 5). Family will transport at [place]. AMALIA Ahn  Care Manager    Care Management Interventions  PCP Verified by CM:  Yes (Patient does not have a PCP, follows with oncologist)  Transition of Care Consult (CM Consult): Cesar: Yes  Discharge Durable Medical Equipment: No  Physical Therapy Consult: Yes  Occupational Therapy Consult: Yes  Speech Therapy Consult: No  Support Systems: Child(leslye), Other Family Member(s)  Confirm Follow Up Transport: Family  Discharge Location  Discharge Placement: Unable to determine at this time

## 2021-11-16 NOTE — ED NOTES
Verbal shift change report given to Baljeet RN (oncoming nurse) by Kassy Rees RN (offgoing nurse). Report included the following information SBAR, Kardex, ED Summary, [facility] and Recent Results.

## 2021-11-16 NOTE — CONSULTS
[facility] [place] at [address], [address] [facility] [address] [facility]  Lilia Pearson: [phone]  F: [phone]      Reason for Visit:   Verner Solan is a 63 y.o. male who is seen for evaluation of severe pain; hx of pancreatic cancer. Hematology/Oncology Treatment History:     Diagnosis: Pancreatic, adenocarcinoma, moderately to poorly diiferentiated     Stage: IV [[id number]]     Pathology: 11/18/2020 pancreatic body, FNA; adenocarcinoma, moderately to poorly diiferentiated  Foundation One testing: Negative for reportable alterations. MS-stable. KRAS G12D. Tumor mutational burden 1 muts/mb PPD2S2H R183W. KDM6A Q611 TPS3 [id number]  Invitae: Germline testing negative for BRCA1/2 and 86 other genes.      Prior Treatment: None     Current Treatment:  FOLFIRINOX every 2 weeks, 11/30/20 - current. History of Present Illness:   Verner Solan is a pleasant 63 y.o. male who was admitted on 11/15/21 for worsening abd pain and tightness. ED imaging showed large volume ascites and slight increase in size of pancreas mass, irregular thickening of the sigmoid colon/rectosigmoid junction. ED labs notable for Hgb 9.5, Ca 7.9, , , ALK phos 300. Pt states his abdomen feels full and firm, with increased and different nature of pain compared to his chronic pain from pancreas mass. He states he is unable to eat because of this. He has been having bowel movements, loose stools recently. He also has weight loss and fatigue. When we discussed his multiple missed appointments, he states that he does not want to burden his children any more with his transportation needs. He states that Medicaid transportation has been very problematic with the  going to the wrong address, etc. According to our staff, patient usually does not call us to update us of his transporation status. Rather, our team reaches out to him to ask about missed appt and he voices trouble with transportation.  He understands his cancer is incurable and is interested in comfort care, but he is unsure where he can live. His daughter is getting  and he does not want to burden her. He no longer has his own place given inability to pay bills - he is working every other week still, but this does not provide enough money for rent. Past Medical History:   Diagnosis Date    Gout     Pancreatic cancer ([address]     Psoriasis     Tobacco abuse        Past Surgical History:   Procedure Laterality Date    HX ORTHOPAEDIC      femur repair    HX ORTHOPAEDIC      lumbar compressed fracture    IR INSERT TUNL CVC W PORT OVER 5 YEARS  11/24/2020       Social History     Socioeconomic History    Marital status: SINGLE     Spouse name: Not on file    Number of children: Not on file    Years of education: Not on file    Highest education level: Not on file   Occupational History    Not on file   Tobacco Use    Smoking status: Current Every Day Smoker     Packs/day: 1.00    Smokeless tobacco: Never Used    Tobacco comment: last week was last cigarette   Substance and Sexual Activity    Alcohol use: Yes    Drug use: Not on file    Sexual activity: Not on file   Other Topics Concern    Not on file   Social History Narrative    Not on file     Social Determinants of Health     Financial Resource Strain:     Difficulty of Paying Living Expenses: Not on file   Food Insecurity:     Worried About Running Out of Food in the Last Year: Not on file    Edwardo of Food in the Last Year: Not on file   Transportation Needs:     Lack of Transportation (Medical): Not on file    Lack of Transportation (Non-Medical):  Not on file   Physical Activity:     Days of Exercise per Week: Not on file    Minutes of Exercise per Session: Not on file   Stress:     Feeling of Stress : Not on file   Social Connections:     Frequency of Communication with Friends and Family: Not on file    Frequency of Social Gatherings with Friends and Family: Not on file    Attends Yarsanism Services: Not on file    Active Member of Clubs or Organizations: Not on file    Attends Club or Organization Meetings: Not on file    Marital Status: Not on file   Intimate Partner Violence:     Fear of Current or Ex-Partner: Not on file    Emotionally Abused: Not on file    Physically Abused: Not on file    Sexually Abused: Not on file   Housing Stability:     Unable to Pay for Housing in the Last Year: Not on file    Number of Places Lived in the Last Year: Not on file    Unstable Housing in the Last Year: Not on file       Family History   Problem Relation Age of Onset    Pancreatic Cancer Brother        Current Facility-Administered Medications   Medication Dose Route Frequency    oxyCODONE ER (OxyCONTIN) tablet 10 mg  10 mg Oral Q12H    lipase-protease-amylase (CREON 24,000) capsule 1 Capsule  1 Capsule Oral PRN    lipase-protease-amylase (CREON 24,000) capsule 2 Capsule  2 Capsule Oral TID WITH MEALS    HYDROmorphone (DILAUDID) injection 1 mg  1 mg IntraVENous Q4H PRN    sodium chloride (NS) flush 5-40 mL  5-40 mL IntraVENous Q8H    sodium chloride (NS) flush 5-40 mL  5-40 mL IntraVENous PRN    acetaminophen (TYLENOL) tablet 650 mg  650 mg Oral Q6H PRN    polyethylene glycol (MIRALAX) packet 17 g  17 g Oral DAILY PRN    senna (SENOKOT) tablet 8.6 mg  1 Tablet Oral DAILY PRN    ondansetron (ZOFRAN) injection 4 mg  4 mg IntraVENous Q6H PRN       No Known Allergies       Review of Systems: A complete review of systems was obtained, reviewed. Pertinent findings reviewed above.     Physical Exam:     Visit Vitals  BP 99/67 (BP 1 Location: Left upper arm, BP Patient Position: At rest)   Pulse 62   Temp 97.4 °F (36.3 °C)   Resp 16   Ht 5' 10\" (1.778 m)   Wt 52.9 kg (116 lb 10 oz)   SpO2 100%   BMI 16.73 kg/m²     ECOG PS: 3  General: Cachetic, no acute distress  Eyes: PERRLA, EOMI, anicteric sclerae  HENT: Atraumatic, OP clear, TMs intact without erythema  Neck: Supple, no JVD or thyromegaly  Lymphatic: No cervical, supraclavicular, axillary or inguinal adenopathy  Respiratory: CTAB, normal respiratory effort  CV: Normal rate, regular rhythm, no murmurs, no peripheral edema  GI: Firm abdomen, distended with ascites, moderately tender. Cannot appreciate HSM given ascites. MS: Normal gait and station. Digits without clubbing or cyanosis. Decreased muscle tone  Skin: No rashes, ecchymoses, or petechiae. Normal temperature, turgor, and texture. Neuro/Psych: Alert, oriented. 5/5 strength in all 4 extremities. Appropriate affect, normal judgment/insight. Results:     Lab Results   Component Value Date/Time    WBC 5.8 11/15/2021 01:07 PM    HGB 9.5 (L) 11/15/2021 01:07 PM    HCT 28.6 (L) 11/15/2021 01:07 PM    PLATELET 551 76/72/5466 01:07 PM    MCV 96.6 11/15/2021 01:07 PM    ABS. NEUTROPHILS 3.8 11/15/2021 01:07 PM     Lab Results   Component Value Date/Time    Sodium 139 11/16/2021 12:42 AM    Potassium 3.9 11/16/2021 12:42 AM    Chloride 107 11/16/2021 12:42 AM    CO2 29 11/16/2021 12:42 AM    Glucose 98 11/16/2021 12:42 AM    BUN 10 11/16/2021 12:42 AM    Creatinine 0.77 11/16/2021 12:42 AM    GFR est AA >60 11/16/2021 12:42 AM    GFR est non-AA >60 11/16/2021 12:42 AM    Calcium 7.9 (L) 11/16/2021 12:42 AM     Lab Results   Component Value Date/Time    Bilirubin, total 0.3 11/16/2021 12:42 AM    ALT (SGPT) 128 (H) 11/16/2021 12:42 AM    Alk.  phosphatase 300 (H) 11/16/2021 12:42 AM    Protein, total 5.4 (L) 11/16/2021 12:42 AM    Albumin 2.3 (L) 11/16/2021 12:42 AM    Globulin 3.1 11/16/2021 12:42 AM     Lab Results   Component Value Date/Time    Sed rate, automated 4 11/16/2017 03:56 PM    C-Reactive protein <0.29 11/16/2017 03:56 PM    TSH 3.36 11/12/2020 05:28 AM    Lipase 17 (L) 11/15/2021 01:07 PM     No results found for: IRON, FE, TIBC, IBCT, PSAT, FERR    Lab Results   Component Value Date/Time    INR 1.0 09/23/2010 03:05 AM    aPTT 28.0 09/23/2010 03:05 AM     Lab Results   Component Value Date/Time    CEA 4.9 11/13/2020 05:29 AM    Carbohydrate Antigen 19-9, (CA 19-9) 6,583 (H) 10/04/2021 08:16 AM     11/15/21 CT A/P W cont  IMPRESSION  1. Large volume ascites, new. No definite change in small submental soft tissue  densities left mid and upper abdomen. 2. Possible slight increase in ill-defined pancreatic body mass. Slight  worsening of focal narrowing/short segment occlusion at the junction of the  superior mesenteric vein and portal vein. 3. Slightly nodular border of the liver with no discrete liver mass. 4. Irregular wall thickening of the sigmoid colon/rectosigmoid junction may be  due to a chronic infectious/inflammatory process, with small fluid collection in  the wall the sigmoid colon slightly larger than prior study. Neoplastic etiology  cannot be excluded. Assessment/Recommendations:   63 y.o. male with metastatic pancreatic cancer on palliative chemotherapy admitted with abd pain. 1. Pancreatic adenocarcinoma:   Stage IV, CANDIE, Germline and somatic genetic testing negative. Diagnosed 11/18/2020, has Stage IV disease with peritoneal carcinomatosis and retroperitoneal LAD, encasement of vasculature seen on CT. CA 19-9 5607 at diagnosis. His disease is not curable, but is treatable.d Was started on  palliative chemotherapy with FOLFIRINOX regimen. CT 7/24/21 showed mild disease progression supported by rising CA 19-9 due to pt not coming for treatment at 2 week intervals. Since July 2021, pt has only come in for treatment on 4 separate occasions out of 9 planned treatments (last received treatment 10/4/21). He has evidence of continued disease progression and CA 19-9 has doubled in recent months. I discussed with patient that he would best be served by hospice to provide additional support at home with palliative management of his symptoms. -- Recommend palliative medicine evaluation and ultimately hospice    2.  Ascites: 2/2/ to pancreatic cancer and disease progression. I discussed this is likely to recur and recommend Aspira cath placement  -- Paracentesis with aspira catheter tomorrow morning, NPO after MN    3. Chronic Neoplasm Pain:   On Oxycodone 5mg prn at home, but currently with increased pain due to ascites and possibly due to increased size of pancreas mass. -- Palliative care consulted. Appreciate assistance. 4. Normocytic anemia / Hypoalbuminemia:  Due to underlying malignancy. 5. Goals of care:   Disease is treatable to improve quality and duration of life, but no cure. However, pt has not attended treatments, has progression of disease and symptoms. He would best be served by transitioning to comfort care. Pt does not want CPR or intubation; agrees to DNR. I have personally seen and evaluated the patient in conjunction with Jocelyne Paulino NP. I find the patient's history and physical exam are consistent with the NP's documentation. I agree with the above assessment and plan, which I have edited if needed.      Signed By: David Ortega MD

## 2021-11-16 NOTE — PROGRESS NOTES
Bedside and Verbal shift change report given to Enzo Ma (oncoming nurse) by Marii Simmons (offgoing nurse). Report included the following information SBAR, OR Summary, Procedure Summary and Intake/Output.

## 2021-11-16 NOTE — PROGRESS NOTES
[facility]    Medical Progress Note      NAME: Sanju Torres   :  1960  MRM:  [id number]    Date/Time of service 2021  7:12 AM          Assessment and Plan:     Pancreatic cancer / Peritoneal carcinomatosis - POA, end stage disease. CT worsening despite palliative chemo. He is deteriorating. Further treatment likely to accelerate and worsen his condition. Hospice disposition is the only reasonable option. Consulted hospice and palliative care. Acute abdominal pain / Ascites, malignant / Cancer related pain - Palliative consult. IR consulted for therapeutic tap. Stop IVF. Continue prn PO Norco and IV prn dilaudid for now. No indication of infection, no role for antibiotics. Severe protein-calorie malnutrition / Cachexia / hypoalbuminemia - Diet as tolerated for comfort. There is no expectation of weight gain. Anemia - Due to cancer and chemo. Monitor PRN       Subjective:     Chief Complaint:  Pain better    ROS:  (bold if positive, if negative)    Abd PainTolerating some PT  Tolerating some Diet        Objective:     Last 24hrs VS reviewed since prior progress note.  Most recent are:    Visit Vitals  BP 95/69 (BP 1 Location: Left lower arm, BP Patient Position: Lying right side)   Pulse 68   Temp 98.5 °F (36.9 °C)   Resp 16   Ht 5' 10\" (1.778 m)   Wt 52.9 kg (116 lb 10 oz)   SpO2 100%   BMI 16.73 kg/m²     SpO2 Readings from Last 6 Encounters:   21 100%   10/04/21 100%   10/04/21 100%   21 99%   21 96%   21 96%            Intake/Output Summary (Last 24 hours) at 2021 4077  Last data filed at 11/15/2021 1901  Gross per 24 hour   Intake 1100 ml   Output    Net 1100 ml        Physical Exam:    Gen:  Cachectic ill appearing, in no acute distress  HEENT:  Pink conjunctivae, PERRL, hearing intact to voice, dry mucous membranes  Neck:  Supple, without masses, thyroid non-tender  Resp:  No accessory muscle use, clear breath sounds without wheezes rales or rhonchi  Card:  No murmurs, normal S1, S2 without thrills, bruits or peripheral edema  Abd:  Soft, non-tender, non-distended, normoactive bowel sounds are present, no mass  Lymph:  No cervical or inguinal adenopathy  Musc:  No cyanosis or clubbing  Skin:  No rashes or ulcers, skin turgor is reduced  Neuro:  Cranial nerves are grossly intact, general motor weakness, follows commands   Psych: Moderate insight, oriented to person, place and time, alert    Telemetry reviewed:   normal sinus rhythm  __________________________________________________________________  Medications Reviewed: (see below)  Medications:     Current Facility-Administered Medications   Medication Dose Route Frequency    HYDROmorphone (DILAUDID) injection 1 mg  1 mg IntraVENous Q4H PRN    sodium chloride (NS) flush 5-40 mL  5-40 mL IntraVENous Q8H    sodium chloride (NS) flush 5-40 mL  5-40 mL IntraVENous PRN    acetaminophen (TYLENOL) tablet 650 mg  650 mg Oral Q6H PRN    Or    acetaminophen (TYLENOL) suppository 650 mg  650 mg Rectal Q6H PRN    polyethylene glycol (MIRALAX) packet 17 g  17 g Oral DAILY PRN    senna (SENOKOT) tablet 8.6 mg  1 Tablet Oral DAILY PRN    enoxaparin (LOVENOX) injection 40 mg  40 mg SubCUTAneous DAILY    ondansetron (ZOFRAN) injection 4 mg  4 mg IntraVENous Q6H PRN    0.9% sodium chloride with KCl 40 mEq/L infusion   IntraVENous CONTINUOUS        Lab Data Reviewed: (see below)  Lab Review:     Recent Labs     11/15/21  1307   WBC 5.8   HGB 9.5*   HCT 28.6*        Recent Labs     11/16/21  0042 11/15/21  1307    137   K 3.9 3.5    105   CO2 29 26   GLU 98 125*   BUN 10 11   CREA 0.77 0.83   CA 7.9* 8.5   ALB 2.3* 2.6*   TBILI 0.3 0.3   * 48     No results found for: GLUCPOC  No results for input(s): PH, PCO2, PO2, HCO3, FIO2 in the last 72 hours. No results for input(s): INR, INREXT in the last 72 hours.   All Micro Results     None          Other pertinent lab: none    Total time spent with patient: 30 Minutes I personally reviewed chart, notes, data and current medications in the medical record. I have personally examined and treated the patient at bedside during this period.                  Care Plan discussed with: Patient, Care Manager, Nursing Staff, Consultant/Specialist and >50% of time spent in counseling and coordination of care    Discussed:  Care Plan and D/C Planning    Prophylaxis:  H2B/PPI    Disposition:  Home w/Family           ___________________________________________________    Attending Physician: Danica Jimenez MD

## 2021-11-16 NOTE — PROGRESS NOTES
OCCUPATIONAL THERAPY EVALUATION/DISCHARGE  Patient: Tiara Levine (74 y.o. male)  Date: 11/16/2021  Primary Diagnosis: Acute abdominal pain [R10.9]       Precautions:  Fall, Contact    ASSESSMENT  Based on the objective data described below, the patient presents with good safety awareness, no LOB and at an overall independent level with ADLs and functional mobility. Pt with pancreatic CA and admitted with acute abdominal pain. Educated pt on energy conservation and work simplification strategies and he verbalized good understanding. No further skilled OT required at this time. Current Level of Function (ADLs/self-care): independent    Functional Outcome Measure: The patient scored 100/100 on the Barthel Index outcome measure. Other factors to consider for discharge: see above     PLAN :  Recommend with staff: up ad shahab- Pt requesting to be able to walk around and go outside on patio. RN received MD order for this    Recommendation for discharge: (in order for the patient to meet his/her long term goals)  No skilled occupational therapy/ follow up rehabilitation needs identified at this time. This discharge recommendation:  Has not yet been discussed the attending provider and/or case management    IF patient discharges home will need the following DME: none       SUBJECTIVE:   Patient stated I am ok. I just want to move around and go outside. 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   Does not drive  Expanded or extensive additional review of patient history:   [facility] Environment: Private residence  # Steps to Enter: 4  Rails to Enter: Yes  Hand Rails : Left  Wheelchair Ramp: No  One/Two Story Residence: One story  Living Alone: Yes (daughter is occasionally there)  Patient Expects to be Discharged to[de-identified] House  Current DME Used/Available at Home: None  Tub or Shower Type: Tub/Shower combination    Hand dominance: Right    EXAMINATION OF PERFORMANCE DEFICITS:  Cognitive/Behavioral Status:  Neurologic State: Alert; Appropriate for age  Orientation Level: Oriented X4  Cognition: Appropriate decision making; Appropriate for age attention/concentration; Appropriate safety awareness; Follows commands  Perception: Appears intact  Perseveration: No perseveration noted  Safety/Judgement: Awareness of environment; Fall prevention; Good awareness of safety precautions; Home safety; Insight into deficits    Hearing: Auditory  Auditory Impairment: Hard of hearing, bilateral    Vision/Perceptual:    Acuity: Within Defined Limits         Range of Motion:  AROM: Within functional limits  PROM: Within functional limits                      Strength:  Strength: Within functional limits                Coordination:  Coordination: Within functional limits  Fine Motor Skills-Upper: Left Intact; Right Intact    Gross Motor Skills-Upper: Left Intact;  Right Intact    Tone & Sensation:  Tone: Normal                         Balance:  Sitting: Intact  Standing: Intact    Functional Mobility and Transfers for ADLs:  Bed Mobility:  Rolling: Independent  Supine to Sit: Independent  Sit to Supine: Independent  Scooting: Independent    Transfers:  Sit to Stand: Independent  Stand to Sit: Independent  Bed to Chair: Independent  Bathroom Mobility: Independent  Toilet Transfer : Independent    ADL Assessment:  Feeding: Independent    Oral Facial Hygiene/Grooming: Independent    Bathing: Independent    Upper Body Dressing: Independent    Lower Body Dressing: Independent    Toileting: Independent                ADL Intervention and task modifications:  Patient instructed and indicated understanding energy conservation techniques to increase independence and safety during ADLs. Cognitive Retraining  Safety/Judgement: Awareness of environment; Fall prevention; Good awareness of safety precautions; Home safety; Insight into deficits       Functional Measure:    Barthel Index:  Bathin  Bladder: 10  Bowels: 10  Groomin  Dressing: 10  Feeding: 10  Mobility: 15  Stairs: 10  Toilet Use: 10  Transfer (Bed to Chair and Back): 15  Total: 100/100      The Barthel ADL Index: Guidelines  1. The index should be used as a record of what a patient does, not as a record of what a patient could do. 2. The main aim is to establish degree of independence from any help, physical or verbal, however minor and for whatever reason. 3. The need for supervision renders the patient not independent. 4. A patient's performance should be established using the best available evidence. Asking the patient, friends/relatives and nurses are the usual sources, but direct observation and common sense are also important. However direct testing is not needed. 5. Usually the patient's performance over the preceding 24-48 hours is important, but occasionally longer periods will be relevant. 6. Middle categories imply that the patient supplies over 50 per cent of the effort. 7. Use of aids to be independent is allowed. Score Interpretation (from [address] [facility] [address])    Independent   60-79 Minimally independent   40-59 Partially dependent   20-39 Very dependent   <20 Totally dependent     -Thai Gilbert., Barthel, D.W. (1965). Functional evaluation: the Barthel Index. [address] [facility] ([address] (). The Barthel activities of daily living index: self-reporting versus actual performance in the old (> or = 75 years).  Journal of [address] [place] [address] 45(7), 14 [facility], JEN, Hardy Epley, CANDE (1999). Measuring the change in disability after inpatient rehabilitation; comparison of the responsiveness of the Barthel Index and Functional Bates Measure. Journal of Neurology, Neurosurgery, and Psychiatry, 66(4), 327-652. CHCUK Jennings, JOSE DAVID Trujillo, & Ksenia Pike M.A. (2004) Assessment of post-stroke quality of life in cost-effectiveness studies: The usefulness of the Barthel Index and the EuroQoL-5D. Quality of Life Research, 15, 993-41       Occupational Therapy Evaluation Charge Determination   History Examination Decision-Making   LOW Complexity : Brief history review  LOW Complexity : 1-3 performance deficits relating to physical, cognitive , or psychosocial skils that result in activity limitations and / or participation restrictions  LOW Complexity : No comorbidities that affect functional and no verbal or physical assistance needed to complete eval tasks       Based on the above components, the patient evaluation is determined to be of the following complexity level: LOW   Pain Ratin/10    Activity Tolerance:   Fair and requires rest breaks    After treatment patient left in no apparent distress:    Supine in bed and Call bell within reach    COMMUNICATION/EDUCATION:   The patients plan of care was discussed with: Registered nurse.      Thank you for this referral.  Janae Diaz OT  Time Calculation: 12 mins

## 2021-11-16 NOTE — ACP (ADVANCE CARE PLANNING)
[address] Adult  Hospitalist Group                                      Advance Care Planning Note    Name: Smooth Hernandez  YOB: 1960  MRN: [id number]  Admission Date: 11/15/2021 12:47 PM    Date of discussion: 11/16/2021    Active Diagnoses:    Hospital Problems  Date Reviewed: 11/11/2020           Severe protein-calorie malnutrition ([address]   Cancer related pain   Anemia   Ascites, malignant   * (Principal) Pancreatic cancer ([address]   Peritoneal carcinomatosis ([address]   Acute abdominal pain          These active diagnoses are of sufficient risk that focused discussion on advance care planning is indicated in order to allow the patient to thoughtfully consider personal goals of care, and if situations arise that prevent the ability to personally give input, to ensure appropriate representation of their personal desires for different levels and aggressiveness of care. Discussion:     Persons present and participating in discussion: Smooth Hernandez, Stanislaw Waite MD,     Discussion: Discussed pancreatic cancer prognosis in setting of cachexia and treatment failure. He now states he desires to be DNR. He is ready to enroll in hospice. He is concerned about living with his daughter to not burden her. I will consult hospice to arrange disposition. Time Spent:     Total time spent face-to-face in education and discussion: 17 minutes.      Stanislaw Waite MD  Date of Service:  11/16/2021  12:06 PM

## 2021-11-16 NOTE — CONSULTS
Comprehensive Nutrition Assessment    Type and Reason for Visit: Initial, Consult    Nutrition Recommendations/Plan:   1. Advance diet to regular as tolerated   2. Provide Ensure Enlive TID to increase kcal/protein intake    Nutrition Assessment:    Pt is a 63year old male admitted with Acute abdominal pain [R10.9]. He  has a past medical history of Gout, Pancreatic cancer ([address] Psoriasis, and Tobacco abuse. He also has no past medical history of Adverse effect of anesthesia, Difficult intubation, Malignant hyperthermia due to anesthesia, Nausea & vomiting, or Pseudocholinesterase deficiency. RD consulted for general nutrition management. Noted hospice consult. Patient with pancreatic cancer metastasized to intra-abdominal lymph node. Per documentation, patient has lost 13# (10%) x 6 months;  Patient reports # x 2 months, but weighed 135# at beginning of the calendar year. This is a 19# (14%) loss x 11 months, not clinically significant for timeframe. Patient reports decreased PO intake x ~ 6 months, estimated consuming 50-75% of usual intake. Reports every other day intake of Boost or Ensure, voiced acceptance of Ensure at mealtimes during this admission. Current c/o tightness in abdomen area. No chewing/swallowing problems. No N/V/D at this time. Noted pt to begin receiving creon at mealtimes starting this evening.     Patient Vitals for the past 168 hrs:   % Diet Eaten   11/16/21 1257 26 - 50%       Wt Readings from Last 20 Encounters:   11/15/21 52.9 kg (116 lb 10 oz)   10/04/21 52.9 kg (116 lb 9.6 oz)   09/07/21 52.7 kg (116 lb 1.6 oz)   08/23/21 53.8 kg (118 lb 8 oz)   07/12/21 56.3 kg (124 lb 1.6 oz)   06/21/21 57.6 kg (126 lb 15.8 oz)   05/24/21 58.9 kg (129 lb 14.4 oz)   04/26/21 59.6 kg (131 lb 6.3 oz)   03/23/21 60.8 kg (134 lb 1.6 oz)       Malnutrition Assessment:  Malnutrition Status:  Severe malnutrition    Context:  Chronic illness     Findings of the 6 clinical characteristics of malnutrition:   Energy Intake:  7 - 75% or less est energy requirements for 1 month or longer  Weight Loss:  7.0 - Greater than 7.5% over 3 months     Body Fat Loss:  1 - Mild body fat loss, Buccal region, Orbital   Muscle Mass Loss:  7 - Severe muscle mass loss, Calf (gastrocnemius), Temples (temporalis)  Fluid Accumulation:  No significant fluid accumulation,     Strength:  Not performed     Estimated Daily Nutrient Needs:  Energy (kcal): 1531-5546 (30-35, cx); Weight Used for Energy Requirements: Current  Protein (g): 64-80 (1.2-1.5); Weight Used for Protein Requirements: Current  Fluid (ml/day): 4039-8016; Method Used for Fluid Requirements: 1 ml/kcal    Nutrition Related Findings:    ABD: WNL 11/16  Last BM: PTA - patient unsure  Edema: None noted 11/16    Nutr. Labs:  Lab Results   Component Value Date/Time    GFR est AA >60 11/16/2021 12:42 AM    GFR est non-AA >60 11/16/2021 12:42 AM    Creatinine 0.77 11/16/2021 12:42 AM    BUN 10 11/16/2021 12:42 AM    Sodium 139 11/16/2021 12:42 AM    Potassium 3.9 11/16/2021 12:42 AM    Chloride 107 11/16/2021 12:42 AM    CO2 29 11/16/2021 12:42 AM     Lab Results   Component Value Date/Time    Glucose 98 11/16/2021 12:42 AM     Lab Results   Component Value Date/Time    Hemoglobin A1c 5.7 (H) 11/12/2020 05:28 AM       Nutr. Meds:  Creon  Miralax  Senna    Wounds:    None       Current Nutrition Therapies:  DIET NPO  ADULT DIET Full Liquid    Anthropometric Measures:  · Height:  5' 10\" (177.8 cm)  · Current Body Wt:  52.9 kg (116 lb 10 oz)   · Admission Body Wt:       · Usual Body Wt:  61.2 kg (135 lb)     · Ideal Body Wt:  166 lbs:  70.3 %   Body mass index is 16.73 kg/m².   · BMI Category:  Underweight (BMI less than 22) age over 72       Nutrition Diagnosis:   · In context of chronic illness, Severe malnutrition related to inadequate protein-energy intake, catabolic illness as evidenced by poor intake prior to admission, BMI, intake 26-50%    Nutrition Interventions:   Food and/or Nutrient Delivery: Continue current diet, Start oral nutrition supplement  Nutrition Education and Counseling: No recommendations at this time  Coordination of Nutrition Care: Continue to monitor while inpatient, Interdisciplinary rounds    Goals:  PO intake >/= 80% estimated protein needs within 2 - 3 days       Nutrition Monitoring and Evaluation:   Behavioral-Environmental Outcomes: None identified  Food/Nutrient Intake Outcomes: Food and nutrient intake, Supplement intake  Physical Signs/Symptoms Outcomes: Biochemical data, Weight, Skin    Discharge Planning:    Continue oral nutrition supplement     Electronically signed by Issac oRy RD on 21/63/8318  Contact: [phone] or via [employer]

## 2021-11-16 NOTE — PROGRESS NOTES
TRANSFER - IN REPORT:    Verbal report received from [address] RN(name) on Natanael Pair  being received from ER(unit) for routine progression of care      Report consisted of patients Situation, Background, Assessment and   Recommendations(SBAR). Information from the following report(s) SBAR, Kardex, ED Summary, Intake/Output, MAR, Recent Results and Med Rec Status was reviewed with the receiving nurse. Opportunity for questions and clarification was provided. Assessment completed upon patients arrival to unit and care assumed.             11/15/21 2203   Vital Signs   Temp 97.4 °F (36.3 °C)   Temp Source Oral   Pulse (Heart Rate) 86   Heart Rate Source Monitor   Resp Rate 16   O2 Sat (%) 100 %   Level of Consciousness Alert (0)   BP (!) 71/53  (RN notified)   MAP (Calculated) (!) 59   BP 1 Method Automatic   BP 1 Location Right upper arm   BP Patient Position Lying left side   MEWS Score 3   Patient Observation   Ambulate No (Comment)   Activity In bed; Resting quietly     :  Paged on call Lisa Carrington MD   Provided patient name, , room number, brief SBAR and current patient condition  Orders received:  1L bolus LR to be given now     2300: bolus administered        21 0003   Vital Signs   Temp 97.6 °F (36.4 °C)   Pulse (Heart Rate) 68   Resp Rate 16   O2 Sat (%) 100 %   Level of Consciousness Alert (0)   BP (!) 78/54   MAP (Calculated) (!) 62   MEWS Score 3     0010:  Paged MD Lisa Carrington and provided updates   Orders received:   Midodrine PO 10mg   Albumin 25% dose 25g IV     0111:  /74

## 2021-11-16 NOTE — ROUTINE PROCESS
Spoke with patients nurse, Herb, regarding order for tunneled Aspira drainage catheter. NPO order in starting at [email or web address]. Patient needs to remain off any blood thinners until after procedure tomorrow.

## 2021-11-16 NOTE — PROGRESS NOTES
PHYSICAL THERAPY EVALUATION/DISCHARGE  Patient: Laura Lo (65 y.o. male)  Date: 11/16/2021  Primary Diagnosis: Acute abdominal pain [R10.9]       Precautions:   Fall, Contact      ASSESSMENT  Based on the objective data described below, the patient presents with decreased activity tolerance following admission for abdominal pain related to pancreatic cancer. Patient is independent with all upright mobility, no loss of balance or instability noted. Patient is at his baseline for mobility and no skilled needs. .    Functional Outcome Measure: The patient scored 28/28 on the tinetti outcome measure. Other factors to consider for discharge: lives alone     Further skilled acute physical therapy is not indicated at this time. PLAN :  Recommendation for discharge: (in order for the patient to meet his/her long term goals)  No skilled physical therapy/ follow up rehabilitation needs identified at this time. This discharge recommendation:  Has been made in collaboration with the attending provider and/or case management    IF patient discharges home will need the following DME: to be determined (TBD)       SUBJECTIVE:   Patient stated [place] I have something to eat, I have not eaten since Sunday.   Nursing notified and kitchen called    OBJECTIVE DATA SUMMARY:   HISTORY:    Past Medical History:   Diagnosis Date    Gout     [facility]     Psoriasis     Tobacco abuse      Past Surgical History:   Procedure Laterality Date    HX ORTHOPAEDIC      femur repair    HX ORTHOPAEDIC      lumbar compressed fracture    IR INSERT TUNL CVC W PORT OVER 5 YEARS  11/24/2020       Prior level of function: see above  Personal factors and/or comorbidities impacting plan of care: see below    [address]: Private residence  # Steps to Enter: 4  Rails to Enter: Yes  Hand Rails : Left  Wheelchair Ramp: No  One/Two Story Residence: One story  Living Alone: Yes (daughter is occasionally there)  Patient Expects to be Discharged to[de-identified] House  Current DME Used/Available at Home: None    EXAMINATION/PRESENTATION/DECISION MAKING:   Critical Behavior:              Hearing: Auditory  Auditory Impairment: Hard of hearing, bilateral    Range Of Motion:  AROM: Within functional limits           PROM: Within functional limits           Strength:    Strength: Within functional limits                    Tone & Sensation:   Tone: Normal                              Coordination:  Coordination: Within functional limits  Vision:      Functional Mobility:  Bed Mobility:  Rolling: Independent  Supine to Sit: Independent  Sit to Supine: Independent     Transfers:  Sit to Stand: Independent  Stand to Sit: Independent        Bed to Chair: Independent              Balance:      Ambulation/Gait Training:  Distance (ft): 250 Feet (ft)  Assistive Device: Gait belt  Ambulation - Level of Assistance: Independent     Gait Description (WDL): Exceptions to WDL                                 Functional Measure:  Tinetti test:    Sitting Balance: 1  Arises: 2  Attempts to Rise: 2  Immediate Standing Balance: 2  Standing Balance: 2  Nudged: 2  Eyes Closed: 1  Turn 360 Degrees - Continuous/Discontinuous: 1  Turn 360 Degrees - Steady/Unsteady: 1  Sitting Down: 2  Balance Score: 16 Balance total score  Indication of Gait: 1  R Step Length/Height: 1  L Step Length/Height: 1  R Foot Clearance: 1  L Foot Clearance: 1  Step Symmetry: 1  Step Continuity: 1  Path: 2  Trunk: 2  Walking Time: 1  Gait Score: 12 Gait total score  Total Score: 28/28 Overall total score         Tinetti Tool Score Risk of Falls  <19 = High Fall Risk  19-24 = Moderate Fall Risk  25-28 = Low Fall Risk  Tinetti ME. Performance-Oriented Assessment of Mobility Problems in Elderly Patients. [address]; [id number].  (Scoring Description: PT Bulletin Feb. 10, 1993)    Older adults: Heyward Goodell et al, 2009; n = [email or web address] elderly evaluated with ABC, RAJ, ADL, and IADL)  · Mean RAJ score for males aged 69-68 years = 26.21(3.40)  · Mean RAJ score for females age 69-68 years = 25.16(4.30)  · Mean RAJ score for males over 80 years = 23.29(6.02)  · Mean RAJ score for females over [de-identified] years = 17.20(8.32)          Physical Therapy Evaluation Charge Determination   History Examination Presentation Decision-Making   MEDIUM  Complexity : 1-2 comorbidities / personal factors will impact the outcome/ POC  LOW Complexity : 1-2 Standardized tests and measures addressing body structure, function, activity limitation and / or participation in recreation  LOW Complexity : Stable, uncomplicated  Other outcome measures tinetti  LOW       Based on the above components, the patient evaluation is determined to be of the following complexity level: LOW     Pain Rating:  None noted    Activity Tolerance:   Fair      After treatment patient left in no apparent distress:   Supine in bed, Call bell within reach and Bed / chair alarm activated    COMMUNICATION/EDUCATION:   The patients plan of care was discussed with: Registered nurse. Fall prevention education was provided and the patient/caregiver indicated understanding., Patient/family have participated as able in goal setting and plan of care. and Patient/family agree to work toward stated goals and plan of care.     Thank you for this referral.  Eduard Del Valle, PT, DPT   Time Calculation: 17 mins

## 2021-11-16 NOTE — CONSULTS
Palliative Medicine Consult  [place]: [phone]    Patient Name: Natanael Hussein  YOB: 1960    Date of Initial Consult: 11/16/2021  Reason for Consult: Overwhelming symptoms  Requesting Provider: Dr. Eliot Moran   Primary Care Physician: None     SUMMARY:   Natanael Hussein is a 63 y.o. with a past history of Stage IV Pancreatic cancer (dx 11/2020) w/ peritoneal carcinomatosis, +tobacco use, , who was admitted on 11/15/2021 from home  with a diagnosis of Severe protein calorie malnutrition and Malignant large voulme ascites 2/2 pancreatic cancer. Patient presented to ER w/ cc of abdominal pain x 4 days as well as decreased intake, weight loss and fatigue    Current medical issues leading to Palliative Medicine involvement include: care decisions in setting of stage IV metastatic cancer. PALLIATIVE DIAGNOSES:   1. Palliative care encounter  2. Concern about end of life  3. Pain  4. Hypoalbuminemia  5. Poor nutrition         PLAN:   1. Prior to visit chart reviewed. I also discussed with Oncologist Dr. Natalie Siddiqi, unit CM Lawrence Fletcher and patients nurse Aurea Parsons. 2. Patient was seen, no family at bedside. He was in bed, awake, alert and oriented. Introduced myself and role of palliative medicine. 3. Patient with good insight regarding hospitalization and ES Disease. 4. Pain- Patient reported having severe uncontrolled abdominal pain 2/2 pancreatic cancer and malignant ascites. He stated that \"people: keep coming in, trying to talk with him, but he cant concentrate when pain is so severe. Patient  rated pain 10/10, see palliative esas below. · Reviewed  current pain  tx plan with patient, including: Oxycodone ER 10mg twice daily (this is his home regimen) and Dilaudid 1mg IV every 4 hours as needed, severe pain (given x 1 since admission). · Patient reported receiving  Dilaudid last night and that it was very effective, denied side effects.  Mr. Jorge Guerrero had not realized he  orders for PRN Dilaudid, was quite relieved to learn this, felt hopeful. · I encouraged patient not to wait until he has severe pain, before asking for PRN. ·  I Asked patients nurse fabiola  to administer PRN IV Dilaudid     · IR planning aspira drain placement tomorrow 11/17, which will likely provide some relief. · No adjustments needed today  5. Severe hypoalbuminemia- Albumin 2.3 in setting of stage IV cancer, uncontrolled pain, poor intake. Intake has worsened s/p development of ascites. 6.  GOC Discussion- Patient verbalized understanding that Hospice recommended, however, he requested that we continue discussion tomorrow, once pain level more tolerable. · Spoke with unit CM Reji Jimenez, she stated that referral to Hospice has already been sent. 7. Psychosocial assessment deferred until follow up d/t uncontrolled severe pain. 8. Palliative team will continue to follow. Please contact me via perfect serve with any urgent needs, questions or concerns. 9. Initial consult note routed to primary continuity provider and/or primary health care team members  10.  Communicated plan of care with: Palliative IDT, [facility] Team, unit CM, RN fabiola     GOALS OF CARE / TREATMENT PREFERENCES:     GOALS OF CARE: Adequate pain control, otherwise Goals not defined today       TREATMENT PREFERENCES:   Code Status: DNR    Patient and family's personal goals include: Personal goal id tolerable pain level    Important upcoming milestones or family events: did not discuss today    The patient identifies the following as important for living well: did not discuss today      Advance Care Planning:  [x] [facility] Interdisciplinary Team has updated the ACP Navigator with Chris 8 and Patient Capacity      Primary Decision Maker: Yu De León - Daughter - [phone]  Advance Care Planning 10/4/2021   Patient's Healthcare Decision Maker is: Legal Next of sheldon 296 Directive None   Patient Would Like to Complete Advance Directive -   Does the patient have other document types -       Medical Interventions: Limited additional interventions       Other:    As far as possible, the palliative care team has discussed with patient / health care proxy about goals of care / treatment preferences for patient. HISTORY:     History obtained from: medical records/CM/nurse/patient    CHIEF COMPLAINT: abdominal pain    HPI/SUBJECTIVE:    The patient is:   [x] Verbal and participatory  [] Non-participatory due to:   Patient reported worsening abdominal pain and distention, that he has been unable to eat, feels very full and abdomen feels like its \"going to explode\".  He acknowledges significant weight loss, fatigue    Clinical Pain Assessment (nonverbal scale for severity on nonverbal patients):   Clinical Pain Assessment  Severity: 10  Location: accross upper middle abdomen  Character: fells like its going to explode  Duration: has chronic abdominal pain x year, this pain over past week  Effect: cant concentrate, cant eat much, can not get comfortable  Factors: dilaudid helped  Frequency: constant          Duration: for how long has pt been experiencing pain (e.g., 2 days, 1 month, years)  Frequency: how often pain is an issue (e.g., several times per day, once every few days, constant)     FUNCTIONAL ASSESSMENT:     Palliative Performance Scale (PPS):  PPS: 40       PSYCHOSOCIAL/SPIRITUAL SCREENING:     Palliative IDT has assessed this patient for cultural preferences / practices and a referral made as appropriate to needs (Cultural Services, Patient Advocacy, Ethics, etc.)    Any spiritual / Jew concerns:  [] Yes /  [x] No    Caregiver Burnout:  [] Yes /  [] No /  [x] No Caregiver Present      Anticipatory grief assessment:   [x] Normal  / [] Maladaptive       ESAS Anxiety: Anxiety: 0    ESAS Depression:          REVIEW OF SYSTEMS:     Positive and pertinent negative findings in ROS are noted above in HPI.  The following systems were [x] reviewed / [] unable to be reviewed as noted in HPI  Other findings are noted below. Systems: constitutional, ears/nose/mouth/throat, respiratory, gastrointestinal, genitourinary, musculoskeletal, integumentary, neurologic, psychiatric, endocrine. Positive findings noted below. Modified ESAS Completed by: provider   Fatigue: 7 Drowsiness: 0     Pain: 10   Anxiety: 0 Nausea: 0   Anorexia: 8 Dyspnea: 0     Constipation: No              PHYSICAL EXAM:     From RN flowsheet:  Wt Readings from Last 3 Encounters:   11/15/21 116 lb 10 oz (52.9 kg)   10/04/21 116 lb 9.6 oz (52.9 kg)   10/04/21 116 lb 10 oz (52.9 kg)     Blood pressure 90/66, pulse 66, temperature 97.8 °F (36.6 °C), resp. rate 16, height 5' 10\" (1.778 m), weight 116 lb 10 oz (52.9 kg), SpO2 93 %.     Pain Scale 1: Numeric (0 - 10)  Pain Intensity 1: 8     Pain Location 1: Abdomen  Pain Orientation 1: Right  Pain Description 1: Constant  Pain Intervention(s) 1: Medication (see MAR)  Last bowel movement, if known: 11/16 loose    Constitutional: appears stated age, thin, weak, fatigued and uncofortable  Eyes: pupils equal, anicteric  ENMT: no nasal discharge, moist mucous membranes  Cardiovascular: regular rhythm, distal pulses intact  Respiratory: breathing not labored, symmetric  Gastrointestinal: small round/distended, very firm, + pain  Musculoskeletal: cachectic, no deformity, no tenderness to palpation  Skin: warm, dry  Neurologic: weak, oriented x3,  following commands, moving all extremities  Psychiatric: full affect, no hallucinations  Other:       HISTORY:     Principal Problem:    Pancreatic cancer (HCC) ()    Active Problems:    Abdominal lymphadenopathy (11/11/2020)      Acute abdominal pain (11/11/2020)      Gout ()      Peritoneal carcinomatosis ([facility] [address] (11/23/2020)      Overlapping malignant neoplasm of pancreas ([facility] [address] (11/9/2021)      Severe protein-calorie malnutrition ([address] (11/15/2021)      Cancer related pain (11/15/2021)      Anemia (11/15/2021)      Ascites, malignant (11/15/2021)      Past Medical History:   Diagnosis Date    Gout     Pancreatic cancer ([address]     Psoriasis     Tobacco abuse       Past Surgical History:   Procedure Laterality Date    HX ORTHOPAEDIC      femur repair    HX ORTHOPAEDIC      lumbar compressed fracture    IR INSERT TUNL CVC W PORT OVER 5 YEARS  11/24/2020      Family History   Problem Relation Age of Onset    Pancreatic Cancer Brother       History reviewed, no pertinent family history.   Social History     Tobacco Use    Smoking status: Current Every Day Smoker     Packs/day: 1.00    Smokeless tobacco: Never Used    Tobacco comment: last week was last cigarette   Substance Use Topics    Alcohol use: Yes     No Known Allergies   Current Facility-Administered Medications   Medication Dose Route Frequency    oxyCODONE ER (OxyCONTIN) tablet 10 mg  10 mg Oral Q12H    lipase-protease-amylase (CREON 24,000) capsule 1 Capsule  1 Capsule Oral PRN    lipase-protease-amylase (CREON 24,000) capsule 2 Capsule  2 Capsule Oral TID WITH MEALS    HYDROmorphone (DILAUDID) injection 1 mg  1 mg IntraVENous Q4H PRN    sodium chloride (NS) flush 5-40 mL  5-40 mL IntraVENous Q8H    sodium chloride (NS) flush 5-40 mL  5-40 mL IntraVENous PRN    acetaminophen (TYLENOL) tablet 650 mg  650 mg Oral Q6H PRN    polyethylene glycol (MIRALAX) packet 17 g  17 g Oral DAILY PRN    senna (SENOKOT) tablet 8.6 mg  1 Tablet Oral DAILY PRN    ondansetron (ZOFRAN) injection 4 mg  4 mg IntraVENous Q6H PRN          LAB AND IMAGING FINDINGS:     Lab Results   Component Value Date/Time    WBC 5.8 11/15/2021 01:07 PM    HGB 9.5 (L) 11/15/2021 01:07 PM    PLATELET 470 46/45/5099 01:07 PM     Lab Results   Component Value Date/Time    Sodium 139 11/16/2021 12:42 AM    Potassium 3.9 11/16/2021 12:42 AM    Chloride 107 11/16/2021 12:42 AM    CO2 29 11/16/2021 12:42 AM    BUN 10 11/16/2021 12:42 AM    Creatinine 0.77 11/16/2021 12:42 AM    Calcium 7.9 (L) 11/16/2021 12:42 AM    Magnesium 2.0 11/12/2020 05:28 AM    Phosphorus 3.4 11/12/2020 05:28 AM      Lab Results   Component Value Date/Time    Alk. phosphatase 300 (H) 11/16/2021 12:42 AM    Protein, total 5.4 (L) 11/16/2021 12:42 AM    Albumin 2.3 (L) 11/16/2021 12:42 AM    Globulin 3.1 11/16/2021 12:42 AM     Lab Results   Component Value Date/Time    INR 1.0 09/23/2010 03:05 AM    Prothrombin time 10.6 09/23/2010 03:05 AM    aPTT 28.0 09/23/2010 03:05 AM      No results found for: IRON, FE, TIBC, IBCT, PSAT, FERR   No results found for: PH, PCO2, PO2  No components found for: GLPOC   No results found for: CPK, CKMB             Total time: 50 min  Counseling / coordination time, spent as noted above: 35 min  > 50% counseling / coordination?: yes    Prolonged service was provided for  []30 min   []75 min in face to face time in the presence of the patient, spent as noted above. Time Start:   Time End:   Note: this can only be billed with 08030 (initial) or 26858 (follow up). If multiple start / stop times, list each separately.

## 2021-11-16 NOTE — HOSPICE
Hospice Liaison Note:     Noted that patient was having severe pain today and would like to continue further hospice discussions tomorrow. Liaison to follow up with patient tomorrow.

## 2021-11-17 NOTE — HOSPICE
[facility]  [phone]     Patient Name: Sharmin Pratt  YOB: 1960  Age: 63 y.o. [address] [facility] RN Note:  Chart reviewed. Per chart review, patient is planned for aspira drain placement today. Did not wish to speak w/ hospice yesterday, will attempt again today. Dispo may be an issue as he cannot return home alone. [facility] house may be an option    2:00 Spoke to patient over telephone as requested. Patient reports that he is still experiencing a significant amount of pain and feels his head is \"too foggy\" to talk about hospice right now. He was agreement w/ meeting w/ hospice liaison tomorrow AM at 10. If pain cannot be better controlled on oral medications may need to consider [facility] as bridge to home for pain control     Will continue to follow closely and assist as able   Thank you for the opportunity to be of service to this patient.

## 2021-11-17 NOTE — PROGRESS NOTES
1730 Missing adult code called for patient being off the floor . Educated to remain on unit .  No smoking policy

## 2021-11-17 NOTE — PROGRESS NOTES
[facility] [place] at [address], [address] [facility]  Andrew Bunk: [phone]  F: [phone]      Reason for Visit:   Santo Hunter is a 63 y.o. male who is seen for evaluation of severe pain; hx of pancreatic cancer. Hematology/Oncology Treatment History:     Diagnosis: Pancreatic, adenocarcinoma, moderately to poorly diiferentiated     Stage: IV [[id number]]     Pathology: 11/18/2020 pancreatic body, FNA; adenocarcinoma, moderately to poorly diiferentiated  [employer] testing: Negative for reportable alterations. MS-stable. KRAS G12D. Tumor mutational burden 1 muts/mb [id number] R183W. KDM6A Q611 TPS3 [id number]*73  [employer]: Germline testing negative for BRCA1/2 and 86 other genes.      Prior Treatment: None     Current Treatment:  FOLFIRINOX every 2 weeks, 11/30/20 - current. History of Present Illness:   Santo Hunter is a pleasant 63 y.o. male who was admitted on 11/15/21 for worsening abd pain and tightness. ED imaging showed large volume ascites and slight increase in size of pancreas mass, irregular thickening of the sigmoid colon/rectosigmoid junction. ED labs notable for Hgb 9.5, Ca 7.9, , , ALK phos 300. Pt states his abdomen feels full and firm, with increased and different nature of pain compared to his chronic pain from pancreas mass. He states he is unable to eat because of this. He has been having bowel movements, loose stools recently. He also has weight loss and fatigue. When we discussed his multiple missed appointments, he states that he does not want to burden his children any more with his transportation needs. He states that Medicaid transportation has been very problematic with the  going to the wrong address, etc. According to our staff, patient usually does not call us to update us of his transporation status. Rather, our team reaches out to him to ask about missed appt and he voices trouble with transportation.  He understands his cancer is incurable and is interested in comfort care, but he is unsure where he can live. His daughter is getting  and he does not want to burden her. He no longer has his own place given inability to pay bills - he is working every other week still, but this does not provide enough money for rent. Interval History:  Pt states that the pain medication adjustment has helped. He was able to sleep 3-4 hours at a time, which he was not previously able to do 2/2 pain. Pt is hopeful that the paracentesis will help even more. Current Facility-Administered Medications   Medication Dose Route Frequency    oxyCODONE ER (OxyCONTIN) tablet 10 mg  10 mg Oral Q12H    lipase-protease-amylase (CREON 24,000) capsule 1 Capsule  1 Capsule Oral PRN    lipase-protease-amylase (CREON 24,000) capsule 2 Capsule  2 Capsule Oral TID WITH MEALS    HYDROmorphone (DILAUDID) injection 1 mg  1 mg IntraVENous Q4H PRN    sodium chloride (NS) flush 5-40 mL  5-40 mL IntraVENous Q8H    sodium chloride (NS) flush 5-40 mL  5-40 mL IntraVENous PRN    acetaminophen (TYLENOL) tablet 650 mg  650 mg Oral Q6H PRN    polyethylene glycol (MIRALAX) packet 17 g  17 g Oral DAILY PRN    senna (SENOKOT) tablet 8.6 mg  1 Tablet Oral DAILY PRN    ondansetron (ZOFRAN) injection 4 mg  4 mg IntraVENous Q6H PRN       No Known Allergies       Review of Systems: A complete review of systems was obtained, reviewed. Pertinent findings reviewed above.     Physical Exam:     Visit Vitals  BP 99/64 (BP 1 Location: Right upper arm, BP Patient Position: At rest;Lying)   Pulse 96   Temp 97.5 °F (36.4 °C)   Resp 20   Ht 5' 10\" (1.778 m)   Wt 116 lb 10 oz (52.9 kg)   SpO2 99%   BMI 16.73 kg/m²     ECOG PS: 3  General: Cachetic, no acute distress  Eyes: anicteric sclerae  HENT: oropharynx clear  Neck: supple  Lymphatic: no cervical, supraclavicular adenopathy  Respiratory: normal respiratory effort  CV: no peripheral edema  GI: Firm abdomen, distended with ascites, moderately tender. Cannot appreciate HSM given ascites. MS: Normal gait and station. Digits without clubbing or cyanosis. Decreased muscle tone  Skin: no rashes; no ecchymoses; no petechiae      Results:     Lab Results   Component Value Date/Time    WBC 5.8 11/15/2021 01:07 PM    HGB 9.5 (L) 11/15/2021 01:07 PM    HCT 28.6 (L) 11/15/2021 01:07 PM    PLATELET 033 41/52/1382 01:07 PM    MCV 96.6 11/15/2021 01:07 PM    ABS. NEUTROPHILS 3.8 11/15/2021 01:07 PM     Lab Results   Component Value Date/Time    Sodium 139 11/16/2021 12:42 AM    Potassium 3.9 11/16/2021 12:42 AM    Chloride 107 11/16/2021 12:42 AM    CO2 29 11/16/2021 12:42 AM    Glucose 98 11/16/2021 12:42 AM    BUN 10 11/16/2021 12:42 AM    Creatinine 0.77 11/16/2021 12:42 AM    GFR est AA >60 11/16/2021 12:42 AM    GFR est non-AA >60 11/16/2021 12:42 AM    Calcium 7.9 (L) 11/16/2021 12:42 AM     Lab Results   Component Value Date/Time    Bilirubin, total 0.3 11/16/2021 12:42 AM    ALT (SGPT) 128 (H) 11/16/2021 12:42 AM    Alk. phosphatase 300 (H) 11/16/2021 12:42 AM    Protein, total 5.4 (L) 11/16/2021 12:42 AM    Albumin 2.3 (L) 11/16/2021 12:42 AM    Globulin 3.1 11/16/2021 12:42 AM     Lab Results   Component Value Date/Time    Sed rate, automated 4 11/16/2017 03:56 PM    C-Reactive protein <0.29 11/16/2017 03:56 PM    TSH 3.36 11/12/2020 05:28 AM    Lipase 17 (L) 11/15/2021 01:07 PM     No results found for: IRON, FE, TIBC, IBCT, PSAT, FERR    Lab Results   Component Value Date/Time    INR 1.0 09/23/2010 03:05 AM    aPTT 28.0 09/23/2010 03:05 AM     Lab Results   Component Value Date/Time    CEA 4.9 11/13/2020 05:29 AM    Carbohydrate Antigen 19-9, (CA 19-9) 6,583 (H) 10/04/2021 08:16 AM     11/15/21 CT A/P W cont  IMPRESSION  1. Large volume ascites, new. No definite change in small submental soft tissue  densities left mid and upper abdomen. 2. Possible slight increase in ill-defined pancreatic body mass.  Slight  worsening of focal narrowing/short segment occlusion at the junction of the  superior mesenteric vein and portal vein. 3. Slightly nodular border of the liver with no discrete liver mass. 4. Irregular wall thickening of the sigmoid colon/rectosigmoid junction may be  due to a chronic infectious/inflammatory process, with small fluid collection in  the wall the sigmoid colon slightly larger than prior study. Neoplastic etiology  cannot be excluded. Assessment/Recommendations:   63 y.o. male with metastatic pancreatic cancer on palliative chemotherapy admitted with abd pain. 1. Pancreatic adenocarcinoma:   Stage IV, CANDIE, Germline and somatic genetic testing negative. Diagnosed 11/18/2020, has Stage IV disease with peritoneal carcinomatosis and retroperitoneal LAD, encasement of vasculature seen on CT. CA 19-9 5607 at diagnosis. His disease is not curable, but is treatable.d Was started on  palliative chemotherapy with FOLFIRINOX regimen. CT 7/24/21 showed mild disease progression supported by rising CA 19-9 due to pt not coming for treatment at 2 week intervals. Since July 2021, pt has only come in for treatment on 4 separate occasions out of 9 planned treatments (last received treatment 10/4/21). He has evidence of continued disease progression and CA 19-9 has doubled in recent months. I discussed with patient that he would best be served by hospice to provide additional support at home with palliative management of his symptoms. -- Recommend hospice    2. Ascites:   2/2/ to pancreatic cancer and disease progression. I discussed this is likely to recur and recommend Aspira cath placement  -- Paracentesis with aspira catheter today, NPO this am    3. Chronic Neoplasm Pain:   Better controlled with recent adjustments to medication regimen. Hopeful for more relief after paracentesis. Appreciate Palliative Medicine assistance. 4. Normocytic anemia / Hypoalbuminemia:  Due to underlying malignancy.      5. Goals of care:   Disease is treatable to improve quality and duration of life, but no cure. However, pt has not attended treatments, has progression of disease and symptoms. He would best be served by transitioning to comfort care. Pt does not want CPR or intubation; agrees to DNR.           Signed By: Juan A Espinoza MD

## 2021-11-17 NOTE — PROGRESS NOTES
[facility]    Medical Progress Note      NAME: Petra Zendejas   :  1960  MRM:  [id number]    Date/Time of service 2021  10:36 AM          Assessment and Plan:     Pancreatic cancer / Peritoneal carcinomatosis - POA, end stage disease. CT worsening despite palliative chemo. He is deteriorating. Further treatment likely to accelerate and worsen his condition. Hospice disposition is the only reasonable option. Consulted hospice and palliative care. He can DC home as soon as that is set up    Acute abdominal pain / Ascites, malignant / Cancer related pain - Palliative consult. IR consulted for therapeutic tap. Stop IVF. Continue prn PO Norco and IV prn dilaudid for now. No indication of infection, no role for antibiotics. Severe protein-calorie malnutrition / Cachexia / hypoalbuminemia - Diet as tolerated for comfort. There is no expectation of weight gain. Anemia - Due to cancer and chemo. Monitor PRN       Subjective:     Chief Complaint:  Tolerated aspira drain placement. ROS:  (bold if positive, if negative)    Abd PainTolerating some PT  Tolerating some Diet        Objective:     Last 24hrs VS reviewed since prior progress note.  Most recent are:    Visit Vitals  BP (!) 84/57   Pulse 75   Temp 98.1 °F (36.7 °C)   Resp 18   Ht 5' 10\" (1.778 m)   Wt 52.9 kg (116 lb 10 oz)   SpO2 99%   BMI 16.73 kg/m²     SpO2 Readings from Last 6 Encounters:   21 99%   10/04/21 100%   10/04/21 100%   21 99%   21 96%   21 96%            Intake/Output Summary (Last 24 hours) at 2021 1036  Last data filed at 2021 1257  Gross per 24 hour   Intake 120 ml   Output    Net 120 ml        Physical Exam:    Gen:  Cachectic ill appearing, in no acute distress  HEENT:  Pink conjunctivae, PERRL, hearing intact to voice, dry mucous membranes  Neck:  Supple, without masses, thyroid non-tender  Resp:  No accessory muscle use, clear breath sounds without wheezes rales or rhonchi  Card:  No murmurs, normal S1, S2 without thrills, bruits or peripheral edema  Abd:  Soft, non-tender, non-distended, normoactive bowel sounds are present, no mass  Lymph:  No cervical or inguinal adenopathy  Musc:  No cyanosis or clubbing  Skin:  No rashes or ulcers, skin turgor is reduced  Neuro:  Cranial nerves are grossly intact, general motor weakness, follows commands   Psych:   Moderate insight, oriented to person, place and time, alert    Telemetry reviewed:   normal sinus rhythm  __________________________________________________________________  Medications Reviewed: (see below)  Medications:     Current Facility-Administered Medications   Medication Dose Route Frequency    fentaNYL citrate (PF) injection  mcg   mcg IntraVENous Multiple    midazolam (VERSED) injection 1-5 mg  1-5 mg IntraVENous Multiple    oxyCODONE ER (OxyCONTIN) tablet 10 mg  10 mg Oral Q12H    lipase-protease-amylase (CREON 24,000) capsule 1 Capsule  1 Capsule Oral PRN    lipase-protease-amylase (CREON 24,000) capsule 2 Capsule  2 Capsule Oral TID WITH MEALS    HYDROmorphone (DILAUDID) injection 1 mg  1 mg IntraVENous Q4H PRN    sodium chloride (NS) flush 5-40 mL  5-40 mL IntraVENous Q8H    sodium chloride (NS) flush 5-40 mL  5-40 mL IntraVENous PRN    acetaminophen (TYLENOL) tablet 650 mg  650 mg Oral Q6H PRN    polyethylene glycol (MIRALAX) packet 17 g  17 g Oral DAILY PRN    senna (SENOKOT) tablet 8.6 mg  1 Tablet Oral DAILY PRN    ondansetron (ZOFRAN) injection 4 mg  4 mg IntraVENous Q6H PRN        Lab Data Reviewed: (see below)  Lab Review:     Recent Labs     11/15/21  1307   WBC 5.8   HGB 9.5*   HCT 28.6*        Recent Labs     11/16/21  0042 11/15/21  1307    137   K 3.9 3.5    105   CO2 29 26   GLU 98 125*   BUN 10 11   CREA 0.77 0.83   CA 7.9* 8.5   ALB 2.3* 2.6*   TBILI 0.3 0.3   * 48     No results found for: GLUCPOC  No results for input(s): PH, PCO2, PO2, HCO3, FIO2 in the last 72 hours. No results for input(s): INR, INREXT, INREXT in the last 72 hours. All Micro Results     None          Other pertinent lab: none    Total time spent with patient: 30 Minutes I personally reviewed chart, notes, data and current medications in the medical record. I have personally examined and treated the patient at bedside during this period.                  Care Plan discussed with: Patient, Care Manager, Nursing Staff, Consultant/Specialist and >50% of time spent in counseling and coordination of care    Discussed:  Care Plan and D/C Planning    Prophylaxis:  H2B/PPI    Disposition:  Home w/Family           ___________________________________________________    Attending Physician: Ravi Mckeon MD

## 2021-11-17 NOTE — PROGRESS NOTES
Spiritual Care Assessment/Progress Note  [address] [place] [address] Dr      NAME: Osvalod Deng      MRN: [id number]  AGE: 63 y.o. SEX: male  Quaker Affiliation: No preference   Language: English     11/17/2021     Total Time (in minutes): 15     Spiritual Assessment begun in [facility] POST SURG ORT 2 through conversation with:         []Patient        [] Family    [] Friend(s)        Reason for Consult: Palliative Care, Initial/Spiritual Assessment     Spiritual beliefs: (Please include comment if needed)     [] Identifies with a gibran tradition:         [] Supported by a gibran community:            [] Claims no spiritual orientation:           [] Seeking spiritual identity:                [] Adheres to an individual form of spirituality:           [x] Not able to assess:                           Identified resources for coping:      [] Prayer                               [] Music                  [] Guided Imagery     [] Family/friends                 [] Pet visits     [] Devotional reading                         [x] Unknown     [] Other:                                              Interventions offered during this visit: (See comments for more details)    Patient Interventions: Initial visit           Plan of Care:     [] Support spiritual and/or cultural needs    [] Support AMD and/or advance care planning process      [] Support grieving process   [] Coordinate Rites and/or Rituals    [] Coordination with community clergy   [] No spiritual needs identified at this time   [] Detailed Plan of Care below (See Comments)  [] Make referral to Music Therapy  [] Make referral to Pet Therapy     [] Make referral to Addiction services  [] Make referral to [facility]  [] Make referral to Spiritual Care Partner  [] No future visits requested        [x] Contact Spiritual Care for further referrals     Attempted visit for palliative initial spiritual assessment. Unable to visit patient at this time.  Pt was sleeping and did not awake. No family present. Pt's chart was consulted.   Chaplain Chen MDiv, MS, [facility]

## 2021-11-17 NOTE — PROGRESS NOTES
1930  Bedside shift change report given to Corin Nichols RN  (oncoming nurse) by Chencho Braswell (offgoing nurse). Report included the following information SBAR, Kardex, Intake/Output, MAR and Recent Results.

## 2021-11-17 NOTE — PROGRESS NOTES
CARE MANAGEMENT   DAILY PROGRESS NOTE        NAME:   Yuni Cheng   :     1960   MRN:     [id number]     RUR:  16%  Risk Level: Moderate  Value-based purchasing:   No   Bundle patient:  No    Transition of care plan:  1. Pt admitted with pancreatic cancer. Pt is followed by Hospitalist, Hem/Onc, and Palliative. Paracentesis planned today. 2. PT and OT evaluations completed which indicates no skilled needs. 3. Hospice orders received and referral made to [facility]. Hospice liaison attempted to meet with Pt yesterday, however, Pt declined due to severe pain. Hospice liaison will follow up again today. 4. Discharge plan TBD pending hospice eval  5. Outpatient follow up. 6. Transportation: Family    3:31 PM  CM coordinated with Rachel Vasquez from [facility]. Rachel Vasquez states that Pt did not want to meet with her again today but was agreeable to scheduling an appt for 10am tomorrow. Rachel Vasquez indicates Pt is likely GIP appropriate for pain management. Rachel Vasquez will meet with Pt tomorrow at 10am.    CM follow up with Pt to encourage hospice meeting tomorrow. CM asked if Pt if he had any concerns. Pt states that he is agreeable to meeting with hospice but felt like he needed to solidify a discharge location prior to the meeting. Pt states that he is calling his sister after work today to see if he can stay with her upon discharge. CM explained that Pt might be appropriate for [facility] for pain management. Pt reports he doesn't think he has \"more than a month left\" and would like to spend it with family if possible. CM voiced understanding. CM also explained that if Pt is appropriate for [facility]osp[facility] this would give him a chance to get his pain manageable before going home with family. Pt voiced understanding. Pt states that he would be agreeable to that plan. Pt is still planning on speaking with his sister tonight but confirms he will meet with Pt tomorrow. _____________________________________  Roberto Ramirez, [facility]  11/17/2021   12:33 PM

## 2021-11-17 NOTE — PROGRESS NOTES
Palliative Medicine Consult  Cristhian: [phone] ([phone])    Patient Name: Ulysses Belt  YOB: 1960    Date of Initial Consult: 11/16/2021  Reason for Consult: Overwhelming symptoms  Requesting Provider: Dr. Hira Em   Primary Care Physician: None     SUMMARY:   Ulysses Belt is a 63 y.o. with a past history of Stage IV Pancreatic cancer (dx 11/2020) w/ peritoneal carcinomatosis, +tobacco use, , who was admitted on 11/15/2021 from home  with a diagnosis of Severe protein calorie malnutrition and Malignant large voulme ascites 2/2 pancreatic cancer. Patient presented to ER w/ cc of abdominal pain x 4 days as well as decreased intake, weight loss and fatigue    Current medical issues leading to Palliative Medicine involvement include: care decisions in setting of stage IV metastatic cancer. PALLIATIVE DIAGNOSES:   1. Palliative care encounter  2. Concern about end of life  3. Pain  4. Constipation  5. Goals of care discussion         PLAN:   1. Prior to visit completed chart review. 2.  I spoke with [facility] hosp[facility] [place]ia[place] [facility] may be appropriate for [facility] at [facility] for uncontrolled cancer related pain, meeting scheduled for 10 AM on 11/18. 3. I met with Mr. Dave Olivia,  no family at bedside. He is  alert and oriented x4.   4. He is s/p Aspira drain placement earlier today, removed an initial 1000 mL. He reports having experienced some relief from procedure, no longer feels like his abdomen is going to \"explode\". 5. Pain- Improved with adjustments made on 11/16. · Current treatment plan includes  Scheduled Oxycodone ER 10mg twice daily   and Dilaudid 1mg IV every 4 hours as needed, severe pain (used x5 /24 hours). · Mr. Rudolph refused scheduled Oxy ER this morning, stated it \"hurts his stomach\". He stated that Oxycodone ER had once been very helpful, but over last several weeks, it has been contributing to abdominal discomfort. He denied recent constipation.  We discussed possibility worsening discomfort was related to cancer and worsening ascites, he agreed to take this evening scheduled dose. · Patient reported IV dilaudid effectively reduces pain to tolerable level, but that after  3 hours he is aware of worsening pain and to wait an hour before can receive next dose, and by then pain is severe. ·  New order to increase frequency of PRN IV dilaudid to every 3 hours as needed, severe pain      6. Severe hypoalbuminemia- Albumin 2.3 in setting of stage IV cancer, uncontrolled pain, poor intake. Intake has worsened s/p development of ascites. Patient with minimal appetite, anticipate it will continue to decline. 7.  Mr. Monique Estrella shared with me that he was told he does not have long to live, maybe a month and he is now reconsidering the possible option of living with his sisters, which he had previously dismissed, not wanting to be a burden to them. He expressed wishes to be with family over the [address]. He plans to speak with sisters this evening, to see if its an option. 8. We discussed potential plans for discharge, including:  discharge to sisters home w/ hospice vs  Discharge to  [facility], for symptom management. Patient is interested in Hospice after discharge  [facility]ospice liaison will meet with patient tomorrow to establish a plan. 9. I will FU with tomorrow  10. Please contact me via perfect serve with any urgent needs, questions or concerns. 11. Initial consult note routed to primary continuity provider and/or primary health care team members  12.  Communicated plan of care with: Palliative IDT, [address] Team, unit JANELLE, RN fabiola     GOALS OF CARE / TREATMENT PREFERENCES:     GOALS OF CARE: Adequate pain control, otherwise Goals not defined today  Patient/Health Care Proxy Stated Goals: Comfort    TREATMENT PREFERENCES:   Code Status: DNR    Patient and family's personal goals include: Personal goal id tolerable pain level    Important upcoming milestones or family events: Holidays with his family    The patient identifies the following as important for living well: Not being a burden to his family, independent, comfortable      Advance Care Planning:  [x] The [facility] Interdisciplinary Team has updated the ACP Navigator with [employer] and Patient Capacity      Primary Decision Maker: Dennie Loffler - Daughter - [phone]  Advance Care Planning 10/4/2021   Patient's Healthcare Decision Maker is: Legal Next of [address] Directive None   Patient Would Like to Complete Advance Directive -   Does the patient have other document types -       Medical Interventions: Limited additional interventions       Other:    As far as possible, the palliative care team has discussed with patient / health care proxy about goals of care / treatment preferences for patient.      HISTORY:     History obtained from: medical records/CM/nurse/patient    CHIEF COMPLAINT: Feel a little better    HPI/SUBJECTIVE:    The patient is:   [x] Verbal and participatory  [] Non-participatory due to:   Patient reported feeling a little better s/p aspira drain, would like to have a cigarette    11/17- s/p aspira drain w/ some relief    Clinical Pain Assessment (nonverbal scale for severity on nonverbal patients):   Clinical Pain Assessment  Severity: 7  Location: accross upper middle abdomen  Character: accross middle upper abdomen  Duration: chronic  Effect: difficulty sleeping, getting comfortable  Factors: dilaudid helpful, removal of 1000ml also helped  Frequency: continuous          Duration: for how long has pt been experiencing pain (e.g., 2 days, 1 month, years)  Frequency: how often pain is an issue (e.g., several times per day, once every few days, constant)     FUNCTIONAL ASSESSMENT:     Palliative Performance Scale (PPS):  PPS: 40       PSYCHOSOCIAL/SPIRITUAL SCREENING:     Palliative IDT has assessed this patient for cultural preferences / practices and a referral made as appropriate to needs (Cultural Services, Patient Advocacy, Ethics, etc.)    Any spiritual / Advent concerns:  [] Yes /  [x] No    Caregiver Burnout:  [] Yes /  [] No /  [x] No Caregiver Present      Anticipatory grief assessment:   [x] Normal  / [] Maladaptive       ESAS Anxiety: Anxiety: 0    ESAS Depression: Depression: 0        REVIEW OF SYSTEMS:     Positive and pertinent negative findings in ROS are noted above in HPI. The following systems were [x] reviewed / [] unable to be reviewed as noted in HPI  Other findings are noted below. Systems: constitutional, ears/nose/mouth/throat, respiratory, gastrointestinal, genitourinary, musculoskeletal, integumentary, neurologic, psychiatric, endocrine. Positive findings noted below. Modified ESAS Completed by: provider   Fatigue: 4 Drowsiness: 0   Depression: 0 Pain: 7   Anxiety: 0 Nausea: 0   Anorexia: 8 Dyspnea: 0     Constipation: No              PHYSICAL EXAM:     From RN flowsheet:  Wt Readings from Last 3 Encounters:   11/15/21 116 lb 10 oz (52.9 kg)   10/04/21 116 lb 9.6 oz (52.9 kg)   10/04/21 116 lb 10 oz (52.9 kg)     Blood pressure (!) 88/69, pulse 64, temperature 97.1 °F (36.2 °C), resp. rate 16, height 5' 10\" (1.778 m), weight 116 lb 10 oz (52.9 kg), SpO2 95 %.     Pain Scale 1: Numeric (0 - 10)  Pain Intensity 1: 5     Pain Location 1: Abdomen  Pain Orientation 1: Anterior  Pain Description 1: Constant  Pain Intervention(s) 1: Medication (see MAR)  Last bowel movement, if known: 11/16 loose    Constitutional: appears stated age, thin, weak, fatigued and uncofortable  Eyes: pupils equal, anicteric  ENMT: no nasal discharge, moist mucous membranes  Cardiovascular: regular rhythm, distal pulses intact  Respiratory: breathing not labored, symmetric  Gastrointestinal: small round/distended, very firm, + pain  Musculoskeletal: cachectic, no deformity, no tenderness to palpation  Skin: warm, dry  Neurologic: weak, oriented x3, following commands, moving all extremities  Psychiatric: full affect, no hallucinations  Other:       HISTORY:     Principal Problem:    Pancreatic cancer (HCC) ()    Active Problems:    Abdominal lymphadenopathy (11/11/2020)      Acute abdominal pain (11/11/2020)      Gout ()      Peritoneal carcinomatosis ([facility] [address] (11/23/2020)      Overlapping malignant neoplasm of pancreas ([facility] [address] (11/9/2021)      Severe protein-calorie malnutrition ([address] (11/15/2021)      Cancer related pain (11/15/2021)      Anemia (11/15/2021)      Ascites, malignant (11/15/2021)      Past Medical History:   Diagnosis Date    Gout     Pancreatic cancer ([facility] [address]     Psoriasis     Tobacco abuse       Past Surgical History:   Procedure Laterality Date    HX ORTHOPAEDIC      femur repair    HX ORTHOPAEDIC      lumbar compressed fracture    IR INSERT INTRAPERI TUNL CATH PERC  11/17/2021    IR INSERT TUNL CVC W PORT OVER 5 YEARS  11/24/2020      Family History   Problem Relation Age of Onset    Pancreatic Cancer Brother       History reviewed, no pertinent family history.   Social History     Tobacco Use    Smoking status: Current Every Day Smoker     Packs/day: 1.00    Smokeless tobacco: Never Used    Tobacco comment: last week was last cigarette   Substance Use Topics    Alcohol use: Yes     No Known Allergies   Current Facility-Administered Medications   Medication Dose Route Frequency    HYDROmorphone (DILAUDID) injection 1 mg  1 mg IntraVENous Q3H PRN    oxyCODONE ER (OxyCONTIN) tablet 10 mg  10 mg Oral Q12H    lipase-protease-amylase (CREON 24,000) capsule 1 Capsule  1 Capsule Oral PRN    lipase-protease-amylase (CREON 24,000) capsule 2 Capsule  2 Capsule Oral TID WITH MEALS    sodium chloride (NS) flush 5-40 mL  5-40 mL IntraVENous Q8H    sodium chloride (NS) flush 5-40 mL  5-40 mL IntraVENous PRN    acetaminophen (TYLENOL) tablet 650 mg  650 mg Oral Q6H PRN    polyethylene glycol (MIRALAX) packet 17 g  17 g Oral DAILY PRN    senna (SENOKOT) tablet 8.6 mg  1 Tablet Oral DAILY PRN    ondansetron (ZOFRAN) injection 4 mg  4 mg IntraVENous Q6H PRN          LAB AND IMAGING FINDINGS:     Lab Results   Component Value Date/Time    WBC 5.8 11/15/2021 01:07 PM    HGB 9.5 (L) 11/15/2021 01:07 PM    PLATELET 505 71/10/2121 01:07 PM     Lab Results   Component Value Date/Time    Sodium 139 11/16/2021 12:42 AM    Potassium 3.9 11/16/2021 12:42 AM    Chloride 107 11/16/2021 12:42 AM    CO2 29 11/16/2021 12:42 AM    BUN 10 11/16/2021 12:42 AM    Creatinine 0.77 11/16/2021 12:42 AM    Calcium 7.9 (L) 11/16/2021 12:42 AM    Magnesium 2.0 11/12/2020 05:28 AM    Phosphorus 3.4 11/12/2020 05:28 AM      Lab Results   Component Value Date/Time    Alk. phosphatase 300 (H) 11/16/2021 12:42 AM    Protein, total 5.4 (L) 11/16/2021 12:42 AM    Albumin 2.3 (L) 11/16/2021 12:42 AM    Globulin 3.1 11/16/2021 12:42 AM     Lab Results   Component Value Date/Time    INR 1.0 09/23/2010 03:05 AM    Prothrombin time 10.6 09/23/2010 03:05 AM    aPTT 28.0 09/23/2010 03:05 AM      No results found for: IRON, FE, TIBC, IBCT, PSAT, FERR   No results found for: PH, PCO2, PO2  No components found for: GLPOC   No results found for: CPK, CKMB             Total time: 35 min  Counseling / coordination time, spent as noted above: 25min  > 50% counseling / coordination?: yes    Prolonged service was provided for  []30 min   []75 min in face to face time in the presence of the patient, spent as noted above. Time Start:   Time End:   Note: this can only be billed with 33923 (initial) or 52946 (follow up). If multiple start / stop times, list each separately.

## 2021-11-17 NOTE — PROGRESS NOTES
TRANSFER - OUT REPORT:    Verbal report given to clpo(name) on Bryan Birmingham being transferred to clpo(unit) for routine post - op       Report consisted of patient's Situation, Background, Assessment and   Recommendations(SBAR). Information from the following report(s) SBAR was reviewed with the receiving nurse. Opportunity for questions and clarification was provided.       Patient transported with:   Registered Nurse

## 2021-11-17 NOTE — ROUTINE PROCESS
10:45 AM  TRANSFER - IN REPORT:    Verbal report received from aaron scott.(name) on Sanju Torres  being received from [facility](unit) for ordered procedure      Report consisted of patients Situation, Background, Assessment and   Recommendations(SBAR). Information from the following report(s) Procedure Summary was reviewed with the receiving nurse. Opportunity for questions and clarification was provided. Assessment completed upon patients arrival to unit and care assumed. 11:16 AM  TRANSFER - OUT REPORT:    Verbal report given to bedside nurse(name) on Sanju Torres  being transferred to back to floor(unit) for routine progression of care       Report consisted of patients Situation, Background, Assessment and   Recommendations(SBAR). Information from the following report(s) Procedure Summary was reviewed with the receiving nurse. Lines:   Venous Access Device 11/24/20 Upper chest (subclavicular area, right (Active)       Venous Access Device Upper chest (subclavicular area, right (Active)       Peripheral IV 11/15/21 Left Antecubital (Active)   Site Assessment Clean, dry, & intact 11/16/21 0800   Phlebitis Assessment 0 11/16/21 0800   Infiltration Assessment 0 11/16/21 0800   Dressing Status Clean, dry, & intact 11/16/21 0800   Dressing Type 4 X 4; Transparent 11/16/21 0800       Peripheral IV 11/16/21 Right Antecubital (Active)   Site Assessment Clean, dry, & intact 11/17/21 0440   Phlebitis Assessment 0 11/17/21 0440   Infiltration Assessment 0 11/17/21 0440   Dressing Status Clean, dry, & intact 11/17/21 0440   Dressing Type Transparent 11/17/21 0440        Opportunity for questions and clarification was provided.       Patient transported with:   [employer]

## 2021-11-18 NOTE — DISCHARGE INSTRUCTIONS
Patient Discharge Instructions    Giovanni Dodge / [id number] : 1960    Admitted 11/15/2021 Discharged: 2021     Primary Diagnoses  Problem List as of 2021 Date Reviewed: 2021           Severe protein-calorie malnutrition ([address]   Cancer related pain   Anemia   Ascites, malignant   Overlapping malignant neoplasm of pancreas (HCC)   Gout   * (Principal) Pancreatic cancer ([facility] [address]   Peritoneal carcinomatosis ([facility] [address]   Abdominal lymphadenopathy   Acute abdominal pain          Take Home Medications     · It is important that you take the medication exactly as they are prescribed. · Keep your medication in the bottles provided by the pharmacist and keep a list of the medication names, dosages, and times to be taken in your wallet. · Do not take other medications without consulting your doctor. What to do at [address]: Comfort feeding    Recommended activity: Activity as tolerated    If you experience worse symptoms, please follow up with hospice. Follow-up with hospice in a few days        Information obtained by :  I understand that if any problems occur once I am at home I am to contact my physician. I understand and acknowledge receipt of the instructions indicated above.                                                                                                                                            Physician's or R.N.'s Signature                                                                  Date/Time                                                                                                                                              Patient or Representative Signature                                                          Date/Time

## 2021-11-18 NOTE — PROGRESS NOTES
Palliative Medicine Consult  Cristhian: [phone] ([phone])    Patient Name: Santo Hunter  YOB: 1960    Date of Initial Consult: 11/16/2021  Reason for Consult: Overwhelming symptoms  Requesting Provider: Dr. Ruthie Tsang   Primary Care Physician: None     SUMMARY:   Santo Hunter is a 63 y.o. with a past history of Stage IV Pancreatic cancer (dx 11/2020) w/ peritoneal carcinomatosis, +tobacco use, , who was admitted on 11/15/2021 from home  with a diagnosis of Severe protein calorie malnutrition and Malignant large voulme ascites 2/2 pancreatic cancer. Patient presented to ER w/ cc of abdominal pain x 4 days as well as decreased intake, weight loss and fatigue    Current medical issues leading to Palliative Medicine involvement include: care decisions in setting of stage IV metastatic cancer. PALLIATIVE DIAGNOSES:   1. Palliative care encounter  2. Concern about end of life  3. Pain  4. Constipation  5. Goals of care discussion         PLAN:   1. Prior to visit completed chart review. 2. I met with  mr. Kelli Griggs, no family at bedside. He was in process of getting back to bed, appeared uncomfortable, holding abdomen  3. Mr. Kelli Griggs stated he was feeling OK, thinks he needs to have a BM, but does not want to strain for fear of escalating pain. I reassured him that Hospice team will assist in managing constipation as well pain. 4. Patient acknowledged plan to discharge to [address] [place] [address] today. Spoke with Hospice Liaison RN regarding plan. 5. Pain- Patient continues to report dilaudid helps reduce pain level to tolerable level. He has used x 4 doses of Dilaudid 1mg IV over past 24 hours. He did not use the scheduled Oxycodone ER yesterday, but did receive this morning. I placed order for Dilaudid 1mg IV once PRN to be given before transport.     6. DNR Discussion- Patient completed Durable DNR today, copy placed in chart to be scanned to EMR, original given to Hospice Liaison Bari Rosales RN   7. Please contact me via perfect serve with any urgent needs, questions or concerns. 8. Initial consult note routed to primary continuity provider and/or primary health care team members  9. Communicated plan of care with: Palliative [facility] Team, [facility]  [facility]     GOALS OF CARE / TREATMENT PREFERENCES:     GOALS OF CARE: Adequate pain control, otherwise Goals not defined today  Patient/Health Care Proxy Stated Goals: Comfort    TREATMENT PREFERENCES:   Code Status: DNR    Patient and family's personal goals include: Personal goal id tolerable pain level    Important upcoming milestones or family events: Holidays with his family    The patient identifies the following as important for living well: Not being a burden to his family, independent, comfortable      Advance Care Planning:  [x] [facility] Interdisciplinary Team has updated the ACP Navigator with [address] and Patient Capacity      Primary Decision Maker: Sri Marte - [phone]  Advance Care Planning 11/16/2021   Patient's [address] is: -   Confirm Advance Directive None   Patient Would Like to Complete Advance Directive No   Does the patient have other document types -       Medical Interventions: Limited additional interventions       Other:    As far as possible, the palliative care team has discussed with patient / health care proxy about goals of care / treatment preferences for patient. HISTORY:     History obtained from: medical records/CM/nurse/patient    CHIEF COMPLAINT: Feel OK    HPI/SUBJECTIVE:    The patient is:   [x] Verbal and participatory  [] Non-participatory due to:   Patient reported feeling OK, still having pain, but better since started Dilaudid.  He is fatigued, no appetite    11/17- s/p aspira drain w/ some relief    Clinical Pain Assessment (nonverbal scale for severity on nonverbal patients):   Clinical Pain Assessment  Severity: 7  Location: accross upper middle abdomen  Character: accross middle upper abdomen  Duration: chronic  Effect: difficulty sleeping, getting comfortable  Factors: dilaudid helpful, removal of 1000ml also helped  Frequency: continuous          Duration: for how long has pt been experiencing pain (e.g., 2 days, 1 month, years)  Frequency: how often pain is an issue (e.g., several times per day, once every few days, constant)     FUNCTIONAL ASSESSMENT:     Palliative Performance Scale (PPS):  PPS: 40       PSYCHOSOCIAL/SPIRITUAL SCREENING:     Palliative IDT has assessed this patient for cultural preferences / practices and a referral made as appropriate to needs (Cultural Services, Patient Advocacy, Ethics, etc.)    Any spiritual / Temple concerns:  [] Yes /  [x] No    Caregiver Burnout:  [] Yes /  [] No /  [x] No Caregiver Present      Anticipatory grief assessment:   [x] Normal  / [] Maladaptive       ESAS Anxiety: Anxiety: 0    ESAS Depression: Depression: 0        REVIEW OF SYSTEMS:     Positive and pertinent negative findings in ROS are noted above in HPI. The following systems were [x] reviewed / [] unable to be reviewed as noted in HPI  Other findings are noted below. Systems: constitutional, ears/nose/mouth/throat, respiratory, gastrointestinal, genitourinary, musculoskeletal, integumentary, neurologic, psychiatric, endocrine. Positive findings noted below. Modified ESAS Completed by: provider   Fatigue: 4 Drowsiness: 0   Depression: 0 Pain: 7   Anxiety: 0 Nausea: 0   Anorexia: 8 Dyspnea: 0     Constipation: No              PHYSICAL EXAM:     From RN flowsheet:  Wt Readings from Last 3 Encounters:   11/15/21 116 lb 10 oz (52.9 kg)   10/04/21 116 lb 9.6 oz (52.9 kg)   10/04/21 116 lb 10 oz (52.9 kg)     Blood pressure (!) 88/58, pulse 67, temperature 97.4 °F (36.3 °C), resp. rate 20, height 5' 10\" (1.778 m), weight 116 lb 10 oz (52.9 kg), SpO2 97 %.     Pain Scale 1: Numeric (0 - 10)  Pain Intensity 1: 5 Pain Location 1: Abdomen  Pain Orientation 1: Anterior  Pain Description 1: Aching  Pain Intervention(s) 1: Medication (see MAR)  Last bowel movement, if known: 11/16 loose    Constitutional: appears stated age, thin, weak, fatigued NAD  Eyes: pupils equal, anicteric  ENMT: no nasal discharge, moist mucous membranes  Cardiovascular: regular rhythm, distal pulses intact  Respiratory: breathing not labored, symmetric  Gastrointestinal: small, semi firm, not as distended, + pain  Musculoskeletal: cachectic, no deformity, no tenderness to palpation  Skin: warm, dry  Neurologic: weak, oriented x3,  following commands, moving all extremities  Psychiatric:appropriate affect, no hallucinations  Other:       HISTORY:     Principal Problem:    Pancreatic cancer (HCC) ()    Active Problems:    Abdominal lymphadenopathy (11/11/2020)      Acute abdominal pain (11/11/2020)      Gout ()      Peritoneal carcinomatosis ([address] (11/23/2020)      Overlapping malignant neoplasm of pancreas ([facility] (11/9/2021)      Severe protein-calorie malnutrition ([address] (11/15/2021)      Cancer related pain (11/15/2021)      Anemia (11/15/2021)      Ascites, malignant (11/15/2021)      Past Medical History:   Diagnosis Date    Gout     Pancreatic cancer ([address]     Psoriasis     Tobacco abuse       Past Surgical History:   Procedure Laterality Date    HX ORTHOPAEDIC      femur repair    HX ORTHOPAEDIC      lumbar compressed fracture    IR INSERT INTRAPERI TUNL CATH PERC  11/17/2021    IR INSERT TUNL CVC W PORT OVER 5 YEARS  11/24/2020      Family History   Problem Relation Age of Onset    Pancreatic Cancer Brother       History reviewed, no pertinent family history.   Social History     Tobacco Use    Smoking status: Current Every Day Smoker     Packs/day: 1.00    Smokeless tobacco: Never Used    Tobacco comment: last week was last cigarette   Substance Use Topics    Alcohol use: Yes     No Known Allergies   Current Facility-Administered Medications   Medication Dose Route Frequency    HYDROmorphone (DILAUDID) tablet 2 mg  2 mg Oral Q4H PRN    HYDROmorphone (DILAUDID) injection 1 mg  1 mg IntraVENous Q3H PRN    oxyCODONE ER (OxyCONTIN) tablet 10 mg  10 mg Oral Q12H    lipase-protease-amylase (CREON 24,000) capsule 1 Capsule  1 Capsule Oral PRN    lipase-protease-amylase (CREON 24,000) capsule 2 Capsule  2 Capsule Oral TID WITH MEALS    sodium chloride (NS) flush 5-40 mL  5-40 mL IntraVENous Q8H    sodium chloride (NS) flush 5-40 mL  5-40 mL IntraVENous PRN    acetaminophen (TYLENOL) tablet 650 mg  650 mg Oral Q6H PRN    polyethylene glycol (MIRALAX) packet 17 g  17 g Oral DAILY PRN    senna (SENOKOT) tablet 8.6 mg  1 Tablet Oral DAILY PRN    ondansetron (ZOFRAN) injection 4 mg  4 mg IntraVENous Q6H PRN          LAB AND IMAGING FINDINGS:     Lab Results   Component Value Date/Time    WBC 5.8 11/15/2021 01:07 PM    HGB 9.5 (L) 11/15/2021 01:07 PM    PLATELET 281 76/24/2317 01:07 PM     Lab Results   Component Value Date/Time    Sodium 139 11/16/2021 12:42 AM    Potassium 3.9 11/16/2021 12:42 AM    Chloride 107 11/16/2021 12:42 AM    CO2 29 11/16/2021 12:42 AM    BUN 10 11/16/2021 12:42 AM    Creatinine 0.77 11/16/2021 12:42 AM    Calcium 7.9 (L) 11/16/2021 12:42 AM    Magnesium 2.0 11/12/2020 05:28 AM    Phosphorus 3.4 11/12/2020 05:28 AM      Lab Results   Component Value Date/Time    Alk.  phosphatase 300 (H) 11/16/2021 12:42 AM    Protein, total 5.4 (L) 11/16/2021 12:42 AM    Albumin 2.3 (L) 11/16/2021 12:42 AM    Globulin 3.1 11/16/2021 12:42 AM     Lab Results   Component Value Date/Time    INR 1.0 09/23/2010 03:05 AM    Prothrombin time 10.6 09/23/2010 03:05 AM    aPTT 28.0 09/23/2010 03:05 AM      No results found for: IRON, FE, TIBC, IBCT, PSAT, FERR   No results found for: PH, PCO2, PO2  No components found for: GLPOC   No results found for: CPK, CKMB             Total time: 25 min  Counseling / coordination time, spent as noted above: 20 min  > 50% counseling / coordination?: yes    Prolonged service was provided for  []30 min   []75 min in face to face time in the presence of the patient, spent as noted above. Time Start:   Time End:   Note: this can only be billed with 54101 (initial) or 76955 (follow up). If multiple start / stop times, list each separately.

## 2021-11-18 NOTE — H&P
[address] [place] [address]   [facility]  [phone]    Patient Name: Aayush Villagomez  YOB: 1960    Date of Provider Hospice Visit: 11/18/21    Level of Care:   [x] General Inpatient (GIP)    [] Routine   [] Respite    Current Location of Care:  [] [facility] [] [facility] [] [facility] [] [facility] [x] Hospice House [facility], patient referred from:  [] [facility] [x] [facility] [] [facility] [] [facility] [] Home [] Other:     Date of Original Hospice Admission: 11/18/21  Hospice Medical Director at time of admission: [address] [place] [address] Diagnosis: Advanced Pancreatic Cancer  Diagnoses RELATED to the terminal prognosis: malignant ascites  Other Diagnoses: protein calorie malnutrition, tobacco use     HOSPICE SUMMARY   Do not cut and paste chart information other than imaging findings    Aayush Villagomez is a 63y.o. year old who was admitted to [address] [place] [address]. The patient's principle diagnosis of advanced maligannt pancreatic adenocarcinoma with malignant ascites has resulted in further worsening function and decline with increased abdominal pain that has been difficult to control with oral opioids. Pt also is severely malnourished. Pt had aspira drain placed in hospital. Pt has been having poor appetite, decreased oral intake and increased fatigue, weakness over past few days. PPS 40% but declining fast  Pt has chosen hospice care due to disease progression and overwhelming symptoms due to lack of regular treatment. Refer to LCD   CT Abdomen:  IMPRESSION     1. Large volume ascites, new. No definite change in small submental soft tissue  densities left mid and upper abdomen. 2. Possible slight increase in ill-defined pancreatic body mass. Slight  worsening of focal narrowing/short segment occlusion at the junction of the  superior mesenteric vein and portal vein. 3. Slightly nodular border of the liver with no discrete liver mass.   4. Irregular wall thickening of the sigmoid colon/rectosigmoid junction may be  due to a chronic infectious/inflammatory process, with small fluid collection in  the wall the sigmoid colon slightly larger than prior study. Neoplastic etiology  cannot be excluded. FNAC: poorly differentiated adenocarcinoma          HOSPICE DIAGNOSES   Active Symptoms:  1. Cancer associated pain  2  Cancer related fatigue/weakness  3. Poor appetite  4. Malignant Ascites  5. Nicotine dependence  6. Anxiety  7. Hospice care     PLAN   1. [facility] as his pain is not well controlled and requires close monitoring with frequent dosing and adjustments. If pt can tolerate oral pain medications and pain can be adequately controlled with oral medications then he could possibly go home. If not then will need to consider maybe PCA for pain control. 2. Pt currently able to tolerate oral pain meds; will continue oxycontin 10mg twice daily orally. 3. Initiate dilaudid 4mg oral every hour as needed for moderate pain and dilaudid 1mg IV every 15mts as needed for severe pain  4. Ativan 1mg IV every 15mts as needed for anxiety  5. Nicotine patch to be started as pt has tobacco dependence  6. If pain controlled and pt unable to take oral meds then considering fentanyl patch will be good too along with PCA bolus. 7. Pt was drained through aspira and 2000ml of clear yellow ascitic fluid was removed today while I was in room seeing him. Recommend drain daily up to 2l as needed for comfort. Pt did feel slightly better after fluid was drained; reduced abdominal stretch and pain  8. Other comfort meds as needed    9.  and SW to support family needs  8. Disposition: as above, may be able to go home for short time but overall has poor prognosis  11. Hospice Plan of care was reviewed in detail and agree with current plan of care    Prognosis estimated based on 11/18/21 clinical assessment is:   [] Hours to Days    [] Days to Weeks    [x] Other:    Communicated plan of care with: Hospice Case Manager;  Hospice IDT; Care Team     GOALS OF CARE     Patient/Medical POA stated Goal of Care: pain control, comfort    [x] I have reviewed and/or updated ACP information in the Advance Care Planning Navigator. This information is available in the [address] link in the patient's chart header. Primary Decision [facility] ([address]):   Primary Decision Maker: Catia Shelley - Daughter - [phone]    Resuscitation Status: DNR  If DNR is there a Durable DNR on file? : [] Yes [] No (If no, complete Durable DNR)    HISTORY     History obtained from: patient, chart review    CHIEF COMPLAINT: pain is real bad if I lay flat  The patient is:   [x] Verbal  [] Nonverbal  [] Unresponsive    HPI/SUBJECTIVE:  Pt just arrived to [facility]. States he was working up to last week and then had to go to the ER as pain in his abdomen got really worse and oral pain meds he was taking at home were not helping. REVIEW OF SYSTEMS     The following systems were: [x] reviewed  [] unable to be reviewed    Positive ROS include:  Constitutional: fatigue, weakness, in pain, short of breath  Ears/nose/mouth/throat: increased airway secretions  Respiratory:shortness of breath, wheezing  Gastrointestinal:poor appetite, nausea, vomiting, abdominal pain, constipation, diarrhea  Musculoskeletal:pain, deformities, swelling legs  Neurologic:confusion, hallucinations, weakness  Psychiatric:anxiety, feeling depressed, poor sleep  Endocrine:     Adult Non-Verbal Pain Assessment Score:      Face  [] 0   No particular expression or smile  [] 1   Occasional grimace, tearing, frowning, wrinkled forehead  [] 2   Frequent grimace, tearing, frowning, wrinkled forehead    Activity (movement)  [] 0   Lying quietly, normal position  [] 1   Seeking attention through movement or slow, cautious movement  [] 2   Restless, excessive activity and/or withdrawal reflexes    Guarding  [] 0   Lying quietly, no positioning of hands over areas of body  [] 1   Splinting areas of the body, tense  [] 2   Rigid, stiff    Physiology (vital signs)  [] 0   Stable vital signs  [] 1   Change in any of the following: SBP > 20mm Hg; HR > 20/minute  [] 2   Change in any of the following: SBP > 30mm Hg; HR > 25/minute    Respiratory  [] 0   Baseline RR/SpO2, compliant with ventilator  [] 1   RR > 10 above baseline, or 5% drop SpO2, mild asynchrony with ventilator  [] 2   RR > 20 above baseline, or 10% drop SpO2, asynchrony with ventilator     FUNCTIONAL ASSESSMENT     Palliative Performance Scale (PPS): 30-40%       PSYCHOSOCIAL/SPIRITUAL ASSESSMENT     Active Problems:    * No active hospital problems.  *    Past Medical History:   Diagnosis Date    Gout     Pancreatic [facility] ([facility]     Psoriasis     Tobacco abuse       Past Surgical History:   Procedure Laterality Date    HX ORTHOPAEDIC      femur repair    HX ORTHOPAEDIC      lumbar compressed fracture    IR INSERT INTRAPERI TUNL CATH PERC  11/17/2021    IR INSERT TUNL CVC W PORT OVER 5 YEARS  11/24/2020      Social History     Tobacco Use    Smoking status: Current Every Day Smoker     Packs/day: 1.00    Smokeless tobacco: Never Used    Tobacco comment: last week was last cigarette   Substance Use Topics    Alcohol use: Yes     Family History   Problem Relation Age of Onset    Pancreatic Cancer Brother       No Known Allergies   Current Facility-Administered Medications   Medication Dose Route Frequency    bisacodyL (DULCOLAX) suppository 10 mg  10 mg Rectal DAILY PRN    oxyCODONE ER (OxyCONTIN) tablet 10 mg  10 mg Oral Q12H    acetaminophen (TYLENOL) suppository 650 mg  650 mg Rectal Q6H PRN    LORazepam (ATIVAN) injection 1 mg  1 mg IntraVENous Q15MIN PRN    HYDROmorphone (DILAUDID) injection 1 mg  1 mg IntraVENous Q15MIN PRN    ketorolac (TORADOL) injection 30 mg  30 mg IntraVENous Q6H PRN    glycopyrrolate (ROBINUL) injection 0.2 mg  0.2 mg IntraVENous Q4H PRN        PHYSICAL EXAM     Wt Readings from Last 3 Encounters:   11/15/21 52.9 kg (116 lb 10 oz)   10/04/21 52.9 kg (116 lb 9.6 oz)   10/04/21 52.9 kg (116 lb 10 oz)       There were no vitals taken for this visit.     Supplemental O2  [] Yes  [x] NO  Last bowel movement: today 11/18/21    Currently this patient has:  [x] Peripheral IV [] PICC  [] PORT [] ICD    [] Duke Catheter [] NG Tube   [] PEG Tube    [] Rectal Tube [] Drain  [] Other:     Constitutional: awake, pleasant, verbal, appears in some distress, laying in bed curled up to side and knees flexed and holding his belly  Eyes: pallor  ENMT: clear mucosa, no lesions  Cardiovascular: normal rate and rhythm, no edema  Respiratory: no breathing distress  Gastrointestinal: distended abdomen, tense, no masses, ascites +, aspira drain right side, tenderness to palpation  Musculoskeletal:thin extremities, no deformities  Skin:no lesions  Neurologic:no obvious deficits  Psychiatric: anxious  Other:       Pertinent Lab and or Imaging Tests:  Lab Results   Component Value Date/Time    Sodium 139 11/16/2021 12:42 AM    Potassium 3.9 11/16/2021 12:42 AM    Chloride 107 11/16/2021 12:42 AM    CO2 29 11/16/2021 12:42 AM    Anion gap 3 (L) 11/16/2021 12:42 AM    Glucose 98 11/16/2021 12:42 AM    BUN 10 11/16/2021 12:42 AM    Creatinine 0.77 11/16/2021 12:42 AM    BUN/Creatinine ratio 13 11/16/2021 12:42 AM    GFR est AA >60 11/16/2021 12:42 AM    GFR est non-AA >60 11/16/2021 12:42 AM    Calcium 7.9 (L) 11/16/2021 12:42 AM     Lab Results   Component Value Date/Time    Protein, total 5.4 (L) 11/16/2021 12:42 AM    Albumin 2.3 (L) 11/16/2021 12:42 AM           Total time: 70mts  Counseling / coordination time: 40mts  > 50% counseling / coordination?:

## 2021-11-18 NOTE — DISCHARGE SUMMARY
Physician Discharge Summary     Patient ID:  Katrinka Duverney  [id number]  74 y.o.  1960    Admit date: 11/15/2021    Discharge date of service and time: 11/18/2021    Admission Diagnoses: Acute abdominal pain [R10.9]    Discharge Diagnoses:    Principal Diagnosis   Pancreatic cancer [facility])                                             Hospital Course and other diagnoses  Pancreatic cancer / Peritoneal carcinomatosis - POA, end stage disease. CT cancer burden worsening despite palliative chemo. He is deteriorating. Further treatment likely to accelerate and worsen his condition. Hospice disposition is the only reasonable option. Consulted hospice and palliative care. He can DC home as soon as that is set up     Acute abdominal pain / Ascites, malignant / Cancer related pain - Palliative consult. IR consulted for therapeutic tap. Stop IVF. Continue prn PO Norco and IV prn dilaudid for now. No indication of infection, no role for antibiotics.     Severe protein-calorie malnutrition / Cachexia / hypoalbuminemia - Diet as tolerated for comfort. There is no expectation of weight gain.     Anemia - Due to cancer and chemo. Monitor PRN     PCP: None    Consults: Hematology/Oncology, Palliative Care and Hospice    Significant Diagnostic Studies: See Hospital Course    Discharged to hospice in stable condition.     Discharge Exam:  BP (!) 88/58 (BP 1 Location: Right upper arm, BP Patient Position: At rest;Lying)   Pulse 68   Temp 98.7 °F (37.1 °C)   Resp 20   Ht 5' 10\" (1.778 m)   Wt 52.9 kg (116 lb 10 oz)   SpO2 98%   BMI 16.73 kg/m²      Gen:  Cachectic ill appearing, in no acute distress  HEENT:  Pink conjunctivae, PERRL, hearing intact to voice, dry mucous membranes  Neck:  Supple, without masses, thyroid non-tender  Resp:  No accessory muscle use, clear breath sounds without wheezes rales or rhonchi  Card:  No murmurs, normal S1, S2 without thrills, bruits or peripheral edema  Abd:  Soft, non-tender, non-distended, normoactive bowel sounds are present, no mass  Lymph:  No cervical or inguinal adenopathy  Musc:  No cyanosis or clubbing  Skin:  No rashes or ulcers, skin turgor is reduced  Neuro:  Cranial nerves are grossly intact, general motor weakness, follows commands   Psych: Moderate insight, oriented to person, place and time, alert    Patient Instructions:   Current Discharge Medication List      START taking these medications    Details   HYDROmorphone (DILAUDID) 2 mg tablet Take 1 Tablet by mouth every four (4) hours as needed for Pain for up to 3 days. Max Daily Amount: 12 mg.  Qty: 10 Tablet, Refills: 0    Associated Diagnoses: Malignant neoplasm of pancreas, unspecified location of malignancy ([facility] [address]         CONTINUE these medications which have CHANGED    Details   oxyCODONE ER (OxyCONTIN) 10 mg ER tablet Take 1 Tablet by mouth every twelve (12) hours for 10 days. Max Daily Amount: 20 mg.  Qty: 20 Tablet, Refills: 0    Associated Diagnoses: Malignant neoplasm of pancreas, unspecified location of malignancy ([facility]         CONTINUE these medications which have NOT CHANGED    Details   lipase-protease-amylase (Creon) 24,000-76,000 -120,000 unit capsule Take 8 Capsules by mouth daily as needed (up to 8 per day). Take 2 capsules with the first bite of a full meal and 1 capsule with first bite of snack. Indications: exocrine pancreatic insufficiency  Qty: 240 Capsule, Refills: 3      polyethylene glycol (MIRALAX) 17 gram packet Take 1 Packet by mouth daily as needed for Constipation. Qty: 24 Each, Refills: 4    Associated Diagnoses: Drug-induced constipation; Malignant neoplasm of body of pancreas (HCC)      senna-docusate (PERICOLACE) 8.6-50 mg per tablet Take 1 Tab by mouth daily.  Indications: constipation  Qty: 30 Tab, Refills: 3    Associated Diagnoses: Drug-induced constipation; Malignant neoplasm of body of pancreas (HCC)      prochlorperazine (Compazine) 10 mg tablet Take 1 Tab by mouth every six (6) hours as needed for Nausea or Vomiting. Qty: 30 Tab, Refills: 3    Associated Diagnoses: Chemotherapy-induced nausea; Malignant neoplasm of body of pancreas (HCC)      ondansetron hcl (ZOFRAN) 8 mg tablet Take 1 Tab by mouth every eight (8) hours as needed for Nausea or Vomiting. Qty: 30 Tab, Refills: 3    Associated Diagnoses: Chemotherapy-induced nausea; Malignant neoplasm of body of pancreas ([address]         STOP taking these medications       oxyCODONE IR (ROXICODONE) 5 mg immediate release tablet Comments:   Reason for Stopping:         loperamide (Imodium A-D) 2 mg capsule Comments:   Reason for Stopping:         potassium chloride (KLOR-CON) 10 mEq tablet Comments:   Reason for Stopping:         dexAMETHasone (DECADRON) 4 mg tablet Comments:   Reason for Stopping:             Activity: Activity as tolerated  Diet: Comfort feeding  Wound Care: None needed    Follow-up with hospice in 1 day.   Follow-up tests/labs - none    Signed:  Eduin Jeong MD  11/18/2021  10:54 AM

## 2021-11-18 NOTE — PROGRESS NOTES
Pt reports that he does not want the oxy because it makes his stomach feel worse. 0719  Bedside shift change report given to ADRIAN Anderson (oncoming nurse) by Marielena Rodriguez (offgoing nurse). Report included the following information SBAR, Intake/Output, Recent Results and Med Rec Status.

## 2021-11-18 NOTE — HOSPICE
[facility]  [phone]    Inpatient Nursing Admission   Patient Name: Mecca Dang  YOB: 1960  Age: 63 y.o. Date of Hospice Admission: 11/18/2021  Hospice Attending Elected by Patient: Flori Sumner MD  Primary Care Physician: None  Admitting RN: Jerilyn Parsons RN  : Sánchez Pacheco     Level of Care (GIP/Routine/Respite): GIP  Facility of Care: [facility]  Patient Room: 12/01     HOSPICE SUMMARY   ER Visits/ Hospitalizations in past year:   Hospice Diagnosis: Pancreatic Cancer   Onset Date of Hospice Diagnosis: 11/18/2021  Summary of Disease Progression Leading to Hospice Diagnosis: Per Dr. Vasiliy Dhaliwal H&P:  Hospital Course and other diagnoses  Pancreatic cancer / Peritoneal carcinomatosis - POA, end stage disease. CT cancer burden worsening despite palliative chemo.  He is deteriorating.  Further treatment likely to accelerate and worsen his condition.  Hospice disposition is the only reasonable option.  Consulted hospice and palliative care. Fabiola Smoker can DC home as soon as that is set up     Acute abdominal pain / Ascites, malignant / Cancer related pain - Palliative consult. Toni Sneed consulted for therapeutic tap.  Stop IVF.  Continue prn PO Norco and IV prn dilaudid for now.  No indication of infection, no role for antibiotics.     Severe protein-calorie malnutrition / Cachexia / hypoalbuminemia - Diet as tolerated for comfort.  There is no expectation of weight gain.     Anemia - Due to cancer and chemo.  Monitor PRN     PCP: None     Consults: Hematology/Oncology, Palliative Care and Hospice     Significant Diagnostic Studies: See Hospital Course     Discharged to hospice in stable condition.     Diagnoses RELATED to the terminal prognosis:   Other Diagnoses:   Severe protein-calorie malnutrition / Cachexia / hypoalbuminemia - Diet as tolerated for comfort. Suann Pillar is no expectation of weight gain.     Anemia - Due to cancer and chemo.  Monitor PRN    Patient Active Problem List   Diagnosis Code    Abdominal lymphadenopathy R59.0    Acute abdominal pain R10.9    Gout M10.9    Pancreatic cancer ([facility] C25.9    Peritoneal carcinomatosis ([facility] [address] C78.6    Overlapping malignant neoplasm of pancreas ([facility] [address] [facility] C25.8    Severe protein-calorie malnutrition ([facility] [address] [facility] E43    Cancer related pain G89.3    Anemia D64.9    Ascites, malignant R18.0       Rationale for a prognosis of life expectancy of 6 months or less if the disease follows its normal course (Disease Specific History):     Gerald Walker is a 63 y.o. who was admitted to [address] [place] [address]. The patient's principle diagnosis of Pancreatic Cancer has resulted in pain, dyspnea, restlessness. Functionally, the patient's Palliative Performance Scale has declined over a period of weeks and is estimated at 30. Objective information that support this patients limited prognosis includes:    Recent Labs     11/15/21  1307   WBC 5.8   HGB 9.5*   HCT 28.6*              Recent Labs     11/16/21  0042 11/15/21  1307    137   K 3.9 3.5    105   CO2 29 26   GLU 98 125*   BUN 10 11   CREA 0.77 0.83   CA 7.9* 8.5   ALB 2.3* 2.6*   TBILI 0.3 0.3   * 48      No results found for: GLUCPOC  No results for input(s): PH, PCO2, PO2, HCO3, FIO2 in the last 72 hours. No results for input(s): INR, INREXT, INREXT in the last 72 hours. The patient/family chose comfort measures with the support of Hospice. Patient meets for GIP LOC as evidenced by uncontrolled pain, restlessness, and dyspnea. Pt is requiring medication adjustments with frequent nursing monitoring and assessment to meet patients needs for comfort that cannot be done in the home setting.     Prognosis estimated based on 11/18/21 clinical assessment is:   [] Few to Many Hours  [] Hours to Days   [] Few to Many Days   [x] Days to Weeks   [] Few to Many Weeks   [] Weeks to Months   [] Few to Many Months    ASSESSMENT    Patient self-reports:  [x]  Yes    [] No    SYMPTOMS: Pain, Dyspnea, Restlessness    SIGNS/PHYSICAL FINDINGS:      FAST for all dementia:  NA    Learning Assessment:  Patient  Is patient willing/able to learn?  yes  What is the highest level of education completed? High school  Learning preference (written material, demonstration, visual)? audible  Learning barriers (ESOL, [place], poor vision)?  no  Caregiver  Is caregiver willing to learn care for patient? yes  What is the highest level of education completed?  college  Learning preference (written material, demonstration, visual)? written  Learning barriers (ESOL, [place], poor vision)?  no  CLINICAL INFORMATION     Wt Readings from Last 3 Encounters:   11/15/21 52.9 kg (116 lb 10 oz)   10/04/21 52.9 kg (116 lb 9.6 oz)   10/04/21 52.9 kg (116 lb 10 oz)     Ht Readings from Last 3 Encounters:   11/16/21 5' 10\" (1.778 m)   10/04/21 5' 10\" (1.778 m)   10/04/21 5' 10\" (1.778 m)     There is no height or weight on file to calculate BMI. Visit Vitals  /74 (BP 1 Location: Right arm, BP Patient Position: At rest)   Pulse 74   Temp 97.4 °F (36.3 °C)   Resp 18   SpO2 (!) 81%       LAB VALUES  No results found for this visit on 11/18/21 (from the past 12 hour(s)). No results found for this visit on 11/18/21 (from the past 6 hour(s)). Lab Results   Component Value Date/Time    Protein, total 5.4 (L) 11/16/2021 12:42 AM    Albumin 2.3 (L) 11/16/2021 12:42 AM       Currently this patient has:  [] Supplemental O2 [x] Peripheral IV  [] PICC    [x] PORT   [] Duke Catheter [] NG Tube   [] PEG Tube [] Ostomy    [] AICD: Has ICD been deactivated? [] Yes [] No:______    PLAN     Admit to [facility] level of care for symptom management of pain, dyspnea and restlessness. 2.  Oxycodone 10 mg every 12 hours  3. Dilaudid 1 mg every 15min PRN  Lorazepam 1mg every 15min PRN  4. PRN medications in place for breakthrough symptom management  5. Infection control and prevention as needed   6.  Provide support/education to caregiver/family Oly Richards, next of kin, POA  7. Monitor closely for changes in symptoms  8. Provide support and frequent rounds for patient comfort and safety   9. Maintain skin integrity as tolerated for hospice patient, turning and repositioning for comfort  10. Provide  and  for patient and family support  6. Continue to discuss discharge plan for any changes    Hospice Team Frequency Orders:  Skilled Nurse - Daily x 14 days with 5 PRN visits for symptom control. MSW  1 x weekly with 5 visits PRN family support and need for volunteer services.   1 x weekly with 5 visits PRN spiritual support. CNA  daily x 14 days    ADVANCE CARE PLANNING (Complete in ACP Flow Sheet)   Code Status: DNR  Durable DNR: [x]  Yes  []  No  Code Status Discussed/Confirmed: yes  Preference for Other Life Sustaining Treatment Discussed/Confirmed: yes  Hospitalization Preference:  None    Advance Care Planning 2021   Patient's Healthcare Decision Maker is: -   Confirm Advance Directive None   Patient Would Like to Complete Advance Directive No   Does the patient have other document types -        Service: [] Yes  []  No      [x] Unknown  Appropriate for Pinning Ceremony:  [] Yes     [] No  Moravian: NO PREFERENCE   Home: TBD    DISCHARGE PLANNING     1. Discharge Plan: Pt will remain at [facility] until symptoms are managed and pt can safely be discharged home and followed by the home hospice team.  2. Patient/Family teaching: Pt Daughter, Oly Richards  3.  Response to patient/family teaching: Acceptance    SOCIAL/EMOTIONAL/SPIRITUAL NEEDS     Spiritual Issues Identified: none at this time    Psych/ Social/ Emotional Issues Identified: pt does not want to be a burden to family    Caregiver Support:  [x] Provided information on End of Life Care   [] Material Provided: [facility] From [facility] or Jenae Houser contacted, discharge to hospice order received  Dr. Daja Shipley contacted, agrees to serve as attending provider for hospice and provided verbal certification of terminal illness with life expectancy of 6 months or less. Orders for hospice admission, medications and plan of treatment received. Medication reconciliation completed.   MEDS: See medication list below  DME: Per [facility]  Supplies: Per [facility]  IDT communication to include MD, , SW, CH and support team    ALLERGIES AND MEDICATIONS     Allergies: No Known Allergies  Current Facility-Administered Medications   Medication Dose Route Frequency    bisacodyL (DULCOLAX) suppository 10 mg  10 mg Rectal DAILY PRN    oxyCODONE ER (OxyCONTIN) tablet 10 mg  10 mg Oral Q12H    acetaminophen (TYLENOL) suppository 650 mg  650 mg Rectal Q6H PRN    LORazepam (ATIVAN) injection 1 mg  1 mg IntraVENous Q15MIN PRN    HYDROmorphone (DILAUDID) injection 1 mg  1 mg IntraVENous Q15MIN PRN    ketorolac (TORADOL) injection 30 mg  30 mg IntraVENous Q6H PRN    glycopyrrolate (ROBINUL) injection 0.2 mg  0.2 mg IntraVENous Q4H PRN    HYDROmorphone (DILAUDID) tablet 4 mg  4 mg Oral Q1H PRN

## 2021-11-18 NOTE — PROGRESS NOTES
[facility]    Medical Progress Note      NAME: Laura Lo   :  1960  MRM:  [id number]    Date/Time of service 2021  7:54 AM          Assessment and Plan:     Pancreatic cancer / Peritoneal carcinomatosis - POA, end stage disease. CT cancer burden worsening despite palliative chemo. He is deteriorating. Further treatment likely to accelerate and worsen his condition. Hospice disposition is the only reasonable option. Consulted hospice and palliative care. He can DC home as soon as that is set up    Acute abdominal pain / Ascites, malignant / Cancer related pain - Palliative consult. IR consulted for therapeutic tap. Stop IVF. Continue prn PO Norco and IV prn dilaudid for now. No indication of infection, no role for antibiotics. Severe protein-calorie malnutrition / Cachexia / hypoalbuminemia - Diet as tolerated for comfort. There is no expectation of weight gain. Anemia - Due to cancer and chemo. Monitor PRN       Subjective:     Chief Complaint:  No events, he left the floor AMA to smoke. ROS:  (bold if positive, if negative)    Abd PainTolerating some PT  Tolerating some Diet        Objective:     Last 24hrs VS reviewed since prior progress note.  Most recent are:    Visit Vitals  BP (!) 88/58 (BP 1 Location: Right upper arm, BP Patient Position: At rest;Lying)   Pulse 68   Temp 98.7 °F (37.1 °C)   Resp 20   Ht 5' 10\" (1.778 m)   Wt 52.9 kg (116 lb 10 oz)   SpO2 98%   BMI 16.73 kg/m²     SpO2 Readings from Last 6 Encounters:   21 98%   10/04/21 100%   10/04/21 100%   21 99%   21 96%   21 96%            Intake/Output Summary (Last 24 hours) at 2021 0754  Last data filed at 2021 1108  Gross per 24 hour   Intake    Output 2100 ml   Net -2100 ml        Physical Exam:    Gen:  Cachectic ill appearing, in no acute distress  HEENT:  Pink conjunctivae, PERRL, hearing intact to voice, dry mucous membranes  Neck:  Supple, without masses, thyroid non-tender  Resp:  No accessory muscle use, clear breath sounds without wheezes rales or rhonchi  Card:  No murmurs, normal S1, S2 without thrills, bruits or peripheral edema  Abd:  Soft, non-tender, non-distended, normoactive bowel sounds are present, no mass  Lymph:  No cervical or inguinal adenopathy  Musc:  No cyanosis or clubbing  Skin:  No rashes or ulcers, skin turgor is reduced  Neuro:  Cranial nerves are grossly intact, general motor weakness, follows commands   Psych: Moderate insight, oriented to person, place and time, alert    Telemetry reviewed:   normal sinus rhythm  __________________________________________________________________  Medications Reviewed: (see below)  Medications:     Current Facility-Administered Medications   Medication Dose Route Frequency    HYDROmorphone (DILAUDID) injection 1 mg  1 mg IntraVENous Q3H PRN    oxyCODONE ER (OxyCONTIN) tablet 10 mg  10 mg Oral Q12H    lipase-protease-amylase (CREON 24,000) capsule 1 Capsule  1 Capsule Oral PRN    lipase-protease-amylase (CREON 24,000) capsule 2 Capsule  2 Capsule Oral TID WITH MEALS    sodium chloride (NS) flush 5-40 mL  5-40 mL IntraVENous Q8H    sodium chloride (NS) flush 5-40 mL  5-40 mL IntraVENous PRN    acetaminophen (TYLENOL) tablet 650 mg  650 mg Oral Q6H PRN    polyethylene glycol (MIRALAX) packet 17 g  17 g Oral DAILY PRN    senna (SENOKOT) tablet 8.6 mg  1 Tablet Oral DAILY PRN    ondansetron (ZOFRAN) injection 4 mg  4 mg IntraVENous Q6H PRN        Lab Data Reviewed: (see below)  Lab Review:     Recent Labs     11/15/21  1307   WBC 5.8   HGB 9.5*   HCT 28.6*        Recent Labs     11/16/21  0042 11/15/21  1307    137   K 3.9 3.5    105   CO2 29 26   GLU 98 125*   BUN 10 11   CREA 0.77 0.83   CA 7.9* 8.5   ALB 2.3* 2.6*   TBILI 0.3 0.3   * 48     No results found for: GLUCPOC  No results for input(s): PH, PCO2, PO2, HCO3, FIO2 in the last 72 hours.   No results for input(s): INR, INREXT, INREXT in the last 72 hours. All Micro Results     None          Other pertinent lab: none    Total time spent with patient: 30 Minutes I personally reviewed chart, notes, data and current medications in the medical record. I have personally examined and treated the patient at bedside during this period.                  Care Plan discussed with: Patient, Care Manager, Nursing Staff, Consultant/Specialist and >50% of time spent in counseling and coordination of care    Discussed:  Care Plan and D/C Planning    Prophylaxis:  H2B/PPI    Disposition:  Home w/Family           ___________________________________________________    Attending Physician: Veronica Landrum MD

## 2021-11-18 NOTE — HOSPICE
[facility]  [phone]    Hospice Nursing PRE-Admission   Discharge Summary  Patient Name: Santo Hunter  YOB: 1960  Age: 63 y.o.     Date of PLANNED Hospice Admission: 21 GIP LOC @ [facility]- will go home w/ either sister or daughter    Primary Contact and Phone: Nishant Giang (daughter) [phone]       Noe Dodd [phone]     Durable DNR:   Yes        Home: TBD    Verbal CTI of terminal diagnosis with life expectancy of 6 months or less received from: Dr Chayo Reeder    For the Hospice Diagnosis of: pancreatic cancer    NCD: declined    Currently this patient has:  Un-accessed PortACath  Right  peripheral IV  Aspira drain    COVID Screening: pending 21    Hospice Consents: signed and scanned    Transportation by: medical transport  Scheduled for 2pm           Phone number to [facility] 3rd floor [facility] [phone], bedside RN  will call report to [facility]

## 2021-11-18 NOTE — PROGRESS NOTES
1420  Attempted to call report to [facility]. Left voicemail message to return.     [address]

## 2021-11-18 NOTE — PROGRESS NOTES
1420  Attempted to call report to [facility]. Left voicemail message to return. Call.

## 2021-11-18 NOTE — PROGRESS NOTES
2021     2:20 PM [facility] completed in [employer] portal for pt per Hospice Social Worker's request. [facility]AI reference # [id number].     12:44 PM Met with pt. Pt is aware and agreeable to discharge today to the [facility] at [facility].    11:43 AM Spoke with [facility]. Planning for pt to admit to [facility] today for pain management. [facility] will admit pt to their service once pt is at the [facility]. CM called to pt's [employer] [id number]([phone]) to schedule pt's transport for today to the [facility]([address] [facility], [address]) at [facility] in a medical taxi. Trip reference # is [id number]. Pt's nurse and Hospice RN aware of transport timing     8:34 AM   CARE MANAGEMENT   DAILY PROGRESS NOTE           NAME:   Moses Bentley   :     1960   MRN:     [id number]      RUR:  16%  Risk Level: Moderate  Value-based purchasing:   No   Bundle patient:  No     Transition of care plan:  1. Pt admitted with pancreatic cancer. Pt is followed by Hospitalist, Hem/Onc, and Palliative. 2. PT and OT evaluations completed which indicates no skilled needs. 3. Pt to discharge to [facility] today at [facility].   4. Outpatient follow up. 5. Transportation: Medicaid cab arranged for 2PM        Care Management Interventions  PCP Verified by CM:  Yes (Patient does not have a PCP, follows with oncologist)  Transition of Care Consult (CM Consult): [place]: Yes  Discharge Durable Medical Equipment: No  Physical Therapy Consult: Yes  Occupational Therapy Consult: Yes  Speech Therapy Consult: No  Support Systems: Child(leslye)  Confirm Follow Up Transport: Family  Discharge Location  Discharge Placement: Home with hospice

## 2021-11-18 NOTE — PROGRESS NOTES
Problem: Pain  Goal: *Control of Pain  Outcome: Progressing Towards Goal     Problem: Patient Education: Go to Patient Education Activity  Goal: Patient/Family Education  Outcome: Progressing Towards Goal     Problem: Activity Intolerance  Goal: *Oxygen saturation during activity within specified parameters  Outcome: Progressing Towards Goal  Goal: *Able to remain out of bed as prescribed  Outcome: Progressing Towards Goal     Problem:  Activity Intolerance  Goal: *Able to remain out of bed as prescribed  Outcome: Progressing Towards Goal     Problem: Patient Education: Go to Patient Education Activity  Goal: Patient/Family Education  Outcome: Progressing Towards Goal

## 2021-11-19 NOTE — HOSPICE
[facility] to Those in Need  [phone]    Social Work Admission Note  Patient Name: Xavier Camarillo  YOB: 1960  Age: 63 y.o. Date of Visit: 11/19/21  Facility of Care: [facility]  Patient Room: 12/01     Hospice Attending: Peter Gibson MD  Hospice Diagnosis: Pancreatic Cancer     Level of Care:    [x]  GIP    []  Respite   []  Routine    NARRATIVE   LCSW met with pt at bedside. He was received lying in hospital bed with lights off and shades closed to the windows. He was easily roused and LCSW sat in chair next to him to address several aspects of his care. LCSW inquired about an AMD or who he would like to speak for him when he can no longer speak for himself. Pt under the impression that he completed an AMD naming his sister as his mPOA. This is not on file however in Nov of 2020 he was approached to complete an AMD by Craig recinos and declined to complete at that time. LCSW placed call to pt's sister to f/u and request copy if she has one. If not, LCSW will suggest that we complete this asap. LCSW also addressed pt's wish for his final arrangements. Pt states he is unsure of his wishes and does not have the resources to make a plan. He thought that perhaps his father or brother may be able to assist with the financial part of this. LCSW encouraged him to consider what he would want so his family can carry it out, and will follow up with his sister and daughter. LCSW requested UAI be completed by CM at [facility]. This was done, but pt did not qualify due to his ongoing independence with ADLs. If pt stabilizes and returns home, an assessment for in home care vs. Placement will need to be done when his functioning declines to access more services. LCSW left VMs for both pt's daughter and sister to follow up with them and assess for bereavement risk. Will await calls back, at this time unclear how they are coping with pt's prognosis.  LCSW did receive word from RN Liaison that family is willing to take pt home should he stabilize, and LCSW will make plans for dc depending on his progress next week. 430pm: LCSW received call back from pt's daughter. She works nights and sounded groggy. She shared that she would not be able to visit until Monday. LCSW reviewed what was discussed with her father: AMD, final arrangements and discharge planning. LCSW made her aware that this will be addressed again on Monday after assessment of pt's condition and more is known about his comfort level. She is accepting. LCSW will continue to follow and address further on Monday. ADVANCE CARE PLANNING    Code Status: DNR  Durable DNR: X Yes  _ No  Advance Care Planning 2021   Patient's Healthcare Decision Maker is: -   Confirm Advance Directive None   Patient Would Like to Complete Advance Directive No   Does the patient have other document types -       Relationship Status:  []  Single     []        []      []  Domestic Partner     []  /  []  Common Law  []    [x]  Unknown    If in a relationship, name of partner/spouse:  Duration of relationship:    Anabaptism: NO PREFERENCE     Home: TBD, LCSW addressed this with pt. He is unsure of his wishes right now and states he does not have the resources to make a plan. LCSW encouraged him to consider and to share with his family. Also reached out to pt's sister and dtr to follow up  Resources Provided: Supportive counseling at bedside regarding advanced care planning    Social Work Initial Assessment     Gender:  male    Race/Ethnicity: (waylon all that apply)  []  American [place] ([facility]) or [place] Native  []    []  Black or [place] American  []   or   []   or [place]  [x]  Josh Dines  []  Unknown      Service:    []  Yes   []  No       []  Unknown  Appropriate for Pinning Ceremony:   []  Yes      []  No  Is patient using VA benefits?    []  Yes      []  No     Primary Fernando Nieto  []   Needed  []   utilized during visit    Ability to express thoughts/needs/feelings  [x]  Expressed thoughts/feelings/needs without difficulty  []  Requires extra time and cuing  []  Speech limited single words  []  Uses only gestures (eye, blinking eye or head movement/pointing)  []  Unable to express thoughts/feelings/needs (speech unintelligible or inappropriate)  []  Unresponsive  Notes:      Mental Status:  []  Alert-oriented to:     []  Person     []  Place     []  Time  []  Comatose-responds to:    []   Verbal stimuli    []  Tactile stimuli    []  Painful stimuli  []  Forgetful  []  Disoriented/Confused  [x]  Lethargic  []  Agitated  []  Other (specify):    Notes:      Patients description of Illness/Current Health Status:    [x]  Patient unable to discuss  []  Patient unwilling to discuss  []  (Specify)        Knowledge/Understanding of Disease Process  Patient:    []  Demonstrates knowledge/understanding of disease process   [x]  Demonstrates knowledge/understanding of treatment plan   [x]  Demonstrates knowledge/understanding of prognosis   []  Demonstrates acceptance of prognosis   []  Demonstrates knowledge/understanding of resuscitation status   []  Other (specify)  Caregiver:   []  Demonstrates knowledge/understanding of disease process   []  Demonstrates knowledge/understanding of treatment plan   []  Demonstrates knowledge/understanding of prognosis   []  Demonstrates acceptance of prognosis   []  Demonstrates knowledge/understanding of resuscitation status   []  Other (specify)  Notes:      Patients living arrangement/care setting:  Use the PRIOR COLUMN when the PATIENTS current health status necessitated a change in his/her primary residence.     Prior Current Response              [x]             []    Patients own home/residence              []             []    Home of family member/friend              []             []    Boarding home []             []    Assisted living facility/nursing home center              []             []    Hospital/Acute care facility              []             []    Skilled nursing facility              []             []    Long term care facility/Nursing home              []             [x]    Hospice in Patient      Primary Caregiver:  []  No Primary Caregiver  Name of Primary Caregiver: Daisy Garza [phone]  Relationship or Primary Caregiver:    []  Spouse/Significant other       [x]  Natural Child        []  Step child       []  Sibling   []  Parent   []  Friend/Neighbor   []  Community/Congregational Volunteer   []  Paid help   []  Other (specify):___________  Notes:       Family members/Significant others:  Name: [employer]   Relationship: Sister  Phone Number: [phone]  Actively involved in care? [x]  Yes  []  No    Name:  Relationship:  Phone Number:  Actively involved in care? []  Yes  []  No    Name:  Relationship:  Phone Number:  Actively involved in care?   []  Yes  []  No    Social support systems: (select ONE best description)  []  Excellent social support system which includes three or more family members or friends  [x]  Good social support system which includes two or less members or friends  []  [address] support which includes one family member or friend  []  Poor social support; no family members or friends; basically ALONE  Notes:      Emotional Status: (waylon all that apply)    Patient Caregiver Response                 []                [x]    Mood/Affect stable and appropriate                   []                []    Angry                 []                []    Anxious                 []                []    Apprehensive                 []                []    Avoidant                 []                []    Clinging                 []                []    Depressed                 []                []    Distraught                 []                []    Elated                 [] []    Euphoric                 []                []    Fearful                 []                []    Flat Affect                 []                []    Helpless                 []                []    Hostile                 []                []    Impulsive                 []                []    Irritable                 []                []    Labile                 []                []    Manic                 []                []    Restlessness                 []                []    Sad                 []                []    Suspicious                 []                []    Tearful                 []                []    Withdrawn     Notes:     Coping Skills (strengths/weakness):    Patient: Coping Skills (strength/weakness):    Family/caregiver (strength/weakness):     Gepp of care (waylon all that apply):     [x]  No burden evident   []  Family must administer medications   []  Illness causing financial strain   []  Family/Support feels overwhelmed   []  Family/Support sleep disturbed with patients care   []  Patients care causes extra physical stress  of death  []  Illness causes changes in family lifestyle  []  Illness impacting family/support employment  []  Family experiencing increased time demands  []  Patients behavior endangers family  []  Denial of patients illness  []  Concern over outcome of illness/fear  []  Patients behavior embarrassing to family   Notes:      Risk Factors: (waylon all that apply):    [x]  No burden evident   []  Alcohol abuse  []  Financial resources inadequate to meet basic needs (food/house/etc)  []  Financial resources inadequate to meet health care needs (supplies/equipment/medications)  []  Food/nutrition resources inadequate  []  Home environment unsafe/inadequate for home care  []  Homicidal risk  []  Lives alone or without concerned relatives  []  Multiple medications/complex schedule  []  Physical limitations increase likelihood of falls  []  Plan of care/treatments complicated  []  Substance use/abuse  []  Suicidal risk  []  Visual impairment threatens safety/ability to perform self-care  []  Other (specify):     Abuse/Neglect (actual/potential risks):  [x]  No signs of abuse/neglect  []  History of abuse/neglect                 []  HVVZMIXA          []  Sexual  []  History of domestic violence  []  Lacks adequate physical care  []  Lacks emotional nurturing/support  []  Lacks appropriate stimulation/cognitive experiences  []  Left alone inappropriately  []  Lacks necessary supervision  []  Inadequate or delayed medical care  []  Unsafe environment (i.e guns/drug use/history of violence in the home/etc.)  []  Bruising or other physical signs of injury present  []  Other (specify):  Notes:   []  Refer to child/adult protective services      Current Sources of Stress (in Addition to Current Illness):   [x]  None reported  []  Bills/Debt    []  Career/Job change    []   (short term)    []   (long term)    []  Death of a child (recent)    []  Death of a parent (recent)   []  Death of a spouse (recent)   []  Employment status changed   []  Family discord    []  Financial loss/Inadequate inther (specify):come  []  Job loss  []  Legal issues unresolved  []  Lifestyle change  []  Marital discord  []  Marriage within the last year  []  Paperwork (insurance/legal/etc) overwhelming  []  Separation/Divorce  []  Other (specify):  Notes:      Current [employer] Being Utilized     1. [facility] Care        Interventions/Plan of Care     1. Assess social and emotional factors related to coping with end of life issues  2. Community resource planning/referral   3. Relocation to different care setting if/when symptoms stabilize  4. Discharge Planning      1.  Should pt stabilize both his sister and daughter have offered for him to come and live with them    MSW Assessment Completed by: Ismael Rodriguez  11/19/21    Time In: 200      Time Out: 230

## 2021-11-19 NOTE — PROGRESS NOTES
[address] [place] [address]   [facility]  [phone]    Patient Name: Joana Estrella  YOB: 1960    Date of Provider Hospice Visit: 11/19/21    Level of Care:   [x] General Inpatient (GIP)    [] Routine   [] Respite    Current Location of Care:  [] [facility] [] [facility] [] [facility] [] [facility] [x] Hospice [facility], patient referred from:  [] [facility] [x] [facility] [] [facility] [] [facility] [] Home [] Other:     Date of Original Hospice Admission: 11/18/21  Hospice Medical Director at time of admission: [address] [place] [address] Diagnosis: Advanced Pancreatic Cancer  Diagnoses RELATED to the terminal prognosis: malignant ascites  Other Diagnoses: protein calorie malnutrition, tobacco use     HOSPICE SUMMARY   Do not cut and paste chart information other than imaging findings    Joana Estrella is a 63y.o. year old who was admitted to [address] [place] [address]. The patient's principle diagnosis of advanced maligannt pancreatic adenocarcinoma with malignant ascites has resulted in further worsening function and decline with increased abdominal pain that has been difficult to control with oral opioids. Pt also is severely malnourished. Pt had aspira drain placed in hospital. Pt has been having poor appetite, decreased oral intake and increased fatigue, weakness over past few days. PPS 40% but declining fast  Pt has chosen hospice care due to disease progression and overwhelming symptoms due to lack of regular treatment. Refer to LCD   CT Abdomen:  IMPRESSION     1. Large volume ascites, new. No definite change in small submental soft tissue  densities left mid and upper abdomen. 2. Possible slight increase in ill-defined pancreatic body mass. Slight  worsening of focal narrowing/short segment occlusion at the junction of the  superior mesenteric vein and portal vein. 3. Slightly nodular border of the liver with no discrete liver mass.   4. Irregular wall thickening of the sigmoid colon/rectosigmoid junction may be  due to a chronic infectious/inflammatory process, with small fluid collection in  the wall the sigmoid colon slightly larger than prior study. Neoplastic etiology  cannot be excluded. FNAC: poorly differentiated adenocarcinoma          HOSPICE DIAGNOSES   Active Symptoms:  1. Cancer associated pain  2  Cancer related fatigue/weakness  3. Poor appetite  4. Malignant Ascites  5. Nicotine dependence  6. Anxiety  7. Hospice care     PLAN   1. Continue  GIP LOC as his pain is not well controlled and requires close monitoring with frequent dosing and adjustments. If pt can tolerate oral pain medications and pain can be adequately controlled with oral medications then he could possibly go home. If not then will need to consider maybe PCA for pain control. 2. Pt currently able to tolerate oral pain meds; will increase oxycontin to 20mg twice daily orally. 3. Continue with the dilaudid 4mg oral every hour as needed for moderate pain and   4. continue with dilaudid 1mg IV every 15mts as needed for severe pain as this works quickly to bring his pain numbers down  5. He reports pain level as a 6, would like to get this down at at least a 4  6. Pt does not want a PCA pump at this time  7. Ativan 1mg IV every 15mts as needed for anxiety  8. Nicotine patch to be started as pt has tobacco dependence  9. If pain controlled and pt unable to take oral meds then considering fentanyl patch will be good too along with PCA bolus. 10. Continue to drain the aspira as this helps remove pressure form his abdomen  11. Continue to drain daily up to 2liters as needed for comfort. Pt does feel better after fluid is drained; reduced abdominal stretch and pain  12. Other comfort meds as needed    13.  and SW to support family needs  15. Disposition: as above, may be able to go home for short time but overall has poor prognosis  15.  Hospice Plan of care was reviewed in detail and agree with current plan of care    Prognosis estimated based on 11/19/21 clinical assessment is:   [] Hours to Days    [] Days to Weeks    [x] Other:    Communicated plan of care with: Hospice Case Manager; Hospice IDT; Care Team     GOALS OF CARE     Patient/Medical POA stated Goal of Care: pain control, comfort    [x] I have reviewed and/or updated ACP information in the Advance Care Planning Navigator. This information is available in the [address] link in the patient's chart header. Primary Decision [facility] (Health Care Agent):   Primary Decision Maker: Allie Sawyer Daughter - [phone]    Resuscitation Status: DNR  If DNR is there a Durable DNR on file? : [] Yes [] No (If no, complete Durable DNR)    HISTORY     History obtained from: patient, chart review    CHIEF COMPLAINT: pain is real bad if I lay flat  The patient is:   [x] Verbal  [] Nonverbal  [] Unresponsive    HPI/SUBJECTIVE:  63year old male with hx of pancreatic adenocarcinoma which has caused cachexia and significant weight loss, abdominal pain and poor appetite. He endorses some anxiety over his condition. He is now on hospice service and GIP for pain control because he has never had adequate pain control. Pt lying in bed and states this is the most comfortable he has been in a long time however he does complain of pain 6/10 and prior to the administration of IV dilaudid his pain was up to 9/10.            REVIEW OF SYSTEMS     The following systems were: [x] reviewed  [] unable to be reviewed    Positive ROS include:  Constitutional: fatigue, weakness, in pain, short of breath  Ears/nose/mouth/throat: increased airway secretions  Respiratory: shortness of breath, wheezing  Gastrointestinal: poor appetite, nausea, vomiting, abdominal pain, constipation, diarrhea  Musculoskeletal: pain, deformities, swelling legs  Neurologic:confusion, hallucinations, weakness  Psychiatric: anxiety, feeling depressed, poor sleep  Endocrine:     Adult Non-Verbal Pain Assessment Score: Face  [] 0   No particular expression or smile  [] 1   Occasional grimace, tearing, frowning, wrinkled forehead  [] 2   Frequent grimace, tearing, frowning, wrinkled forehead    Activity (movement)  [] 0   Lying quietly, normal position  [] 1   Seeking attention through movement or slow, cautious movement  [] 2   Restless, excessive activity and/or withdrawal reflexes    Guarding  [] 0   Lying quietly, no positioning of hands over areas of body  [] 1   Splinting areas of the body, tense  [] 2   Rigid, stiff    Physiology (vital signs)  [] 0   Stable vital signs  [] 1   Change in any of the following: SBP > 20mm Hg; HR > 20/minute  [] 2   Change in any of the following: SBP > 30mm Hg; HR > 25/minute    Respiratory  [] 0   Baseline RR/SpO2, compliant with ventilator  [] 1   RR > 10 above baseline, or 5% drop SpO2, mild asynchrony with ventilator  [] 2   RR > 20 above baseline, or 10% drop SpO2, asynchrony with ventilator     FUNCTIONAL ASSESSMENT     Palliative Performance Scale (PPS): 30-40%       PSYCHOSOCIAL/SPIRITUAL ASSESSMENT     Active Problems:    * No active hospital problems.  *    Past Medical History:   Diagnosis Date    Gout     Pancreat[facility] cancer ([facility] [address]     Psoriasis     Tobacco abuse       Past Surgical History:   Procedure Laterality Date    HX ORTHOPAEDIC      femur repair    HX ORTHOPAEDIC      lumbar compressed fracture    IR INSERT INTRAPERI TUNL CATH PERC  11/17/2021    IR INSERT TUNL CVC W PORT OVER 5 YEARS  11/24/2020      Social History     Tobacco Use    Smoking status: Current Every Day Smoker     Packs/day: 1.00    Smokeless tobacco: Never Used    Tobacco comment: last week was last cigarette   Substance Use Topics    Alcohol use: Yes     Family History   Problem Relation Age of Onset    Pancreatic Cancer Brother       No Known Allergies   Current Facility-Administered Medications   Medication Dose Route Frequency    oxyCODONE ER (OxyCONTIN) tablet 20 mg  20 mg Oral Q12H    bisacodyL (DULCOLAX) suppository 10 mg  10 mg Rectal DAILY PRN    acetaminophen (TYLENOL) suppository 650 mg  650 mg Rectal Q6H PRN    LORazepam (ATIVAN) injection 1 mg  1 mg IntraVENous Q15MIN PRN    HYDROmorphone (DILAUDID) injection 1 mg  1 mg IntraVENous Q15MIN PRN    ketorolac (TORADOL) injection 30 mg  30 mg IntraVENous Q6H PRN    glycopyrrolate (ROBINUL) injection 0.2 mg  0.2 mg IntraVENous Q4H PRN    HYDROmorphone (DILAUDID) tablet 4 mg  4 mg Oral Q1H PRN    nicotine (NICODERM CQ) 21 mg/24 hr patch 1 Patch  1 Patch TransDERmal DAILY        PHYSICAL EXAM     Wt Readings from Last 3 Encounters:   11/15/21 52.9 kg (116 lb 10 oz)   10/04/21 52.9 kg (116 lb 9.6 oz)   10/04/21 52.9 kg (116 lb 10 oz)       Visit Vitals  /60 (BP Patient Position: Lying)   Pulse 80   Temp 97.7 °F (36.5 °C)   Resp 16   SpO2 90%       Supplemental O2  [] Yes  [x] NO  Last bowel movement: today 11/18/21    Currently this patient has:  [x] Peripheral IV [] PICC  [x] PORT [] ICD    [] Duke Catheter [] NG Tube   [] PEG Tube    [] Rectal Tube [] Drain  [] Other:     Constitutional: awake, pleasant, verbal, appears in some distress, laying in bed curled up to side and knees flexed and holding his belly  Eyes: pallor  ENMT: clear mucosa, no lesions  Cardiovascular: normal rate and rhythm, no edema  Respiratory: no breathing distress  Gastrointestinal: distended abdomen, tense, no masses, ascites +, aspira drain right side, tenderness to palpation  Musculoskeletal:thin extremities, no deformities  Skin:no lesions  Neurologic:no obvious deficits  Psychiatric: anxious  Other:       Pertinent Lab and or Imaging Tests:  Lab Results   Component Value Date/Time    Sodium 139 11/16/2021 12:42 AM    Potassium 3.9 11/16/2021 12:42 AM    Chloride 107 11/16/2021 12:42 AM    CO2 29 11/16/2021 12:42 AM    Anion gap 3 (L) 11/16/2021 12:42 AM    Glucose 98 11/16/2021 12:42 AM    BUN 10 11/16/2021 12:42 AM    Creatinine 0.77 11/16/2021 12:42 AM BUN/Creatinine ratio 13 11/16/2021 12:42 AM    GFR est AA >60 11/16/2021 12:42 AM    GFR est non-AA >60 11/16/2021 12:42 AM    Calcium 7.9 (L) 11/16/2021 12:42 AM     Lab Results   Component Value Date/Time    Protein, total 5.4 (L) 11/16/2021 12:42 AM    Albumin 2.3 (L) 11/16/2021 12:42 AM         Edy Granger, NP

## 2021-11-19 NOTE — HOSPICE
This was an initial visit to assess needs and offer support. Mr Jasvir Smoker was in bed. He invited me into the room when I knocked on the door. He immediately said he was ok. I asked if I could introduce myself. He said yes and I did. He thanked me for coming and repeated that he is okay. No needs verbalized. He did say prayer for him and his family would be okay. I assured him I would hold him in my prayers.

## 2021-11-19 NOTE — PROGRESS NOTES
0700  Report received. 0715  Pt lying in bed, awake. Pt reports pain is 7/10  Pt just medicated with IV dilaudid. Lungs clear, pt is on Ra. No cough noted. Hyperactive bowel sounds. Last reported BM was last night. Abd is slightly distended and tender to touch near the aspira drain site. Dressing is dry and intact. Pt is continent of bowel and bladder. No edema noted. Skin is intact. Pt has and IV in his R Ac, dressing is clear and intact. [address]  NP Gemma Clemons rounding. Oxycodone increased to 20 BID and continue to use IV Dilaudid. 0830  Pt sleep. 56  Pt continues to sleep. 1020  Pt sleeping. No facial grimacing  1100  Pt ringing out. Pt needing to use the restroom. Pt ambulates to the bathroom  Without assist.  Pt reports pain is a 5 and he feels much better. 1116  Pt medicated with scheduled oxycodone. No PRN needed at this time. Pt sitting up  In bed eating his breakfast.    1220  Pt back to sleep. 1320  Pt co pain. 8/10 in abd. Pt medicated with dilaudid by AP. Pt sitting up eating his lunch. Pt tolerated 100%. Pt's abd tight. Aspira drain drained 2,000cc of clear  Yellow fluid. 1400  Pt asleep. 1500  Pt asleep. 1600  Pt up to the bathroom to void. No complaints. 1645  Pt wanting to void, and take a shower. Pt also requesting medication. Advised pt that we would medicated after his shower. Pt agreed. Pt unsafe in the bathroom after medications. 1750  Pt ringing out. See AP note. Pt medicated with dilaudid. 1815  Pt sleeping  1845  Pt  Resting. No facial grimacing. 1900 report given. NAME OF PATIENT:  Jennifer Otero    LEVEL OF CARE:  GIP    REASON FOR GIP:   Pain, despite numerous changes in medications, Medication adjustment that must be monitored 24/7 and Stabilizing treatment that cannot take place at home. Medication adjustments.       *PATIENT REMAINS ELIGIBLE FOR GIP LEVEL OF CARE AS EVIDENCED BY: (MUST BE ADDRESSED OF PATIENT GIP)  Medication adjustments, Frequent nursing assessments, IV medications. REASON FOR RESPITE:  n/a    O2 SAFETY:  RA    FALL INTERVENTIONS PROVIDED:   Implemented/recommended use of non-skid footwear, Implemented/recommended use of fall risk identification flag to all team members, Implemented/recommended assistive devices and encouraged their use, Implemented/recommended resources for alarm system (personal alarm, bed alarm, call bell, etc.) , Implemented/recommended environmental changes (remove hazards, lower bed, improve lighting, etc.) and Implemented/recommended increased supervision/assistance    INTERDISPLINARY COMMUNICATION/COLLABORATION:  Physician, MSW, [place] and RN, CNA    NEW MEDICATION INITIATION DOCUMENTATION:  No new medications initiated    Reason medication is being initiated:  n/a    MD / Provider name consulted re: change in status / initiation of new medication:  n/a    New Symptom(s):  n/a    New Order(s):  n/a    Name of the person notified of the changes:  n/a    Name of person being taught:  n/a    Instructions given:  n/a    Side Effects taught:  n/a    Response to teaching:  n/a      COMFORTABLE DYING MEASURE:  Is Patient/family satisfied with symptom level?  yes    DISCHARGE PLAN:  Pt will remain at the [facility] until his pain is managed and then return home to his family and continue to be [facility].

## 2021-11-19 NOTE — PROGRESS NOTES
Problem: Pain  Goal: *Control of Pain  Outcome: Progressing Towards Goal   Assess character of pain. Have pt rate pain via 0 - 10 scale. Medicate with prn medications to manage pain  Assess effectiveness of pain medication.

## 2021-11-19 NOTE — HOSPICE
1325  Patient complained of 8/10 abdominal pain. Medicated with PRN IV dilaudid. 1400  Patient resting quietly.     1750  Patient c/o abdominal pain 8/10, medicated with prn iv dilaudid

## 2021-11-19 NOTE — PROGRESS NOTES
1900 Report received from [facility]. Pt is GIP level of care for management of pain, agitation and education of pt in  Aspira drain use and care. Dx Pancreatic Cancer. 1930 Pt is resting in bed. Has called out to go to restroom for BM. He has to go quickly when his bowels have to move. Describes as loose and like foam and he must go urgently when he feel the need. He has steady gait. He is alert and oriented. States his pain is rated as 0 -1 since Abdomen drained earlier. Aspira drain dressing dry and intact. 2030 Resting quietly in bed. Denies pain at this time. Has had 2 BMs he states in last 30 mins. Headed to bathroom now. 2110 Pt request IV pain medication. Rates pain as 5 of 10. Also hopes it will settle him down and slow the stools so that he can sleep. Dilaudid 1mg given IV for pain. 2130 Appears to be sleeping. Eyes are closed. Respirations easy. 2230 Continue to monitor. Appears to be sleeping. No signs of discomfort. 2330 No signs of pain. Appears to be sleeping. 0030 Awake talking on phne. Denies pain or discomfort. 0130 Resting quietly. Appears to be sleeping. 0230 Continue to monitor. Medicate as needed for c/o pain. 0325 Pt up to bathroom to void. No BM. Complains of pain in abdomen. Dilaudid 1 mg given IV for pain rated 4 of 10. Refuses Nicotine patch. States only smokes 4 cigarettes a day and he does not want any more chemicals in his body. 0400 Appears to be sleeping. No facial grimace or signs of discomfort. 0500 Resting quietly. No facial grimace. 0600 Continues to rest without signs of pain or discomfort.  0700 Complains of pain rated at 8 of 10 at right abdominal Aspira drain site. Dilaudid 1 mg given IV for pain. Report to oncoming nurse.       NAME OF PATIENT:  Steven Vinson    LEVEL OF CARE:  GIP    REASON FOR GIP:   Pain, despite numerous changes in medications, Medication adjustment that must be monitored 24/7 and Stabilizing treatment that cannot take place at home    *PATIENT REMAINS ELIGIBLE FOR GIP LEVEL OF CARE AS EVIDENCED BY: Frequent assessment of the pt and medication adjustments. IV medications required to manage symptoms. REASON FOR RESPITE:  na    O2 SAFETY:  na    FALL INTERVENTIONS PROVIDED:   Implemented/recommended use of non-skid footwear, Implemented/recommended use of fall risk identification flag to all team members, Implemented/recommended resources for alarm system (personal alarm, bed alarm, call bell, etc.) , Implemented/recommended environmental changes (remove hazards, lower bed, improve lighting, etc.) and Implemented/recommended increased supervision/assistance    INTERDISPLINARY COMMUNICATION/COLLABORATION:  Physician, MSW, [place] and RN, CNA    NEW MEDICATION INITIATION DOCUMENTATION:  na    Reason medication is being initiated:  na    MD / Provider name consulted re: change in status / initiation of new medication:  na    New Symptom(s):  na    New Order(s):  na    Name of the person notified of the changes:  na    Name of person being taught:  na    Instructions given:  na    Side Effects taught:  na    Response to teaching:  na      COMFORTABLE DYING MEASURE:  Is Patient/family satisfied with symptom level? Yes    DISCHARGE PLAN:  Return home when Symptoms are managed and care of Aspira drain can be managed in the home.

## 2021-11-19 NOTE — PROGRESS NOTES
Problem: Pain  Goal: *Control of Pain  11/19/2021 1150 by Arron Bales RN  Outcome: Progressing Towards Goal  11/19/2021 1150 by Arron Bales RN  Outcome: Progressing Towards Goal     Problem: Patient Education: Go to Patient Education Activity  Goal: Patient/Family Education  11/19/2021 1150 by Arron Bales RN  Outcome: Progressing Towards Goal  11/19/2021 1150 by Arron Bales RN  Outcome: Progressing Towards Goal     Problem: Activity Intolerance  Goal: *Oxygen saturation during activity within specified parameters  11/19/2021 1150 by Arron Bales RN  Outcome: Progressing Towards Goal  11/19/2021 1150 by Arron Bales RN  Outcome: Progressing Towards Goal  Goal: *Able to remain out of bed as prescribed  11/19/2021 1150 by Arron Balse RN  Outcome: Progressing Towards Goal  11/19/2021 1150 by Arron Bales RN  Outcome: Progressing Towards Goal     Problem: Patient Education: Go to Patient Education Activity  Goal: Patient/Family Education  11/19/2021 1150 by Arron Bales RN  Outcome: Progressing Towards Goal  11/19/2021 1150 by Arron Bales RN  Outcome: Progressing Towards Goal     Problem: Falls - Risk of  Goal: *Absence of Falls  Description: Document Savanna Led Fall Risk and appropriate interventions in the flowsheet.   11/19/2021 1150 by Arron Bales RN  Outcome: Progressing Towards Goal  Note: Fall Risk Interventions:  Mobility Interventions: Bed/chair exit alarm         Medication Interventions: Bed/chair exit alarm    Elimination Interventions: Bed/chair exit alarm, Call light in reach           11/19/2021 1150 by Arron Bales RN  Outcome: Progressing Towards Goal  Note: Fall Risk Interventions:  Mobility Interventions: Bed/chair exit alarm         Medication Interventions: Bed/chair exit alarm    Elimination Interventions: Bed/chair exit alarm, Call light in reach              Problem: Patient Education: Go to Patient Education Activity  Goal: Patient/Family Education  11/19/2021 1150 by Jacqueline Dickinson RN  Outcome: Progressing Towards Goal  11/19/2021 1150 by Jacqueline Dickinson RN  Outcome: Progressing Towards Goal

## 2021-11-20 NOTE — PROGRESS NOTES
Problem: Falls - Risk of  Goal: *Absence of Falls  Description: Document Kris Moore Fall Risk and appropriate interventions in the flowsheet.   Outcome: Progressing Towards Goal  Note: Fall Risk Interventions:  Mobility Interventions: Bed/chair exit alarm         Medication Interventions: Bed/chair exit alarm    Elimination Interventions: Bed/chair exit alarm, Call light in reach

## 2021-11-20 NOTE — HOSPICE
0700 Report received from [facility]. 0745 Patient complaining of pain in abdomen, PRN IV dilaudid given. Cup of apple juice gotten for patient. 0820 Patient states pain is better at this time. Patient states his breakfast is very good. 6826 Patient ate 100% of breakfast. Patient's aspira drain drained, 1100 ml taken off. Patient tolerated procedure well without complications of pain or lightheadedness. Patient given scheduled medications, see MAR.     1000 Patient resting in bed quietly with eyes closed, neutral facial expression noted. 1100 Patient resting in bed quietly, eyes are closed, neutral facial expression noted. 1155 Patient complaining of pain in abdomen, PRN IV dilaudid given. 1215 Patient lunch tray set up for patient to eat, patient states pain is better at this time. 1300 Patient ate 75% of meal.    1330 Patient denies pain at this time, patient states he is looking forward to his sister visiting him later. 1340 Patient's sister at the bedside. 1440 Patient's sister left the bedside. 1455 Patient complaining of pain in abdomen, patient given PRN hydromorphone, see MAR.    1520 Patient states he does not want to take a bath today because he showered last night. Patient states pain is a little better right now. [phone] Patient's daughter at the bedside. 1615 Patient resting in bed quietly, denies pain at this time. [id number] Patient visiting with cousin at bedside, patient's dinner tray set up to eat. 1747 Patient given PRN IV dilaudid for pain in abdomen. Will continue to monitor.         NAME OF PATIENT:  [place] Sameer    LEVEL OF CARE:  GIP    REASON FOR GIP:   Pain, despite numerous changes in medications, Medication adjustment that must be monitored 24/7 and Stabilizing treatment that cannot take place at home    *PATIENT REMAINS ELIGIBLE FOR GIP LEVEL OF CARE AS EVIDENCED BY:  Need for PRN IV medication for pain management     REASON FOR RESPITE:  n/a  O2 SAFETY:  n/a    FALL INTERVENTIONS PROVIDED:   Implemented/recommended use of non-skid footwear, Implemented/recommended use of fall risk identification flag to all team members and Implemented/recommended increased supervision/assistance    INTERDISPLINARY COMMUNICATION/COLLABORATION:  Physician, MSW, [place] and RN, CNA    NEW MEDICATION INITIATION DOCUMENTATION:  n/a  Reason medication is being initiated:  n/a    MD / Provider name consulted re: change in status / initiation of new medication:  n/a    New Symptom(s):  n/a  New Order(s):  n/a    Name of the person notified of the changes:  n/a    Name of person being taught:  n/a    Instructions given:  n/a    Side Effects taught:  n/a    Response to teaching:  n/a      COMFORTABLE DYING MEASURE:  Is Patient/family satisfied with symptom level? Yes    DISCHARGE PLAN:  Home when symptoms are managed.

## 2021-11-20 NOTE — PROGRESS NOTES
Problem: Pain  Goal: *Control of Pain  Outcome: Progressing Towards Goal     Problem: Patient Education: Go to Patient Education Activity  Goal: Patient/Family Education  Outcome: Progressing Towards Goal     Problem: Activity Intolerance  Goal: *Oxygen saturation during activity within specified parameters  Outcome: Progressing Towards Goal  Goal: *Able to remain out of bed as prescribed  Outcome: Progressing Towards Goal     Problem: Patient Education: Go to Patient Education Activity  Goal: Patient/Family Education  Outcome: Progressing Towards Goal     Problem: Falls - Risk of  Goal: *Absence of Falls  Description: Document Efra Roman Fall Risk and appropriate interventions in the flowsheet. Outcome: Progressing Towards Goal  Note: Fall Risk Interventions:  Mobility Interventions: Bed/chair exit alarm         Medication Interventions: Bed/chair exit alarm    Elimination Interventions: Call light in reach, Bed/chair exit alarm              Problem: Patient Education: Go to Patient Education Activity  Goal: Patient/Family Education  Outcome: Progressing Towards Goal     Problem: Emotional Support Needs  Goal: Patient/family is receiving emotional support  Outcome: Progressing Towards Goal     Problem: Family Significant Other Needs  Goal: Patient/family will receive support from community resources  Outcome: Progressing Towards Goal     Problem: Pressure Injury - Risk of  Goal: *Prevention of pressure injury  Description: Document Waqas Scale and appropriate interventions in the flowsheet. Outcome: Progressing Towards Goal  Note: Pressure Injury Interventions:  Sensory Interventions: Assess changes in LOC, Float heels, Minimize linen layers, Turn and reposition approx.  every two hours (pillows and wedges if needed), Check visual cues for pain, Keep linens dry and wrinkle-free, Avoid rigorous massage over bony prominences    Moisture Interventions: Absorbent underpads, Check for incontinence Q2 hours and as needed, Minimize layers, Limit adult briefs, Moisture barrier    Activity Interventions: Assess need for specialty bed    Mobility Interventions: Turn and reposition approx.  every two hours(pillow and wedges), Assess need for specialty bed    Nutrition Interventions: Document food/fluid/supplement intake    Friction and Shear Interventions: Apply protective barrier, creams and emollients, Transferring/repositioning devices, Lift sheet, Transfer aides:transfer board/Jameel lift/ceiling lift                Problem: Patient Education: Go to Patient Education Activity  Goal: Patient/Family Education  Outcome: Progressing Towards Goal

## 2021-11-20 NOTE — PROGRESS NOTES
1900 Report received from [facility]. Pt is [facility] level of care for management of pain, and education in care of Aspira drain. Dx Pancreatic Cancer. 1930 Sitting on bedside, visitor in the room. Pt has grimace on face but states he will request medication after visitation. 2000 Request pain medication for abdominal pain rated at 8 of 10. Aspira drain dressing wet after taking shower. Site without signs of infection. Dressing changed using sterile technique. 2030 Resting quietly in bed. Appears to be sleeping. 2130 Continue to monitor. Resting quietly, appears to be sleeping. 2230 No signs or complaints of pain. 2315 Up to bathroom. Complains of abd pain rated 8 of 10. States medication relieves pain and allows him to sleep but only 3 hours. Dilaudid 1 mg given IV for pain. Sitting at bedside filling out menu. 2345 Resting quietly in bed. Appears to be sleeping. 0030 Continue to monitor and medicate for comfort. 0130 Appears to be resting quietly. No symptoms of pain noted. 0255 Pt up to bathroom. Request pain medication for abd pain rated 7 of 10. Decsribes near [address] tube insertion. Decsribes as cramping. Dilaudid 1 mg given IV for complaints of pain. 0330 Resting quietly, appears to be sleeping. 0430 Continues to rest quietly. 0530 Complains of abd pain 7 of 10. Dilaudid 1 mg given IV  Up to bathroom. State still having small amts of foamy liquid stools each time he voids. 3779  Appears to be sleeping. Medication effective to relieve pain.  0700 Report to oncoming nurse. NAME OF PATIENT:  Catherine Delgadillo    LEVEL OF CARE:  [facility]    REASON FOR GIP:   Pain, despite numerous changes in medications, Medication adjustment that must be monitored 24/7 and Stabilizing treatment that cannot take place at home    *PATIENT REMAINS ELIGIBLE FOR GIP LEVEL OF CARE AS EVIDENCED BY: Frequent nurse assessment and medication adjustments required to manage symptoms. IV medication required.       REASON FOR RESPITE:  na    O2 SAFETY:  na    FALL INTERVENTIONS PROVIDED:   Implemented/recommended use of non-skid footwear, Implemented/recommended use of fall risk identification flag to all team members, Implemented/recommended resources for alarm system (personal alarm, bed alarm, call bell, etc.) , Implemented/recommended environmental changes (remove hazards, lower bed, improve lighting, etc.) and Implemented/recommended increased supervision/assistance    INTERDISPLINARY COMMUNICATION/COLLABORATION:  Physician, MSW, [place] and RN, CNA    NEW MEDICATION INITIATION DOCUMENTATION:  na    Reason medication is being initiated:  na    MD / Provider name consulted re: change in status / initiation of new medication:  na    New Symptom(s):  na    New Order(s):  na    Name of the person notified of the changes:  na    Name of person being taught:  na    Instructions given:  na    Side Effects taught:  na    Response to teaching:  na      COMFORTABLE DYING MEASURE:  Is Patient/family satisfied with symptom level? Yes    DISCHARGE PLAN:  Remain GIP until symptoms are managed and pt can be cared for in the home.

## 2021-11-20 NOTE — PROGRESS NOTES
[address] [place] [address]   [facility] to Those in Need  [phone]    Patient Name: Lucia Randhawa  YOB: 1960    Date of Provider Hospice Visit: 11/20/21    Level of Care:   [x] General Inpatient (GIP)    [] Routine   [] Respite    Current Location of Care:  [] [facility] [] [facility] [] [facility] [] [facility] [x] Hospice House [facility], patient referred from:  [] [facility] [x] [facility] [] [facility] [] [facility] [] Home [] Other:     Date of Original Hospice Admission: 11/18/21  Hospice Medical Director at time of admission: [address] [place] [address] Diagnosis: Advanced Pancreatic Cancer  Diagnoses RELATED to the terminal prognosis: malignant ascites  Other Diagnoses: protein calorie malnutrition, tobacco use     HOSPICE SUMMARY   Do not cut and paste chart information other than imaging findings    Lucia Randhawa is a 63y.o. year old who was admitted to [address] [place] [address]. The patient's principle diagnosis of advanced maligannt pancreatic adenocarcinoma with malignant ascites has resulted in further worsening function and decline with increased abdominal pain that has been difficult to control with oral opioids. Pt also is severely malnourished. Pt had aspira drain placed in hospital. Pt has been having poor appetite, decreased oral intake and increased fatigue, weakness over past few days. PPS 40% but declining fast  Pt has chosen hospice care due to disease progression and overwhelming symptoms due to lack of regular treatment. Refer to LCD   CT Abdomen:  IMPRESSION     1. Large volume ascites, new. No definite change in small submental soft tissue  densities left mid and upper abdomen. 2. Possible slight increase in ill-defined pancreatic body mass. Slight  worsening of focal narrowing/short segment occlusion at the junction of the  superior mesenteric vein and portal vein. 3. Slightly nodular border of the liver with no discrete liver mass.   4. Irregular wall thickening of the sigmoid colon/rectosigmoid junction may be  due to a chronic infectious/inflammatory process, with small fluid collection in  the wall the sigmoid colon slightly larger than prior study. Neoplastic etiology  cannot be excluded. FNAC: poorly differentiated adenocarcinoma          HOSPICE DIAGNOSES   Active Symptoms:  1. Cancer associated pain  Constipation  2  Cancer related fatigue/weakness  3. Poor appetite  4. Malignant Ascites  5. Nicotine dependence  6. Anxiety  7. Hospice care     PLAN   1. Continue  GIP LOC as his pain is not well controlled and requires close monitoring with frequent dosing and recent adjustments. 2. Pt is tolerating his oral pain meds; is taking  oxycontin to 20mg twice daily orally. However he continues to have significant prn needs, has used  Dilaudid 1 mg IV PRN x 9 doses/24 hours. He has not used any of the PRN oral dilaudid ordered. In anticipation of patient being discharged home would like to see if symptoms can be well controlled on oral regimen. Nursing to encourage patient to try prn oral dilaudid first. with family would prefer oral regimeEncourage patient to use oral dilaudid first  3. Ativan 1mg IV every 15mts as needed for anxiety, has not required. 4. Nicotine patch to be started as pt has tobacco dependence  5. PCA may be option if patient not tolerating oral regimen. 6. Constipation- patient reporting not moving BM frequently enough, Increased Dulcolax to BID. 7. Continue to drain the aspira as this helps remove pressure form his abdomen  8. Continue to drain daily up to 2liters as needed for comfort. Pt does feel better after fluid is drained; reduced abdominal stretch and pain. He had 1 liter drained earlier today. 9. Other comfort meds as needed    10.  and SW to support family needs  6. Disposition: as above, may be able to go home for short time but overall has poor prognosis  12.  Hospice Plan of care was reviewed in detail and agree with current plan of care, RN    Prognosis estimated based on 11/20/21 clinical assessment is:   [] Hours to Days    [] Days to Weeks    [x] Other:    Communicated plan of care with: Hospice Nurse      GOALS OF CARE     Patient/Medical POA stated Goal of Care: pain control, comfort    [x] I have reviewed and/or updated ACP information in the Advance Care Planning Navigator. This information is available in the [address] link in the patient's chart header. Primary Decision [address] (Health Care Agent):   Primary Decision Maker: Maggie Mckeon - Daughter - [phone]    Resuscitation Status: DNR  If DNR is there a Durable DNR on file? : [x] Yes [] No (If no, complete Durable DNR)    HISTORY     History obtained from: patient, chart review    CHIEF COMPLAINT: pain ok  The patient is:   [x] Verbal  [] Nonverbal  [] Unresponsive    HPI/SUBJECTIVE:  63year old male with hx of pancreatic adenocarcinoma which has caused cachexia and significant weight loss, abdominal pain and poor appetite. He endorses some anxiety over his condition. He is now on hospice service and GIP for pain control because he has never had adequate pain control. Pt lying in bed and states this is the most comfortable he has been in a long time however he does complain of pain 6/10 and prior to the administration of IV dilaudid his pain was up to 9/10. REVIEW OF SYSTEMS     The following systems were: [x] reviewed  [] unable to be reviewed    Positive ROS include:  Constitutional: fatigue, weakness, in pain, short of breath  Ears/nose/mouth/throat: increased airway secretions  Respiratory: shortness of breath, wheezing  Gastrointestinal: poor appetite, nausea, vomiting, abdominal pain, constipation, diarrhea  Musculoskeletal: pain, deformities, swelling legs  Neurologic:confusion, hallucinations, weakness  Psychiatric: anxiety, feeling depressed, poor sleep  Endocrine:     Adult Non-Verbal Pain Assessment Score:      Face  [] 0   No particular expression or smile  [] 1   Occasional grimace, tearing, frowning, wrinkled forehead  [] 2   Frequent grimace, tearing, frowning, wrinkled forehead    Activity (movement)  [] 0   Lying quietly, normal position  [] 1   Seeking attention through movement or slow, cautious movement  [] 2   Restless, excessive activity and/or withdrawal reflexes    Guarding  [] 0   Lying quietly, no positioning of hands over areas of body  [] 1   Splinting areas of the body, tense  [] 2   Rigid, stiff    Physiology (vital signs)  [] 0   Stable vital signs  [] 1   Change in any of the following: SBP > 20mm Hg; HR > 20/minute  [] 2   Change in any of the following: SBP > 30mm Hg; HR > 25/minute    Respiratory  [] 0   Baseline RR/SpO2, compliant with ventilator  [] 1   RR > 10 above baseline, or 5% drop SpO2, mild asynchrony with ventilator  [] 2   RR > 20 above baseline, or 10% drop SpO2, asynchrony with ventilator     FUNCTIONAL ASSESSMENT     Palliative Performance Scale (PPS): 30-40%       PSYCHOSOCIAL/SPIRITUAL ASSESSMENT     Active Problems:    * No active hospital problems.  *    Past Medical History:   Diagnosis Date    Gout     Pancreatic cancer ([facility] [address]     Psoriasis     Tobacco abuse       Past Surgical History:   Procedure Laterality Date    HX ORTHOPAEDIC      femur repair    HX ORTHOPAEDIC      lumbar compressed fracture    IR INSERT INTRAPERI TUNL CATH PERC  11/17/2021    IR INSERT TUNL CVC W PORT OVER 5 YEARS  11/24/2020      Social History     Tobacco Use    Smoking status: Current Every Day Smoker     Packs/day: 1.00    Smokeless tobacco: Never Used    Tobacco comment: last week was last cigarette   Substance Use Topics    Alcohol use: Yes     Family History   Problem Relation Age of Onset    Pancreatic Cancer Brother       No Known Allergies   Current Facility-Administered Medications   Medication Dose Route Frequency    oxyCODONE ER (OxyCONTIN) tablet 20 mg  20 mg Oral Q12H    docusate sodium (COLACE) capsule 100 mg  100 mg Oral DAILY    bisacodyL (DULCOLAX) suppository 10 mg  10 mg Rectal DAILY PRN    acetaminophen (TYLENOL) suppository 650 mg  650 mg Rectal Q6H PRN    LORazepam (ATIVAN) injection 1 mg  1 mg IntraVENous Q15MIN PRN    HYDROmorphone (DILAUDID) injection 1 mg  1 mg IntraVENous Q15MIN PRN    ketorolac (TORADOL) injection 30 mg  30 mg IntraVENous Q6H PRN    glycopyrrolate (ROBINUL) injection 0.2 mg  0.2 mg IntraVENous Q4H PRN    HYDROmorphone (DILAUDID) tablet 4 mg  4 mg Oral Q1H PRN    nicotine (NICODERM CQ) 21 mg/24 hr patch 1 Patch  1 Patch TransDERmal DAILY        PHYSICAL EXAM     Wt Readings from Last 3 Encounters:   11/15/21 52.9 kg (116 lb 10 oz)   10/04/21 52.9 kg (116 lb 9.6 oz)   10/04/21 52.9 kg (116 lb 10 oz)       Visit Vitals  BP (!) 86/52 (BP 1 Location: Right lower arm, BP Patient Position: At rest;Lying;Lying left side)   Pulse 72   Temp 98.2 °F (36.8 °C)   Resp 18   SpO2 98%       Supplemental O2  [] Yes  [x] NO  Last bowel movement: today 11/18/21    Currently this patient has:  [x] Peripheral IV [] PICC  [x] PORT [] ICD    [] Duke Catheter [] NG Tube   [] PEG Tube    [] Rectal Tube [] Drain  [] Other:     Constitutional: awake, pleasant, verbal, appears in some distress, laying in bed curled up to side and knees flexed and holding his belly  Eyes: pallor  ENMT: clear mucosa, no lesions  Cardiovascular: normal rate and rhythm, no edema  Respiratory: no breathing distress  Gastrointestinal: distended abdomen, tense, no masses, ascites +, aspira drain right side, tenderness to palpation  Musculoskeletal:thin extremities, no deformities  Skin:no lesions  Neurologic:no obvious deficits  Psychiatric: anxious  Other:       Pertinent Lab and or Imaging Tests:  Lab Results   Component Value Date/Time    Sodium 139 11/16/2021 12:42 AM    Potassium 3.9 11/16/2021 12:42 AM    Chloride 107 11/16/2021 12:42 AM    CO2 29 11/16/2021 12:42 AM    Anion gap 3 (L) 11/16/2021 12:42 AM    Glucose 98 11/16/2021 12:42 AM    BUN 10 11/16/2021 12:42 AM    Creatinine 0.77 11/16/2021 12:42 AM    BUN/Creatinine ratio 13 11/16/2021 12:42 AM    GFR est AA >60 11/16/2021 12:42 AM    GFR est non-AA >60 11/16/2021 12:42 AM    Calcium 7.9 (L) 11/16/2021 12:42 AM     Lab Results   Component Value Date/Time    Protein, total 5.4 (L) 11/16/2021 12:42 AM    Albumin 2.3 (L) 11/16/2021 12:42 AM         Melody Fu NP

## 2021-11-21 NOTE — PROGRESS NOTES
Problem: Pain  Goal: *Control of Pain  Outcome: Progressing Towards Goal     Problem: Patient Education: Go to Patient Education Activity  Goal: Patient/Family Education  Outcome: Progressing Towards Goal     Problem: Activity Intolerance  Goal: *Oxygen saturation during activity within specified parameters  Outcome: Progressing Towards Goal  Goal: *Able to remain out of bed as prescribed  Outcome: Progressing Towards Goal     Problem: Patient Education: Go to Patient Education Activity  Goal: Patient/Family Education  Outcome: Progressing Towards Goal     Problem: Falls - Risk of  Goal: *Absence of Falls  Description: Document Jami Walter Fall Risk and appropriate interventions in the flowsheet. Outcome: Progressing Towards Goal  Note: Fall Risk Interventions:  Mobility Interventions: Bed/chair exit alarm         Medication Interventions: Bed/chair exit alarm    Elimination Interventions: Bed/chair exit alarm, Call light in reach    History of Falls Interventions: Door open when patient unattended         Problem: Patient Education: Go to Patient Education Activity  Goal: Patient/Family Education  Outcome: Progressing Towards Goal     Problem: Emotional Support Needs  Goal: Patient/family is receiving emotional support  Outcome: Progressing Towards Goal     Problem: Family Significant Other Needs  Goal: Patient/family will receive support from community resources  Outcome: Progressing Towards Goal     Problem: Pressure Injury - Risk of  Goal: *Prevention of pressure injury  Description: Document Waqas Scale and appropriate interventions in the flowsheet.   Outcome: Progressing Towards Goal  Note: Pressure Injury Interventions:  Sensory Interventions: Assess changes in LOC, Float heels, Maintain/enhance activity level, Monitor skin under medical devices    Moisture Interventions: Absorbent underpads, Minimize layers, Limit adult briefs, Moisture barrier    Activity Interventions: Assess need for specialty bed    Mobility Interventions: Assess need for specialty bed, Float heels, Turn and reposition approx.  every two hours(pillow and wedges)    Nutrition Interventions: Document food/fluid/supplement intake    Friction and Shear Interventions: Apply protective barrier, creams and emollients, Lift sheet, Minimize layers                Problem: Patient Education: Go to Patient Education Activity  Goal: Patient/Family Education  Outcome: Progressing Towards Goal

## 2021-11-21 NOTE — PROGRESS NOTES
Problem: Pain  Goal: *Control of Pain  Outcome: Progressing Towards Goal     Problem: Falls - Risk of  Goal: *Absence of Falls  Description: Document Shelby Fall Risk and appropriate interventions in the flowsheet.   Outcome: Progressing Towards Goal  Note: Fall Risk Interventions:  Mobility Interventions: Bed/chair exit alarm         Medication Interventions: Teach patient to arise slowly    Elimination Interventions: Call light in reach    History of Falls Interventions: Door open when patient unattended

## 2021-11-21 NOTE — HOSPICE
0700 Report received from [facility] Patient resting in bed quietly, eyes are closed. Patient appears to be sleeping at this time. 6028 Patient continues to appear to be sleeping at this time. 1061 Patient's breakfast left at the bedside. 0930 Patient resting in bed quietly, eyes are closed, neutral facial expression noted. Patient appears to be sleeping.     0950 Patient continues to appear to be sleeping. 1040 Patient woke up, complaining of pain and tightness in abdomen. Patient given scheduled medication and PRN IV dilaudid. Patient's aspira drain drained, 1000 ml taken off. Patient states he is not very hungry right now, only ate 25% of meal. Patient states he wants to go back to sleep. Belongings and call bell within reach. 1125 Patient resting in bed quietly, eyes are closed, patient appears to be sleeping at this time. PRN medication effective. 1220 Patient's lunch tray left at the bedside. 1250 Patient resting in bed quietly, eyes are closed, neutral facial expression noted. Patient appears to be sleeping at this time. 1340 Patient resting in bed quietly, eyes are closed, neutral facial expression noted. Patient appears to be sleeping at this time. 1430 Patient resting in bed quietly, appears to be sleeping at this time. 1525 Patient resting in bed quietly, eyes are closed, neutral facial expression noted. Patient appears to be sleeping at this time. 1550 Patient resting in bed quietly, appears to be sleeping at this time. 1615 Patient resting in bed quietly, appears to be sleeping at this time. 1700 Patient's dinner tray set up for patient. 1750 Patient ate 75% of meal. Patient complaining of pain in abdomen, patient given PRN IV dilaudid. [phone] Patient visiting with friends at the bedside.      NAME OF PATIENT:  Lucinda Fabian    LEVEL OF CARE:  GIP    REASON FOR GIP:   Pain, despite numerous changes in medications, Medication adjustment that must be monitored 24/7 and Stabilizing treatment that cannot take place at home    *PATIENT REMAINS ELIGIBLE FOR GIP LEVEL OF CARE AS EVIDENCED BY:  Need for PRN IV medication for symptom management       REASON FOR RESPITE:  n/a    O2 SAFETY:  n/a    FALL INTERVENTIONS PROVIDED:   Implemented/recommended use of non-skid footwear, Implemented/recommended use of fall risk identification flag to all team members and Implemented/recommended increased supervision/assistance    INTERDISPLINARY COMMUNICATION/COLLABORATION:  Physician, MSW, [place] and RN, CNA    NEW MEDICATION INITIATION DOCUMENTATION:  n/a    Reason medication is being initiated:  n/a    MD / Provider name consulted re: change in status / initiation of new medication:  n/a    New Symptom(s):  n/a    New Order(s):  n/a    Name of the person notified of the changes:  n/a    Name of person being taught:  n/a    Instructions given:  n/a    Side Effects taught:  n/a    Response to teaching:  n/a      COMFORTABLE DYING MEASURE:  Is Patient/family satisfied with symptom level? Yes    DISCHARGE PLAN:  Home when symptoms are managed.

## 2021-11-21 NOTE — PROGRESS NOTES
1900  Received report from Bettye Andujar, [address]  Performed brief assessment on patient due to visitor in the room. Will complete full assessment when visitor leaves for the evening. Patient reports pain in abdomen and requests pain medication. 1927  Dilaudid 1mg administered IV.    2000  Assessment completed as documented. 2100  Patient resting in bed. Patient reports that his abdomen remains sore from his recent procedure. 2114  Scheduled Oxycontin administered orally. 2200  Patient reports that pain medication has helped a bit, but he states that he believes the site of the aspira drain is going to be sore for a while as it heals. 2250  Patient resting in bed. Patient is rubbing abdomen, but denies pain. 2330  Patient resting in bed. States that he's trying to sleep. 1961 Patient rang call bell requesting pain medication. 0022 PRN Dilaudid administered IV for pain rated 7 in lower right abdomen. 7258  Patient sleeping with neutral facial expression. Pain medication appears to be effective.    0202  Patient sleeping with relaxed facial expression. Patient sleeping on left side as he states this is the most comfortable position for him due to the drain on his right side. 0115 Patient sleeping with relaxed facial expression. 0335  Patient sleeping on left side. Will continue to monitor. 5150  Patient sleeping curled up on left side. 5637  Patient remains sleeping curled up on left side. Face appears relaxed. 0600 Patient sleeping, but stirred when nurse opened the door.      0700 Report given to Bettye Andujar RN.               NAME OF PATIENT:  Alexandrea Metzger    LEVEL OF CARE:  GIP     REASON FOR GIP:   Pain, despite numerous changes in medications, Medication adjustment that must be monitored 24/7 and Stabilizing treatment that cannot take place at home    *PATIENT REMAINS ELIGIBLE FOR GIP LEVEL OF CARE AS EVIDENCED BY: (MUST BE ADDRESSED OF PATIENT GIP) Pain management via PRN IV pain medication. REASON FOR RESPITE:  N/A    O2 SAFETY:  N/A    FALL INTERVENTIONS PROVIDED:   Implemented/recommended use of non-skid footwear and Implemented/recommended environmental changes (remove hazards, lower bed, improve lighting, etc.)    INTERDISPLINARY COMMUNICATION/COLLABORATION:  Physician, MSW, [place] and RN, CNA    NEW MEDICATION INITIATION DOCUMENTATION:  N/A    Reason medication is being initiated:  N/A      MD / Provider name consulted re: change in status / initiation of new medication:  N/A    New Symptom(s):  N/A    New Order(s):  N/A    Name of the person notified of the changes:  N/A    Name of person being taught:  N/A    Instructions given:  N/A    Side Effects taught:  N/A    Response to teaching:  N/A      COMFORTABLE DYING MEASURE:  Is Patient/family satisfied with symptom level?  yes    DISCHARGE PLAN:  Patient will return home with home hospice.

## 2021-11-22 NOTE — PROGRESS NOTES
Problem: Pain  Goal: *Control of Pain  Outcome: Progressing Towards Goal   Assess pain characteristics.   Rate pain via 0-10 scale  Medication for pain control  Assess effectiveness of medication

## 2021-11-22 NOTE — PROGRESS NOTES
1900 Report received from [facility]. Pt is [facility] level of care for management of pain and education for Aspira drain care. Dx Pancreatic Cancer. 1930 Resting in bed, visitor in room Pt request pain medication. Rates pain as 8 of 10. Complains of pain in right lower abdomen near Drain site to umbilicus. 1940 Dilaudid 1 mg given IV for pain control. 2000 Visitor has gone. Pt is resting quietly in bed with eyes closed. Appears to be sleeping. 2130 Sleeping soundly. Did not awaken to gentle name call. 2230 Awakened for hs medications. Pt states he does not have pain at this time. 2330 Request medication for abd pain. Rates 7 of 10. Dilaudid 1 mg given IV.  0000 Resting quietly, appears to be sleeping.  0100 Continue to assess and medicate as needed for pain relief.  0200 Request medication for right lower quadrant abd pain states almost 10. Dilaudid 1 mg given IV.  0230 Appears to be sleeping, no facial grimace. 0330 Resting quietly, appears to be sleeping. 0430 Continue to monitor. 0530 Complains of abd pain rated as 8 of 10. Dilaudid 1 mg given IV.  1616 Voices relief of pain. Resting quietly. 0700 Report to oncoming nurse    NAME OF PATIENT:  Selvin Cuenca    LEVEL OF CARE: [facility]    REASON FOR GIP:   Pain, despite numerous changes in medications, Medication adjustment that must be monitored 24/7 and Stabilizing treatment that cannot take place at home    *PATIENT REMAINS ELIGIBLE FOR [facility] LEVEL OF CARE AS EVIDENCED BY: Frequent nurse assessment of the pt and adjustment of medications. IV medications required.       REASON FOR RESPITE:  na    O2 SAFETY:  na    FALL INTERVENTIONS PROVIDED:   Implemented/recommended use of non-skid footwear, Implemented/recommended use of fall risk identification flag to all team members, Implemented/recommended resources for alarm system (personal alarm, bed alarm, call bell, etc.) , Implemented/recommended environmental changes (remove hazards, lower bed, improve lighting, etc.) and Implemented/recommended increased supervision/assistance    INTERDISPLINARY COMMUNICATION/COLLABORATION:  Physician, MSW, [place] and RN, CNA    NEW MEDICATION INITIATION DOCUMENTATION:  na    Reason medication is being initiated:  na    MD / Provider name consulted re: change in status / initiation of new medication:  na    New Symptom(s):  na    New Order(s):  na    Name of the person notified of the changes:  na    Name of person being taught:  na    Instructions given:  na    Side Effects taught:  na    Response to teaching:  na      COMFORTABLE DYING MEASURE:  Is Patient/family satisfied with symptom level? Yes    DISCHARGE PLAN:  Remain GIP until symptoms managed and teaching completed.

## 2021-11-22 NOTE — PROGRESS NOTES
0700  Report received. 0715  Pt lying in bed, asleep. Pt reports pain is 4-5/10 in his lower right abd - near the aspira drain. Pt reports that number is ok for him and he doesn't need any medicine right now. Lungs clear. Pt is on Ra. No cough. + bowel sounds. Last reported BM was 11-21.  abd is tender near aspira drain site. Pt is continent of bowel and bladder. No edema noted. Gait is steady. 0800  Pt sitting up in bed eating breakfast.  Pt tolerated PO meds without difficulty. 0830  Pt tolerated 100% of his breakfast.    0930  Pt asleep  0454  Dr Luna Both rounding. MD suggest pt try PO Dilaudid in hope of getting him home. Pt agreed. 1030  Pt resting. 1145   Pt complaint of tightness in his abd. Pt drained, 1000cc light christi fluid removed. Pt reported less discomfort. Pt up to the bathroom. 1158   Pt medicated with Dilaudid Pt.    1230  Pt sleeping. 1330  Pt sleeping. Did not arouse to RN whispering his name. No facial grimacing. 1445  Pt relaxed. 1530  Pt sleeping. 1645  Pt continues to rest.    1700  Pt ringing out for pain meds. Pt states he prefers IV meds as opposed to PO. He reports he doesn't feel as though they are controlling his pain. Rn educated him   [phone]  Pt reported to CNA that the pain pills aren't working well for him. He feels as though they are making him constipated. Per CNA pt only tolerated a couple bites of his dinner and then went in the bathroom and threw up.    1745  Rn went to check on Mr Lashanda Griffin. Pt was asleep. 1820  Pt sleeping. 1900  Report given.       NAME OF PATIENT:  Payal Sindhu    LEVEL OF CARE:  GIP    REASON FOR GIP:   Pain, despite numerous changes in medications, Medication adjustment that must be monitored 24/7 and Stabilizing treatment that cannot take place at home    *PATIENT REMAINS ELIGIBLE FOR GIP LEVEL OF CARE AS EVIDENCED BY: (MUST BE ADDRESSED OF PATIENT GIP)  Pt continues to have frequent medication changes. REASON FOR RESPITE:  n/a    O2 SAFETY:  RA    FALL INTERVENTIONS PROVIDED:   Implemented/recommended use of non-skid footwear, Implemented/recommended use of fall risk identification flag to all team members, Implemented/recommended assistive devices and encouraged their use, Implemented/recommended resources for alarm system (personal alarm, bed alarm, call bell, etc.) , Implemented/recommended environmental changes (remove hazards, lower bed, improve lighting, etc.) and Implemented/recommended increased supervision/assistance    INTERDISPLINARY COMMUNICATION/COLLABORATION:  Physician, MSW, Dave and RN, CNA    NEW MEDICATION INITIATION DOCUMENTATION:  No new medications initiated. Dilaudid changed to PO. Reason medication is being initiated:  n/a    MD / Provider name consulted re: change in status / initiation of new medication:  n/a    New Symptom(s):  n/a    New Order(s):  n/a    Name of the person notified of the changes:  n/a    Name of person being taught:  n/a    Instructions given:  n/a    Side Effects taught:  n/a    Response to teaching:  n/a      COMFORTABLE DYING MEASURE:  Is Patient/family satisfied with symptom level?  yes    DISCHARGE PLAN:  Pt will remain at the [facility] until his symptoms are well managed then he will return home and continue to be followed by Home Hospice.

## 2021-11-22 NOTE — PROGRESS NOTES
[facility]   Good Help to Those in Need  [phone]    Patient Name: Chad Mallory  YOB: 1960    Date of Provider Hospice Visit: 11/22/21    Level of Care:   [x] General Inpatient (GIP)    [] Routine   [] Respite    Current Location of Care:  [] [facility] [] [facility] [] [facility] [] [facility] [x] Hospice House [facility], patient referred from:  [] [facility] [x] [facility] [] [facility] [] [facility] [] Home [] Other:     Date of Original Hospice Admission: 11/18/21  Hospice Medical Director at time of admission: [address] [place] [place] [address] Diagnosis: Advanced Pancreatic Cancer  Diagnoses RELATED to the terminal prognosis: malignant ascites  Other Diagnoses: protein calorie malnutrition, tobacco use     HOSPICE SUMMARY   Do not cut and paste chart information other than imaging findings    Chad Mallory is a 63y.o. year old who was admitted to [facility]. The patient's principle diagnosis of advanced maligannt pancreatic adenocarcinoma with malignant ascites has resulted in further worsening function and decline with increased abdominal pain that has been difficult to control with oral opioids. Pt also is severely malnourished. Pt had aspira drain placed in hospital. Pt has been having poor appetite, decreased oral intake and increased fatigue, weakness over past few days. PPS 40% but declining fast  Pt has chosen hospice care due to disease progression and overwhelming symptoms due to lack of regular treatment. Refer to LCD   CT Abdomen:  IMPRESSION     1. Large volume ascites, new. No definite change in small submental soft tissue  densities left mid and upper abdomen. 2. Possible slight increase in ill-defined pancreatic body mass. Slight  worsening of focal narrowing/short segment occlusion at the junction of the  superior mesenteric vein and portal vein. 3. Slightly nodular border of the liver with no discrete liver mass.   4. Irregular wall thickening of the sigmoid colon/rectosigmoid junction may be  due to a chronic infectious/inflammatory process, with small fluid collection in  the wall the sigmoid colon slightly larger than prior study. Neoplastic etiology  cannot be excluded. FNAC: poorly differentiated adenocarcinoma          HOSPICE DIAGNOSES   Active Symptoms:  1. Cancer associated pain  2  Cancer related fatigue/weakness  3. Poor appetite  4. Malignant Ascites  5. Nicotine dependence  6. Anxiety  7. Hospice care  8. Constipation-likely opioids     PLAN   1. Continue  GIP LOC as his pain is not well controlled and requires close monitoring with frequent dosing and recent adjustments. Need to attempt oral medications to see if effective  2. Continue oxycontin 20 mg every 12 hrs. Has required 7 prn doses of IV dilaudid but is able to eat and take other oral medications. Will attempt to give oral dilaudid over the next 24-36 hrs to be sure effective. May need to adjust the Oxycontin further-this was changed 3 days ago. 1 mg IV dilaudid=4 mg po  3. Ativan 1mg IV every 15mts as needed for anxiety, has not required. 4. Nicotine patch to be started as pt has tobacco dependence  5. PCA may be option if patient not tolerating oral regimen. 6. Constipation- stop colace which usually not effective in OIC and start senna daily   7. Continue to drain the aspira as this helps remove pressure form his abdomen-1 liter daily at minimal right now. Will continue to drain for comfort  8. Discussed with Carolyn. Patient probably will live with his daughter but even this plan may be challenging as she works 12 hr shifts. Will continue to assess daily    9.  and SW to support family needs  8. Disposition: as above, may be able to go home for short time but overall has poor prognosis  11.  Hospice Plan of care was reviewed in detail and agree with current plan of care, RN    Prognosis estimated based on 11/22/21 clinical assessment is:   [] Hours to Days    [] Days to Weeks    [x] Other:    Communicated plan of care with: Hospice Nurse      GOALS OF CARE     Patient/Medical POA stated Goal of Care: pain control, comfort    [x] I have reviewed and/or updated ACP information in the Advance Care Planning Navigator. This information is available in the [address] link in the patient's chart header. Primary Decision [facility] (Health Care Agent):   Primary Decision Maker: Barbara Sawyer Daughter - [phone]    Resuscitation Status: DNR  If DNR is there a Durable DNR on file? : [x] Yes [] No (If no, complete Durable DNR)    HISTORY     History obtained from: patient, chart review    CHIEF COMPLAINT: pain ok  The patient is:   [x] Verbal  [] Nonverbal  [] Unresponsive    HPI/SUBJECTIVE:  63year old male with hx of pancreatic adenocarcinoma which has caused cachexia and significant weight loss, abdominal pain and poor appetite. He endorses some anxiety over his condition. He is now on hospice service and GIP for pain control because he has never had adequate pain control.  Pt lying in bed and states this is the most comfortable he has been in a long time however he does complain of pain 6/10 and prior to the administration of IV dilaudid his pain was up to 9/10.    11/22-sleeping when I enter the room but arouses easily            REVIEW OF SYSTEMS     The following systems were: [x] reviewed  [] unable to be reviewed    Positive ROS include:  Constitutional: fatigue, weakness, in pain, short of breath  Ears/nose/mouth/throat: increased airway secretions  Respiratory: shortness of breath, wheezing  Gastrointestinal: poor appetite, nausea, vomiting, abdominal pain, constipation, diarrhea  Musculoskeletal: pain, deformities, swelling legs  Neurologic:confusion, hallucinations, weakness  Psychiatric: anxiety, feeling depressed, poor sleep  Endocrine:     Adult Non-Verbal Pain Assessment Score: Pain as high as 7/10    Face  [] 0   No particular expression or smile  [] 1   Occasional grimace, tearing, frowning, wrinkled forehead  [] 2   Frequent grimace, tearing, frowning, wrinkled forehead    Activity (movement)  [] 0   Lying quietly, normal position  [] 1   Seeking attention through movement or slow, cautious movement  [] 2   Restless, excessive activity and/or withdrawal reflexes    Guarding  [] 0   Lying quietly, no positioning of hands over areas of body  [] 1   Splinting areas of the body, tense  [] 2   Rigid, stiff    Physiology (vital signs)  [] 0   Stable vital signs  [] 1   Change in any of the following: SBP > 20mm Hg; HR > 20/minute  [] 2   Change in any of the following: SBP > 30mm Hg; HR > 25/minute    Respiratory  [] 0   Baseline RR/SpO2, compliant with ventilator  [] 1   RR > 10 above baseline, or 5% drop SpO2, mild asynchrony with ventilator  [] 2   RR > 20 above baseline, or 10% drop SpO2, asynchrony with ventilator     FUNCTIONAL ASSESSMENT     Palliative Performance Scale (PPS): 30-40%       PSYCHOSOCIAL/SPIRITUAL ASSESSMENT     Active Problems:    * No active hospital problems.  *    Past Medical History:   Diagnosis Date    Gout     Pancreat[facility] cancer ([facility] [address]     Psoriasis     Tobacco abuse       Past Surgical History:   Procedure Laterality Date    HX ORTHOPAEDIC      femur repair    HX ORTHOPAEDIC      lumbar compressed fracture    IR INSERT INTRAPERI TUNL CATH PERC  11/17/2021    IR INSERT TUNL CVC W PORT OVER 5 YEARS  11/24/2020      Social History     Tobacco Use    Smoking status: Current Every Day Smoker     Packs/day: 1.00    Smokeless tobacco: Never Used    Tobacco comment: last week was last cigarette   Substance Use Topics    Alcohol use: Yes     Family History   Problem Relation Age of Onset    Pancreatic Cancer Brother       No Known Allergies   Current Facility-Administered Medications   Medication Dose Route Frequency    docusate sodium (COLACE) capsule 100 mg  100 mg Oral BID    oxyCODONE ER (OxyCONTIN) tablet 20 mg  20 mg Oral Q12H    bisacodyL (DULCOLAX) suppository 10 mg  10 mg Rectal DAILY PRN    acetaminophen (TYLENOL) suppository 650 mg  650 mg Rectal Q6H PRN    LORazepam (ATIVAN) injection 1 mg  1 mg IntraVENous Q15MIN PRN    HYDROmorphone (DILAUDID) injection 1 mg  1 mg IntraVENous Q15MIN PRN    ketorolac (TORADOL) injection 30 mg  30 mg IntraVENous Q6H PRN    glycopyrrolate (ROBINUL) injection 0.2 mg  0.2 mg IntraVENous Q4H PRN    HYDROmorphone (DILAUDID) tablet 4 mg  4 mg Oral Q1H PRN    nicotine (NICODERM CQ) 21 mg/24 hr patch 1 Patch  1 Patch TransDERmal DAILY        PHYSICAL EXAM     Wt Readings from Last 3 Encounters:   11/15/21 52.9 kg (116 lb 10 oz)   10/04/21 52.9 kg (116 lb 9.6 oz)   10/04/21 52.9 kg (116 lb 10 oz)       Visit Vitals  /60   Pulse 83   Temp 98.6 °F (37 °C)   Resp 18   SpO2 98%       Supplemental O2  [] Yes  [x] NO  Last bowel movement: today 11/18/21    Currently this patient has:  [x] Peripheral IV [] PICC  [x] PORT [] ICD    [] Duke Catheter [] NG Tube   [] PEG Tube    [] Rectal Tube [] Drain  [] Other:     Constitutional: awake, pleasant, verbal, appears more comfortable right now  Eyes: pallor  ENMT: clear mucosa, no lesions  Cardiovascular: normal rate and rhythm, no edema  Respiratory: no breathing distress  Gastrointestinal: distended abdomen, tense, no masses, ascites +, aspira drain right side, tenderness to palpation, but positive bs  Musculoskeletal:thin extremities, no deformities  Skin:no lesions  Neurologic:no obvious deficits  Psychiatric: calm today  Other:       Pertinent Lab and or Imaging Tests:  Lab Results   Component Value Date/Time    Sodium 139 11/16/2021 12:42 AM    Potassium 3.9 11/16/2021 12:42 AM    Chloride 107 11/16/2021 12:42 AM    CO2 29 11/16/2021 12:42 AM    Anion gap 3 (L) 11/16/2021 12:42 AM    Glucose 98 11/16/2021 12:42 AM    BUN 10 11/16/2021 12:42 AM    Creatinine 0.77 11/16/2021 12:42 AM    BUN/Creatinine ratio 13 11/16/2021 12:42 AM    GFR est AA >60 11/16/2021 12:42 AM    GFR est non-AA >60 11/16/2021 12:42 AM    Calcium 7.9 (L) 11/16/2021 12:42 AM     Lab Results   Component Value Date/Time    Protein, total 5.4 (L) 11/16/2021 12:42 AM    Albumin 2.3 (L) 11/16/2021 12:42 AM         Elisabet Saleem MD

## 2021-11-22 NOTE — PROGRESS NOTES
Problem: Pain  Goal: *Control of Pain  Outcome: Progressing Towards Goal     Problem: Patient Education: Go to Patient Education Activity  Goal: Patient/Family Education  Outcome: Progressing Towards Goal     Problem: Activity Intolerance  Goal: *Oxygen saturation during activity within specified parameters  Outcome: Progressing Towards Goal  Goal: *Able to remain out of bed as prescribed  Outcome: Progressing Towards Goal     Problem: Falls - Risk of  Goal: *Absence of Falls  Description: Document Haylee Denniss Fall Risk and appropriate interventions in the flowsheet. Outcome: Progressing Towards Goal  Note: Fall Risk Interventions:  Mobility Interventions: Bed/chair exit alarm         Medication Interventions: Bed/chair exit alarm    Elimination Interventions: Bed/chair exit alarm, Call light in reach    History of Falls Interventions: Bed/chair exit alarm         Problem: Emotional Support Needs  Goal: Patient/family is receiving emotional support  Outcome: Progressing Towards Goal     Problem: Pressure Injury - Risk of  Goal: *Prevention of pressure injury  Description: Document Waqas Scale and appropriate interventions in the flowsheet. Outcome: Progressing Towards Goal  Note: Pressure Injury Interventions:  Sensory Interventions: Assess changes in LOC, Float heels, Maintain/enhance activity level, Monitor skin under medical devices    Moisture Interventions: Absorbent underpads, Minimize layers, Limit adult briefs, Moisture barrier    Activity Interventions: Assess need for specialty bed    Mobility Interventions: Assess need for specialty bed, Float heels, Turn and reposition approx.  every two hours(pillow and wedges)    Nutrition Interventions: Document food/fluid/supplement intake    Friction and Shear Interventions: Apply protective barrier, creams and emollients, Lift sheet, Minimize layers

## 2021-11-22 NOTE — PROGRESS NOTES
LCSW met with pt at bedside. He was received lying on his side in bed with lights off and shades drawn. He is alert and oriented x4. LCSW had reviewed with MD and RN that pt does remain GIP eligible today with trial for oral meds should he stabilize and be ready for dc. LCSW completed a new AMD with pt and staff as witnesses because his previous AMD was not on file. Pt named his sister, Dangelo Pickett as his primary agent and daughter, León Goss as his secondary agent. LCSW explained hospice levels of care and competed FAP form with pt as he may need routine stay prior to dc to his sister or daughter's home. Pt to retrieve document from social security by calling confirming his income of about $500/month which is deposited onto a debit card. LCSW provided fax number where this can be sent to reach  and then send to the [facility]. Pt remains unable to consider his plans for final arrangements. LCSW placed call to his sister to provide update and further discuss next steps, left VM and will await call back.     AMALIA Watson, [facility]   [phone]

## 2021-11-22 NOTE — PROGRESS NOTES
[facility] Hospice Monitoring   November 22, 2021  Attending: Dr. Karen Barton  Pharmacist monitoring provided for this 63y.o. year old male at [facility], admitted for Advanced Pancreatic Cancer . Principle Hospice Diagnosis:  Level of care: General Inpatient (GIP)  Length of stay:   Day 4  Active Problem List:    Treatment Goal Check List     Admitting dianosis treated appropriately : YES    Nausea/Vomiting controlled: YES    Pain Controlled: NO, Pain increasing. Patient has appropriate meds available. MD considering to switch to PCA if oral meds become ineffective. Agitation/Anxiety/ Delirium controlled: YES, not needed    Depression controlled: YES    Secretions controlled : YES    Constipation/Bowel routine controlled: YES, docusate changed to senna.     SOB/ Dyspnea controlled: YES    Insomnia: NO     Gwenith Oppenheim [facility]

## 2021-11-23 NOTE — HOSPICE
Participated in [facility]OW[facility] rounds. Visited with pt to affirm our on going concern for him. He noted he is trying to rest at this time but is always polite and encouraging. I believe he will benefit from follow-up visits to affirm our continuing care.

## 2021-11-23 NOTE — [facility] IDG SOCIAL WORKER NOTES
[address] [place] [address] note:   LCSW participated in [address] rounds. Team is continuing to work toward stabilizing pt's pain regimen for home hospice to take place either at his daughter or sister's home. Sister, Giovani Aponte lives in [place] and is Neal. LCSW met with pt at bedside. He confirmed his preference would be to return to his daughter's home in [facility] also provided him with the fax number again for the SSA document which will be necessary for his referral for FAP should he require routine days at [facility].     Problem: Financial deficits, discharge planning  Plan: Assist with discharge planning and financial needs  Community Resources: [facility] for GIP care, FAP started  Advance Directives: Completed and on file (sister, Giovani Aponte is MPOA)  DDNR: Completed and on file   Home: TBD, pt unwilling to discuss his wishes    Georgina Pagan Worker   [phone]

## 2021-11-23 NOTE — PROGRESS NOTES
Team is continuing to work toward stabilizing pt's pain regimen for home hospice to take place either at his daughter or sister's home likely early next week due to holiday. Pt may require some routine days at [facility] and FAP was started with him yesterday. Pt able to complete the form, but will need to retrieve supporting document from [employer]. Pt does not have an online account, and is planning to try calling again today to request that the proof of income be faxed to [facility] main fax ([phone]) for submission with FAP form to the foundation. Pt's sister, Rex Coronado lives in [place] and is [place]. She works as a nurse but was open to having pt come to live with her. [facility] and shared that she became aware that pt and his daughter were arguing while he lived with her. Rex Coronado made clear that pt will not have consistent care giving at her house or daughter Chrystal's home as they both work. Pt has another sister who is caring for her own daughter and 2 grandchildren so it is not an option. LCSW made Rex Coronado aware of new AMD completed with pt naming her MPOA as well as the limitations of [facility] for long term care. LCSW also shared with her that pt's UAI was declined as he was too independent with ADLs during his most recent hospitalization and this will need to be re-done when he declines. Rex Mean aware that final arrangements need to be further addressed with pt and aware that pt is concerned due to lack of resources about making plans along with the difficulty of discussing his own demise. LCSW met with pt at bedside today to clarify his wishes for discharge and explain that the team will likely look to make a plan for him early next week. Pt was clear that he wished to return to his daughter's home in [facility] (address provided was [address] [place] [address]. [facility], [place] [address]).  Pt will likely need equipment ordered: bed, bedside commode should also be considered along with a mobility assistance device if pt will accept. 11:40am: Pt was able to speak with SSA and have income letter faxed to .  LCSW sent to [facility] for approval.    Geronimo Hendrix, MSW, [address] [facility] [address] [employer] Worker  [phone]

## 2021-11-23 NOTE — PROGRESS NOTES
Assess pain characteristics (eg: Intensity scale; onset; location; quality; severity; duration; frequency;   radiation)                Assess pain management - barriers (eg: Past pain   experiences)                Identify pain expectations (eg: Patient's pain goal; somatic experiences; behavioral changes;   affect)                Identify pain medication concerns (eg: Cultural considerations; addiction   concerns)                Support system identification (eg: Caregiver; community resource; family; friends; Sikhism; support   group)                Monitor for change in patient condition (eg: Vital signs changes; changes in level of consciousness; nausea; behavioral   changes)                Medication side-effect   assessment                Pain-relief response reassessment (eg: Frequency based on route of administration;   effectiveness)          Assess pain characteristics (eg: Intensity scale; onset; location; quality; severity; duration; frequency;   radiation)                Assess pain management - barriers (eg: Past pain   experiences)                Identify pain expectations (eg: Patient's pain goal; somatic experiences; behavioral changes;   affect)                Identify pain medication concerns (eg: Cultural considerations; addiction   concerns)                Support system identification (eg: Caregiver; community resource; family; friends; Sikhism; support   group)                Monitor for change in patient condition (eg: Vital signs changes; changes in level of consciousness; nausea; behavioral   changes)                Medication side-effect   assessment                Pain-relief response reassessment (eg: Frequency based on route of administration;   effectiveness)

## 2021-11-23 NOTE — PROGRESS NOTES
1900 Report received from [place], [address] [facility]. Pt is [facility] level of care for management of pain and education in [facility] care. Dx Pancreatic Cancer. 2015 Complains of right lower abd pain near Aspira drain site. Radiates to mid lower abdomen. Rates as 8 of 10. Has facial grimace. States oral medications don't work as fast and he states he feels he did not get as much relief. Also states he threw up after taking it. States he has been told it is a higher dose and longer lasting. Medicated with Dilaudid po and Lorazepam 1 mg SL to help relax and aid nausea. Educated in greater sedation and fall risk. To call for help up if gait unsteady. 2030 Pt appears to be sleeping. No facial grimace noted. 2130 Continues to rest, appears to be sleeping. 2230 Continue to monitor. Assess for pain. Medicate as needed. 2330 Resting quietly. Appears to be sleeping with no symptoms of discomfort. 0030 Continue to assess for pain. Medicate as needed. 0130 Resting quietly. No signs of or complaints of pain. 0230 Appears to be sleeping. 0330 Continues to rest quietly. No complaints of abd pain. 0430 Appears to be sleeping. No signs of pain. 0530 Continues to rest quietly. No complaints of pain. 0630 Resting quietly. Repositions self in bed. Appears to be sleeping.  0700 Repot to oncoming nurse. NAME OF PATIENT:  Lucinda Fabian    LEVEL OF CARE:  [facility]    REASON FOR GIP:   Pain, despite numerous changes in medications, Medication adjustment that must be monitored 24/7 and Stabilizing treatment that cannot take place at home    *PATIENT REMAINS ELIGIBLE FOR [facility] LEVEL OF CARE AS EVIDENCED BY: Frequent nurse assessment and adjustment of medications required to manage symptoms.       REASON FOR RESPITE:  na    O2 SAFETY:  na    FALL INTERVENTIONS PROVIDED:   Implemented/recommended use of non-skid footwear, Implemented/recommended use of fall risk identification flag to all team members, Implemented/recommended assistive devices and encouraged their use, Implemented/recommended resources for alarm system (personal alarm, bed alarm, call bell, etc.) , Implemented/recommended environmental changes (remove hazards, lower bed, improve lighting, etc.) and Implemented/recommended increased supervision/assistance    INTERDISPLINARY COMMUNICATION/COLLABORATION:  Physician, MSW, [place] and RN, CNA    NEW MEDICATION INITIATION DOCUMENTATION:  Lorazepam SL route    Reason medication is being initiated: For anxiety and nausea    MD / Provider name consulted re: change in status / initiation of new medication:  Dr Dalia Castañeda Symptom(s):  Nausea, anxiety    New Order(s):  Lorazepam SL route prn dose    Name of the person notified of the changes:  Pt himself    Name of person being taught:  The pt    Instructions given:  Action of medication    Side Effects taught:  sedation    Response to teaching:  Voices understanding      COMFORTABLE DYING MEASURE:  Is Patient/family satisfied with symptom level? Yes    DISCHARGE PLAN:  Remain GIP until symptoms are managed and care can be managed in the home.

## 2021-11-23 NOTE — PROGRESS NOTES
Problem: Pain  Goal: *Control of Pain  Outcome: Progressing Towards Goal     Problem: Patient Education: Go to Patient Education Activity  Goal: Patient/Family Education  Outcome: Progressing Towards Goal     Problem: Pain  Goal: *Control of Pain  Outcome: Progressing Towards Goal  Goal: *PALLIATIVE CARE:  Alleviation of Pain  Outcome: Progressing Towards Goal     Problem: Patient Education: Go to Patient Education Activity  Goal: Patient/Family Education  Outcome: Progressing Towards Goal     Problem: Activity Intolerance  Goal: *Oxygen saturation during activity within specified parameters  Outcome: Progressing Towards Goal  Goal: *Able to remain out of bed as prescribed  Outcome: Progressing Towards Goal     Problem: Patient Education: Go to Patient Education Activity  Goal: Patient/Family Education  Outcome: Progressing Towards Goal     Problem: Falls - Risk of  Goal: *Absence of Falls  Description: Document Ok Cease Fall Risk and appropriate interventions in the flowsheet.   Outcome: Progressing Towards Goal  Note: Fall Risk Interventions:  Mobility Interventions: Bed/chair exit alarm         Medication Interventions: Bed/chair exit alarm    Elimination Interventions: Bed/chair exit alarm, Call light in reach    History of Falls Interventions: Bed/chair exit alarm

## 2021-11-23 NOTE — PROGRESS NOTES
0700  Report received.    4661  Pt medicated with Dilaudid tabs. Pt reports pain is 7/10. Located in his lower abd. Pt is throbbing, increases with movement and decreases with rest and medications. 4526  Dr malorie brown. No changes made to POC. Pt asleep. 0930  Pt asleep. Breakfast tray removed. Pt tolerated 100%. [place] [address] out. Pt reports pain is 7/10 in lower abd. Pt medicated with Dilaudid. 1100  Pt asleep. 1200  Pt wanting to hold his lunch tray. He is working with the SW on discharge planning. 1245  Pt asleep. 1330  Pt up to the Bathroom. Gait is steady. No complaints. 1445  Pt relaxed. 1530  Pt asleep. 1615  Pt's ANNABEL at the bedside. No needs at this time. 1627  Pt medicated with dilaudid tabs. C/o Pain 6 or 7, located in his abdomen. 1700  Pt sleeping. 1745  Pt resting. 1830  Pt asleep. 1900  Report given. NAME OF PATIENT:  German Ritomanda    LEVEL OF CARE:  [facility]    REASON FOR GIP:   Pain, despite numerous changes in medications, Medication adjustment that must be monitored 24/7 and Stabilizing treatment that cannot take place at home    *PATIENT REMAINS ELIGIBLE FOR GIP LEVEL OF CARE AS EVIDENCED BY: (MUST BE ADDRESSED OF PATIENT GIP)  Pt without a caregiver. Pt getting frequent medication adjustments.         REASON FOR RESPITE:  n/a    O2 SAFETY:  RA    FALL INTERVENTIONS PROVIDED:   Implemented/recommended use of non-skid footwear, Implemented/recommended use of fall risk identification flag to all team members, Implemented/recommended assistive devices and encouraged their use, Implemented/recommended resources for alarm system (personal alarm, bed alarm, call bell, etc.) , Implemented/recommended environmental changes (remove hazards, lower bed, improve lighting, etc.) and Implemented/recommended increased supervision/assistance    INTERDISPLINARY COMMUNICATION/COLLABORATION:  Physician, AMALIA, Lina Huitron and RN, BALDEV PUGH MEDICATION INITIATION DOCUMENTATION:  No new medications initiated. Reason medication is being initiated:  n/a    MD / Provider name consulted re: change in status / initiation of new medication:  n/a    New Symptom(s):  n/a    New Order(s):  n/a    Name of the person notified of the changes:  n/a    Name of person being taught:  n/a    Instructions given:  n/a    Side Effects taught:  n/a    Response to teaching:  n/a      COMFORTABLE DYING MEASURE:  Is Patient/family satisfied with symptom level?  yes    DISCHARGE PLAN:  Pt will remain at the [facility]  Until his symptoms are well managed. SW and pt are working at discharge planning.

## 2021-11-23 NOTE — [facility] IDG MASTER NOTE
Hospice Interdisciplinary Group Collaborative  Date: 11/23/21  Time: 1:05 PM    ___________________    Patient: Merline Lipanthony  Coverage Information:     Payor: [employer]ICA[employer]     Plan: [address] [place] [address]     Subscriber ID: [id number]     Phone Number:   MRN: [id number]  CCN:   [place] Claim No. :     Hospice Election Date:   Current Benefit Period: Benefit Period 1  Start Date: 11/18/2021  End Date: 2/15/2022      Medical Director:   Hospice Attending Provider: Isai Casanova [facility] [address] [address]  Phone: [phone]  Fax: [phone]    Level of Care: General Inpatient Care      ___________________    Diagnoses:  Diagnoses of Malignant neoplasm of head of pancreas ([facility] [address] Cancer related pain, Ascites, malignant, Severe protein-calorie malnutrition ([facility] [address] Poor appetite, Anxiety, Nicotine dependence, uncomplicated, unspecified nicotine product type, Neoplastic (malignant) related fatigue, and Hospice care were pertinent to this visit.     Current Medications:    Current Facility-Administered Medications:     sennosides (SENOKOT) 8.8 mg/5 mL syrup 17.6 mg, 10 mL, Oral, DAILY, Erlin Orr MD, 17.6 mg at 11/23/21 1042    LORazepam (INTENSOL) 2 mg/mL oral concentrate 1 mg, 1 mg, SubLINGual, C28CLO PRN, Freddy Machado MD, 1 mg at 11/22/21 2017    oxyCODONE ER (OxyCONTIN) tablet 20 mg, 20 mg, Oral, Q12H, Olvin Pizarro NP, 20 mg at 11/23/21 1041    bisacodyL (DULCOLAX) suppository 10 mg, 10 mg, Rectal, DAILY PRN, Monica Cortez MD    acetaminophen (TYLENOL) suppository 650 mg, 650 mg, Rectal, Q6H PRN, Maral Rico MD    LORazepam (ATIVAN) injection 1 mg, 1 mg, IntraVENous, Q15MIN PRN, Maral Rico MD    HYDROmorphone (DILAUDID) injection 1 mg, 1 mg, IntraVENous, Q15MIN PRN, Maral Rico MD, 1 mg at 11/22/21 0531    ketorolac (TORADOL) injection 30 mg, 30 mg, IntraVENous, Q6H PRN, Maral Rico MD    glycopyrrolate (ROBINUL) injection 0.2 mg, 0.2 mg, IntraVENous, Q4H PRN, Maral Rico MD    HYDROmorphone (DILAUDID) tablet 4 mg, 4 mg, Oral, Q1H PRN, Esteban Doll MD, 4 mg at 11/23/21 1047    nicotine (NICODERM CQ) 21 mg/24 hr patch 1 Patch, 1 Patch, TransDERmal, DAILY, Esteban Doll MD    Orders:  Orders Placed This Encounter    ADULT DIET Regular     Standing Status:   Standing     Number of Occurrences:   1     Order Specific Question:   Primary Diet:     Answer:   Regular    NURSING-MISCELLANEOUS: Admit to [facility] level of care for symptom management of pain, restlessness and agitation. Admit to [facility] level of care; Sn visit daily x 14 days with 5 visits PRN for symptom control; MSW 1 x weekly with 5 visits PRN family suppo. .. Admit to [facility] level of care; Sn visit daily x 14 days with 5 visits PRN for symptom control; MSW 1 x weekly with 5 visits PRN family support and need for volunteer services,   1 x weekly and 5 visits PRN spiritual support; CNA daily x 14 days. Standing Status:   Standing     Number of Occurrences:   1     Order Specific Question:   Description of Order:     Answer:   Admit to [facility] level of care for symptom management of pain, restlessness and agitation.  NURSING-MISCELLANEOUS: (see comments) 1. NO admission labs, x-rays or other diagnostic tests, unless pertinent to symptom control . 2. Discontinue ALL prior medications. CONTINUOUS     1. NO admission labs, x-rays or other diagnostic tests, unless pertinent to symptom control . 2. Discontinue ALL prior medications.      Standing Status:   Standing     Number of Occurrences:   1     Order Specific Question:   Description of Order:     Answer:   (see comments)    COMFORT MEASURES ONLY     Standing Status:   Standing     Number of Occurrences:   1    VITAL SIGNS     Standing Status:   Standing     Number of Occurrences:   [id number]    NOTIFY PROVIDER: SPECIFY NOTIFY PROVIDER: FOR PAIN, DYSPNEA, AGITATION, OTHER DISTRESS OR NOT RESPONDING TO ORDERED INTERVENTIONS CONTINUOUS Routine     Standing Status:   Standing     Number of Occurrences:   1     Order Specific Question:   Please describe the test or procedure you would like to order. Answer:   NOTIFY PROVIDER: FOR PAIN, DYSPNEA, AGITATION, OTHER DISTRESS OR NOT RESPONDING TO ORDERED INTERVENTIONS    ORAL CARE     Keep mouth moisturized with sponge sticks/toothettes and tap water. Vaseline to lips and nares as needed. Standing Status:   Standing     Number of Occurrences:   1    NURSING-MISCELLANEOUS: BITES AND SIPS FOR COMFORT CONTINUOUS     Standing Status:   Standing     Number of Occurrences:   1     Order Specific Question:   Description of Order:     Answer:   BITES AND SIPS FOR COMFORT    ACTIVITY AS TOLERATED W/ASSIST     Up with assist.     Standing Status:   Standing     Number of Occurrences:   1    NURSING-MISCELLANEOUS: Aspira Drain Daily or PRN abd discomfort. CONTINUOUS     Daily or PRN abd discomfort. Standing Status:   Standing     Number of Occurrences:   1     Order Specific Question:   Description of Order:     Answer:   Aspira Drain    DO NOT RESUSCITATE     Standing Status:   Standing     Number of Occurrences:   1    DIET ONE TIME MESSAGE     Nursing to order diet per patient preferences as needed. Standing Status:   Standing     Number of Occurrences:   1    OXYGEN CANNULA Liters per minute: 2; Indications for O2 therapy: OTHER PRN Routine     Oxygen as needed. Adjust for comfort. Discontinue if not contributing to patient comfort. Standing Status:   Standing     Number of Occurrences:   [id number]     Order Specific Question:   Liters per minute:      Answer:   2     Order Specific Question:   Indications for O2 therapy     Answer:   OTHER    bisacodyL (DULCOLAX) suppository 10 mg    DISCONTD: oxyCODONE ER (OxyCONTIN) tablet 10 mg    acetaminophen (TYLENOL) suppository 650 mg    LORazepam (ATIVAN) injection 1 mg    HYDROmorphone (DILAUDID) injection 1 mg    ketorolac (TORADOL) injection 30 mg    glycopyrrolate (ROBINUL) injection 0.2 mg    HYDROmorphone (DILAUDID) tablet 4 mg    nicotine (NICODERM CQ) 21 mg/24 hr patch 1 Patch    oxyCODONE ER (OxyCONTIN) tablet 20 mg    DISCONTD: docusate sodium (COLACE) capsule 100 mg    DISCONTD: docusate sodium (COLACE) capsule 100 mg    DISCONTD: docusate sodium (COLACE) capsule 100 mg    DISCONTD: senna (SENOKOT) tablet 17.2 mg (Patient Supplied)    sennosides (SENOKOT) 8.8 mg/5 mL syrup 17.6 mg    LORazepam (INTENSOL) 2 mg/mL oral concentrate 1 mg    INITIAL PHYSICIAN ORDER: HOSPICE Level Of Care: General Inpatient; Reason for Admission: Admit to [facility] level of care for symptom management of pain, anxiety and restlessness. Standing Status:   Standing     Number of Occurrences:   1     Order Specific Question:   Status     Answer:   Hospice     Order Specific Question:   Level Of Care     Answer:   General Inpatient     Order Specific Question:   Reason for Admission     Answer:   Admit to [facility] level of care for symptom management of pain, anxiety and restlessness. Order Specific Question:   Admitting Physician     Answer:   Alfonso Joshi     Order Specific Question:   Attending Physician     Answer:   Alfonso Joshi     Order Specific Question:   Discharge Plan:     Answer:   Home with Home Hospice       Allergies:  No Known Allergies    Care Plan:  Encounter Problems (Active)     Problem:  Activity Intolerance     Dates: Start: 11/18/21       Disciplines: Interdisciplinary    Goal: *Oxygen saturation during activity within specified parameters     Dates: Start: 11/18/21   Expected End: 11/22/21       Disciplines: Interdisciplinary    Intervention: Activity tolerance assessment (eg: Vital signs including apical pulse; level of consciousness; oxygen saturation level; skin color; dyspnea on exertion; diaphoresis)     Dates: Start: 11/18/21          Intervention: Breathing pattern signs and symptoms assessment (eg: Apnea; bradypnea; pursed-lip; dyspnea; hyperpnea; paradoxical; periodic; hyperventilation; hypoventilation; tachypnea)     Dates: Start: 11/18/21             Goal: *Able to remain out of bed as prescribed     Dates: Start: 11/18/21   Expected End: 11/22/21       Disciplines: Interdisciplinary    Intervention: Assistive device usability assessment (eg: Prior use; cognitive ability; upper and lower extremity muscle strength; prohibiting injuries; safety issues; visual perception)     Dates: Start: 11/18/21          Intervention: Mobility assessment (eg: Bridging ability; dynamic balance, sitting & standing; stair use, independent; transfer independence: bed-chair, shower & sit-stand)     Dates: Start: 11/18/21          Intervention: Physical activity management (eg: ADL participation; coordination exercises; exercise training for respiratory patients; out-of-bed program; stair/strength/transfer/balance training; bed mobility)     Dates: Start: 11/18/21          Intervention: Progressive ambulation (eg: Dangling; sitting in chair; sitting to standing progression; stair mobility)     Dates: Start: 11/18/21                Problem: Emotional Support Needs     Dates: Start: 11/19/21       Disciplines: Interdisciplinary    Goal: Patient/family is receiving emotional support     Dates: Start: 11/19/21       Disciplines: Interdisciplinary    Intervention: Provide emotional support     Dates: Start: 11/19/21                Problem: Falls - Risk of     Dates: Start: 11/19/21       Disciplines: Interdisciplinary    Goal: *Absence of Falls     Dates: Start: 11/19/21   Expected End: 11/26/21       Description: Document Memo Linder Fall Risk and appropriate interventions in the flowsheet.     Disciplines: Interdisciplinary          Problem: Family Significant Other Needs     Dates: Start: 11/19/21       Disciplines: Interdisciplinary    Goal: Patient/family will receive support from community resources     Dates: Start: 11/19/21       Disciplines: Interdisciplinary    Intervention: MSW community resource planning     Dates: Start: 11/19/21                Problem: Financial Deficits     Dates: Start: 11/22/21       Disciplines: Interdisciplinary    Goal: Patient/family will voice understanding of available financial resources     Dates: Start: 11/22/21       Disciplines: Interdisciplinary    Intervention: Assess financial needs     Dates: Start: 11/22/21          Intervention: Assist with financial resources     Dates: Start: 11/22/21       Description: Assist with identifying financial resources.              Problem: Pain     Dates: Start: 11/18/21       Disciplines: Nurse, Interdisciplinary, RT    Goal: *Control of Pain     Dates: Start: 11/18/21   Expected End: 11/22/21       Disciplines: Nurse, Interdisciplinary, RT    Intervention: Assess pain characteristics (eg: Intensity scale; onset; location; quality; severity; duration; frequency; radiation)     Dates: Start: 11/18/21          Intervention: Assess pain management - barriers (eg: Past pain experiences)     Dates: Start: 11/18/21          Intervention: Identify pain expectations (eg: Patient's pain goal; somatic experiences; behavioral changes; affect)     Dates: Start: 11/18/21          Intervention: Identify pain medication concerns (eg: Cultural considerations; addiction concerns)     Dates: Start: 11/18/21          Intervention: Support system identification (eg: Caregiver; community resource; family; friends; [employer]; support group)     Dates: Start: 11/18/21          Intervention: Monitor for change in patient condition (eg:  Vital signs changes; changes in level of consciousness; nausea; behavioral changes)     Dates: Start: 11/18/21          Intervention: Medication side-effect assessment     Dates: Start: 11/18/21          Intervention: Pain-relief response reassessment (eg: Frequency based on route of administration; effectiveness)     Dates: Start: 11/18/21 Problem: Pain     Dates: Start: 11/21/21       Disciplines: Nurse, Interdisciplinary, RT    Goal: *Control of Pain     Dates: Start: 11/21/21       Disciplines: Nurse, Interdisciplinary, RT    Intervention: Assess pain characteristics (eg: Intensity scale; onset; location; quality; severity; duration; frequency; radiation)     Dates: Start: 11/21/21          Intervention: Assess pain management - barriers (eg: Past pain experiences)     Dates: Start: 11/21/21          Intervention: Identify pain expectations (eg: Patient's pain goal; somatic experiences; behavioral changes; affect)     Dates: Start: 11/21/21          Intervention: Identify pain medication concerns (eg: Cultural considerations; addiction concerns)     Dates: Start: 11/21/21          Intervention: Support system identification (eg: Caregiver; community resource; family; friends; [employer]; support group)     Dates: Start: 11/21/21          Intervention: Monitor for change in patient condition (eg:  Vital signs changes; changes in level of consciousness; nausea; behavioral changes)     Dates: Start: 11/21/21          Intervention: Medication side-effect assessment     Dates: Start: 11/21/21          Intervention: Pain-relief response reassessment (eg: Frequency based on route of administration; effectiveness)     Dates: Start: 11/21/21             Goal: *PALLIATIVE CARE:  Alleviation of Pain     Dates: Start: 11/21/21       Disciplines: Nurse, Interdisciplinary, RT    Intervention: Refer patient for holistic services per facility     Dates: Start: 11/21/21                Problem: Patient Education: Go to Patient Education Activity     Dates: Start: 11/18/21       Disciplines: Nurse, Interdisciplinary, RT    Goal: Patient/Family Education     Dates: Start: 11/18/21   Expected End: 11/22/21       Disciplines: Nurse, Interdisciplinary, RT          Problem: Patient Education: Go to Patient Education Activity     Dates: Start: 11/18/21 Disciplines: Interdisciplinary    Goal: Patient/Family Education     Dates: Start: 11/18/21   Expected End: 11/22/21       Disciplines: Interdisciplinary          Problem: Patient Education: Go to Patient Education Activity     Dates: Start: 11/19/21       Disciplines: Interdisciplinary    Goal: Patient/Family Education     Dates: Start: 11/19/21       Disciplines: Interdisciplinary          Problem: Patient Education: Go to Patient Education Activity     Dates: Start: 11/20/21       Disciplines: Interdisciplinary    Goal: Patient/Family Education     Dates: Start: 11/20/21       Disciplines: Interdisciplinary          Problem: Patient Education: Go to Patient Education Activity     Dates: Start: 11/21/21       Disciplines: Nurse, Interdisciplinary, RT    Goal: Patient/Family Education     Dates: Start: 11/21/21       Disciplines: Nurse, Interdisciplinary, RT          Problem: Pressure Injury - Risk of     Dates: Start: 11/20/21       Disciplines: Interdisciplinary    Goal: *Prevention of pressure injury     Dates: Start: 11/20/21   Expected End: 11/29/21       Description: Document Waqas Scale and appropriate interventions in the flowsheet. Disciplines: Interdisciplinary          Problem: Spiritual Evaluation     Dates: Start: 11/23/21       Disciplines: Interdisciplinary    Goal: Identify beliefs/practices that support hospice experience     Dates: Start: 11/23/21       Description: Patient/family identify their beliefs/practices that impair Hospice experience. Patient/family identify their beliefs/practices that support Hospice experience. Patient coping identified. Spiritual distress identified and decreased with visit.     Disciplines: Interdisciplinary    Intervention: Assess spiritual needs     Dates: Start: 11/23/21       Description: Assist with spiritual questions       Intervention: Assist with clergy contact     Dates: Start: 11/23/21          Intervention: Assist with spiritual resources Dates: Start: 11/23/21          Intervention: Offer ongoing support/referrals to community resources     Dates: Start: 11/23/21          Intervention: Provide spiritual support     Dates: Start: 11/23/21       Description: Assist with coordination of spiritual needs. Needs may include communion, anointing, / visits, and fear. Care Plan Problems/Goals  Report    0 of 16 Goals Met 0 of 16 Met     Progressing Towards Goal (15)      *Control of Pain (Pain)      Patient/Family Education (Patient Education: Go to Patient Education Activity)      *Oxygen saturation during activity within specified parameters (Activity Intolerance)      *Able to remain out of bed as prescribed (Activity Intolerance)      Patient/Family Education (Patient Education: Go to Patient Education Activity)      *Absence of Falls (Falls - Risk of)      Patient/Family Education (Patient Education: Go to Patient Education Activity)      Patient/family is receiving emotional support (Emotional Support Needs)      Patient/family will receive support from community resources (Family Significant Other Needs)      *Prevention of pressure injury (Pressure Injury - Risk of)      Patient/Family Education (Patient Education: Go to Patient Education Activity)      *Control of Pain (Pain)      *PALLIATIVE CARE:  Alleviation of Pain (Pain)      Patient/Family Education (Patient Education: Go to Patient Education Activity)      Patient/family will voice understanding of available financial resources (Financial Deficits)                   No Outcome (1)      Identify beliefs/practices that support hospice experience (Spiritual Evaluation)                        ___________________    Care Team Notes    IDG- clinical note- Pt is GIP at [facility] - symptom - Pain   thick secretions. Dilaudid needed for break through pain management. Pt is receiving several PRN's. ON scheduled Oxycodone.  Pt needs better management of pain and drain. Will plan to readdress his needs and have a d/c plan. Pt hesitant to go home due to no caregiver. Aspira drain needs draining daily. POC/IDG Notes      [facility] IDG  Notes by Khloe Hernandez at 217  Version 1 of 1    Author: Khloe Hernandez Service: Hospice and Palliative Care Author Type:     Filed: 21 [id number] Date of Service: 21 Status: Signed    : Khloe Hernandez ()       [address] [facility] IDG note:   LCSW participated in [facility] rounds. Team is continuing to work toward stabilizing pt's pain regimen for home hospice to take place either at his daughter or sister's home. Sister, Mohan Durant lives in [place] and is [place]. LCSW met with pt at bedside. He confirmed his preference would be to return to his daughter's home in [facility] 70 also provided him with the fax number again for the SSA document which will be necessary for his referral for FAP should he require routine days at [facility].     Problem: Financial deficits, discharge planning  Plan: Assist with discharge planning and financial needs  Community Resources: [facility] for GIP care, FAP started  Advance Directives: Completed and on file (sister, Mohan Durant is MPOA)  DDNR: Completed and on file   Home: TBD, pt unwilling to discuss his wishes    Aimee Bloom   [phone]                Care Team Present:         Asha Hope RN  [address] [facility]  Mrs Escalante Paco MSW  Dr Abhay Masters, RN, Nurse Leader

## 2021-11-23 NOTE — PROGRESS NOTES
[address] [place] [address]   [facility] in Need  [phone]    Patient Name: Bibiana Aquino  YOB: 1960    Date of Provider Hospice Visit: 11/23/21    Level of Care:   [x] General Inpatient (GIP)    [] Routine   [] Respite    Current Location of Care:  [] [facility] [] [facility] [] [facility] [] [address] [x] Hospice House THE [facility], patient referred from:  [] [facility] [x] [facility] [] [facility] [] [address] [] Home [] Other:     Date of Original Hospice Admission: 11/18/21  Hospice Medical Director at time of admission: [address] [place] [address] Diagnosis: Advanced Pancreatic Cancer  Diagnoses RELATED to the terminal prognosis: malignant ascites  Other Diagnoses: protein calorie malnutrition, tobacco use     HOSPICE SUMMARY   Do not cut and paste chart information other than imaging findings    Bibiana Aquino is a 63y.o. year old who was admitted to [address] [place] [address]. The patient's principle diagnosis of advanced maligannt pancreatic adenocarcinoma with malignant ascites has resulted in further worsening function and decline with increased abdominal pain that has been difficult to control with oral opioids. Pt also is severely malnourished. Pt had aspira drain placed in hospital. Pt has been having poor appetite, decreased oral intake and increased fatigue, weakness over past few days. PPS 40% but declining fast  Pt has chosen hospice care due to disease progression and overwhelming symptoms due to lack of regular treatment. Refer to LCD   CT Abdomen:  IMPRESSION     1. Large volume ascites, new. No definite change in small submental soft tissue  densities left mid and upper abdomen. 2. Possible slight increase in ill-defined pancreatic body mass. Slight  worsening of focal narrowing/short segment occlusion at the junction of the  superior mesenteric vein and portal vein. 3. Slightly nodular border of the liver with no discrete liver mass.   4. Irregular wall thickening of the sigmoid colon/rectosigmoid junction may be  due to a chronic infectious/inflammatory process, with small fluid collection in  the wall the sigmoid colon slightly larger than prior study. Neoplastic etiology  cannot be excluded. FNAC: poorly differentiated adenocarcinoma          HOSPICE DIAGNOSES   Active Symptoms:  1. Cancer associated pain  2  Cancer related fatigue/weakness  3. Poor appetite  4. Malignant Ascites  5. Nicotine dependence  6. Anxiety  7. Hospice care  8. Constipation-likely opioids     PLAN   1. Continue GIP level care at least for another 24 hours. Patient does appear to be tolerating oral Dilaudid even though he would prefer to IV medication. I actually saw him 2 different times in his room today and he was sleeping both times. The dose of Ativan did help him rest last night so we may need to consider scheduling Ativan. He has had 3 as needed doses of Dilaudid orally today. 2. Continue oxycontin 20 mg every 12 hrs. Has required 7 prn doses of IV dilaudid but is able to eat and take other oral medications. Continue oral Dilaudid 4 mg as needed. We will consider increasing his OxyContin over the next 24 to 48 hours if continues to require multiple as needed dosing  3. Ativan 1mg IV every 15mts as needed for anxiety, has not required-consider scheduled at bedtime  4. Nicotine patch to be started as pt has tobacco dependence   5. Constipation- stop colace which usually not effective in OIC and start senna daily. Continue to monitor effectiveness  6. Continue to drain the aspira as this helps remove pressure form his abdomen-1 liter daily at minimal right now. Will continue to drain for comfort  7. Appreciate the assistance of [facility]. I read her note and the discussion she had with patient's sister. Difficult transition home. Working on potentially FAP to cover routine level care. 8.  and SW to support family needs  9.  Disposition: as above, may be able to go home for short time but overall has poor prognosis  10. Hospice Plan of care was reviewed in detail and agree with current plan of care, RN    Prognosis estimated based on 11/23/21 clinical assessment is:   [] Hours to Days    [] Days to Weeks    [x] Other:    Communicated plan of care with: Hospice Nurse      GOALS OF CARE     Patient/Medical POA stated Goal of Care: pain control, comfort    [x] I have reviewed and/or updated ACP information in the Advance Care Planning Navigator. This information is available in the [address] link in the patient's chart header. Primary Decision [facility] (Health Care Agent):   Primary Decision Maker: Leilani Natarajan - Estuardo - [phone]    Resuscitation Status: DNR  If DNR is there a Durable DNR on file? : [x] Yes [] No (If no, complete Durable DNR)    HISTORY     History obtained from: patient, chart review    CHIEF COMPLAINT: pain ok  The patient is:   [x] Verbal  [] Nonverbal  [] Unresponsive    HPI/SUBJECTIVE:  63year old male with hx of pancreatic adenocarcinoma which has caused cachexia and significant weight loss, abdominal pain and poor appetite. He endorses some anxiety over his condition. He is now on hospice service and GIP for pain control because he has never had adequate pain control. Pt lying in bed and states this is the most comfortable he has been in a long time however he does complain of pain 6/10 and prior to the administration of IV dilaudid his pain was up to 9/10.    11/22-sleeping when I enter the room but arouses easily     11/23-patient resting both times I entered the room. Did not arouse him as he appeared comfortable.            REVIEW OF SYSTEMS     The following systems were: [x] reviewed  [] unable to be reviewed    Positive ROS include:  Constitutional: fatigue, weakness, in pain, short of breath  Ears/nose/mouth/throat: increased airway secretions  Respiratory: shortness of breath, wheezing  Gastrointestinal: poor appetite, nausea, vomiting, abdominal pain, constipation, diarrhea  Musculoskeletal: pain, deformities, swelling legs  Neurologic:confusion, hallucinations, weakness  Psychiatric: anxiety, feeling depressed, poor sleep  Endocrine:     Adult Non-Verbal Pain Assessment Score: Sleeping and appears comfortable. He has required 3 as needed doses of Dilaudid during the day  Face  [] 0   No particular expression or smile  [] 1   Occasional grimace, tearing, frowning, wrinkled forehead  [] 2   Frequent grimace, tearing, frowning, wrinkled forehead    Activity (movement)  [] 0   Lying quietly, normal position  [] 1   Seeking attention through movement or slow, cautious movement  [] 2   Restless, excessive activity and/or withdrawal reflexes    Guarding  [] 0   Lying quietly, no positioning of hands over areas of body  [] 1   Splinting areas of the body, tense  [] 2   Rigid, stiff    Physiology (vital signs)  [] 0   Stable vital signs  [] 1   Change in any of the following: SBP > 20mm Hg; HR > 20/minute  [] 2   Change in any of the following: SBP > 30mm Hg; HR > 25/minute    Respiratory  [] 0   Baseline RR/SpO2, compliant with ventilator  [] 1   RR > 10 above baseline, or 5% drop SpO2, mild asynchrony with ventilator  [] 2   RR > 20 above baseline, or 10% drop SpO2, asynchrony with ventilator     FUNCTIONAL ASSESSMENT     Palliative Performance Scale (PPS): 30-40%       PSYCHOSOCIAL/SPIRITUAL ASSESSMENT     Active Problems:    * No active hospital problems.  *    Past Medical History:   Diagnosis Date    Gout     Pancreatic cancer ([facility] [address]     Psoriasis     Tobacco abuse       Past Surgical History:   Procedure Laterality Date    HX ORTHOPAEDIC      femur repair    HX ORTHOPAEDIC      lumbar compressed fracture    IR INSERT INTRAPERI TUNL CATH PERC  11/17/2021    IR INSERT TUNL CVC W PORT OVER 5 YEARS  11/24/2020      Social History     Tobacco Use    Smoking status: Current Every Day Smoker     Packs/day: 1.00    Smokeless tobacco: Never Used    Tobacco comment: last week was last cigarette   Substance Use Topics    Alcohol use: Yes     Family History   Problem Relation Age of Onset    Pancreatic Cancer Brother       No Known Allergies   Current Facility-Administered Medications   Medication Dose Route Frequency    sennosides (SENOKOT) 8.8 mg/5 mL syrup 17.6 mg  10 mL Oral DAILY    LORazepam (INTENSOL) 2 mg/mL oral concentrate 1 mg  1 mg SubLINGual Q15MIN PRN    oxyCODONE ER (OxyCONTIN) tablet 20 mg  20 mg Oral Q12H    bisacodyL (DULCOLAX) suppository 10 mg  10 mg Rectal DAILY PRN    acetaminophen (TYLENOL) suppository 650 mg  650 mg Rectal Q6H PRN    LORazepam (ATIVAN) injection 1 mg  1 mg IntraVENous Q15MIN PRN    HYDROmorphone (DILAUDID) injection 1 mg  1 mg IntraVENous Q15MIN PRN    glycopyrrolate (ROBINUL) injection 0.2 mg  0.2 mg IntraVENous Q4H PRN    HYDROmorphone (DILAUDID) tablet 4 mg  4 mg Oral Q1H PRN    nicotine (NICODERM CQ) 21 mg/24 hr patch 1 Patch  1 Patch TransDERmal DAILY        PHYSICAL EXAM     Wt Readings from Last 3 Encounters:   11/15/21 52.9 kg (116 lb 10 oz)   10/04/21 52.9 kg (116 lb 9.6 oz)   10/04/21 52.9 kg (116 lb 10 oz)       Visit Vitals  BP (!) 82/60 (BP 1 Location: Right upper arm, BP Patient Position: Lying left side)   Pulse 75   Temp 99.1 °F (37.3 °C)   Resp 11   SpO2 92%       Supplemental O2  [] Yes  [x] NO  Last bowel movement: today 11/18/21    Currently this patient has:  [x] Peripheral IV [] PICC  [x] PORT [] ICD    [] Duke Catheter [] NG Tube   [] PEG Tube    [] Rectal Tube [] Drain  [] Other:     Constitutional: Sleeping when I evaluated him both times during the day.   Discussed with bedside nurse and he is tolerating oral Dilaudid  Eyes: pallor  ENMT: clear mucosa, no lesions  Cardiovascular: normal rate and rhythm, no edema  Respiratory: no breathing distress  Gastrointestinal: distended abdomen, tense, no masses, ascites +, aspira drain right side, tenderness to palpation, but positive bs  Musculoskeletal:thin extremities, no deformities  Skin:no lesions  Neurologic:no obvious deficits  Psychiatric: calm today  Other:       Pertinent Lab and or Imaging Tests:  Lab Results   Component Value Date/Time    Sodium 139 11/16/2021 12:42 AM    Potassium 3.9 11/16/2021 12:42 AM    Chloride 107 11/16/2021 12:42 AM    CO2 29 11/16/2021 12:42 AM    Anion gap 3 (L) 11/16/2021 12:42 AM    Glucose 98 11/16/2021 12:42 AM    BUN 10 11/16/2021 12:42 AM    Creatinine 0.77 11/16/2021 12:42 AM    BUN/Creatinine ratio 13 11/16/2021 12:42 AM    GFR est AA >60 11/16/2021 12:42 AM    GFR est non-AA >60 11/16/2021 12:42 AM    Calcium 7.9 (L) 11/16/2021 12:42 AM     Lab Results   Component Value Date/Time    Protein, total 5.4 (L) 11/16/2021 12:42 AM    Albumin 2.3 (L) 11/16/2021 12:42 AM         Rhoda Byrd MD

## 2021-11-24 NOTE — PROGRESS NOTES
1900 Report received from [facility]. Pt is [facility] level of care for pain and education in care of Aspira drain. 1930  Complains of abd pain rated 8 of 10. Holding right lower abd. States he just doesn't feel the pill is as effective as the IV route. Medicated with Dilaudid 4 mg po tabs and Lorazepam 1 mg SL.  2000 Pt appears to be sleeping. 2100 Awakened for scheduled Oxycontin. Voices relief of pain and states has been able to sleep. 2200 Continue to monitor, No signs of discomfort. 2300 No signs or complaints of pain. 0100 Appears to be sleeping. Continue to monitor. 0200 Resting quietly. 0300 No signs of pain. , sleeping.  0400 Awake, repositioning self in bed, no facial grimace  Or guarding. 0515 Awake, states called out accidentally while turning on light in room. Did not request pain medication. 0600 Resting quietly, appears to be back to sleep. 0645 Dilaudid 4 mg and Lorazepam Sl for complaints of abd pain rated 9 of 10. Pt  Holding abdomen. 0700 Report to oncoming nurse. NAME OF PATIENT:  Jared Davenport    LEVEL OF CARE:  [facility]    REASON FOR GIP:   Pain, despite numerous changes in medications, Medication adjustment that must be monitored 24/7 and Stabilizing treatment that cannot take place at home    *PATIENT REMAINS ELIGIBLE FOR [facility] LEVEL OF CARE AS EVIDENCED BY: Frequent nurse assessment and medication adjustments required for symptom management.       REASON FOR RESPITE:  na    O2 SAFETY:  na    FALL INTERVENTIONS PROVIDED:   Implemented/recommended use of non-skid footwear, Implemented/recommended use of fall risk identification flag to all team members, Implemented/recommended resources for alarm system (personal alarm, bed alarm, call bell, etc.) , Implemented/recommended environmental changes (remove hazards, lower bed, improve lighting, etc.) and Implemented/recommended increased supervision/assistance    INTERDISPLINARY COMMUNICATION/COLLABORATION:  Physician, MSW, Henry Urban and RN, CNA    NEW MEDICATION INITIATION DOCUMENTATION:  na    Reason medication is being initiated:  na    MD / Provider name consulted re: change in status / initiation of new medication:  na    New Symptom(s):  na    New Order(s):  na  Name of the person notified of the changes:  na    Name of person being taught:  na    Instructions given:  na    Side Effects taught:  na    Response to teaching:  na      COMFORTABLE DYING MEASURE:  Is Patient/family satisfied with symptom level? Yes    DISCHARGE PLAN:  Remain GIP until symptoms are managed and pt can be cared for in the home.

## 2021-11-24 NOTE — PROGRESS NOTES
1630-Report received from Roseann Camarillo; care assumed of this pt for the rest of the shift  1700-assessment reveals pt to be oriented but confused; pt gives unclear answers to questions; on room air, lungs CTA; aspira drain dressing in place, clean, dry and intact; pt c/o 8/10 abdominal pain--PRN Dilaudid administered; pt sitting up on the edge of the bed eating a fruit cup; sister present at the bedside  1745-pt reports pain is now 7/10-pt will wait longer to see if medication provides additional relief  1800-pt taken outside by his son in a wheelchair  1830-pt back in his bed--requests additional pain medication for 8/10 abdominal pain  1836-PRN dilaudid administered  1900-Report given to [employer], RN

## 2021-11-24 NOTE — PROGRESS NOTES
Problem: Falls - Risk of  Goal: *Absence of Falls  Description: Document Kalyani Castillo Fall Risk and appropriate interventions in the flowsheet.   Outcome: Progressing Towards Goal  Note: Fall Risk Interventions:  Mobility Interventions: Bed/chair exit alarm         Medication Interventions: Bed/chair exit alarm    Elimination Interventions: Bed/chair exit alarm, Call light in reach    History of Falls Interventions: Bed/chair exit alarm

## 2021-11-24 NOTE — HOSPICE
0700 Report received from [facility]. 0750 Patient's breakfast tray set up by BALDEV Ga. Patient appears lethargic and drowsy at this time, states he has some pain in his abdomen but wants to eat something first.     0830 Patient getting shower by BALDEV Ga. Patient's linens changed while out of bed.     0920 Patient's aspira drain drained, 1000 ml taken off.    0940 Patient states he does not feel like he needs Senokot this morning, oxycodone ER given. Patient states he wants to go to sleep now, lights turned off.     1040 Patient resting in bed quietly, eyes are closed, neutral facial expression noted. 6595 Dr. Ivan Cano rounding with patient. 1155 Patient complaining of abdominal pain, PRN PO dilaudid given, see MAR. Will continue to monitor. 1230 Patient ate bites of lunch. Patient now resting in bed quietly with eyes closed, patient appears to be sleeping at this time. PRN dilaudid effective. 1325 Patient resting in bed quietly, eyes are closed, patient appears to be sleeping at this time. 1415 Patient resting in bed quietly, eyes are closed, neutral facial expression noted. Respirations are even and unlabored. Patient appears to be sleeping at this time. 1515 Patient resting in bed quietly, appears to be sleeping at this time. 1600 Report given to [facility].

## 2021-11-24 NOTE — PROGRESS NOTES
LCSW checked in with RN today regarding pt's status. He is more confused today and his color is a bit different. Potential signs of further decline. LCSW continuing to follow for any needs including discharge planning. -FAP remains pending with [employer] for possible routine days  -Sister Raymond Simmons is MPOA per AMD should pt no longer be able to speak for himself  -Should pt become stable his preferred dc location is his daughter, Chrystal's home.     Candi Hurd MSW, [facility]   [phone]

## 2021-11-24 NOTE — PROGRESS NOTES
Problem: Pain  Goal: *Control of Pain  Outcome: Progressing Towards Goal     Problem: Patient Education: Go to Patient Education Activity  Goal: Patient/Family Education  Outcome: Progressing Towards Goal     Problem: Pain  Goal: *Control of Pain  Outcome: Progressing Towards Goal  Goal: *PALLIATIVE CARE:  Alleviation of Pain  Outcome: Progressing Towards Goal     Problem: Patient Education: Go to Patient Education Activity  Goal: Patient/Family Education  Outcome: Progressing Towards Goal     Problem: Activity Intolerance  Goal: *Oxygen saturation during activity within specified parameters  Outcome: Progressing Towards Goal  Goal: *Able to remain out of bed as prescribed  Outcome: Progressing Towards Goal     Problem: Patient Education: Go to Patient Education Activity  Goal: Patient/Family Education  Outcome: Progressing Towards Goal     Problem: Falls - Risk of  Goal: *Absence of Falls  Description: Document Jefe Vega Fall Risk and appropriate interventions in the flowsheet.   Outcome: Progressing Towards Goal  Note: Fall Risk Interventions:  Mobility Interventions: Bed/chair exit alarm         Medication Interventions: Bed/chair exit alarm    Elimination Interventions: Bed/chair exit alarm, Call light in reach    History of Falls Interventions: Bed/chair exit alarm         Problem: Patient Education: Go to Patient Education Activity  Goal: Patient/Family Education  Outcome: Progressing Towards Goal     Problem: Emotional Support Needs  Goal: Patient/family is receiving emotional support  Outcome: Progressing Towards Goal     Problem: Family Significant Other Needs  Goal: Patient/family will receive support from community resources  Outcome: Progressing Towards Goal     Problem: Financial Deficits  Goal: Patient/family will voice understanding of available financial resources  Outcome: Progressing Towards Goal     Problem: Pressure Injury - Risk of  Goal: *Prevention of pressure injury  Description: Document Waqas Scale and appropriate interventions in the flowsheet. Outcome: Progressing Towards Goal     Problem: Patient Education: Go to Patient Education Activity  Goal: Patient/Family Education  Outcome: Progressing Towards Goal     Problem: Spiritual Evaluation  Goal: Identify beliefs/practices that support hospice experience  Description: Patient/family identify their beliefs/practices that impair Hospice experience. Patient/family identify their beliefs/practices that support Hospice experience. Patient coping identified. Spiritual distress identified and decreased with visit.   Outcome: Progressing Towards Goal

## 2021-11-25 NOTE — PROGRESS NOTES
Problem: Pain  Goal: *Control of Pain  Outcome: Progressing Towards Goal     Problem: Patient Education: Go to Patient Education Activity  Goal: Patient/Family Education  Outcome: Progressing Towards Goal     Problem: Pain  Goal: *Control of Pain  Outcome: Progressing Towards Goal  Goal: *PALLIATIVE CARE:  Alleviation of Pain  Outcome: Progressing Towards Goal     Problem: Patient Education: Go to Patient Education Activity  Goal: Patient/Family Education  Outcome: Progressing Towards Goal     Problem: Activity Intolerance  Goal: *Oxygen saturation during activity within specified parameters  Outcome: Progressing Towards Goal  Goal: *Able to remain out of bed as prescribed  Outcome: Progressing Towards Goal     Problem: Patient Education: Go to Patient Education Activity  Goal: Patient/Family Education  Outcome: Progressing Towards Goal     Problem: Falls - Risk of  Goal: *Absence of Falls  Description: Document Dane Walter Fall Risk and appropriate interventions in the flowsheet. Outcome: Progressing Towards Goal  Note: Fall Risk Interventions:  Mobility Interventions: Bed/chair exit alarm         Medication Interventions: Bed/chair exit alarm    Elimination Interventions: Bed/chair exit alarm, Call light in reach    History of Falls Interventions: Bed/chair exit alarm         Problem: Patient Education: Go to Patient Education Activity  Goal: Patient/Family Education  Outcome: Progressing Towards Goal     Problem: Pressure Injury - Risk of  Goal: *Prevention of pressure injury  Description: Document Waqas Scale and appropriate interventions in the flowsheet.   Outcome: Progressing Towards Goal  Note: Pressure Injury Interventions:  Sensory Interventions: Assess changes in LOC, Avoid rigorous massage over bony prominences, Float heels, Minimize linen layers, Keep linens dry and wrinkle-free    Moisture Interventions: Maintain skin hydration (lotion/cream), Minimize layers    Activity Interventions: Pressure redistribution bed/mattress(bed type)    Mobility Interventions: Float heels, Pressure redistribution bed/mattress (bed type)    Nutrition Interventions: Offer support with meals,snacks and hydration    Friction and Shear Interventions: Lift sheet, Minimize layers                Problem: Patient Education: Go to Patient Education Activity  Goal: Patient/Family Education  Outcome: Progressing Towards Goal

## 2021-11-25 NOTE — PROGRESS NOTES
Problem: Pain  Goal: *Control of Pain  Outcome: Progressing Towards Goal     Problem: Patient Education: Go to Patient Education Activity  Goal: Patient/Family Education  Outcome: Progressing Towards Goal     Problem: Activity Intolerance  Goal: *Oxygen saturation during activity within specified parameters  Outcome: Progressing Towards Goal  Goal: *Able to remain out of bed as prescribed  Outcome: Progressing Towards Goal     Problem: Patient Education: Go to Patient Education Activity  Goal: Patient/Family Education  Outcome: Progressing Towards Goal     Problem: Falls - Risk of  Goal: *Absence of Falls  Description: Document Thora Bare Fall Risk and appropriate interventions in the flowsheet. Outcome: Progressing Towards Goal  Note: Fall Risk Interventions:  Mobility Interventions: Bed/chair exit alarm         Medication Interventions: Bed/chair exit alarm    Elimination Interventions: Bed/chair exit alarm, Call light in reach    History of Falls Interventions: Bed/chair exit alarm         Problem: Patient Education: Go to Patient Education Activity  Goal: Patient/Family Education  Outcome: Progressing Towards Goal     Problem: Emotional Support Needs  Goal: Patient/family is receiving emotional support  Outcome: Progressing Towards Goal     Problem: Family Significant Other Needs  Goal: Patient/family will receive support from community resources  Outcome: Progressing Towards Goal     Problem: Pressure Injury - Risk of  Goal: *Prevention of pressure injury  Description: Document Waqas Scale and appropriate interventions in the flowsheet.   Outcome: Progressing Towards Goal     Problem: Patient Education: Go to Patient Education Activity  Goal: Patient/Family Education  Outcome: Progressing Towards Goal     Problem: Pain  Goal: *Control of Pain  Outcome: Progressing Towards Goal  Goal: *PALLIATIVE CARE:  Alleviation of Pain  Outcome: Progressing Towards Goal     Problem: Patient Education: Go to Patient Education Activity  Goal: Patient/Family Education  Outcome: Progressing Towards Goal     Problem: Financial Deficits  Goal: Patient/family will voice understanding of available financial resources  Outcome: Progressing Towards Goal     Problem: Spiritual Evaluation  Goal: Identify beliefs/practices that support hospice experience  Description: Patient/family identify their beliefs/practices that impair Hospice experience. Patient/family identify their beliefs/practices that support Hospice experience. Patient coping identified. Spiritual distress identified and decreased with visit.   Outcome: Progressing Towards Goal

## 2021-11-25 NOTE — PROGRESS NOTES
1900: Report received from Huy Hart 124: Patient is Alert and oriented, has visitor at the bedside. No needs at this time   2000: Patient has visitor at the bedside. No needs at this time   2030:  Patient assessment complete. Documented in flowsheets. 2055: Scheduled oxycontin 20mg administered. Patient reports pain is presently 6/10. Reports that this is his baseline. Patient refused nicotine patch. Retimed nicotine patch for AM.  Lights turned down in room for patient at his request.   2200: Patient resting with eyes close, appears sleeping. Neutral facial position observed. 2300: Patient remains sleeping  0000: Patient sleeping. Has repositioned himself in bed. Neutral facial position observed  0100: Patient remains sleeping  0200: Patient awake in bed, returning from restroom. No needs or complaints at this time. 0300: Patient sleeping  0340: Patient awake, requesting pain medication for pain rated 8/10 and aspira drain dressing changed for saturated dressing. Patient returning to sleep with no additional needs or complaints. 0430: Patient has returned to sleeping. Neutral facial position observed. 0530: Patient sleeping  0630: Patient remains sleeping. Neutral facial position observed.    0700: Report given to BHARATH Hawk         NAME OF PATIENT:  Payal Manley    LEVEL OF CARE:  GIP    REASON FOR GIP:   Pain, despite numerous changes in medications, Medication adjustment that must be monitored 24/7 and Stabilizing treatment that cannot take place at home    *PATIENT REMAINS ELIGIBLE FOR GIP LEVEL OF CARE AS EVIDENCED BY: Patient requires frequent nursing assessment of distress causing symptoms     REASON FOR RESPITE:  NA    O2 SAFETY:  NA    FALL INTERVENTIONS PROVIDED:   Implemented/recommended use of non-skid footwear, Implemented/recommended use of fall risk identification flag to all team members, Implemented/recommended assistive devices and encouraged their use, Implemented/recommended resources for alarm system (personal alarm, bed alarm, call bell, etc.) , Implemented/recommended environmental changes (remove hazards, lower bed, improve lighting, etc.) and Implemented/recommended increased supervision/assistance    INTERDISPLINARY COMMUNICATION/COLLABORATION:  Physician, MSW, Dave and RN, CNA    NEW MEDICATION INITIATION DOCUMENTATION:  NA    Reason medication is being initiated:  NA    MD / Provider name consulted re: change in status / initiation of new medication:  NA    New Symptom(s):  NA    New Order(s):  NA    Name of the person notified of the changes:  NA    Name of person being taught:  NA    Instructions given:  NA    Side Effects taught:  NA    Response to teaching:  NA      COMFORTABLE DYING MEASURE:  Is Patient/family satisfied with symptom level?  yes    DISCHARGE PLAN:  Home when symptom management goals are met

## 2021-11-26 NOTE — PROGRESS NOTES
Problem: Pain  Goal: *Control of Pain  Outcome: Progressing Towards Goal     Problem: Falls - Risk of  Goal: *Absence of Falls  Description: Document Shelby Fall Risk and appropriate interventions in the flowsheet. Outcome: Progressing Towards Goal  Note: Fall Risk Interventions:  Mobility Interventions: Patient to call before getting OOB, Bed/chair exit alarm         Medication Interventions: Bed/chair exit alarm    Elimination Interventions: Call light in reach, Bed/chair exit alarm    History of Falls Interventions: Bed/chair exit alarm         Problem: Pressure Injury - Risk of  Goal: *Prevention of pressure injury  Description: Document Waqas Scale and appropriate interventions in the flowsheet.   Outcome: Progressing Towards Goal  Note: Pressure Injury Interventions:  Sensory Interventions: Assess changes in LOC, Avoid rigorous massage over bony prominences, Float heels, Minimize linen layers, Keep linens dry and wrinkle-free    Moisture Interventions: Maintain skin hydration (lotion/cream), Minimize layers    Activity Interventions: Pressure redistribution bed/mattress(bed type)    Mobility Interventions: Float heels, Pressure redistribution bed/mattress (bed type)    Nutrition Interventions: Offer support with meals,snacks and hydration    Friction and Shear Interventions: Lift sheet, Minimize layers

## 2021-11-26 NOTE — PROGRESS NOTES
Problem: Pain  Goal: *Control of Pain  Outcome: Progressing Towards Goal     Problem: Patient Education: Go to Patient Education Activity  Goal: Patient/Family Education  Outcome: Progressing Towards Goal     Problem: Activity Intolerance  Goal: *Oxygen saturation during activity within specified parameters  Outcome: Progressing Towards Goal  Goal: *Able to remain out of bed as prescribed  Outcome: Progressing Towards Goal     Problem: Patient Education: Go to Patient Education Activity  Goal: Patient/Family Education  Outcome: Progressing Towards Goal     Problem: Falls - Risk of  Goal: *Absence of Falls  Description: Document Roberto Sol Fall Risk and appropriate interventions in the flowsheet. Outcome: Progressing Towards Goal  Note: Fall Risk Interventions:  Mobility Interventions: Bed/chair exit alarm         Medication Interventions: Bed/chair exit alarm    Elimination Interventions: Call light in reach    History of Falls Interventions: Bed/chair exit alarm         Problem: Patient Education: Go to Patient Education Activity  Goal: Patient/Family Education  Outcome: Progressing Towards Goal     Problem: Emotional Support Needs  Goal: Patient/family is receiving emotional support  Outcome: Progressing Towards Goal     Problem: Family Significant Other Needs  Goal: Patient/family will receive support from community resources  Outcome: Progressing Towards Goal     Problem: Pressure Injury - Risk of  Goal: *Prevention of pressure injury  Description: Document Waqas Scale and appropriate interventions in the flowsheet.   Outcome: Progressing Towards Goal  Note: Pressure Injury Interventions:  Sensory Interventions: Assess changes in LOC, Avoid rigorous massage over bony prominences, Float heels, Minimize linen layers, Keep linens dry and wrinkle-free    Moisture Interventions: Maintain skin hydration (lotion/cream), Minimize layers    Activity Interventions: Pressure redistribution bed/mattress(bed type)    Mobility Interventions: Float heels, Pressure redistribution bed/mattress (bed type)    Nutrition Interventions: Offer support with meals,snacks and hydration    Friction and Shear Interventions: Lift sheet, Minimize layers                Problem: Patient Education: Go to Patient Education Activity  Goal: Patient/Family Education  Outcome: Progressing Towards Goal     Problem: Pain  Goal: *Control of Pain  Outcome: Progressing Towards Goal  Goal: *PALLIATIVE CARE:  Alleviation of Pain  Outcome: Progressing Towards Goal     Problem: Patient Education: Go to Patient Education Activity  Goal: Patient/Family Education  Outcome: Progressing Towards Goal     Problem: Financial Deficits  Goal: Patient/family will voice understanding of available financial resources  Outcome: Progressing Towards Goal     Problem: Spiritual Evaluation  Goal: Identify beliefs/practices that support hospice experience  Description: Patient/family identify their beliefs/practices that impair Hospice experience. Patient/family identify their beliefs/practices that support Hospice experience. Patient coping identified. Spiritual distress identified and decreased with visit.   Outcome: Progressing Towards Goal

## 2021-11-26 NOTE — PROGRESS NOTES
0700: report received from Eryn, [facility]: pt laying in bed awake, states his pain is 5/10 but wants to try to eat breakfast before taking any pain meds. 0845: pt resting quietly in bed with lights out and blinds closed. States he ate some of his breakfast, a little better appetite than what he had yesterday. Full assessment done, see flowsheets. 0908: scheduled meds given per [facility]   0930: rounds with Dr Darya Tarango. Will d/c nicotine patch as patient has not required it and increase OxyContin dose to 30mg BID based on the amount of PRN pain meds he has received in the last 24 hours. A one time dose of 10mg oxycontin will be administered. Patient is transferred to routine LOC from [facility].   1015: pt resting quietly with eyes closed. Call bell within reach. 1115: pt resting quietly with eyes closed. Pt awakened when RN spoke. RN updated patient on increase in medication dosage. Pt verbalized understanding. 10mg one time dose of OxyContin given per [facility]   1230: lunch tray set up for patient. No other needs at this time. 1400: pt resting quietly, states he ate a little bit of lunch and his pain is ok. No needs at this time. Call bell within reach. 1600: pt resting quietly, no needs at this time. 1800: pt resting quietly. BHARATH Barkley hooked up drainage bag to aspira drain. 700ml yellow fluid drained. Pt states his pain is ok right now and he doesn't want a PRN dose, would rather wait for scheduled dose at 2100.          NAME OF PATIENT:  Bibiana Aquino    LEVEL OF CARE:  GIP to routine effective today    REASON FOR GIP:   n/a    *PATIENT REMAINS ELIGIBLE FOR [facility] LEVEL OF CARE AS EVIDENCED BY: (MUST BE ADDRESSED OF PATIENT GIP)      REASON FOR RESPITE:  n/a    O2 SAFETY:  pt is on room air    FALL INTERVENTIONS PROVIDED:   Implemented/recommended use of non-skid footwear, Implemented/recommended use of fall risk identification flag to all team members, Implemented/recommended assistive devices and encouraged their use, Implemented/recommended resources for alarm system (personal alarm, bed alarm, call bell, etc.) , Implemented/recommended environmental changes (remove hazards, lower bed, improve lighting, etc.) and Implemented/recommended increased supervision/assistance    INTERDISPLINARY COMMUNICATION/COLLABORATION:  Physician, MSW, Ada and RN, CNA    NEW MEDICATION INITIATION DOCUMENTATION:  n/a    Reason medication is being initiated:  n/a    MD / Provider name consulted re: change in status / initiation of new medication:  Dominga    New Symptom(s):  n/a    New Order(s):  Increase OxyContin to 30mg BID    Name of the person notified of the changes:  patient    Name of person being taught:  patient    Instructions given:  yes    Side Effects taught:  yes    Response to teaching:  Verbalized understanding      COMFORTABLE DYING MEASURE:  Is Patient/family satisfied with symptom level?  yes    DISCHARGE PLAN:  Patient will discharge home with his daughter or sister and continue to be followed by home hospice.

## 2021-11-26 NOTE — PROGRESS NOTES
[address] [place] [address]   [facility]  [phone]    Patient Name: Orion Gill  YOB: 1960    Date of Provider Hospice Visit: 11/26/21    Level of Care:   [] General Inpatient (GIP)    [x] Routine   [] Respite    Current Location of Care:  [] [facility] [] [facility] [] [facility] [] [facility] [x] [facility], patient referred from:  [] [facility] [x] [facility] [] [facility] [] [facility] [] Home [] Other:     Date of Original Hospice Admission: 11/18/21  Hospice Medical Director at time of admission: [address] [place] [address] Diagnosis: Advanced Pancreatic Cancer  Diagnoses RELATED to the terminal prognosis: malignant ascites  Other Diagnoses: protein calorie malnutrition, tobacco use     HOSPICE SUMMARY   Do not cut and paste chart information other than imaging findings    Orion Gill is a 63y.o. year old who was admitted to [address] [place] [address]. The patient's principle diagnosis of advanced maligannt pancreatic adenocarcinoma with malignant ascites has resulted in further worsening function and decline with increased abdominal pain that has been difficult to control with oral opioids. Pt also is severely malnourished. Pt had aspira drain placed in hospital. Pt has been having poor appetite, decreased oral intake and increased fatigue, weakness over past few days. PPS 40% but declining fast  Pt has chosen hospice care due to disease progression and overwhelming symptoms due to lack of regular treatment. Refer to LCD   CT Abdomen:  IMPRESSION     1. Large volume ascites, new. No definite change in small submental soft tissue  densities left mid and upper abdomen. 2. Possible slight increase in ill-defined pancreatic body mass. Slight  worsening of focal narrowing/short segment occlusion at the junction of the  superior mesenteric vein and portal vein. 3. Slightly nodular border of the liver with no discrete liver mass.   4. Irregular wall thickening of the sigmoid colon/rectosigmoid junction may be  due to a chronic infectious/inflammatory process, with small fluid collection in  the wall the sigmoid colon slightly larger than prior study. Neoplastic etiology  cannot be excluded. FNAC: poorly differentiated adenocarcinoma          HOSPICE DIAGNOSES   Active Symptoms:  1. Cancer associated pain  2  Cancer related fatigue/weakness  3. Poor appetite  4. Malignant Ascites  5. Nicotine dependence  6. Anxiety  7. Hospice care  8. Constipation-likely opioids     PLAN   1. Patient will transition to routine level of care. We will make adjustments in his oral opioid management but overall appears comfortable. He is more awake and interactive this morning. He states he ate a little bit more. 2. Given his overall need for Dilaudid 4 mg4 doses, will increase his OxyContin to 30 mg every 12 and continue Dilaudid 4 mg every hour as needed for breakthrough pain. He has not required IV medication in several days. He does occasionally have some abdominal discomfort which I explained is likely related to his cancer. He had some concerns that this could be related to the oral Dilaudid which certainly is possible but I think less likely. 3. Ativan 1mg IV every 15mts as needed for anxiety  4. Stop his nicotine patch   5. Constipation-continue senna daily. Continue to monitor effectiveness  6. Continue to drain the aspira as this helps remove pressure form his abdomen-1 liter daily at minimal right now. Will continue to drain for comfort. Typically taking almost a liter off daily. 7. Talk with Chrystal patient apparently has been approved for FAP. We transition him to routine level care but also need a overall disposition if he does not show evidence of further decline. He would prefer to return to his daughters if he were to be discharged. Honestly, I think he would prefer to stay at the hospice house but we will need to have ongoing discussions about disposition    8.   and SW to support family needs  9. Disposition: as above, may be able to go home for short time but overall has poor prognosis  10. Hospice Plan of care was reviewed in detail and agree with current plan of care, RN    Prognosis estimated based on 11/26/21 clinical assessment is:   [] Hours to Days    [] Days to Weeks    [x] Other:    Communicated plan of care with: Hospice Nurse      GOALS OF CARE     Patient/Medical POA stated Goal of Care: pain control, comfort    [x] I have reviewed and/or updated ACP information in the Advance Care Planning Navigator. This information is available in the [address] link in the patient's chart header. Primary Decision [facility] (Health Care Agent):   Primary Decision Maker: Emily Calles - Daughter - [phone]    Resuscitation Status: DNR  If DNR is there a Durable DNR on file? : [x] Yes [] No (If no, complete Durable DNR)    HISTORY     History obtained from: patient, chart review    CHIEF COMPLAINT: pain ok  The patient is:   [x] Verbal  [] Nonverbal  [] Unresponsive    HPI/SUBJECTIVE:  63year old male with hx of pancreatic adenocarcinoma which has caused cachexia and significant weight loss, abdominal pain and poor appetite. He endorses some anxiety over his condition. He is now on hospice service and GIP for pain control because he has never had adequate pain control. Pt lying in bed and states this is the most comfortable he has been in a long time however he does complain of pain 6/10 and prior to the administration of IV dilaudid his pain was up to 9/10.    11/22-sleeping when I enter the room but arouses easily     11/23-patient resting both times I entered the room. Did not arouse him as he appeared comfortable. 11/24-patient arouses but definitely not as engaged. Color appears different as he appears more ashen. 11/26-patient more interactive today. He was sleeping at first but arouses easily.   Pain seems to be better managed and he ate a little bit better REVIEW OF SYSTEMS     The following systems were: [x] reviewed  [] unable to be reviewed    Positive ROS include:  Constitutional: fatigue, weakness, in pain, short of breath  Ears/nose/mouth/throat: increased airway secretions  Respiratory: shortness of breath, wheezing  Gastrointestinal: poor appetite, nausea, vomiting, abdominal pain, constipation, diarrhea  Musculoskeletal: pain, deformities, swelling legs  Neurologic:confusion, hallucinations, weakness  Psychiatric: anxiety, feeling depressed, poor sleep  Endocrine:     Adult Non-Verbal Pain Assessment Score: Rates pain to be 3-4 out of 10 this morning    Face  [] 0   No particular expression or smile  [] 1   Occasional grimace, tearing, frowning, wrinkled forehead  [] 2   Frequent grimace, tearing, frowning, wrinkled forehead    Activity (movement)  [] 0   Lying quietly, normal position  [] 1   Seeking attention through movement or slow, cautious movement  [] 2   Restless, excessive activity and/or withdrawal reflexes    Guarding  [] 0   Lying quietly, no positioning of hands over areas of body  [] 1   Splinting areas of the body, tense  [] 2   Rigid, stiff    Physiology (vital signs)  [] 0   Stable vital signs  [] 1   Change in any of the following: SBP > 20mm Hg; HR > 20/minute  [] 2   Change in any of the following: SBP > 30mm Hg; HR > 25/minute    Respiratory  [] 0   Baseline RR/SpO2, compliant with ventilator  [] 1   RR > 10 above baseline, or 5% drop SpO2, mild asynchrony with ventilator  [] 2   RR > 20 above baseline, or 10% drop SpO2, asynchrony with ventilator     FUNCTIONAL ASSESSMENT     Palliative Performance Scale (PPS): 30-40%       PSYCHOSOCIAL/SPIRITUAL ASSESSMENT     Active Problems:    * No active hospital problems.  *    Past Medical History:   Diagnosis Date    Gout     Pancreatic cancer ([facility] [address]     Psoriasis     Tobacco abuse       Past Surgical History:   Procedure Laterality Date    HX ORTHOPAEDIC      femur repair    HX ORTHOPAEDIC lumbar compressed fracture    IR INSERT INTRAPERI TUNL CATH PERC  11/17/2021    IR INSERT TUNL CVC W PORT OVER 5 YEARS  11/24/2020      Social History     Tobacco Use    Smoking status: Current Every Day Smoker     Packs/day: 1.00    Smokeless tobacco: Never Used    Tobacco comment: last week was last cigarette   Substance Use Topics    Alcohol use: Yes     Family History   Problem Relation Age of Onset    Pancreatic Cancer Brother       No Known Allergies   Current Facility-Administered Medications   Medication Dose Route Frequency    oxyCODONE ER (OxyCONTIN) tablet 30 mg  30 mg Oral Q12H    sennosides (SENOKOT) 8.8 mg/5 mL syrup 17.6 mg  10 mL Oral DAILY    LORazepam (INTENSOL) 2 mg/mL oral concentrate 1 mg  1 mg SubLINGual Q15MIN PRN    bisacodyL (DULCOLAX) suppository 10 mg  10 mg Rectal DAILY PRN    acetaminophen (TYLENOL) suppository 650 mg  650 mg Rectal Q6H PRN    LORazepam (ATIVAN) injection 1 mg  1 mg IntraVENous Q15MIN PRN    HYDROmorphone (DILAUDID) injection 1 mg  1 mg IntraVENous Q15MIN PRN    glycopyrrolate (ROBINUL) injection 0.2 mg  0.2 mg IntraVENous Q4H PRN    HYDROmorphone (DILAUDID) tablet 4 mg  4 mg Oral Q1H PRN        PHYSICAL EXAM     Wt Readings from Last 3 Encounters:   11/15/21 52.9 kg (116 lb 10 oz)   10/04/21 52.9 kg (116 lb 9.6 oz)   10/04/21 52.9 kg (116 lb 10 oz)       Visit Vitals  BP (!) 120/58   Pulse (!) 58   Temp 98.2 °F (36.8 °C)   Resp 14   SpO2 99%       Supplemental O2  [] Yes  [x] NO  Last bowel movement: today 11/18/21    Currently this patient has:  [x] Peripheral IV [] PICC  [x] PORT [] ICD    [] Duke Catheter [] NG Tube   [] PEG Tube    [] Rectal Tube [] Drain  [] Other:     Constitutional: Arouses easily, awake and oriented  Eyes: pallor  ENMT: clear mucosa, no lesions  Cardiovascular: normal rate and rhythm, no edema  Respiratory: no breathing distress  Gastrointestinal: distended abdomen, tense, no masses, ascites +, aspira drain right side, tenderness to palpation, hypoactive bowel sounds  Musculoskeletal:thin extremities, no deformities  Skin:no lesions  Neurologic:no obvious deficits  Psychiatric: calm today  Other:       Pertinent Lab and or Imaging Tests:  Lab Results   Component Value Date/Time    Sodium 139 11/16/2021 12:42 AM    Potassium 3.9 11/16/2021 12:42 AM    Chloride 107 11/16/2021 12:42 AM    CO2 29 11/16/2021 12:42 AM    Anion gap 3 (L) 11/16/2021 12:42 AM    Glucose 98 11/16/2021 12:42 AM    BUN 10 11/16/2021 12:42 AM    Creatinine 0.77 11/16/2021 12:42 AM    BUN/Creatinine ratio 13 11/16/2021 12:42 AM    GFR est AA >60 11/16/2021 12:42 AM    GFR est non-AA >60 11/16/2021 12:42 AM    Calcium 7.9 (L) 11/16/2021 12:42 AM     Lab Results   Component Value Date/Time    Protein, total 5.4 (L) 11/16/2021 12:42 AM    Albumin 2.3 (L) 11/16/2021 12:42 AM         Leilani Gordon MD

## 2021-11-27 NOTE — PROGRESS NOTES
Problem: Pain  Goal: *Control of Pain  Outcome: Progressing Towards Goal     Problem: Patient Education: Go to Patient Education Activity  Goal: Patient/Family Education  Outcome: Progressing Towards Goal     Problem: Activity Intolerance  Goal: *Oxygen saturation during activity within specified parameters  Outcome: Progressing Towards Goal  Goal: *Able to remain out of bed as prescribed  Outcome: Progressing Towards Goal     Problem: Patient Education: Go to Patient Education Activity  Goal: Patient/Family Education  Outcome: Progressing Towards Goal     Problem: Falls - Risk of  Goal: *Absence of Falls  Description: Document [place] Fall Risk and appropriate interventions in the flowsheet. Outcome: Progressing Towards Goal  Note: Fall Risk Interventions:  Mobility Interventions: Bed/chair exit alarm         Medication Interventions: Bed/chair exit alarm    Elimination Interventions: Call light in reach    History of Falls Interventions: Bed/chair exit alarm         Problem: Patient Education: Go to Patient Education Activity  Goal: Patient/Family Education  Outcome: Progressing Towards Goal     Problem: Emotional Support Needs  Goal: Patient/family is receiving emotional support  Outcome: Progressing Towards Goal     Problem: Family Significant Other Needs  Goal: Patient/family will receive support from community resources  Outcome: Progressing Towards Goal     Problem: Pressure Injury - Risk of  Goal: *Prevention of pressure injury  Description: Document Waqas Scale and appropriate interventions in the flowsheet.   Outcome: Progressing Towards Goal  Note: Pressure Injury Interventions:  Sensory Interventions: Assess changes in LOC, Avoid rigorous massage over bony prominences, Check visual cues for pain, Keep linens dry and wrinkle-free, Maintain/enhance activity level    Moisture Interventions: Maintain skin hydration (lotion/cream), Minimize layers    Activity Interventions: Pressure redistribution bed/mattress(bed type)    Mobility Interventions: Pressure redistribution bed/mattress (bed type)    Nutrition Interventions: Offer support with meals,snacks and hydration    Friction and Shear Interventions: Lift sheet, Minimize layers                Problem: Patient Education: Go to Patient Education Activity  Goal: Patient/Family Education  Outcome: Progressing Towards Goal     Problem: Pain  Goal: *Control of Pain  Outcome: Progressing Towards Goal  Goal: *PALLIATIVE CARE:  Alleviation of Pain  Outcome: Progressing Towards Goal     Problem: Patient Education: Go to Patient Education Activity  Goal: Patient/Family Education  Outcome: Progressing Towards Goal     Problem: Financial Deficits  Goal: Patient/family will voice understanding of available financial resources  Outcome: Progressing Towards Goal     Problem: Spiritual Evaluation  Goal: Identify beliefs/practices that support hospice experience  Description: Patient/family identify their beliefs/practices that impair Hospice experience. Patient/family identify their beliefs/practices that support Hospice experience. Patient coping identified. Spiritual distress identified and decreased with visit.   Outcome: Progressing Towards Goal

## 2021-11-27 NOTE — PROGRESS NOTES
Problem: Pain  Goal: *Control of Pain  Outcome: Progressing Towards Goal     Problem: Patient Education: Go to Patient Education Activity  Goal: Patient/Family Education  Outcome: Progressing Towards Goal     Problem: Pain  Goal: *Control of Pain  Outcome: Progressing Towards Goal  Goal: *PALLIATIVE CARE:  Alleviation of Pain  Outcome: Progressing Towards Goal     Problem: Patient Education: Go to Patient Education Activity  Goal: Patient/Family Education  Outcome: Progressing Towards Goal     Problem: Falls - Risk of  Goal: *Absence of Falls  Description: Document [place] Fall Risk and appropriate interventions in the flowsheet.   Outcome: Progressing Towards Goal  Note: Fall Risk Interventions:  Mobility Interventions: Bed/chair exit alarm         Medication Interventions: Bed/chair exit alarm    Elimination Interventions: Call light in reach    History of Falls Interventions: Bed/chair exit alarm         Problem: Patient Education: Go to Patient Education Activity  Goal: Patient/Family Education  Outcome: Progressing Towards Goal

## 2021-11-27 NOTE — PROGRESS NOTES
0700: report received from Aundrea Ochoa RN  4615: pt resting quietly with eyes closed, no signs of pain or discomfort at this time. 9318: full assessment done, see flowsheets. Pt states his pain is about 5/10. Scheduled meds given per MAR. Pt has not eaten any of his breakfast this morning, he says he doesn't want to eat anything right now. Pt laying on his left side, covered with blankets, lights out, and blinds closed. 1100: pt resting quietly with eyes closed. 1245: patient resting quietly. Did not eat any breakfast earlier. Only requests some jello at this time. 1400: pt resting quietly, no signs of pain or discomfort at this time. 1530: visitor to bedside. No needs at this time. 1630: pt is resting quietly in bed. Has refused a shower throughout the day. Has been sleeping with lights off and blinds closed all day. 1800: pt resting quietly in bed. States he doesn't need anything at this time, just wants to keep resting and hopes that his appetite will come back a little. 1900: report given to oncoming nurse.       NAME OF PATIENT:  German Carrera    LEVEL OF CARE:  routine    REASON FOR GIP:   n/a    *PATIENT REMAINS ELIGIBLE FOR GIP LEVEL OF CARE AS EVIDENCED BY: (MUST BE ADDRESSED OF PATIENT GIP)      REASON FOR RESPITE:  n/a    O2 SAFETY:  pt is on room air    FALL INTERVENTIONS PROVIDED:   Implemented/recommended use of non-skid footwear, Implemented/recommended use of fall risk identification flag to all team members, Implemented/recommended assistive devices and encouraged their use, Implemented/recommended resources for alarm system (personal alarm, bed alarm, call bell, etc.) , Implemented/recommended environmental changes (remove hazards, lower bed, improve lighting, etc.) and Implemented/recommended increased supervision/assistance    INTERDISPLINARY COMMUNICATION/COLLABORATION:  Physician, MSW, [place] and RN, CNA    NEW MEDICATION INITIATION DOCUMENTATION:  n/a    Reason medication is being initiated:  n/a    MD / Provider name consulted re: change in status / initiation of new medication:  n/a    New Symptom(s):  n/a    New Order(s):  n/a    Name of the person notified of the changes:  n/a    Name of person being taught:  n/a    Instructions given:  n/a    Side Effects taught:  n/a    Response to teaching:  n/a      COMFORTABLE DYING MEASURE:  Is Patient/family satisfied with symptom level?  yes    DISCHARGE PLAN:  Home with sister or daughter and continue to be followed by home hospice.

## 2021-11-27 NOTE — PROGRESS NOTES
1900: [facility] is resting quietly in bed. 2000: Patient assessment complete. Patient reports pain is presently 5/10, but does not need PRN medication at this time. He would like to wait for his scheduled medication  2055: Scheduled oxycontin 30mg administered. Patient reports pain remains at 5/10. He asked to have his Aspira drain tail taped so that it doesn't move around/get pulled in his sleep. No other needs at this time. 2230: Patient is resting with eyes closed, appears sleeping. Neutral facial position observed. 0000: Patient appears sleeping. 0130: Patient remains sleeping, neutral facial position observed. Respirations regular depth and rhythm  0300: Patient sleeping.  0500: Patient remains sleeping. Has repositioned himself. Neutral facial position observed.     3282: Patient sleeping.   0700: Report given to Polina Mandujano

## 2021-11-28 NOTE — HOSPICE
1900:  Report received from Domandres Cortez, [address]:  Pt assessed. Withdrawn. Rates abd pain a 7, but declined to take additional pan medication. Discussed need to take PRN pain meds regularly to keep pain under control. Mentioned he had vomited earlier. Declined meds for nausea. Asked for a popsicle. Declined to have Aspira drain drained. Abd soft and slightly distended. 2109:  Scheduled oxycodone ER given. C/O having no appetite. 2300:  Resting quietly with eyes closed. Resp shallow. 0100:  Resting quietly with eyes closed. Resp even and shallow. 0300:  Continues to rest with eyes closed. Resp even and shallow. Neutral facial expression. 0500:  Continues to rest quietly with eyes closed.   0700:  Report given to Pinky Oakley RN  NAME OF PATIENT:  Catherine Delgadillo    LEVEL OF CARE:  Routine    REASON FOR GIP:   N/A    REASON FOR RESPITE:  N/A    O2 SAFETY:  N/A    FALL INTERVENTIONS PROVIDED:   Implemented/recommended resources for alarm system (personal alarm, bed alarm, call bell, etc.)  and Implemented/recommended environmental changes (remove hazards, lower bed, improve lighting, etc.)    INTERDISPLINARY COMMUNICATION/COLLABORATION:  Physician, MSW, Dave and RN, CNA    NEW MEDICATION INITIATION DOCUMENTATION:  N/A    Reason medication is being initiated:  N/A    MD / Provider name consulted re: change in status / initiation of new medication:  N/A    New Symptom(s):  N/A    New Order(s):  N/A    Name of the person notified of the changes:  N/A    Name of person being taught:  N/A    Instructions given:  N/A    Side Effects taught:  N/A    Response to teaching:  N/A      COMFORTABLE DYING MEASURE:  Is Patient/family satisfied with symptom level?  yes    DISCHARGE PLAN:  Home with family

## 2021-11-28 NOTE — PROGRESS NOTES
Problem: Pain  Goal: *Control of Pain  Outcome: Progressing Towards Goal     Problem: Pain  Goal: *Control of Pain  Outcome: Progressing Towards Goal     Problem: Activity Intolerance  Goal: *Able to remain out of bed as prescribed  Outcome: Progressing Towards Goal     Problem: Falls - Risk of  Goal: *Absence of Falls  Description: Document [place] Fall Risk and appropriate interventions in the flowsheet. Outcome: Progressing Towards Goal  Note: Fall Risk Interventions:  Mobility Interventions: Bed/chair exit alarm         Medication Interventions: Bed/chair exit alarm    Elimination Interventions: Call light in reach    History of Falls Interventions: Bed/chair exit alarm         Problem: Pressure Injury - Risk of  Goal: *Prevention of pressure injury  Description: Document Waqas Scale and appropriate interventions in the flowsheet.   Outcome: Progressing Towards Goal  Note: Pressure Injury Interventions:  Sensory Interventions: Assess changes in LOC, Avoid rigorous massage over bony prominences, Check visual cues for pain, Keep linens dry and wrinkle-free, Maintain/enhance activity level    Moisture Interventions: Maintain skin hydration (lotion/cream), Minimize layers    Activity Interventions: Pressure redistribution bed/mattress(bed type)    Mobility Interventions: Pressure redistribution bed/mattress (bed type)    Nutrition Interventions: Offer support with meals,snacks and hydration    Friction and Shear Interventions: Lift sheet, Minimize layers

## 2021-11-28 NOTE — HOSPICE
0700 Report received from [facility]. 5944 Patient resting in bed quietly, eyes are closed. Patient appears to be sleeping at this time. 6092 Patient's aspira drain drained, 600 ml emptied. Patient given cup of sprite to drink. 1010 Patient given scheduled medications, see MAR.    1145 Patient resting in bed quietly, eyes are closed. Patient appears to be sleeping at this time. 1250 Patient resting in bed quietly, eyes are closed. Patient appears to be sleeping at this time. 1355 Patient resting in bed quietly, eyes are closed, neutral facial expression noted. 1430 Patient resting in bed quietly, eyes are closed, neutral facial expression noted. 1550 Patient resting in bed quietly, eyes are closed, neutral facial expression noted. Patient appears to be sleeping at this time. 1630 Patient resting in bed quietly, eyes are closed, neutral facial expression noted. Patient appears to be sleeping at this time.

## 2021-11-29 NOTE — HOSPICE
1900: report received from Bridger Hart 274: assessment completed, pt is alert but withdrawn and with flat affect. Pt is thin and cachectic. Rates pain 8/10. Offered PRN pain medication, pt declined and stated he would wait for his scheduled pain medication at 0900    2110: scheduled pain medication given    2300: pt resting quietly on L side. 0040: pt resting quietly with eyes closed and relaxed facial expression. Respirations are unlabored, RR 12    0228: pt resting on L side. Respirations are unlabored    0400: pt appears calm, resting in bed with eyes closed. Pt has repositioned himself and is lying supine     0525: pt provided RN with his breakfast order, states he has \"slept well tonight\" denies needing any PRN medications for pain. Pt denies nausea.  Provided fresh cup of ice     0700: Report given to oncoming nurse       NAME OF PATIENT:  Steven Vinson    LEVEL OF CARE: Routine    REASON FOR GIP:   n/a    *PATIENT REMAINS ELIGIBLE FOR GIP LEVEL OF CARE AS EVIDENCED BY: n/a      REASON FOR RESPITE:  n/a    O2 SAFETY:  n/a    FALL INTERVENTIONS PROVIDED:   Implemented/recommended resources for alarm system (personal alarm, bed alarm, call bell, etc.) , Implemented/recommended environmental changes (remove hazards, lower bed, improve lighting, etc.) and Implemented/recommended increased supervision/assistance    INTERDISPLINARY COMMUNICATION/COLLABORATION:  Physician, MSW, Dave and RN, CNA    NEW MEDICATION INITIATION DOCUMENTATION:  n/a    Reason medication is being initiated:  n/a    MD / Provider name consulted re: change in status / initiation of new medication: n/a    New Symptom(s): n/a    New Order(s): n/a    Name of the person notified of the changes: n/a    Name of person being taught: n/a    Instructions given: n/a    Side Effects taught: n/a    Response to teaching: n/a      COMFORTABLE DYING MEASURE:  Is Patient/family satisfied with symptom level?  yes    DISCHARGE PLAN: SW assisting pt and family with discharge planning

## 2021-11-29 NOTE — PROGRESS NOTES
Problem: Pain  Goal: *Control of Pain  Outcome: Progressing Towards Goal     Problem: Falls - Risk of  Goal: *Absence of Falls  Description: Document [place] Fall Risk and appropriate interventions in the flowsheet.   Outcome: Progressing Towards Goal  Note: Fall Risk Interventions:  Mobility Interventions: Bed/chair exit alarm         Medication Interventions: Evaluate medications/consider consulting pharmacy, Patient to call before getting OOB, Teach patient to arise slowly    Elimination Interventions: Call light in reach, Toileting schedule/hourly rounds    History of Falls Interventions: Bed/chair exit alarm

## 2021-11-29 NOTE — HOSPICE
0700 Report received from [id number] [facility] [address]    2935 Patient resting in bed quietly, eyes are closed, neutral facial expression noted. Patient appears to be sleeping at this time. 0744 Patient given scheduled medications, see MAR. Patient ate 1/2 cup of cereal for breakfast, patient states he is not hungry or thirsty and does not want anything else to eat or drink. Patient denies pain/nausea at this time and states he does not require PRN medication. 1000 Patient resting in bed quietly, eyes are closed. Patient appears to be sleeping. 1120 Patient resting in bed quietly, eyes are closed, neutral facial expression noted. Patient appears to be sleeping at this time. 1230 Fresh ice chips gotten for patient. 1350 Patient resting in bed quietly, eyes are closed, neutral facial expression noted. 1430 Patient resting in bed quietly, eyes are closed, patient appears to be sleeping at this time. 1530 Fresh chips gotten for patient. 1605 Patient resting in bed quietly, eyes are closed, patient appears to be sleeping at this time. 1740 Patient's aspira drain drained, 500 ml taken off. Patient's dressing changed. 1755 Fresh ice chips gotten for patient. 1825 Patient resting in bed quietly, eyes are closed.

## 2021-11-30 NOTE — HSP[facility] IDG OTHER NOTES
SW attended [facility] rounds on 21 at 10 AM:     Problem: Pt has very limited support and doesn't have a safe discharge plan at this time. Plan:  SW to work pt and his family on d/c plan. SW to request a new UAI be completed. Pts sister stated living with her is not a option and she didn't think pts daughter was a good option for pts mental health. Resources: Requesting a new UAI be completed, Assisting with cg options and d/c plan. Advance Care Planning: Pt is a DNR and has appointed his sister as MPOA.  Home: LILY CANTU addressed this with pt. He is unsure of his wishes right now and states he does not have the resources to make a plan. LCSW encouraged him to consider and to share with his family.  Also reached out to pt's sister and dtr to follow up  [address]  [facility]   [phone]

## 2021-11-30 NOTE — PROGRESS NOTES
1415-Called to the bedside by BALDEV Piedra who was in the bathroom assisting the patient with a shower; pt was found sitting on the shower chair with this head between his legs and his hands on his head; pt verbalized that he was feeing lightheaded and very SOB; pt was noted to have hard mottling up bilateral lower extremities, arms, elbows hands and fingers; pt was assisted to dry off and dress then was placed in the recliner chair and wheeled back to bed; pt laid down and reported an upset stomach but refused offer of antiemetics; pt made safe in bed and assisted to get comfortable; bed alarm activate, call bell within reach  1530-Aspira drain dressing changed

## 2021-11-30 NOTE — HOSPICE
0700-Received report from [facility] and assumed care of pt. Pt is Routine LOC with primary diagnosis of Pancreatic Cancer. 0800-Pt sleeping on left side. No s/s distress noted at this time. 0900-Pt awakended. Pt very flat affect, mumbling words, doesn't make eye contact at this time. Pt breakfast set up at bedside. RN assessment completed. 0930-Pt took PO medications without difficulty. Pt took sips of juice but refusing to eat or drink anything else at this time. Food and drink at pt bedside within reach. Call bell with pt.  1100-ID rounds completed. MD, AMALIA, Marlen Stanley, RN and Nurse Leader at bedside. MD examined and spoke with pt. Pt gave verbal permission to speak with his sister about discharge planning. Pt agreed to try an anti depressant with MD.  MD to place medications orders. 1215-Lunch tray set up for pt and at bedside. Pt not interested in eating at this time. Will continue to support and offer fluids/food as tolerated. 1345-Pt wanting a shower. Items gathered for pt and pt assisted by CNA [facility], standby assist.  Pt appears weak, but is steady on his feet at this time. 1415-See Judith SINHA note. 1500-Pt in bed, refusing anything to eat or drink at this time. Pt stated he had no pain or discomfort. 1630-Pt sleeping in bed. 1830-Pt assisted to bathroom with CNA. Pt weak but steady. 1845-Pt back in bed, curled up and eyes closed. 1900-Report given to oncsimran RN.       NAME OF PATIENT:  Leo Melgoza    LEVEL OF CARE:  Routine    REASON FOR GIP:   NA    *PATIENT REMAINS ELIGIBLE FOR GIP LEVEL OF CARE AS EVIDENCED BY: NA      REASON FOR RESPITE:  NA    O2 SAFETY:  NA    FALL INTERVENTIONS PROVIDED:   Implemented/recommended use of non-skid footwear, Implemented/recommended resources for alarm system (personal alarm, bed alarm, call bell, etc.) , Implemented/recommended environmental changes (remove hazards, lower bed, improve lighting, etc.) and Implemented/recommended increased supervision/assistance    INTERDISPLINARY COMMUNICATION/COLLABORATION:  Physician, MSW and RN, CNA    NEW MEDICATION INITIATION DOCUMENTATION:  NA    Reason medication is being initiated:  NA    MD / Provider name consulted re: change in status / initiation of new medication:  Dr. Katreyna Chavez Symptom(s):  NA    New Order(s):  NA    Name of the person notified of the changes:  NA    Name of person being taught:  NA    Instructions given:  NA    Side Effects taught:  NA    Response to teaching:  NA      COMFORTABLE DYING MEASURE:  Is Patient/family satisfied with symptom level?  yes    DISCHARGE PLAN:  Pt spoke with MSW today and gave permission to speak with pt sister to work on living arrangements.   Once discharged, pt will be followed by home hospice team.

## 2021-11-30 NOTE — HOSPICE
1900: report received from [place], [address] [place], [place] [address]: assessment deferred, pt has visitors at the bedside     2000: assessment completed, pt is alert and oriented x 4. Pt rates pain 7/10 but declines RN pain medication and prefers to wait until 9pm for his scheduled dose. Pt educated on available medications. 2130: scheduled oxycontin given. 2300: pt awake in bed, pt educated on PRN medications available to him if needed. Pt verbalized understanding     0030: pt resting quietly with unlabored respirations     0200: pt resting quietly with unlabored respirations and relaxed facial expression     0340: pt reports he is having trouble sleeping and would like to take SL ativan. PRN SL ativan given. Pt rates pain 5/10     0420: pt asleep in bed with legs curled up. Pt appears calm and relaxed    0555: pt continues to rest quietly with eyes closed.  Respirations are unlabored and facial expression is relaxed     0700: report given to oncoming nurse     NAME OF PATIENT:  Mecca Dang    LEVEL OF CARE: Routine    REASON FOR GIP:   n/a    *PATIENT REMAINS ELIGIBLE FOR GIP LEVEL OF CARE AS EVIDENCED BY: n/a    REASON FOR RESPITE:  n/a    O2 SAFETY:  n/a    FALL INTERVENTIONS PROVIDED:   Implemented/recommended resources for alarm system (personal alarm, bed alarm, call bell, etc.) , Implemented/recommended environmental changes (remove hazards, lower bed, improve lighting, etc.) and Implemented/recommended increased supervision/assistance    INTERDISPLINARY COMMUNICATION/COLLABORATION:  Physician, MSW, [place] and RN, CNA    NEW MEDICATION INITIATION DOCUMENTATION:  n/a    Reason medication is being initiated: n/a    MD / Provider name consulted re: change in status / initiation of new medication: n/a    New Symptom(s):n/a    New Order(s): n/a    Name of the person notified of the changes:n/a    Name of person being taught: n/a    Instructions given:n/a    Side Effects taught: n/a    Response to teaching: n/a      COMFORTABLE DYING MEASURE:  Is Patient/family satisfied with symptom level?  yes    DISCHARGE PLAN: Pt will remain at [facility] under routine LOC, he will likely discharge to his daughters home when routine stay is complete

## 2021-11-30 NOTE — HSP[facility] IDG CHAPLAIN NOTES
SW attended [facility] rounds on 21 at 10 AM:    Problem: Pt has very limited support and doesn't have a safe discharge plan at this time. Plan:  SW to work pt and his family on d/c plan. SW to request a new UAI be completed. Pts sister stated living with her is not a option and she didn't think pts daughter was a good option for pts mental health. Resources: Requesting a new UAI be completed, Assisting with cg options and d/c plan. Advance Care Planning: Pt is a DNR and has appointed his sister as MPOA.  Home: LILY CANTU addressed this with pt. He is unsure of his wishes right now and states he does not have the resources to make a plan. LCSW encouraged him to consider and to share with his family.  Also reached out to pt's sister and dtr to follow up    [address] [facility]   [phone]

## 2021-11-30 NOTE — PROGRESS NOTES
[address] [place] [address]   Good Help to Those in Need  [phone]    Patient Name: Mecca Dang  YOB: 1960    Date of Provider Hospice Visit: 11/30/21    Level of Care:   [] General Inpatient (GIP)    [x] Routine   [] Respite    Current Location of Care:  [] [facility] [] [facility] [] [facility] [] [facility] [x] Hospice [facility], patient referred from:  [] [facility] [x] [facility] [] [facility] [] [facility] [] Home [] Other:     Date of Original Hospice Admission: 11/18/21  Hospice Medical Director at time of admission: [address] [place] [address] Diagnosis: Advanced Pancreatic Cancer  Diagnoses RELATED to the terminal prognosis: malignant ascites  Other Diagnoses: protein calorie malnutrition, tobacco use     HOSPICE SUMMARY   Do not cut and paste chart information other than imaging findings    Mecca Dang is a 63y.o. year old who was admitted to [address] [place] [address]. The patient's principle diagnosis of advanced maligannt pancreatic adenocarcinoma with malignant ascites has resulted in further worsening function and decline with increased abdominal pain that has been difficult to control with oral opioids. Pt also is severely malnourished. Pt had aspira drain placed in hospital. Pt has been having poor appetite, decreased oral intake and increased fatigue, weakness over past few days. PPS 40% but declining fast  Pt has chosen hospice care due to disease progression and overwhelming symptoms due to lack of regular treatment. Refer to LCD   CT Abdomen:  IMPRESSION     1. Large volume ascites, new. No definite change in small submental soft tissue  densities left mid and upper abdomen. 2. Possible slight increase in ill-defined pancreatic body mass. Slight  worsening of focal narrowing/short segment occlusion at the junction of the  superior mesenteric vein and portal vein. 3. Slightly nodular border of the liver with no discrete liver mass.   4. Irregular wall thickening of the sigmoid colon/rectosigmoid junction may be  due to a chronic infectious/inflammatory process, with small fluid collection in  the wall the sigmoid colon slightly larger than prior study. Neoplastic etiology  cannot be excluded. FNAC: poorly differentiated adenocarcinoma          HOSPICE DIAGNOSES   Active Symptoms:  1. Cancer associated pain  2  Cancer related fatigue/weakness  3. Poor appetite  4. Malignant Ascites  5. Nicotine dependence  6. Anxiety/depression  7. Hospice care  8. Constipation-likely opioids     PLAN   1. Patient to continue routine level of care. Continue all oral medications. 2. Keep OxyContin 30 mg every 12 hours & Dilaudid 4 mg every hour as needed for breakthrough pain for now. Will continue to assess, encouraged pt to ask for dilaudid when he feels pain rather than avoiding it. 3. Continue Ativan 1mg IV/PO every 15mts as needed for anxiety   4. Add Welbutrin 75mg twice daily to help with depression, anxiety symptoms. This might also be of some help due to his hx of nicotine dependence  5. Constipation-continue senna daily. Continue to monitor effectiveness  6. Continue to drain the aspira as this helps remove pressure form his abdomen-1 liter daily at minimal right now. Will continue to drain for comfort. Typically taking almost a liter off daily. 7. Discussed with pt & Chrystal at bedside. She will be talking with pt's sister as he has given permission and will try to sort out disposition; pt going home to live with his sister. 8. Provided supportive listening and discussed care plan with IDG team  9.  Hospice Plan of care was reviewed in detail and agree with current plan of care, RN    Prognosis estimated based on 11/30/21 clinical assessment is:   [] Hours to Days    [] Days to Weeks    [x] Other:    Communicated plan of care with: Hospice Nurse      GOALS OF CARE     Patient/Medical POA stated Goal of Care: pain control, comfort    [x] I have reviewed and/or updated ACP information in the Advance Care Planning Navigator. This information is available in the [address] link in the patient's chart header. Primary Decision [facility] (Health Care Agent):   Primary Decision Maker: Carlos Sprague - Daughter - [phone]    Resuscitation Status: DNR  If DNR is there a Durable DNR on file? : [x] Yes [] No (If no, complete Durable DNR)    HISTORY     History obtained from: patient, chart review    CHIEF COMPLAINT: pain ok  The patient is:   [x] Verbal  [] Nonverbal  [] Unresponsive    HPI/SUBJECTIVE:  63year old male with hx of pancreatic adenocarcinoma which has caused cachexia and significant weight loss, abdominal pain and poor appetite. He endorses some anxiety over his condition. He is now on hospice service and GIP for pain control because he has never had adequate pain control. Pt lying in bed and states this is the most comfortable he has been in a long time however he does complain of pain 6/10 and prior to the administration of IV dilaudid his pain was up to 9/10.    11/22-sleeping when I enter the room but arouses easily     11/23-patient resting both times I entered the room. Did not arouse him as he appeared comfortable. 11/24-patient arouses but definitely not as engaged. Color appears different as he appears more ashen. 11/26-patient more interactive today. He was sleeping at first but arouses easily. Pain seems to be better managed and he ate a little bit better    11/30; pt seen in IDG rounds. Laying curled up in bed, appears very flat affect, responds with few words, less talkative, says pain Okay, appetite little, mostly in bed now. When asked says he will likely go home with his sister. Abdominal pain about 6-7/10, improves with medication.    Got 1 prn ativan through night     REVIEW OF SYSTEMS     The following systems were: [x] reviewed  [] unable to be reviewed    Positive ROS include:  Constitutional: fatigue, weakness, in pain, short of breath  Ears/nose/mouth/throat: increased airway secretions  Respiratory: shortness of breath, wheezing  Gastrointestinal: poor appetite, nausea, vomiting, abdominal pain, constipation, diarrhea  Musculoskeletal: pain, deformities, swelling legs  Neurologic:confusion, hallucinations, weakness  Psychiatric: anxiety, feeling depressed, poor sleep  Endocrine:     Adult Non-Verbal Pain Assessment Score: Rates pain to be 6-7/10    Face  [] 0   No particular expression or smile  [] 1   Occasional grimace, tearing, frowning, wrinkled forehead  [] 2   Frequent grimace, tearing, frowning, wrinkled forehead    Activity (movement)  [] 0   Lying quietly, normal position  [] 1   Seeking attention through movement or slow, cautious movement  [] 2   Restless, excessive activity and/or withdrawal reflexes    Guarding  [] 0   Lying quietly, no positioning of hands over areas of body  [] 1   Splinting areas of the body, tense  [] 2   Rigid, stiff    Physiology (vital signs)  [] 0   Stable vital signs  [] 1   Change in any of the following: SBP > 20mm Hg; HR > 20/minute  [] 2   Change in any of the following: SBP > 30mm Hg; HR > 25/minute    Respiratory  [] 0   Baseline RR/SpO2, compliant with ventilator  [] 1   RR > 10 above baseline, or 5% drop SpO2, mild asynchrony with ventilator  [] 2   RR > 20 above baseline, or 10% drop SpO2, asynchrony with ventilator     FUNCTIONAL ASSESSMENT     Palliative Performance Scale (PPS): 30-40%       PSYCHOSOCIAL/SPIRITUAL ASSESSMENT     Active Problems:    * No active hospital problems.  *    Past Medical History:   Diagnosis Date    Gout     Pancreatic cancer ([facility] [address]     Psoriasis     Tobacco abuse       Past Surgical History:   Procedure Laterality Date    HX ORTHOPAEDIC      femur repair    HX ORTHOPAEDIC      lumbar compressed fracture    IR INSERT INTRAPERI TUNL CATH PERC  11/17/2021    IR INSERT TUNL CVC W PORT OVER 5 YEARS  11/24/2020      Social History     Tobacco Use    Smoking status: Current Every Day Smoker Packs/day: 1.00    Smokeless tobacco: Never Used    Tobacco comment: last week was last cigarette   Substance Use Topics    Alcohol use: Yes     Family History   Problem Relation Age of Onset    Pancreatic Cancer Brother       No Known Allergies   Current Facility-Administered Medications   Medication Dose Route Frequency    oxyCODONE ER (OxyCONTIN) tablet 30 mg  30 mg Oral Q12H    sennosides (SENOKOT) 8.8 mg/5 mL syrup 17.6 mg  10 mL Oral DAILY    LORazepam (INTENSOL) 2 mg/mL oral concentrate 1 mg  1 mg SubLINGual Q15MIN PRN    bisacodyL (DULCOLAX) suppository 10 mg  10 mg Rectal DAILY PRN    acetaminophen (TYLENOL) suppository 650 mg  650 mg Rectal Q6H PRN    LORazepam (ATIVAN) injection 1 mg  1 mg IntraVENous Q15MIN PRN    HYDROmorphone (DILAUDID) injection 1 mg  1 mg IntraVENous Q15MIN PRN    glycopyrrolate (ROBINUL) injection 0.2 mg  0.2 mg IntraVENous Q4H PRN    HYDROmorphone (DILAUDID) tablet 4 mg  4 mg Oral Q1H PRN        PHYSICAL EXAM     Wt Readings from Last 3 Encounters:   11/15/21 52.9 kg (116 lb 10 oz)   10/04/21 52.9 kg (116 lb 9.6 oz)   10/04/21 52.9 kg (116 lb 10 oz)       Visit Vitals  /70 (BP 1 Location: Right arm, BP Patient Position: Sitting)   Pulse 80   Temp 97.6 °F (36.4 °C)   Resp 16   SpO2 98%       Supplemental O2  [] Yes  [x] NO  Last bowel movement:     Currently this patient has:  [x] Peripheral IV [] PICC  [x] PORT [] ICD    [] Duke Catheter [] NG Tube   [] PEG Tube    [] Rectal Tube [x] Drain  [] Other:     Constitutional: awake and oriented, pale, cachectic, frail, thin  Eyes: pallor  ENMT: clear mucosa, no lesions  Cardiovascular: normal rate and rhythm, no edema  Respiratory: no breathing distress  Gastrointestinal: distended abdomen, tense, no masses, ascites +, aspira drain right side, tenderness to palpation, hypoactive bowel sounds  Musculoskeletal:thin extremities, no deformities  Skin:no lesions  Neurologic:no obvious deficits  Psychiatric: depressed flat affect  Other:       Pertinent Lab and or Imaging Tests:  Lab Results   Component Value Date/Time    Sodium 139 11/16/2021 12:42 AM    Potassium 3.9 11/16/2021 12:42 AM    Chloride 107 11/16/2021 12:42 AM    CO2 29 11/16/2021 12:42 AM    Anion gap 3 (L) 11/16/2021 12:42 AM    Glucose 98 11/16/2021 12:42 AM    BUN 10 11/16/2021 12:42 AM    Creatinine 0.77 11/16/2021 12:42 AM    BUN/Creatinine ratio 13 11/16/2021 12:42 AM    GFR est AA >60 11/16/2021 12:42 AM    GFR est non-AA >60 11/16/2021 12:42 AM    Calcium 7.9 (L) 11/16/2021 12:42 AM     Lab Results   Component Value Date/Time    Protein, total 5.4 (L) 11/16/2021 12:42 AM    Albumin 2.3 (L) 11/16/2021 12:42 AM         Liv Khanna MD     Time spent: 35mts

## 2021-11-30 NOTE — HOSPICE
[employer] Worker Note:     LCSW called pts sister/MPOA with pts permission to discuss possibly going to live with her as pt voiced this would be his preference. Pts sister stated she worked days and was on call night. Pts sister stated she lived in [place] and has little support. Pts sister stated she did not think pt wanted to go live with his daughter as she didn't believe this was good for his mental health. LCSW discussed need for a [facility]AI and then pt could possibly get assistance in the home from a A services from [facility]. LCSW will request UAI. LCSW attempted to go back and speak to pt about conversation with his sister but he was asleep. SW to continue to work on a discharge plan. LCSW will continue to monitor and assess needs.     Tiffanie Castellon LCSW  Clinicial Psychosocial Supervisor  [facility]   [phone]

## 2021-11-30 NOTE — PROGRESS NOTES
Problem: Pain  Goal: *Control of Pain  Outcome: Progressing Towards Goal     Problem: Pain  Goal: *Control of Pain  Outcome: Progressing Towards Goal     Problem: Falls - Risk of  Goal: *Absence of Falls  Description: Document Shelby Fall Risk and appropriate interventions in the flowsheet.   Outcome: Progressing Towards Goal  Note: Fall Risk Interventions:  Mobility Interventions: Bed/chair exit alarm         Medication Interventions: Bed/chair exit alarm    Elimination Interventions: Call light in reach    History of Falls Interventions: Bed/chair exit alarm

## 2021-11-30 NOTE — PROGRESS NOTES
Problem: Pain  Goal: *Control of Pain  Outcome: Progressing Towards Goal     Problem: Pain  Goal: *Control of Pain  Outcome: Progressing Towards Goal  Goal: *PALLIATIVE CARE:  Alleviation of Pain  Outcome: Progressing Towards Goal     Problem: Falls - Risk of  Goal: *Absence of Falls  Description: Document Shelby Fall Risk and appropriate interventions in the flowsheet. Outcome: Progressing Towards Goal  Note: Fall Risk Interventions:  Mobility Interventions: Bed/chair exit alarm         Medication Interventions: Bed/chair exit alarm    Elimination Interventions: Call light in reach    History of Falls Interventions: Bed/chair exit alarm         Problem: Pressure Injury - Risk of  Goal: *Prevention of pressure injury  Description: Document Waqas Scale and appropriate interventions in the flowsheet.   Outcome: Progressing Towards Goal  Note: Pressure Injury Interventions:  Sensory Interventions: Assess changes in LOC, Check visual cues for pain    Moisture Interventions: Minimize layers    Activity Interventions: Increase time out of bed    Mobility Interventions: Float heels    Nutrition Interventions: Document food/fluid/supplement intake    Friction and Shear Interventions: HOB 30 degrees or less

## 2021-11-30 NOTE — [facility] IDG CHAPLAIN NOTES
Patient: Lear Room    Date: 11/30/21  Time: 12:10 PM    [facility]  Notes  Attended [facility] IDG rounds with the Medical Director, RN, Clinical Manager and LCSW. Patient was lying in bed resting. No visitors present. The patient interacted with short answers to questions but did not talk to the . The  will continue to offer support when the patient requests.     Signed by: Xavier Fabian

## 2021-12-01 NOTE — PROGRESS NOTES
Problem: Pain  Goal: *Control of Pain  12/1/2021 0813 by Kelsey Puentes RN  Outcome: Progressing Towards Goal  12/1/2021 0749 by Kelsey Puentes RN  Outcome: Progressing Towards Goal     Problem: Patient Education: Go to Patient Education Activity  Goal: Patient/Family Education  12/1/2021 0813 by Kelsey Puentes RN  Outcome: Progressing Towards Goal  12/1/2021 0749 by Kelsey Puentes RN  Outcome: Progressing Towards Goal     Problem: Pain  Goal: *Control of Pain  12/1/2021 0813 by Kelsey Puentes RN  Outcome: Progressing Towards Goal  12/1/2021 0749 by Kelsey Puentes RN  Outcome: Progressing Towards Goal  Goal: *PALLIATIVE CARE:  Alleviation of Pain  12/1/2021 0813 by Kelsey Puentes RN  Outcome: Progressing Towards Goal  12/1/2021 0749 by Kelsey Puentes RN  Outcome: Progressing Towards Goal     Problem: Patient Education: Go to Patient Education Activity  Goal: Patient/Family Education  12/1/2021 0813 by Kelsey Puentes RN  Outcome: Progressing Towards Goal  12/1/2021 0749 by Kelsey Puentes RN  Outcome: Progressing Towards Goal     Problem: Activity Intolerance  Goal: *Oxygen saturation during activity within specified parameters  12/1/2021 0813 by Kelsey Puentes RN  Outcome: Progressing Towards Goal  12/1/2021 0749 by Kelsey Puentes RN  Outcome: Progressing Towards Goal     Problem:  Activity Intolerance  Goal: *Oxygen saturation during activity within specified parameters  12/1/2021 0813 by Kelsey Puentes RN  Outcome: Progressing Towards Goal  12/1/2021 0749 by Kelsey Puentes RN  Outcome: Progressing Towards Goal  Goal: *Able to remain out of bed as prescribed  12/1/2021 0813 by Kelsey Puentes RN  Outcome: Progressing Towards Goal  12/1/2021 0749 by Kelsey Puentes RN  Outcome: Progressing Towards Goal     Problem: Patient Education: Go to Patient Education Activity  Goal: Patient/Family Education  12/1/2021 0813 by Kelsey Puentes RN  Outcome: Progressing Towards Goal  12/1/2021 0749 by Kelsey Puentes RN  Outcome: Progressing Towards Goal     Problem: Falls - Risk of  Goal: *Absence of Falls  Description: Document Jonathan Staton Fall Risk and appropriate interventions in the flowsheet.   12/1/2021 0813 by Julia Marie RN  Outcome: Progressing Towards Goal  Note: Fall Risk Interventions:  Mobility Interventions: Bed/chair exit alarm    Mentation Interventions: Bed/chair exit alarm, Adequate sleep, hydration, pain control, Door open when patient unattended    Medication Interventions: Bed/chair exit alarm    Elimination Interventions: Call light in reach, Bed/chair exit alarm    History of Falls Interventions: Bed/chair exit alarm      12/1/2021 0749 by Julia Marie RN  Outcome: Progressing Towards Goal  Note: Fall Risk Interventions:  Mobility Interventions: Bed/chair exit alarm    Mentation Interventions: Bed/chair exit alarm, Adequate sleep, hydration, pain control, Door open when patient unattended    Medication Interventions: Bed/chair exit alarm    Elimination Interventions: Call light in reach, Bed/chair exit alarm    History of Falls Interventions: Bed/chair exit alarm         Problem: Patient Education: Go to Patient Education Activity  Goal: Patient/Family Education  12/1/2021 0813 by Julia Marie RN  Outcome: Progressing Towards Goal  12/1/2021 0749 by Julia Marie RN  Outcome: Progressing Towards Goal     Problem: Emotional Support Needs  Goal: Patient/family is receiving emotional support  12/1/2021 0813 by Julia Marie RN  Outcome: Progressing Towards Goal  12/1/2021 0749 by Julia Marie RN  Outcome: Progressing Towards Goal     Problem: Family Significant Other Needs  Goal: Patient/family will receive support from community resources  12/1/2021 0813 by Julia Marie RN  Outcome: Progressing Towards Goal  12/1/2021 0749 by Julia Marie RN  Outcome: Progressing Towards Goal     Problem: Financial Deficits  Goal: Patient/family will voice understanding of available financial resources  12/1/2021 0813 by Rahel Quinones Elisabet Kaye RN  Outcome: Progressing Towards Goal  12/1/2021 0749 by Suzette Bryant RN  Outcome: Progressing Towards Goal     Problem: Pressure Injury - Risk of  Goal: *Prevention of pressure injury  Description: Document Waqas Scale and appropriate interventions in the flowsheet. 12/1/2021 0813 by Suzette Bryant RN  Outcome: Progressing Towards Goal  12/1/2021 0749 by Suzette Bryant RN  Outcome: Progressing Towards Goal     Problem: Patient Education: Go to Patient Education Activity  Goal: Patient/Family Education  12/1/2021 0813 by Suzette Braynt RN  Outcome: Progressing Towards Goal  12/1/2021 0749 by Suzette Bryant RN  Outcome: Progressing Towards Goal     Problem: Spiritual Evaluation  Goal: Identify beliefs/practices that support hospice experience  Description: Patient/family identify their beliefs/practices that impair Hospice experience. Patient/family identify their beliefs/practices that support Hospice experience. Patient coping identified. Spiritual distress identified and decreased with visit. 12/1/2021 0813 by Suzette Bryant RN  Outcome: Progressing Towards Goal  12/1/2021 0749 by Suzette Bryant RN  Outcome: Progressing Towards Goal     Problem: Spiritual Evaluation  Goal: Identify beliefs/practices that support hospice experience  Description: Patient/family identify their beliefs/practices that impair Hospice experience. Patient/family identify their beliefs/practices that support Hospice experience. Patient coping identified. Spiritual distress identified and decreased with visit.   12/1/2021 0813 by Suzette Bryant RN  Outcome: Progressing Towards Goal  12/1/2021 0749 by Suzette Bryant RN  Outcome: Progressing Towards Goal

## 2021-12-01 NOTE — PROGRESS NOTES
Problem: Pain  Goal: *Control of Pain  Outcome: Progressing Towards Goal     Problem: Falls - Risk of  Goal: *Absence of Falls  Description: Document Shelby Fall Risk and appropriate interventions in the flowsheet. Outcome: Progressing Towards Goal  Note: Fall Risk Interventions:  Mobility Interventions: Bed/chair exit alarm    Mentation Interventions: Adequate sleep, hydration, pain control, Bed/chair exit alarm    Medication Interventions: Bed/chair exit alarm    Elimination Interventions: Bed/chair exit alarm, Call light in reach, Patient to call for help with toileting needs    History of Falls Interventions: Bed/chair exit alarm         Problem: Pressure Injury - Risk of  Goal: *Prevention of pressure injury  Description: Document Waqas Scale and appropriate interventions in the flowsheet.   Outcome: Progressing Towards Goal  Note: Pressure Injury Interventions:  Sensory Interventions: Assess changes in LOC, Check visual cues for pain, Minimize linen layers    Moisture Interventions: Absorbent underpads    Activity Interventions: Pressure redistribution bed/mattress(bed type)    Mobility Interventions: Pressure redistribution bed/mattress (bed type)    Nutrition Interventions: Document food/fluid/supplement intake    Friction and Shear Interventions: Apply protective barrier, creams and emollients, HOB 30 degrees or less

## 2021-12-01 NOTE — PROGRESS NOTES
Problem: Pain  Goal: *Control of Pain  Outcome: Progressing Towards Goal     Problem: Patient Education: Go to Patient Education Activity  Goal: Patient/Family Education  Outcome: Progressing Towards Goal     Problem: Pain  Goal: *Control of Pain  Outcome: Progressing Towards Goal  Goal: *PALLIATIVE CARE:  Alleviation of Pain  Outcome: Progressing Towards Goal     Problem: Patient Education: Go to Patient Education Activity  Goal: Patient/Family Education  Outcome: Progressing Towards Goal     Problem: Activity Intolerance  Goal: *Oxygen saturation during activity within specified parameters  Outcome: Progressing Towards Goal  Goal: *Able to remain out of bed as prescribed  Outcome: Progressing Towards Goal     Problem: Patient Education: Go to Patient Education Activity  Goal: Patient/Family Education  Outcome: Progressing Towards Goal     Problem: Falls - Risk of  Goal: *Absence of Falls  Description: Document Luis Miguel Dong Fall Risk and appropriate interventions in the flowsheet.   Outcome: Progressing Towards Goal  Note: Fall Risk Interventions:  Mobility Interventions: Bed/chair exit alarm    Mentation Interventions: Bed/chair exit alarm, Adequate sleep, hydration, pain control, Door open when patient unattended    Medication Interventions: Bed/chair exit alarm    Elimination Interventions: Call light in reach, Bed/chair exit alarm    History of Falls Interventions: Bed/chair exit alarm         Problem: Patient Education: Go to Patient Education Activity  Goal: Patient/Family Education  Outcome: Progressing Towards Goal     Problem: Emotional Support Needs  Goal: Patient/family is receiving emotional support  Outcome: Progressing Towards Goal     Problem: Family Significant Other Needs  Goal: Patient/family will receive support from community resources  Outcome: Progressing Towards Goal     Problem: Pressure Injury - Risk of  Goal: *Prevention of pressure injury  Description: Document Waqas Scale and appropriate interventions in the flowsheet. Outcome: Progressing Towards Goal     Problem: Patient Education: Go to Patient Education Activity  Goal: Patient/Family Education  Outcome: Progressing Towards Goal     Problem: Spiritual Evaluation  Goal: Identify beliefs/practices that support hospice experience  Description: Patient/family identify their beliefs/practices that impair Hospice experience. Patient/family identify their beliefs/practices that support Hospice experience. Patient coping identified. Spiritual distress identified and decreased with visit.   Outcome: Progressing Towards Goal

## 2021-12-01 NOTE — HOSPICE
[facility]  Note:     LILY contacted care manager at [facility] to see if they could help complete a updated UAI for pt to assist with pts discharge plan. MARIA ALEJANDRAW will continue to monitor and assess needs.     Nicky Hampton LCSW   Clinical Psychosocial Supervisor   [facility]   [phone]

## 2021-12-01 NOTE — HOSPICE
1900: report received from Aisha, [facility]    2484: assessment completed. Pt is lethargic, and seems to have some periodic confusion. Pt asks if he fell today, states he remembers \"a bunch of women being in my room but I don't know why\". Reoriented pt to events that occurred in the morning. Lungs are diminished. Abdomen is soft, RLQ aspira drain present. Pt is very frail and cachectic. BLE are cool with some mottling noted. See flowsheet for full assessment. Bed alarm on and pt instructed to call prior to getting up to bathroom    2005: pt reports pain 8/10, requests PRN medication. PRN PO dilaudid given    2140: scheduled PO OxyContin given. Pt assisted to bathroom where he voided without difficulty. Pt is very unsteady and weak. 2330: pt assisted to bathroom, had small BM. Pt drank several sips of soda. Pt asks RN when he will get his scheduled OxyContin, advised pt he has already taken it. Pt declines PRN medication for pain. 0115: pt sleeping soundly, laying on L side    0220: pt assisted to bathroom by Braydon Darling, RN    8077: pt resting quietly with eyes closed     0530: pt having difficulty processing information and is also having some expressive aphasia. When RN asked pt to rate his pain using numeric scale he said \"a medium\". RN assisted pt to bathroom.  Pt requests PO dilaudid for pain and also requests ativan to help him relax so he can fall asleep    0600: medications effective, pt resting quietly with relaxed facial expression     0700: report given to oncoming nurse      NAME OF PATIENT:  Steven Vinson    LEVEL OF CARE: Routine    REASON FOR GIP:   n/a    *PATIENT REMAINS ELIGIBLE FOR GIP LEVEL OF CARE AS EVIDENCED BY: n/a      REASON FOR RESPITE:  n/a    O2 SAFETY:  n/a    FALL INTERVENTIONS PROVIDED:   Implemented/recommended resources for alarm system (personal alarm, bed alarm, call bell, etc.) , Implemented/recommended environmental changes (remove hazards, lower bed, improve lighting, etc.) and Implemented/recommended increased supervision/assistance    INTERDISPLINARY COMMUNICATION/COLLABORATION:  Physician, MSW, [place] and RN, CNA    NEW MEDICATION INITIATION DOCUMENTATION:  n/a    Reason medication is being initiated: n/a    MD / Provider name consulted re: change in status / initiation of new medication:n/a    New Symptom(s): n/a    New Order(s): n/a    Name of the person notified of the changes: n/a    Name of person being taught: n/a    Instructions given:n/a    Side Effects taught:n/a    Response to teaching:n/a      COMFORTABLE DYING MEASURE:  Is Patient/family satisfied with symptom level?  yes    DISCHARGE PLAN: PT will remain at [facility] under routine LOC until safe discharge plan is in place

## 2021-12-01 NOTE — HOSPICE
0700 Report received from [facility].    7933 Patient resting in bed quietly, eyes are closed, neutral facial expression noted. Cup of fresh ice gotten for patient. 0830 Patient resting in bed quietly, eyes are closed, patient appears to be sleeping at this time. 0900 Patient given scheduled medications, see MAR.     0915 Patient's aspira drain drained, 600 ml output. Dressing changed. Patient tolerated procedure well. Patient's breakfast tray set up.    0945 Patient ate cup of cereal and milk for breakfast.     1140 Patient resting in bed quietly, eyes are closed. 1215 Patient requesting a cherry pop sickle, pop sickle gotten for patient. 1300 Patient resting in bed quietly, eyes are closed. 1410 Patient went to bathroom. Patient voided and assisted back to bed. 12 Patient's friends visiting at the bedside. [facility] Patient's friends left the bedside. 1700 Patient went to bathroom, assisted back to bed. Two cups of ice gotten for patient. [address] Patient resting in bed quietly, eyes are closed. Patient appears to be sleeping at this time.

## 2021-12-02 NOTE — PROGRESS NOTES
Problem: Pain  Goal: *Control of Pain  Outcome: Progressing Towards Goal     Problem: Pain  Goal: *Control of Pain  Outcome: Progressing Towards Goal  Goal: *PALLIATIVE CARE:  Alleviation of Pain  Outcome: Progressing Towards Goal     Problem: Falls - Risk of  Goal: *Absence of Falls  Description: Document Shelby Fall Risk and appropriate interventions in the flowsheet. Outcome: Progressing Towards Goal  Note: Fall Risk Interventions:  Mobility Interventions: Bed/chair exit alarm    Mentation Interventions: Adequate sleep, hydration, pain control    Medication Interventions: Bed/chair exit alarm    Elimination Interventions: Bed/chair exit alarm    History of Falls Interventions: Bed/chair exit alarm         Problem:  Activity Intolerance  Goal: *Oxygen saturation during activity within specified parameters  Outcome: Progressing Towards Goal  Goal: *Able to remain out of bed as prescribed  Outcome: Progressing Towards Goal

## 2021-12-02 NOTE — PROGRESS NOTES
[address] [place] [address]   [facility] in [facility]  [phone]    Patient Name: Jaci Ramirez  YOB: 1960    Date of Provider Hospice Visit: 12/02/21    Level of Care:   [] General Inpatient (GIP)    [x] Routine   [] Respite    Current Location of Care:  [] [facility] [] [facility] [] [address] [] [facility] [x] Hospice House THE [facility], patient referred from:  [] [facility] [x] [facility] [] [address] [] [facility] [] Home [] Other:     Date of Original Hospice Admission: 11/18/21  Hospice Medical Director at time of admission: [address] [place] [address] Diagnosis: Advanced Pancreatic Cancer  Diagnoses RELATED to the terminal prognosis: malignant ascites  Other Diagnoses: protein calorie malnutrition, tobacco use     HOSPICE SUMMARY   Do not cut and paste chart information other than imaging findings    Jaci Ramirez is a 63y.o. year old who was admitted to [address] [place] [address]. The patient's principle diagnosis of advanced maligannt pancreatic adenocarcinoma with malignant ascites has resulted in further worsening function and decline with increased abdominal pain that has been difficult to control with oral opioids. Pt also is severely malnourished. Pt had aspira drain placed in hospital. Pt has been having poor appetite, decreased oral intake and increased fatigue, weakness over past few days. PPS 40% but declining fast  Pt has chosen hospice care due to disease progression and overwhelming symptoms due to lack of regular treatment. Refer to LCD   CT Abdomen:  IMPRESSION     1. Large volume ascites, new. No definite change in small submental soft tissue  densities left mid and upper abdomen. 2. Possible slight increase in ill-defined pancreatic body mass. Slight  worsening of focal narrowing/short segment occlusion at the junction of the  superior mesenteric vein and portal vein. 3. Slightly nodular border of the liver with no discrete liver mass.   4. Irregular wall thickening of the sigmoid colon/rectosigmoid junction may be  due to a chronic infectious/inflammatory process, with small fluid collection in  the wall the sigmoid colon slightly larger than prior study. Neoplastic etiology  cannot be excluded. FNAC: poorly differentiated adenocarcinoma          HOSPICE DIAGNOSES   Active Symptoms:  1. Cancer associated pain  2  Cancer related fatigue/weakness  3. Poor appetite  4. Malignant Ascites  5. Nicotine dependence  6. Anxiety/depression  7. Hospice care  8. Constipation-likely opioids     PLAN   1. Patient to continue routine level of care. Continue all oral medications. 2. Increase OxyContin to 40 mg every 12 hours & keep Dilaudid 4 mg every hour as needed for breakthrough pain for now. Will continue to assess, encouraged pt to ask for dilaudid when he feels pain rather than avoiding it. Hopefully increase in dose of Oxycontin will help with pain control. 3. Continue Ativan 1mg IV/PO every 15mts as needed for anxiety   4. Initiate Welbutrin 75mg twice daily once available (on order from [employer] [facility]) to help with depression, anxiety symptoms. This might also be of some help due to his hx of nicotine dependence  5. Constipation-continue senna daily. Continue to monitor effectiveness  6. Continue to drain the aspira as this helps remove pressure form his abdomen-1 liter daily at minimal right now. Will continue to drain for comfort. Typically taking almost a liter off daily. 7. Pt most likely will need to go to a NH/LTC facility; [facility] worker spoke with his family; sister but she is not able to take full responsibility of caring for him due to work constraints. 8. Provided supportive listening and discussed care plan with bedside nurse Sheri  9.  Hospice Plan of care was reviewed in detail and agree with current plan of care    Prognosis estimated based on 12/02/21 clinical assessment is:   [] Hours to Days    [] Days to Weeks    [x] Other:    Communicated plan of care with: Hospice Nurse      GOALS OF CARE Patient/Medical POA stated Goal of Care: pain control, comfort    [x] I have reviewed and/or updated ACP information in the Advance Care Planning Navigator. This information is available in the [address] link in the patient's chart header. Primary Decision [facility] (Health Care Agent):   Primary Decision Maker: [facility] - Daughter - [phone]    Resuscitation Status: DNR  If DNR is there a Durable DNR on file? : [x] Yes [] No (If no, complete Durable DNR)    HISTORY     History obtained from: patient, chart review    CHIEF COMPLAINT: pain  The patient is:   [x] Verbal  [] Nonverbal  [] Unresponsive    HPI/SUBJECTIVE:  63year old male with hx of pancreatic adenocarcinoma which has caused cachexia and significant weight loss, abdominal pain and poor appetite. He endorses some anxiety over his condition. He is now on hospice service and GIP for pain control because he has never had adequate pain control. Pt lying in bed and states this is the most comfortable he has been in a long time however he does complain of pain 6/10 and prior to the administration of IV dilaudid his pain was up to 9/10.    11/22-sleeping when I enter the room but arouses easily     11/23-patient resting both times I entered the room. Did not arouse him as he appeared comfortable. 11/24-patient arouses but definitely not as engaged. Color appears different as he appears more ashen. 11/26-patient more interactive today. He was sleeping at first but arouses easily. Pain seems to be better managed and he ate a little bit better    11/30; pt seen in IDG rounds. Laying curled up in bed, appears very flat affect, responds with few words, less talkative, says pain Okay, appetite little, mostly in bed now. When asked says he will likely go home with his sister. Abdominal pain about 6-7/10, improves with medication.    Got 1 prn ativan through night    12/02; pt seen in FU, still continues to have pain in his abdomen, fluctuates in intensity; currently at 5/10, pt hesitant to take dilaudid too much as he feels it makes him sick and affects his appetite. He has not been eating much, sleeps most times curled up in bed. Responds but talks less. REVIEW OF SYSTEMS     The following systems were: [x] reviewed  [] unable to be reviewed    Positive ROS include:  Constitutional: fatigue, weakness, in pain, short of breath  Ears/nose/mouth/throat: increased airway secretions  Respiratory: shortness of breath, wheezing  Gastrointestinal: poor appetite, nausea, vomiting, abdominal pain, constipation, diarrhea  Musculoskeletal: pain, deformities, swelling legs  Neurologic:confusion, hallucinations, weakness  Psychiatric: anxiety, feeling depressed, poor sleep  Endocrine:     Adult Non-Verbal Pain Assessment Score: Rates pain to be 5/10    Face  [] 0   No particular expression or smile  [] 1   Occasional grimace, tearing, frowning, wrinkled forehead  [] 2   Frequent grimace, tearing, frowning, wrinkled forehead    Activity (movement)  [] 0   Lying quietly, normal position  [] 1   Seeking attention through movement or slow, cautious movement  [] 2   Restless, excessive activity and/or withdrawal reflexes    Guarding  [] 0   Lying quietly, no positioning of hands over areas of body  [] 1   Splinting areas of the body, tense  [] 2   Rigid, stiff    Physiology (vital signs)  [] 0   Stable vital signs  [] 1   Change in any of the following: SBP > 20mm Hg; HR > 20/minute  [] 2   Change in any of the following: SBP > 30mm Hg; HR > 25/minute    Respiratory  [] 0   Baseline RR/SpO2, compliant with ventilator  [] 1   RR > 10 above baseline, or 5% drop SpO2, mild asynchrony with ventilator  [] 2   RR > 20 above baseline, or 10% drop SpO2, asynchrony with ventilator     FUNCTIONAL ASSESSMENT     Palliative Performance Scale (PPS): 30-40%       PSYCHOSOCIAL/SPIRITUAL ASSESSMENT     Active Problems:    * No active hospital problems.  *    Past Medical History:   Diagnosis Date    Gout     Pancreatic cancer ([facility] [address]     Psoriasis     Tobacco abuse       Past Surgical History:   Procedure Laterality Date    HX ORTHOPAEDIC      femur repair    HX ORTHOPAEDIC      lumbar compressed fracture    IR INSERT INTRAPERI TUNL CATH PERC  11/17/2021    IR INSERT TUNL CVC W PORT OVER 5 YEARS  11/24/2020      Social History     Tobacco Use    Smoking status: Current Every Day Smoker     Packs/day: 1.00    Smokeless tobacco: Never Used    Tobacco comment: last week was last cigarette   Substance Use Topics    Alcohol use: Yes     Family History   Problem Relation Age of Onset    Pancreatic Cancer Brother       No Known Allergies   Current Facility-Administered Medications   Medication Dose Route Frequency    oxyCODONE ER (OxyCONTIN) tablet 40 mg  40 mg Oral Q12H    buPROPion (WELLBUTRIN) tablet 75 mg (Patient Supplied)  75 mg Oral BID    sennosides (SENOKOT) 8.8 mg/5 mL syrup 17.6 mg  10 mL Oral DAILY    LORazepam (INTENSOL) 2 mg/mL oral concentrate 1 mg  1 mg SubLINGual Q15MIN PRN    bisacodyL (DULCOLAX) suppository 10 mg  10 mg Rectal DAILY PRN    acetaminophen (TYLENOL) suppository 650 mg  650 mg Rectal Q6H PRN    LORazepam (ATIVAN) injection 1 mg  1 mg IntraVENous Q15MIN PRN    HYDROmorphone (DILAUDID) injection 1 mg  1 mg IntraVENous Q15MIN PRN    glycopyrrolate (ROBINUL) injection 0.2 mg  0.2 mg IntraVENous Q4H PRN    HYDROmorphone (DILAUDID) tablet 4 mg  4 mg Oral Q1H PRN        PHYSICAL EXAM     Wt Readings from Last 3 Encounters:   11/15/21 52.9 kg (116 lb 10 oz)   10/04/21 52.9 kg (116 lb 9.6 oz)   10/04/21 52.9 kg (116 lb 10 oz)       Visit Vitals  BP (!) 86/60 (BP 1 Location: Right arm, BP Patient Position: At rest;Lying left side)   Pulse 76   Temp 97.9 °F (36.6 °C)   Resp 16   SpO2 90%       Supplemental O2  [] Yes  [x] NO  Last bowel movement:     Currently this patient has:  [x] Peripheral IV [] PICC  [x] PORT [] ICD    [] Duke Catheter [] NG Tube   [] PEG Tube    [] Rectal Tube [x] Drain  [] Other:     Constitutional: awake and oriented, pale, cachectic, frail, thin  Eyes: pallor  ENMT: clear mucosa, no lesions  Cardiovascular: normal rate and rhythm, no edema  Respiratory: no breathing distress  Gastrointestinal: distended abdomen, tense, no masses, ascites +, aspira drain right side, tenderness to palpation, hypoactive bowel sounds  Musculoskeletal:thin extremities, no deformities  Skin:no lesions  Neurologic:no obvious deficits  Psychiatric: depressed flat affect  Other:       Pertinent Lab and or Imaging Tests:  Lab Results   Component Value Date/Time    Sodium 139 11/16/2021 12:42 AM    Potassium 3.9 11/16/2021 12:42 AM    Chloride 107 11/16/2021 12:42 AM    CO2 29 11/16/2021 12:42 AM    Anion gap 3 (L) 11/16/2021 12:42 AM    Glucose 98 11/16/2021 12:42 AM    BUN 10 11/16/2021 12:42 AM    Creatinine 0.77 11/16/2021 12:42 AM    BUN/Creatinine ratio 13 11/16/2021 12:42 AM    GFR est AA >60 11/16/2021 12:42 AM    GFR est non-AA >60 11/16/2021 12:42 AM    Calcium 7.9 (L) 11/16/2021 12:42 AM     Lab Results   Component Value Date/Time    Protein, total 5.4 (L) 11/16/2021 12:42 AM    Albumin 2.3 (L) 11/16/2021 12:42 AM         Doug Luna MD     Time spent: 35mts

## 2021-12-02 NOTE — HOSPICE
MSW called [facility] to request UAI screening for patient. There was no answer, MSW left message with contact information. MSW made visit with patient. Patient observed lying down in bed on phoen when MSW arrived. Patient ended call and spoke with MSW. Patient shared that he is feeling better today. MSW shared that MSW is still working on plans for patient's discharge. Patient shared that he did not think going to his daughter's home would be a good idea as she works 12 hour shifts. Patient also stated that if he could he would like to go live with his younger sister, but knows that she works and also noted that it may not be a possibility. MSW talked with patient about the possibility of facility placement. Patient appeared resigned to the idea, shared that it may be the best option. Patient shared that he thinks family would have spoken up for him to stay with them by now if they wanted him to live with them. MSW provided emotional support to patient through active listening as patient shared his feelings about his declining health and having to have help for his care. Patient shared that he thought he would work until he . MSW reassured patient that we are not trying to kick him out today, but that we have to started working on discharge plan now as getting care in place can take a long time and we have a fixed amount of time that he can remain at [facility]. Patient understanding and appreciative of support. MSW left so that patient could rest. MSW will continue to assist with making discharge plans.      AMALIA Bryant  [facility]

## 2021-12-02 NOTE — PROGRESS NOTES
1900 Received report from [place], [address] [place] [address] Assessment completed as documented. 2022  Patient sleeping on left side. Appears comfortable. 2122 Scheduled Oxycodone ER administered. Patient tolerated well. States that he \"just wants to get some sleep\" because he feels \"rough\". 2215 Patient is sleeping on left side with relaxed facial expression. 2315 Patient sleeping. Appears comfortable. Neutral facial expression. 0015  Patient is sleeping on left side. Relaxed facial expression. 0100  Patient continues to sleep, appearing comfortable. Bed in lowest position. 0200  Patient sleeping on left side with legs drawn towards chest.  Relaxed facial expression. 0300 Patient sleeping soundly. Appears comfortable with neutral facial expression. 3563  Patient sleeping on left side for comfort. No visual signs of pain or discomfort. 3606  Patient sleeping. Bed in lowest position with side rails up x 2.      0545  Patient up to restroom. Urinated. Linens straightened and patient safely back to bed.      5249  Patient sleeping on left side. Appears comfortable. 0700 Report given to BHARATH Wade.      NAME OF PATIENT:  Nhung Senior    LEVEL OF CARE:  GIP    REASON FOR GIP:   Pain, despite numerous changes in medications    *PATIENT REMAINS ELIGIBLE FOR [facility] LEVEL OF CARE AS EVIDENCED BY: (MUST BE ADDRESSED OF PATIENT GIP)  Need for frequent skilled assessments and symptom monitoring/management.       REASON FOR RESPITE:  N/A    O2 SAFETY:  N/A    FALL INTERVENTIONS PROVIDED:   Implemented/recommended resources for alarm system (personal alarm, bed alarm, call bell, etc.) , Implemented/recommended environmental changes (remove hazards, lower bed, improve lighting, etc.) and Implemented/recommended increased supervision/assistance    INTERDISPLINARY COMMUNICATION/COLLABORATION:  Physician, MSW, [place] and RN, CNA    NEW MEDICATION INITIATION DOCUMENTATION:  N/A    Reason medication is being initiated:  N/A    MD / Provider name consulted re: change in status / initiation of new medication:  N/A    New Symptom(s):  N/A    New Order(s):  N/A    Name of the person notified of the changes:  N/A    Name of person being taught:  N/A    Instructions given:  N/A    Side Effects taught:  N/A    Response to teaching:  N/A      COMFORTABLE DYING MEASURE:  Is Patient/family satisfied with symptom level?  yes    DISCHARGE PLAN:  Unsure at this time of LTC placement or home with family.

## 2021-12-02 NOTE — PROGRESS NOTES
0700- Received report from [facility] completed. Pt is lying supine in bed on his right side. HOB is flat. Pt is resting quietly with unlabored respirations. 0830- Pt sleeping quietly, curled up on his right side in the fetal position. Respirations are unlabored. 0316- Scheduled meds given per MAR. Pt woke up briefly to take meds, then laid back down to sleep. 1115- Spoke to [place] triage nurse. She is ordering Wellbutrin for pt and will be delivered to [facility] tomorrow. 1230- Rounded on pt with Dr. Jami Herrera. Oxycontin was increased to 40mg BID due to med wearing off prior to next scheduled dose. 1430- Pt asked to have oatmeal re-heated. Pt sitting on side of bed, eating oatmeal.     1525- SW at bedside, meeting with pt.     1725- Pt ambulated to bathroom and then back to bed. Pt now resting quietly on his left side. Pt not interested in eating dinner at this time. May want re-heated later. 1900- Report given to oncoming nurse.           NAME OF PATIENT:  Xavier Marquise    LEVEL OF CARE:  GIP    REASON FOR GIP:   Pain, despite numerous changes in medications and Medication adjustment that must be monitored 24/7    *PATIENT REMAINS ELIGIBLE FOR GIP LEVEL OF CARE AS EVIDENCED BY: (MUST BE ADDRESSED OF PATIENT GIP) Need for frequent skilled assessments and symptom monitoring/management      REASON FOR RESPITE:  NA    O2 SAFETY:  NA    FALL INTERVENTIONS PROVIDED:   Implemented/recommended use of non-skid footwear, Implemented/recommended resources for alarm system (personal alarm, bed alarm, call bell, etc.) , Implemented/recommended environmental changes (remove hazards, lower bed, improve lighting, etc.) and Implemented/recommended increased supervision/assistance    INTERDISPLINARY COMMUNICATION/COLLABORATION:  Physician, MSW, [place] and RN, CNA    NEW MEDICATION INITIATION DOCUMENTATION:  NA    Reason medication is being initiated:  SARAH    MD / Provider name consulted re: change in status / initiation of new medication:  NA    New Symptom(s):  NA    New Order(s):  NA    Name of the person notified of the changes:  NA    Name of person being taught:  NA    Instructions given:  NA    Side Effects taught:  NA    Response to teaching:  NA      COMFORTABLE DYING MEASURE:  Is Patient/family satisfied with symptom level?  yes    DISCHARGE PLAN:  Once symptoms can be managed at home, pt with either return home or go to a long-term care facility.  Pt will continue to be managed by the home hospice team.

## 2021-12-02 NOTE — [facility] IDG OTHER NOTES
HOSPICE -- IDG NOTE -- BEREAVEMENT:  During the interdisciplinary group meeting on 12/2/2021, the primary care team reviewed the patient's admission, and bereavement was discussed. It was confirmed that maddie Cormier is the primary bereaved at low risk level due to no significant risk factors.

## 2021-12-02 NOTE — PROGRESS NOTES
Problem: Pain  Goal: *Control of Pain  Outcome: Progressing Towards Goal     Problem: Pain  Goal: *Control of Pain  Outcome: Progressing Towards Goal  Goal: *PALLIATIVE CARE:  Alleviation of Pain  Outcome: Progressing Towards Goal     Problem: Falls - Risk of  Goal: *Absence of Falls  Description: Document Shelby Fall Risk and appropriate interventions in the flowsheet. Outcome: Progressing Towards Goal  Note: Fall Risk Interventions:  Mobility Interventions: Bed/chair exit alarm    Mentation Interventions: Adequate sleep, hydration, pain control, Bed/chair exit alarm, Door open when patient unattended, Eyeglasses and hearing aids, Update white board    Medication Interventions: Bed/chair exit alarm    Elimination Interventions: Bed/chair exit alarm, Call light in reach    History of Falls Interventions: Bed/chair exit alarm         Problem: Pressure Injury - Risk of  Goal: *Prevention of pressure injury  Description: Document Waqas Scale and appropriate interventions in the flowsheet.   Outcome: Progressing Towards Goal  Note: Pressure Injury Interventions:  Sensory Interventions: Avoid rigorous massage over bony prominences, Check visual cues for pain, Minimize linen layers, Keep linens dry and wrinkle-free    Moisture Interventions: Minimize layers    Activity Interventions: Pressure redistribution bed/mattress(bed type)    Mobility Interventions: Pressure redistribution bed/mattress (bed type)    Nutrition Interventions: Document food/fluid/supplement intake    Friction and Shear Interventions: Apply protective barrier, creams and emollients 28.9

## 2021-12-03 NOTE — PROGRESS NOTES
Problem: Pain  Goal: *Control of Pain  Outcome: Progressing Towards Goal     Problem: Patient Education: Go to Patient Education Activity  Goal: Patient/Family Education  Outcome: Progressing Towards Goal     Problem: Activity Intolerance  Goal: *Oxygen saturation during activity within specified parameters  Outcome: Progressing Towards Goal  Goal: *Able to remain out of bed as prescribed  Outcome: Progressing Towards Goal     Problem: Falls - Risk of  Goal: *Absence of Falls  Description: Document Leafy Contreras Fall Risk and appropriate interventions in the flowsheet.   Outcome: Progressing Towards Goal  Note: Fall Risk Interventions:  Mobility Interventions: Bed/chair exit alarm    Mentation Interventions: Adequate sleep, hydration, pain control    Medication Interventions: Bed/chair exit alarm    Elimination Interventions: Bed/chair exit alarm    History of Falls Interventions: Bed/chair exit alarm

## 2021-12-03 NOTE — PROGRESS NOTES
0700- Received report from Merle Cerna 50 completed. Pt is lying supine in bed on his left side, curled up in the fetal position. Pt is sleeping quietly with unlabored respirations. HOB is flat. Bed is in lowest position; wheels are locked; bed alarm is on; call light is within reach. 0745- Pt ambulated to bathroom and then back to bed. Brought pt coffee as requested. 2082- Pt sleeping. Awoke to take meds. Scheduled meds given per MAR.     1200- Pt sitting upright in bed, eating a few bites of lunch. 1400- Pt sleeping on left side. Respirations are regular and unlabored. 1600- Pt sleeping. Appears comfortable. 1800- Pt awake and requesting ice water which was brought to him. No other needs expressed. 1900- Report given to oncoming nurse. NAME OF PATIENT:  Lucinda Fabian    LEVEL OF CARE:  Routine    REASON FOR GIP:   NA    *PATIENT REMAINS ELIGIBLE FOR GIP LEVEL OF CARE AS EVIDENCED BY: (MUST BE ADDRESSED OF PATIENT GIP)      REASON FOR RESPITE:  NA    O2 SAFETY:  NA    FALL INTERVENTIONS PROVIDED:   Implemented/recommended use of non-skid footwear, Implemented/recommended resources for alarm system (personal alarm, bed alarm, call bell, etc.) , Implemented/recommended environmental changes (remove hazards, lower bed, improve lighting, etc.) and Implemented/recommended increased supervision/assistance    INTERDISPLINARY COMMUNICATION/COLLABORATION:  Physician, MSW, Dave and RN, CNA    NEW MEDICATION INITIATION DOCUMENTATION:  NA    Reason medication is being initiated:  SARAH    MD / Provider name consulted re: change in status / initiation of new medication:  NA    New Symptom(s):  NA    New Order(s):  NA    Name of the person notified of the changes:  NA    Name of person being taught:  NA    Instructions given:  NA    Side Effects taught:  NA    Response to teaching:  NA      COMFORTABLE DYING MEASURE:  Is Patient/family satisfied with symptom level? yes    DISCHARGE PLAN:  Once symptoms can be managed at home, pt with either return home or go to a long-term care facility.  Pt will continue to be managed by the home hospice team

## 2021-12-03 NOTE — PROGRESS NOTES
1900 Report received from Kandis Chanel 85  Assessment completed as documented. Patient withdrawn, not wanting to participate in any conversation. 2058 Scheduled oxycodone ER 40mg administered. Patient reports that he just wants to sleep. 2150  Patient sleeping on left side with knees close to chest.  Appears comfortable. 2240  Patient sleeping with neutral facial expression. 2330 Patient sleeping on left side with relaxed facial expression. Will continue to monitor. 0040  Patient sleeping. Appears comfortable. 0130  Patient sleeping on left side. Bed in lowest position. 0225 Patient up to restroom to urinate. Linens straightened. Patient back to bed. Requested tape for Aspira drain. He is concerned he will dislodge it when he rolls over while sleeping. Tape applied. 1731 Patient sleeping on left side with relaxed facial expression. 0430  Patient sleeping. Appears comfortable. Will continue to monitor  0520 Patient sleeping on left side with relaxed facial expression.   1209 Patient up to bathroom to urinate, then back to bed.    0700 Report given to BHARATH Wade.    NAME OF PATIENT:  Payal Manley    LEVEL OF CARE:  Routine    REASON FOR GIP:   N/A    *PATIENT REMAINS ELIGIBLE FOR GIP LEVEL OF CARE AS EVIDENCED BY: (MUST BE ADDRESSED OF PATIENT GIP)  N/A      REASON FOR RESPITE:  N/A    O2 SAFETY:  N/A    FALL INTERVENTIONS PROVIDED:   Implemented/recommended resources for alarm system (personal alarm, bed alarm, call bell, etc.)  and Implemented/recommended environmental changes (remove hazards, lower bed, improve lighting, etc.)    INTERDISPLINARY COMMUNICATION/COLLABORATION:  Physician, MSW, Dave and RN, CNA    NEW MEDICATION INITIATION DOCUMENTATION:  N/A    Reason medication is being initiated:  N/A    MD / Provider name consulted re: change in status / initiation of new medication:  N/A    New Symptom(s):  N/A    New Order(s):  N/A    Name of the person notified of the changes: N/A    Name of person being taught:  N/A    Instructions given:  N/A    Side Effects taught:  N/A    Response to teaching:  N/A      COMFORTABLE DYING MEASURE:  Is Patient/family satisfied with symptom level?  yes    DISCHARGE PLAN:  Likely LTC placement.

## 2021-12-03 NOTE — PROGRESS NOTES
Problem: Pain  Goal: *Control of Pain  Outcome: Progressing Towards Goal     Problem: Pain  Goal: *Control of Pain  Outcome: Progressing Towards Goal  Goal: *PALLIATIVE CARE:  Alleviation of Pain  Outcome: Progressing Towards Goal     Problem: Falls - Risk of  Goal: *Absence of Falls  Description: Document Shelby Fall Risk and appropriate interventions in the flowsheet. Outcome: Progressing Towards Goal  Note: Fall Risk Interventions:  Mobility Interventions: Bed/chair exit alarm    Mentation Interventions: Adequate sleep, hydration, pain control, Eyeglasses and hearing aids, Update white board    Medication Interventions: Bed/chair exit alarm    Elimination Interventions: Bed/chair exit alarm, Call light in reach    History of Falls Interventions: Bed/chair exit alarm         Problem: Pressure Injury - Risk of  Goal: *Prevention of pressure injury  Description: Document Waqas Scale and appropriate interventions in the flowsheet.   Outcome: Progressing Towards Goal  Note: Pressure Injury Interventions:  Sensory Interventions: Avoid rigorous massage over bony prominences, Keep linens dry and wrinkle-free, Minimize linen layers, Pressure redistribution bed/mattress (bed type)    Moisture Interventions: Absorbent underpads, Apply protective barrier, creams and emollients    Activity Interventions: Pressure redistribution bed/mattress(bed type)    Mobility Interventions: Pressure redistribution bed/mattress (bed type)    Nutrition Interventions: Document food/fluid/supplement intake    Friction and Shear Interventions: Apply protective barrier, creams and emollients

## 2021-12-04 NOTE — PROGRESS NOTES
1900: Report received from [address] [facility] Patient is resting in bed, eyes closed, appears sleeping. 2000: Patient assessment complete, documented in flowsheets. Patient is lethargic but oriented x4. Reports pain is 5/10 at this time. Does not want PRN medication at this time. 2045: Scheduled  Oxycontin 40mg administered. Patient able to swallow pills whole without difficulty. No other needs at his time. Lights and television off in room per patient request.   3151: Patient remains sleeping. Has repositioned himself in bed. Neutral facial position observed. 0010: Patient appears sleeping. 0120: Patient is laying in bed with eyes open, he states he just woke up to reposition. No needs at this time   0200: Patient has returned to sleeping  0330: Patient appears sleeping. Neutral facial position observed. Respirations areregular depth and rhythm  0500: Patient resting with eyes closed, appears sleeping.   0600: Patient up to bathroom for bowel movement.    0700: Report given to [address] [facility] [place], RN       NAME OF PATIENT:  Jared Davenport    LEVEL OF CARE:  Routine    REASON FOR GIP:   NA    *PATIENT REMAINS ELIGIBLE FOR GIP LEVEL OF CARE AS EVIDENCED BY: (MUST BE ADDRESSED OF PATIENT GIP)      REASON FOR RESPITE:  NA    O2 SAFETY:  NA    FALL INTERVENTIONS PROVIDED:   Implemented/recommended use of non-skid footwear, Implemented/recommended use of fall risk identification flag to all team members, Implemented/recommended assistive devices and encouraged their use, Implemented/recommended resources for alarm system (personal alarm, bed alarm, call bell, etc.) , Implemented/recommended environmental changes (remove hazards, lower bed, improve lighting, etc.) and Implemented/recommended increased supervision/assistance    INTERDISPLINARY COMMUNICATION/COLLABORATION:  Physician, MSW, [place] and RN, CNA    NEW MEDICATION INITIATION DOCUMENTATION:  NA    Reason medication is being initiated:  NA    MD / Provider name consulted re: change in status / initiation of new medication:  NA    New Symptom(s):  NA    New Order(s):  NA    Name of the person notified of the changes:  NA    Name of person being taught:  NA    Instructions given:  A    Side Effects taught:  NA    Response to teaching:  NA      COMFORTABLE DYING MEASURE:  Is Patient/family satisfied with symptom level?  yes    DISCHARGE PLAN:  Facility Placement

## 2021-12-04 NOTE — PROGRESS NOTES
0700- Received report from [facility], [address] [place] [address]- Assessment completed. Pt is lying supine in bed on his left side, resting. Pt opened eyes upon this nurse entering room. Pt states he slept well last night. Pt reports that \"whatever you did with my medicine yesterday, it helped. \" Pt referring to increase of Oxycontin to 40mg. Pt ambulated to bathroom. Pt asked if Aspira drain dressing could be checked. Transparent dressing is loose on the corners, but is otherwise intact. Dressing was removed an new dressing placed. Drain site is dry and skin is normal in color. No signs of skin breakdown observed. Pt declines having site drained at this time and states \"I don't think it's necessary at the moment. I'm not feeling any pressure. \" Abdomen is very flat and no signs of ascites noted. Will monitor throughout shift. 0730- CNA at bedside, obtaining vitals. Brought pt some ice and a soda as requested. 1781- Scheduled Senokot and Oxycontin given per MAR. IV in right forearm flushed and new dressing placed. 1115- Pt resting with eyes closed, positioned on his left side. Respirations are regular and unlabored. 1315- Pt sleeping on left side. No distress noted. 1515- Patient sleeping. Appears comfortable. 1715- Pt awake and requesting ice, which was then brought to bedside. No other needs expressed. Pt states that his current pain level is tolerable. 1900- Report given to oncoming nurse.          NAME OF PATIENT:  Mecca Dang    LEVEL OF CARE:  Routine    REASON FOR GIP:   Pain, despite numerous changes in medications, Medication adjustment that must be monitored 24/7 and Stabilizing treatment that cannot take place at home    *PATIENT REMAINS ELIGIBLE FOR GIP LEVEL OF CARE AS EVIDENCED BY: (MUST BE ADDRESSED OF PATIENT GIP)      REASON FOR RESPITE:  NA    O2 SAFETY:  NA    FALL INTERVENTIONS PROVIDED:   Implemented/recommended use of non-skid footwear, Implemented/recommended resources for alarm system (personal alarm, bed alarm, call bell, etc.) , Implemented/recommended environmental changes (remove hazards, lower bed, improve lighting, etc.) and Implemented/recommended increased supervision/assistance    INTERDISPLINARY COMMUNICATION/COLLABORATION:  Physician, MSW, [place] and RN, CNA    NEW MEDICATION INITIATION DOCUMENTATION:  NA    Reason medication is being initiated:  NA    MD / Provider name consulted re: change in status / initiation of new medication:  NA    New Symptom(s):  NA    New Order(s):  NA    Name of the person notified of the changes:  NA    Name of person being taught:  NA    Instructions given:  NA    Side Effects taught:  NA    Response to teaching:  NA      COMFORTABLE DYING MEASURE:  Is Patient/family satisfied with symptom level?  yes    DISCHARGE PLAN:  Once symptoms can be managed at home, pt with either return home or go to a long-term care facility.  Pt will continue to be managed by the home hospice team

## 2021-12-05 NOTE — HOSPICE
1900 Report received from Darío, [address] Patient ambulated to bathroom with standby assist. When asked if patient had a BM, patient responded \"I don't do much of anything. \" RN assessment completed. Patient rates his pain a 6/10, declines any prn pain medication. Menu left with patient to fill out. 2050 Patient medicated with scheduled po oxycodone. Patient swallows pills without difficulty. Lights turned off per patients request.    2220 Patient resting quietly in bed with eyes closed, laying on his left side. 2320 Patient appears to be sleeping. 0030 Patient sleeping with neutral facial expression. 0130 Patient laying in bed on his left side with eyes closed, respirations even and unlabored. 0300 Patient up to the bathroom to urinate and returned to bed. Denies any needs at this time. 9141 Patient awake in bed, accidentally hit the call bell. Declines any pain medication at this time.   9977 Patient has returned to sleep.  0700 Report given to Venita Wilburn RN    NAME OF PATIENT:  Ana Maria Hernandez    LEVEL OF CARE:  Routine    REASON FOR GIP:   n/a    *PATIENT REMAINS ELIGIBLE FOR GIP LEVEL OF CARE AS EVIDENCED BY: (MUST BE ADDRESSED OF PATIENT GIP)      REASON FOR RESPITE:  n/a    O2 SAFETY:  n/a, patient on room air    FALL INTERVENTIONS PROVIDED:   Implemented/recommended use of non-skid footwear, Implemented/recommended use of fall risk identification flag to all team members, Implemented/recommended resources for alarm system (personal alarm, bed alarm, call bell, etc.) , Implemented/recommended environmental changes (remove hazards, lower bed, improve lighting, etc.) and Implemented/recommended increased supervision/assistance    INTERDISPLINARY COMMUNICATION/COLLABORATION:  Physician, MSW, Dave and RN, CNA    NEW MEDICATION INITIATION DOCUMENTATION:  n/a    Reason medication is being initiated:  n/a    MD / Provider name consulted re: change in status / initiation of new medication:  n/a    New Symptom(s):  n/a    New Order(s):  n/a    Name of the person notified of the changes:  n/a    Name of person being taught:  n/a    Instructions given:  n/a    Side Effects taught:  n/a    Response to teaching:  n/a      COMFORTABLE DYING MEASURE:  Is Patient/family satisfied with symptom level?  yes    DISCHARGE PLAN:  MSW working with patient to establish safe discharge plan. LTC placement will be found.

## 2021-12-05 NOTE — PROGRESS NOTES
Problem: Pain  Goal: *Control of Pain  Outcome: Progressing Towards Goal     Problem: Patient Education: Go to Patient Education Activity  Goal: Patient/Family Education  Outcome: Progressing Towards Goal     Problem: Activity Intolerance  Goal: *Oxygen saturation during activity within specified parameters  Outcome: Progressing Towards Goal  Goal: *Able to remain out of bed as prescribed  Outcome: Progressing Towards Goal     Problem: Patient Education: Go to Patient Education Activity  Goal: Patient/Family Education  Outcome: Progressing Towards Goal     Problem: Falls - Risk of  Goal: *Absence of Falls  Description: Document Emigdio Webster Fall Risk and appropriate interventions in the flowsheet. Outcome: Progressing Towards Goal  Note: Fall Risk Interventions:  Mobility Interventions: Bed/chair exit alarm    Mentation Interventions: Bed/chair exit alarm    Medication Interventions: Bed/chair exit alarm    Elimination Interventions: Call light in reach    History of Falls Interventions: Bed/chair exit alarm         Problem: Patient Education: Go to Patient Education Activity  Goal: Patient/Family Education  Outcome: Progressing Towards Goal     Problem: Emotional Support Needs  Goal: Patient/family is receiving emotional support  Outcome: Progressing Towards Goal     Problem: Family Significant Other Needs  Goal: Patient/family will receive support from community resources  Outcome: Progressing Towards Goal     Problem: Pressure Injury - Risk of  Goal: *Prevention of pressure injury  Description: Document Waqas Scale and appropriate interventions in the flowsheet.   Outcome: Progressing Towards Goal  Note: Pressure Injury Interventions:  Sensory Interventions: Float heels    Moisture Interventions: Absorbent underpads    Activity Interventions: Pressure redistribution bed/mattress(bed type)    Mobility Interventions: HOB 30 degrees or less, Pressure redistribution bed/mattress (bed type)    Nutrition Interventions: Document food/fluid/supplement intake, Offer support with meals,snacks and hydration    Friction and Shear Interventions: HOB 30 degrees or less, Minimize layers                Problem: Patient Education: Go to Patient Education Activity  Goal: Patient/Family Education  Outcome: Progressing Towards Goal     Problem: Pain  Goal: *Control of Pain  Outcome: Progressing Towards Goal  Goal: *PALLIATIVE CARE:  Alleviation of Pain  Outcome: Progressing Towards Goal     Problem: Patient Education: Go to Patient Education Activity  Goal: Patient/Family Education  Outcome: Progressing Towards Goal     Problem: Financial Deficits  Goal: Patient/family will voice understanding of available financial resources  Outcome: Progressing Towards Goal     Problem: Spiritual Evaluation  Goal: Identify beliefs/practices that support hospice experience  Description: Patient/family identify their beliefs/practices that impair Hospice experience. Patient/family identify their beliefs/practices that support Hospice experience. Patient coping identified. Spiritual distress identified and decreased with visit.   Outcome: Progressing Towards Goal

## 2021-12-05 NOTE — PROGRESS NOTES
Problem: Pain  Goal: *Control of Pain  Outcome: Progressing Towards Goal     Problem: Activity Intolerance  Goal: *Oxygen saturation during activity within specified parameters  Outcome: Progressing Towards Goal     Problem: Falls - Risk of  Goal: *Absence of Falls  Description: Document Shelby Fall Risk and appropriate interventions in the flowsheet. Outcome: Progressing Towards Goal  Note: Fall Risk Interventions:  Mobility Interventions: Patient to call before getting OOB    Mentation Interventions: Adequate sleep, hydration, pain control    Medication Interventions: Teach patient to arise slowly, Patient to call before getting OOB    Elimination Interventions: Call light in reach    History of Falls Interventions: Bed/chair exit alarm         Problem: Pressure Injury - Risk of  Goal: *Prevention of pressure injury  Description: Document Waqas Scale and appropriate interventions in the flowsheet.   Outcome: Progressing Towards Goal  Note: Pressure Injury Interventions:  Sensory Interventions: Float heels    Moisture Interventions: Absorbent underpads    Activity Interventions: Pressure redistribution bed/mattress(bed type)    Mobility Interventions: HOB 30 degrees or less, Pressure redistribution bed/mattress (bed type)    Nutrition Interventions: Document food/fluid/supplement intake, Offer support with meals,snacks and hydration    Friction and Shear Interventions: Apply protective barrier, creams and emollients

## 2021-12-05 NOTE — PROGRESS NOTES
0700: report received from [facility], [address] [facility]  0730: pt resting quietly with eyes closed, no signs of pain or discomfort at this time. 0945: pt resting quietly, awake in bed. Full assessment done, see flowsheets. Pt states pain about a 5/10. Scheduled meds given per MAR. wellbutrin has not been delivered from pharmacy yet. Breakfast tray set up for patient. Provided fresh cup of ice, water, and apple juice for patient. 1130: pt resting quietly, did not eat any of his breakfast. States he doesn't need anything and is \"just trying to get some rest.\" call bell within reach. 1330: pt resting quietly, no needs at this time. 1515: pt resting quietly with no needs at this time. Encouraged patient to fill out his dinner menu. He had no special requests for dinner and stated \"just [place] whatever you want. \" RN expressed wanting to get him something that sounded good to him in hopes that he would have an appetite and be able to eat something.   1700: dinner tray set up by CNA. Pt states no needs. 1830: pt resting quietly. Did not eat any dinner. No needs at this time. 1900: report given to oncoming nurse.      NAME OF PATIENT:  Lucinda Fabian    LEVEL OF CARE:  routine    REASON FOR GIP:   n/a    *PATIENT REMAINS ELIGIBLE FOR GIP LEVEL OF CARE AS EVIDENCED BY: (MUST BE ADDRESSED OF PATIENT GIP)      REASON FOR RESPITE:  n/a    O2 SAFETY:  pt is on room air    FALL INTERVENTIONS PROVIDED:   Implemented/recommended use of non-skid footwear, Implemented/recommended use of fall risk identification flag to all team members, Implemented/recommended assistive devices and encouraged their use, Implemented/recommended resources for alarm system (personal alarm, bed alarm, call bell, etc.) , Implemented/recommended environmental changes (remove hazards, lower bed, improve lighting, etc.) and Implemented/recommended increased supervision/assistance    INTERDISPLINARY COMMUNICATION/COLLABORATION:  Physician, AMALIA, Julia Newell and RN, CNA    NEW MEDICATION INITIATION DOCUMENTATION:  n/a    Reason medication is being initiated:  n/a    MD / Provider name consulted re: change in status / initiation of new medication:  n/a    New Symptom(s):  n/a    New Order(s):  n/a    Name of the person notified of the changes:  n/a    Name of person being taught:  n/a    Instructions given:  n/a    Side Effects taught:  n/a    Response to teaching:  n/a      COMFORTABLE DYING MEASURE:  Is Patient/family satisfied with symptom level?  yes    DISCHARGE PLAN:  LTC placement and continue to be followed by home hospice.

## 2021-12-06 NOTE — PROGRESS NOTES
0700  Report received from [facility]  3615  Patient awake, alert, denies pain/discomfort. Provided ice and apple juice per patient request.   0930  Am medications administered. Wellbutrin held/unavailable. Declined a bath today. 1130  Patient resting quietly in bed with eyes closed. Appears to be sleeping comfortably. 1200  Rounds with Dr. Annetta Rico. D/C Wellbutrin. 1400  Patient awake, declined lunch but accepted a popsicle. Patient denies pain but states that he feels tired. 1600  Patient resting quietly with eyes closed. Appears to be sleeping comfortably. 1645  Patient's sister/MPOA arrived to visit. Status update provided. 1800  Patient declined dinner. Sister at bedside. 1900  Report given to oncoming nurse.     NAME OF PATIENT:  Maira Clark    LEVEL OF CARE:  Routine    REASON FOR GIP:  NA    *PATIENT REMAINS ELIGIBLE FOR GIP LEVEL OF CARE AS EVIDENCED BY: (MUST BE ADDRESSED OF PATIENT GIP)  NA    REASON FOR RESPITE:  NA    O2 SAFETY:  NA    FALL INTERVENTIONS PROVIDED:   Implemented/recommended use of non-skid footwear, Implemented/recommended use of fall risk identification flag to all team members, Implemented/recommended assistive devices and encouraged their use, Implemented/recommended resources for alarm system (personal alarm, bed alarm, call bell, etc.) , Implemented/recommended environmental changes (remove hazards, lower bed, improve lighting, etc.) and Implemented/recommended increased supervision/assistance    INTERDISPLINARY COMMUNICATION/COLLABORATION:  Physician, MSW, [place] and RN, CNA    NEW MEDICATION INITIATION DOCUMENTATION:  NA    Reason medication is being initiated:  NA    MD / Provider name consulted re: change in status / initiation of new medication:  NA    New Symptom(s):  NA    New Order(s):  NA    Name of the person notified of the changes:  NA    Name of person being taught:  NA    Instructions given:  NA    Side Effects taught:  NA    Response to teaching:  NA    COMFORTABLE DYING MEASURE:  Is Patient/family satisfied with symptom level?   Yes    DISCHARGE PLAN:  Placement

## 2021-12-06 NOTE — PROGRESS NOTES
Problem: Falls - Risk of  Goal: *Absence of Falls  Description: Document Efra Roman Fall Risk and appropriate interventions in the flowsheet.   Outcome: Progressing Towards Goal  Note: Fall Risk Interventions:  Mobility Interventions: Bed/chair exit alarm    Mentation Interventions: Adequate sleep, hydration, pain control    Medication Interventions: Patient to call before getting OOB    Elimination Interventions: Call light in reach    History of Falls Interventions: Door open when patient unattended

## 2021-12-06 NOTE — PROGRESS NOTES
LCSW collaborated with team regarding pt's status. He remains stable at routine LOC and is ambulatory but is not eating anything at this time. He does have ice chips on occasion. Pt's last available routine day is 12/16. Team monitoring closely and will work with family for dc planning if he remains stable toward the end of this week/beginning of next week.     AMALIA Gill, [facility]   [phone]

## 2021-12-06 NOTE — HOSPICE
1900 Report received from Kandis Wells 85 Patient resting quietly in bed with eyes closed. 2045 Patient resting in bed. Responds to verbal stimulation. RN assessment completed. Patient medicated with scheduled oxycodone. Patients dinner tray sitting on his table, patient did not eat anything , asked for tray to be removed. Patient asked for a cup of ice and cola. 2230 Patient resting quietly in bed, eyes closed. Respirations unlabored. 0000 Patient continues to sleep. 0120 Patient resting quietly, neutral facial expression observed. 7643 Patient resting with eyes closed, appears to be sleeping.  0420 Patient continues to sleep, laying on his left side. 1470 Patient resting with eyes closed. Neutral facial expression. Respirations unlabored.   0700 Report given to Tyra Randolph RN        NAME OF PATIENT:  Alexandrea Metzger    LEVEL OF CARE:  Routine    REASON FOR GIP:   n/a    *PATIENT REMAINS ELIGIBLE FOR GIP LEVEL OF CARE AS EVIDENCED BY: (MUST BE ADDRESSED OF PATIENT GIP)      REASON FOR RESPITE:  n/a    O2 SAFETY:  n/a, patient on room air    FALL INTERVENTIONS PROVIDED:   Implemented/recommended use of non-skid footwear, Implemented/recommended use of fall risk identification flag to all team members, Implemented/recommended resources for alarm system (personal alarm, bed alarm, call bell, etc.) , Implemented/recommended environmental changes (remove hazards, lower bed, improve lighting, etc.) and Implemented/recommended increased supervision/assistance    INTERDISPLINARY COMMUNICATION/COLLABORATION:  Physician, MSW, [place] and RN, CNA    NEW MEDICATION INITIATION DOCUMENTATION:  n/a    Reason medication is being initiated:  n/a    MD / Provider name consulted re: change in status / initiation of new medication:  n/a    New Symptom(s):  n/a    New Order(s):  n/a    Name of the person notified of the changes:  n/a    Name of person being taught:  n/a    Instructions given:  n/a    Side Effects taught: n/a    Response to teaching:  Riki Pereirab:  Is Patient/family satisfied with symptom level?  yes    DISCHARGE PLAN:  MSW working with patient to establish a safe discharge plan and LTC placement.

## 2021-12-06 NOTE — PROGRESS NOTES
Problem: Pain  Goal: *Control of Pain  Outcome: Progressing Towards Goal     Problem: Activity Intolerance  Goal: *Oxygen saturation during activity within specified parameters  Outcome: Progressing Towards Goal     Problem: Falls - Risk of  Goal: *Absence of Falls  Description: Document Shelby Fall Risk and appropriate interventions in the flowsheet. Outcome: Progressing Towards Goal  Note: Fall Risk Interventions:  Mobility Interventions: Bed/chair exit alarm, Patient to call before getting OOB    Mentation Interventions: Adequate sleep, hydration, pain control    Medication Interventions: Patient to call before getting OOB    Elimination Interventions: Call light in reach    History of Falls Interventions: Bed/chair exit alarm         Problem: Pressure Injury - Risk of  Goal: *Prevention of pressure injury  Description: Document Waqas Scale and appropriate interventions in the flowsheet.   Outcome: Progressing Towards Goal  Note: Pressure Injury Interventions:  Sensory Interventions: Float heels    Moisture Interventions: Absorbent underpads    Activity Interventions: Pressure redistribution bed/mattress(bed type)    Mobility Interventions: Pressure redistribution bed/mattress (bed type), HOB 30 degrees or less, Float heels    Nutrition Interventions: Document food/fluid/supplement intake, Offer support with meals,snacks and hydration    Friction and Shear Interventions: HOB 30 degrees or less, Lift sheet, Minimize layers

## 2021-12-07 NOTE — HOSPICE
1900 Report received from Danii Ojeda, [address] Patient resting quietly in bed with eyes closed. 2100 Patient resting in bed with eyes open. RN assessment completed. Administered scheduled oxycodone. Patient asked for a cup of ice. Patient up to the bathroom. 2230 Patient resting quietly in bed on his right side. 2345 Patient sleeping in a fetal position with his head tucked in towards the mattress. 0115 Patient resting quietly in bed on his left side. 0215 Patient continues to sleep. 0330 Patient resting quietly on his left side. 0530 Patient sleeping, respirations unlabored.   8839 Patient continues to sleep.  0700 Report given to [employer] RN        NAME OF PATIENT:  Steven Vinson    LEVEL OF CARE:  Routine    REASON FOR GIP:   n/a    *PATIENT REMAINS ELIGIBLE FOR GIP LEVEL OF CARE AS EVIDENCED BY: (MUST BE ADDRESSED OF PATIENT GIP)      REASON FOR RESPITE:  n/a    O2 SAFETY:  n/a    FALL INTERVENTIONS PROVIDED:   Implemented/recommended use of non-skid footwear, Implemented/recommended use of fall risk identification flag to all team members, Implemented/recommended resources for alarm system (personal alarm, bed alarm, call bell, etc.) , Implemented/recommended environmental changes (remove hazards, lower bed, improve lighting, etc.) and Implemented/recommended increased supervision/assistance    INTERDISPLINARY COMMUNICATION/COLLABORATION:  Physician, MSW, Dave and RN, CNA    NEW MEDICATION INITIATION DOCUMENTATION:  n/a    Reason medication is being initiated:  n/a    MD / Provider name consulted re: change in status / initiation of new medication:  n/a    New Symptom(s):  n/a    New Order(s):  n/a    Name of the person notified of the changes:  n/a    Name of person being taught:  n/a    Instructions given:  n/a    Side Effects taught:  n/a    Response to teaching:  n/a      COMFORTABLE DYING MEASURE:  Is Patient/family satisfied with symptom level?  yes    DISCHARGE PLAN:  MSW working with patient and family on patient placement.

## 2021-12-07 NOTE — PROGRESS NOTES
0700: Report received from [place], [place]. Patient resting in bed with eye closed, appears sleeping   0745: Patient assessment complete, documented in flowsheets. Patient sleeping, roused to verbal stimulation but lethargic and withdrawn. No needs or requests at this time  0900: Scheduled oxycontin 40mg and senokot administered Patient reports pain is being managed well with scheduled medications. He reports bowel movement this morning   Breakfast is present at the bedside, provided sugar as requested for breakfast.   1100: Patient appears sleeping. Respirations are regular depth and rhythm. Neutral facial position observed. 1230: Patient resting in bed with eyes closed. Ice provided to patient at his request.  No other needs or requests at this time   1400: Patient resting in bed with eyes closed, neutral facial position observed. 1500:  Patient remains sleeping. 1640: Patient is resting with eyes closed, appears sleeping with neutral facial position. Respirations are regular depth and rhythm. 1800: Patient remains sleeping.    1900: Report given to Rolan Bertrand RN         NAME OF PATIENT:  Gerald Walker    LEVEL OF CARE:  Routine    REASON FOR GIP:   NA    *PATIENT REMAINS ELIGIBLE FOR GIP LEVEL OF CARE AS EVIDENCED BY: (MUST BE ADDRESSED OF PATIENT GIP)      REASON FOR RESPITE:  NA    O2 SAFETY:  NA    FALL INTERVENTIONS PROVIDED:   Implemented/recommended use of non-skid footwear, Implemented/recommended use of fall risk identification flag to all team members, Implemented/recommended assistive devices and encouraged their use, Implemented/recommended resources for alarm system (personal alarm, bed alarm, call bell, etc.) , Implemented/recommended environmental changes (remove hazards, lower bed, improve lighting, etc.) and Implemented/recommended increased supervision/assistance    INTERDISPLINARY COMMUNICATION/COLLABORATION:  Physician, MSW, Dave and RN, CNA    NEW MEDICATION INITIATION DOCUMENTATION:  NA    Reason medication is being initiated:  NA    MD / Provider name consulted re: change in status / initiation of new medication:  NA    New Symptom(s):  NA    New Order(s):  NA    Name of the person notified of the changes:  NA    Name of person being taught:  NA    Instructions given:  NA    Side Effects taught:  NA    Response to teaching:  NA      COMFORTABLE DYING MEASURE:  Is Patient/family satisfied with symptom level?  yes    DISCHARGE PLAN:  Home with family

## 2021-12-07 NOTE — [facility] IDG CHAPLAIN NOTES
Patient: Catherine Delgadillo    Date: 12/07/21  Time: 10:40 AM    [facility] [place]  Notes  Attended [facility] IDG rounds. Patient declined visit form the  but knows he can reach out at any time for support. Patient has not been receptive to  visits for the past 2 weeks . Will continue to offer support while the patient is at the [facility].       Signed by: Royce Casey

## 2021-12-07 NOTE — HOSPICE
LCSW participated in [address] rounds with [facility] team. Pt's current plan remains the same and LCSW visited pt briefly with [address]. Pt was received lying in bed, easily roused but wanted to continue resting. Team made him aware of availability for support. LCSW will continue to work toward dc plan by 12/16.     AMALIA Ha, [facility]   [phone]

## 2021-12-07 NOTE — [facility] IDG SOCIAL WORKER NOTES
[address] [place] [address] note:   LCSW participated in [address] rounds with [facility] team. Pt's current plan remains the same and LCSW visited pt briefly with [facility] [address]. Pt was received lying in bed, easily roused but wanted to continue resting. Team made him aware of availability for support. LCSW will continue to work toward dc plan by .     Problem: Support deficit, dc planning  Plan: LCSW will remain available daily to support pt and plans for discharge next week  Community Resources: [facility] for routine LOC  Advance Directives: Pt's sister is NHUNG  DDNR: On file   Home: [place], undecided     Drew Sams    [phone]

## 2021-12-07 NOTE — HOSPICE
Routine spiritual care visit attempted with patient. Joint visit with LCSW. Patient said he was ok and not in need of anything at his time. He declined a visit for today but knows the  is available if his needs change.

## 2021-12-07 NOTE — PROGRESS NOTES
Problem: Pain  Goal: *Control of Pain  Outcome: Progressing Towards Goal     Problem: Activity Intolerance  Goal: *Oxygen saturation during activity within specified parameters  Outcome: Progressing Towards Goal  Goal: *Able to remain out of bed as prescribed  Outcome: Progressing Towards Goal     Problem: Falls - Risk of  Goal: *Absence of Falls  Description: Document Kalyani Castillo Fall Risk and appropriate interventions in the flowsheet. Outcome: Progressing Towards Goal  Note: Fall Risk Interventions:  Mobility Interventions: Patient to call before getting OOB    Mentation Interventions: Adequate sleep, hydration, pain control, Bed/chair exit alarm, Door open when patient unattended, More frequent rounding    Medication Interventions: Patient to call before getting OOB    Elimination Interventions: Call light in reach, Toileting schedule/hourly rounds    History of Falls Interventions: Door open when patient unattended         Problem: Pressure Injury - Risk of  Goal: *Prevention of pressure injury  Description: Document Waqas Scale and appropriate interventions in the flowsheet.   Outcome: Progressing Towards Goal  Note: Pressure Injury Interventions:  Sensory Interventions: Minimize linen layers, Pressure redistribution bed/mattress (bed type)    Moisture Interventions: Absorbent underpads    Activity Interventions: Pressure redistribution bed/mattress(bed type)    Mobility Interventions: HOB 30 degrees or less, Pressure redistribution bed/mattress (bed type)    Nutrition Interventions: Document food/fluid/supplement intake, Offer support with meals,snacks and hydration    Friction and Shear Interventions: Minimize layers, HOB 30 degrees or less

## 2021-12-07 NOTE — PROGRESS NOTES
Problem: Pain  Goal: *Control of Pain  Outcome: Progressing Towards Goal     Problem: Patient Education: Go to Patient Education Activity  Goal: Patient/Family Education  Outcome: Progressing Towards Goal     Problem: Pain  Goal: *Control of Pain  Outcome: Progressing Towards Goal  Goal: *PALLIATIVE CARE:  Alleviation of Pain  Outcome: Progressing Towards Goal     Problem: Patient Education: Go to Patient Education Activity  Goal: Patient/Family Education  Outcome: Progressing Towards Goal     Problem: Patient Education: Go to Patient Education Activity  Goal: Patient/Family Education  Outcome: Progressing Towards Goal     Problem: Activity Intolerance  Goal: *Oxygen saturation during activity within specified parameters  Outcome: Progressing Towards Goal  Goal: *Able to remain out of bed as prescribed  Outcome: Progressing Towards Goal     Problem: Falls - Risk of  Goal: *Absence of Falls  Description: Document Suzette Sanchez Fall Risk and appropriate interventions in the flowsheet. Outcome: Progressing Towards Goal  Note: Fall Risk Interventions:  Mobility Interventions: Patient to call before getting OOB    Mentation Interventions: Adequate sleep, hydration, pain control, Bed/chair exit alarm, Door open when patient unattended    Medication Interventions: Patient to call before getting OOB    Elimination Interventions: Call light in reach, Patient to call for help with toileting needs    History of Falls Interventions: Door open when patient unattended         Problem: Patient Education: Go to Patient Education Activity  Goal: Patient/Family Education  Outcome: Progressing Towards Goal     Problem: Pressure Injury - Risk of  Goal: *Prevention of pressure injury  Description: Document Waqas Scale and appropriate interventions in the flowsheet.   Outcome: Progressing Towards Goal  Note: Pressure Injury Interventions:  Sensory Interventions: Keep linens dry and wrinkle-free, Minimize linen layers, Avoid rigorous massage over bony prominences    Moisture Interventions: Minimize layers, Maintain skin hydration (lotion/cream)    Activity Interventions: Pressure redistribution bed/mattress(bed type)    Mobility Interventions: Pressure redistribution bed/mattress (bed type)    Nutrition Interventions: Offer support with meals,snacks and hydration    Friction and Shear Interventions: Lift sheet, Minimize layers                Problem: Patient Education: Go to Patient Education Activity  Goal: Patient/Family Education  Outcome: Progressing Towards Goal

## 2021-12-08 NOTE — HOSPICE
1900 Received report from [employer]. 1930 Patient is sleeping in bed, woke up during assessment and said he was fine. He just wants the lights out so he can sleep. Please see flow sheet for assessment. NAME OF PATIENT:  Payal Manley    LEVEL OF CARE: Routine    REASON FOR GIP:   NA    *PATIENT REMAINS ELIGIBLE FOR GIP LEVEL OF CARE AS EVIDENCED BY: (MUST BE ADDRESSED OF PATIENT GIP) NA      REASON FOR RESPITE:  NA    O2 SAFETY:  NA    FALL INTERVENTIONS PROVIDED:   Implemented/recommended use of fall risk identification flag to all team members, Implemented/recommended assistive devices and encouraged their use, Implemented/recommended resources for alarm system (personal alarm, bed alarm, call bell, etc.) , Implemented/recommended environmental changes (remove hazards, lower bed, improve lighting, etc.) and Implemented/recommended increased supervision/assistance    INTERDISPLINARY COMMUNICATION/COLLABORATION:  Physician, Leisa Gill and RN, BALDEV    NEW MEDICATION INITIATION DOCUMENTATION:  NA  Reason medication is being initiated:  NA    MD / Provider name consulted re: change in status / initiation of new medication:  NA    New Symptom(s):  NA    New Order(s):  NA    Name of the person notified of the changes:  NA  Name of person being taught:  NA    Instructions given:  NA    Side Effects taught:  NA    Response to teaching:  NA      COMFORTABLE DYING MEASURE:  Is Patient/family satisfied with symptom level?  yes    DISCHARGE PLAN:  Home with hospice.

## 2021-12-08 NOTE — PROGRESS NOTES
LCSW placed call to pt's sister/MPOA Matthew Forte to touch base regarding pt's approaching end of routine day max. VM left and will await call back.     AMALIA Summers, [facility]   [phone]

## 2021-12-08 NOTE — PROGRESS NOTES
Problem: Pain  Goal: *Control of Pain  Outcome: Progressing Towards Goal     Problem: Patient Education: Go to Patient Education Activity  Goal: Patient/Family Education  Outcome: Progressing Towards Goal     Problem: Pain  Goal: *Control of Pain  Outcome: Progressing Towards Goal  Goal: *PALLIATIVE CARE:  Alleviation of Pain  Outcome: Progressing Towards Goal     Problem: Patient Education: Go to Patient Education Activity  Goal: Patient/Family Education  Outcome: Progressing Towards Goal     Problem: Activity Intolerance  Goal: *Oxygen saturation during activity within specified parameters  Outcome: Progressing Towards Goal  Goal: *Able to remain out of bed as prescribed  Outcome: Progressing Towards Goal     Problem: Patient Education: Go to Patient Education Activity  Goal: Patient/Family Education  Outcome: Progressing Towards Goal     Problem: Falls - Risk of  Goal: *Absence of Falls  Description: Document Rebecca Angeli Fall Risk and appropriate interventions in the flowsheet.   Outcome: Progressing Towards Goal  Note: Fall Risk Interventions:  Mobility Interventions: Patient to call before getting OOB    Mentation Interventions: Adequate sleep, hydration, pain control    Medication Interventions: Patient to call before getting OOB    Elimination Interventions: Call light in reach    History of Falls Interventions: Door open when patient unattended         Problem: Patient Education: Go to Patient Education Activity  Goal: Patient/Family Education  Outcome: Progressing Towards Goal     Problem: Emotional Support Needs  Goal: Patient/family is receiving emotional support  Outcome: Progressing Towards Goal     Problem: Family Significant Other Needs  Goal: Patient/family will receive support from community resources  Outcome: Progressing Towards Goal     Problem: Financial Deficits  Goal: Patient/family will voice understanding of available financial resources  Outcome: Progressing Towards Goal     Problem: Pressure Injury - Risk of  Goal: *Prevention of pressure injury  Description: Document Waqas Scale and appropriate interventions in the flowsheet. Outcome: Progressing Towards Goal  Note: Pressure Injury Interventions:  Sensory Interventions: Minimize linen layers    Moisture Interventions: Minimize layers    Activity Interventions: Pressure redistribution bed/mattress(bed type)    Mobility Interventions: Pressure redistribution bed/mattress (bed type)    Nutrition Interventions: Document food/fluid/supplement intake    Friction and Shear Interventions: Minimize layers                Problem: Patient Education: Go to Patient Education Activity  Goal: Patient/Family Education  Outcome: Progressing Towards Goal     Problem: Spiritual Evaluation  Goal: Identify beliefs/practices that support hospice experience  Description: Patient/family identify their beliefs/practices that impair Hospice experience. Patient/family identify their beliefs/practices that support Hospice experience. Patient coping identified. Spiritual distress identified and decreased with visit.   Outcome: Progressing Towards Goal

## 2021-12-08 NOTE — HOSPICE
1900 Received report from [employer]. 1930 Patient is sleeping in bed, woke up during assessment and said he was fine. He just wants the lights out so he can sleep. Please see flow sheet for assessment. 2050 gave scheduled dose of 40 mg of oxycodone. 2200 Patient is sleeping with shallow respirations. 0000 Patient is sleeping on right side. 0200 Patient is sleeping with shallow respirations and a relaxed face. 0400 Patient is sleeping with no facial grimacing.  0600 Patient sleeping, woke and asked for water and went back to sleep.  0700 Gave report.     NAME OF PATIENT:  Jose Mauro    LEVEL OF CARE:  Routine    REASON FOR GIP:   NA    *PATIENT REMAINS ELIGIBLE FOR GIP LEVEL OF CARE AS EVIDENCED BY: (MUST BE ADDRESSED OF PATIENT GIP)  NA    REASON FOR RESPITE:  NA    O2 SAFETY:  NA    FALL INTERVENTIONS PROVIDED:   Implemented/recommended use of fall risk identification flag to all team members, Implemented/recommended assistive devices and encouraged their use, Implemented/recommended resources for alarm system (personal alarm, bed alarm, call bell, etc.) , Implemented/recommended environmental changes (remove hazards, lower bed, improve lighting, etc.) and Implemented/recommended increased supervision/assistance    INTERDISPLINARY COMMUNICATION/COLLABORATION:  Physician and RN, CNA    NEW MEDICATION INITIATION DOCUMENTATION:  NA    Reason medication is being initiated:  NA    MD / Provider name consulted re: change in status / initiation of new medication:  NA    New Symptom(s):  NA    New Order(s):  NA    Name of the person notified of the changes:  NA    Name of person being taught:  NA    Instructions given:  NA    Side Effects taught:  NA    Response to teaching:  NA      COMFORTABLE DYING MEASURE:  Is Patient/family satisfied with symptom level?  yes    DISCHARGE PLAN:  Home

## 2021-12-09 NOTE — PROGRESS NOTES
LCSW placed call to pt's sister/MPOA, Vignesh Chun to discuss pt's status. LCSW informed her that he remains at Routine LOC and unless he declines further and either passes or qualifies for GIP care, he will need to dc from [facility] by 12/16 (next Thursday). Vignesh Chun agreeable to speaking w/ pt's daughter, Ijeoma Smith - they both plan to visit pt tomorrow - and discuss a plan. She is aware that if pt were to go to [facility] with Vignesh Chun that we would need to transfer hospices and that would prevent him from returning to our hospice house. LCSW will continue to follow closely. Pt is scheduled for a [facility] on 12/27 with [facility]. LCSW will adjust or cancel this depending on pt's needs and status.     AMALIA Alvarez, [facility]   [phone]

## 2021-12-09 NOTE — PROGRESS NOTES
[facility] Those in [facility]  [phone]    Patient Name: Steven Vinson  YOB: 1960    Date of Provider Hospice Visit: 12/09/21    Level of Care:   [] General Inpatient (GIP)    [x] Routine   [] Respite    Current Location of Care:  [] [facility] [] [facility] [] [facility] [] [facility] [x] Hospice House [facility], patient referred from:  [] [facility] [x] [facility] [] [facility] [] [facility] [] Home [] Other:     Date of Original Hospice Admission: 11/18/21  Hospice Medical Director at time of admission: [address] [place] [address] Diagnosis: Advanced Pancreatic Cancer  Diagnoses RELATED to the terminal prognosis: malignant ascites  Other Diagnoses: protein calorie malnutrition, tobacco use     HOSPICE SUMMARY   Do not cut and paste chart information other than imaging findings    Steven Vinson is a 63y.o. year old who was admitted to [facility]. The patient's principle diagnosis of advanced maligannt pancreatic adenocarcinoma with malignant ascites has resulted in further worsening function and decline with increased abdominal pain that has been difficult to control with oral opioids. Pt also is severely malnourished. Pt had aspira drain placed in hospital. Pt has been having poor appetite, decreased oral intake and increased fatigue, weakness over past few days. PPS 40% but declining fast  Pt has chosen hospice care due to disease progression and overwhelming symptoms due to lack of regular treatment. Refer to LCD   CT Abdomen:  IMPRESSION     1. Large volume ascites, new. No definite change in small submental soft tissue  densities left mid and upper abdomen. 2. Possible slight increase in ill-defined pancreatic body mass. Slight  worsening of focal narrowing/short segment occlusion at the junction of the  superior mesenteric vein and portal vein. 3. Slightly nodular border of the liver with no discrete liver mass.   4. Irregular wall thickening of the sigmoid colon/rectosigmoid junction may be  due to a chronic infectious/inflammatory process, with small fluid collection in  the wall the sigmoid colon slightly larger than prior study. Neoplastic etiology  cannot be excluded. FNAC: poorly differentiated adenocarcinoma          HOSPICE DIAGNOSES   Active Symptoms:  1. Cancer associated pain  2  Cancer related fatigue/weakness  3. Poor appetite  4. Malignant Ascites  5. Nicotine dependence  6. Anxiety/depression  7. Hospice care  8. Constipation-likely opioids     PLAN   1. Patient to continue routine level of care for now. Patient has taken a definite decline since last time I saw him which was almost 2 weeks ago. He was refusing medication overnight and essentially sleeping the whole time. Discussed with his bedside nurse and we went to evaluate him together. Patient clearly appears uncomfortable. He appears agitated when we came into the room but also showing evidence of discomfort. He continues to state that he just wants to sleep. Finally he did agree to allow his nurse to medicate him for comfort. I anticipate patient requiring IV medication as he not tolerating oral medication or taking it but clearly is uncomfortable. He is completely emaciatedalready was very thin but this is significantly different when I saw him 2 weeks ago. .   2. He is having Dilaudid 1 mg every 15 minuteswe will see how much he eats over the next 24 hours and make adjustments accordingly. Given that he has been on OxyContin 40 mg twice a day and has not taken it in almost 24 hours we do need to be careful about any type of withdrawal symptoms. Continue as needed Ativan as well. 3. Continue Ativan 1mg IV/PO every 15mts as needed for anxiety   4. Constipation-continue senna daily. Continue to monitor effectiveness. Not sure we will need to continue this either. 5. Continue to drain the aspira as this helps remove pressure form his abdomen-1 liter daily at minimal right now.  Will continue to drain for comfort. Typically taking almost a liter off daily. 6. Addendum-talked with patient's daughter and updated her about his decline. She has to work tonight and plans on coming tomorrow. She will contact patient's sister  7. Given my assessment today, patient may be transitioning towards end-of-life. 8. Plan reviewed with bedside nursing team  9. Hospice Plan of care was reviewed in detail and agree with current plan of care    Prognosis estimated based on 12/09/21 clinical assessment is:   [] Hours to Days    [] Days to Weeks    [x] Other:    Communicated plan of care with: Hospice Nurse      GOALS OF CARE     Patient/Medical POA stated Goal of Care: pain control, comfort    [x] I have reviewed and/or updated ACP information in the Advance Care Planning Navigator. This information is available in the [address] [place] [address] Drive link in the patient's chart header. Primary Decision [facility] (Health Care Agent):   Primary Decision Maker: Emily Calles - Daughter - [phone]    Resuscitation Status: DNR  If DNR is there a Durable DNR on file? : [x] Yes [] No (If no, complete Durable DNR)    HISTORY     History obtained from: patient, chart review    CHIEF COMPLAINT: pain  The patient is:   [x] Verbal  [] Nonverbal  [] Unresponsive    HPI/SUBJECTIVE:  63year old male with hx of pancreatic adenocarcinoma which has caused cachexia and significant weight loss, abdominal pain and poor appetite. He endorses some anxiety over his condition. He is now on hospice service and GIP for pain control because he has never had adequate pain control. Pt lying in bed and states this is the most comfortable he has been in a long time however he does complain of pain 6/10 and prior to the administration of IV dilaudid his pain was up to 9/10.    11/22-sleeping when I enter the room but arouses easily     11/23-patient resting both times I entered the room. Did not arouse him as he appeared comfortable.     11/24-patient arouses but definitely not as engaged. Color appears different as he appears more ashen. 11/26-patient more interactive today. He was sleeping at first but arouses easily. Pain seems to be better managed and he ate a little bit better    11/30; pt seen in IDG rounds. Laying curled up in bed, appears very flat affect, responds with few words, less talkative, says pain Okay, appetite little, mostly in bed now. When asked says he will likely go home with his sister. Abdominal pain about 6-7/10, improves with medication. Got 1 prn ativan through night    12/02; pt seen in FU, still continues to have pain in his abdomen, fluctuates in intensity; currently at 5/10, pt hesitant to take dilaudid too much as he feels it makes him sick and affects his appetite. He has not been eating much, sleeps most times curled up in bed. Responds but talks less. 12/9-patient essentially covered with blankets. He did not really want to speak but allow me to interact. Definitely having pain.      REVIEW OF SYSTEMS     The following systems were: [x] reviewed  [] unable to be reviewed    Positive ROS include:  Constitutional: fatigue, weakness, in pain, short of breath  Ears/nose/mouth/throat: increased airway secretions  Respiratory: shortness of breath, wheezing  Gastrointestinal: poor appetite, nausea, vomiting, abdominal pain, constipation, diarrhea  Musculoskeletal: pain, deformities, swelling legs  Neurologic:confusion, hallucinations, weakness  Psychiatric: anxiety, feeling depressed, poor sleep  Endocrine:     Adult Non-Verbal Pain Assessment Score: 3    Face  [] 0   No particular expression or smile  [] 1   Occasional grimace, tearing, frowning, wrinkled forehead  [x] 2   Frequent grimace, tearing, frowning, wrinkled forehead    Activity (movement)  [] 0   Lying quietly, normal position  [x] 1   Seeking attention through movement or slow, cautious movement  [] 2   Restless, excessive activity and/or withdrawal reflexes    Guarding  [x] 0 Lying quietly, no positioning of hands over areas of body  [] 1   Splinting areas of the body, tense  [] 2   Rigid, stiff    Physiology (vital signs)  [x] 0   Stable vital signs  [] 1   Change in any of the following: SBP > 20mm Hg; HR > 20/minute  [] 2   Change in any of the following: SBP > 30mm Hg; HR > 25/minute    Respiratory  [x] 0   Baseline RR/SpO2, compliant with ventilator  [] 1   RR > 10 above baseline, or 5% drop SpO2, mild asynchrony with ventilator  [] 2   RR > 20 above baseline, or 10% drop SpO2, asynchrony with ventilator     FUNCTIONAL ASSESSMENT     Palliative Performance Scale (PPS): 20       PSYCHOSOCIAL/SPIRITUAL ASSESSMENT     Active Problems:    * No active hospital problems.  *    Past Medical History:   Diagnosis Date    Gout     Pancreatic cancer ([facility] [address]     Psoriasis     Tobacco abuse       Past Surgical History:   Procedure Laterality Date    HX ORTHOPAEDIC      femur repair    HX ORTHOPAEDIC      lumbar compressed fracture    IR INSERT INTRAPERI TUNL CATH PERC  11/17/2021    IR INSERT TUNL CVC W PORT OVER 5 YEARS  11/24/2020      Social History     Tobacco Use    Smoking status: Current Every Day Smoker     Packs/day: 1.00    Smokeless tobacco: Never Used    Tobacco comment: last week was last cigarette   Substance Use Topics    Alcohol use: Yes     Family History   Problem Relation Age of Onset    Pancreatic Cancer Brother       No Known Allergies   Current Facility-Administered Medications   Medication Dose Route Frequency    oxyCODONE ER (OxyCONTIN) tablet 40 mg  40 mg Oral Q12H    sennosides (SENOKOT) 8.8 mg/5 mL syrup 17.6 mg  10 mL Oral DAILY    LORazepam (INTENSOL) 2 mg/mL oral concentrate 1 mg  1 mg SubLINGual Q15MIN PRN    bisacodyL (DULCOLAX) suppository 10 mg  10 mg Rectal DAILY PRN    acetaminophen (TYLENOL) suppository 650 mg  650 mg Rectal Q6H PRN    LORazepam (ATIVAN) injection 1 mg  1 mg IntraVENous Q15MIN PRN    HYDROmorphone (DILAUDID) injection 1 mg  1 mg IntraVENous Q15MIN PRN    glycopyrrolate (ROBINUL) injection 0.2 mg  0.2 mg IntraVENous Q4H PRN    HYDROmorphone (DILAUDID) tablet 4 mg  4 mg Oral Q1H PRN        PHYSICAL EXAM     Wt Readings from Last 3 Encounters:   11/15/21 52.9 kg (116 lb 10 oz)   10/04/21 52.9 kg (116 lb 9.6 oz)   10/04/21 52.9 kg (116 lb 10 oz)       Visit Vitals  BP (!) 76/55   Pulse 88   Temp 97.7 °F (36.5 °C)   Resp 20   SpO2 96%       Supplemental O2  [] Yes  [x] NO  Last bowel movement:     Currently this patient has:  [x] Peripheral IV [] PICC  [x] PORT [] ICD    [] Duke Catheter [] NG Tube   [] PEG Tube    [] Rectal Tube [x] Drain  [] Other:     Constitutional: Barely interactive, appears uncomfortable, covered in his blankets, cachectic  Eyes: pallor  ENMT: clear mucosa, no lesions  Cardiovascular: normal rate and rhythm, no edema  Respiratory: no breathing distress  Gastrointestinal: distended abdomen, tense, no masses, ascites +, aspira drain right side, tenderness to palpation, hypoactive bowel sounds  Musculoskeletal:thin extremities, no deformities  Skin:no lesions  Neurologic:no obvious deficits  Psychiatric: Anxious  Other:       Pertinent Lab and or Imaging Tests:  Lab Results   Component Value Date/Time    Sodium 139 11/16/2021 12:42 AM    Potassium 3.9 11/16/2021 12:42 AM    Chloride 107 11/16/2021 12:42 AM    CO2 29 11/16/2021 12:42 AM    Anion gap 3 (L) 11/16/2021 12:42 AM    Glucose 98 11/16/2021 12:42 AM    BUN 10 11/16/2021 12:42 AM    Creatinine 0.77 11/16/2021 12:42 AM    BUN/Creatinine ratio 13 11/16/2021 12:42 AM    GFR est AA >60 11/16/2021 12:42 AM    GFR est non-AA >60 11/16/2021 12:42 AM    Calcium 7.9 (L) 11/16/2021 12:42 AM     Lab Results   Component Value Date/Time    Protein, total 5.4 (L) 11/16/2021 12:42 AM    Albumin 2.3 (L) 11/16/2021 12:42 AM         Ju Sanchez MD

## 2021-12-09 NOTE — PROGRESS NOTES
Problem: Pain  Goal: *Control of Pain  Outcome: Progressing Towards Goal     Problem: Pain  Goal: *PALLIATIVE CARE:  Alleviation of Pain  Outcome: Progressing Towards Goal     Problem: Activity Intolerance  Goal: *Able to remain out of bed as prescribed  Outcome: Progressing Towards Goal     Problem: Falls - Risk of  Goal: *Absence of Falls  Description: Document Luis Miguel Dong Fall Risk and appropriate interventions in the flowsheet.   Outcome: Progressing Towards Goal  Note: Fall Risk Interventions:  Mobility Interventions: Patient to call before getting OOB    Mentation Interventions: Adequate sleep, hydration, pain control, Door open when patient unattended, Eyeglasses and hearing aids, More frequent rounding, Update white board    Medication Interventions: Patient to call before getting OOB    Elimination Interventions: Call light in reach, Patient to call for help with toileting needs, Stay With Me (per policy)    History of Falls Interventions: Door open when patient unattended

## 2021-12-09 NOTE — PROGRESS NOTES
700: Report received from [place], Counts include 234 beds at the [facility]. Patient resting in bed with eyes closed, appears sleeping. 0800: Patient assessment complete. Patient appears to be having pain but is refusing PO medications. Patient is agitated and states that he just wants to be left alone. Patient observed to have mottling present on left toes and ball of foot. 0930: Patient remains resting in bed, eyes are closed, and he appears sleeping. 1100: Patient is awake in bed. He appears to be in pain/discomfort, but continues to refuse any medication and states that he just wants to be left alone  2188: Dr. Benita Prince rounded on patient. Patient continues to refuse PO medications, he is unable to tolerate at this time. Patient is visibly in pain and agitated. Patient agrees to try IV medications for pain and agitation/anxeity. 1310:  Patient has visitor at the bedside. Patient is resting quietly in bed. Hydromorphone 1mg IV and lorazepam 1mg IV administered. 1345: Patient is resting with eyes closed, appears sleeping. 1500: Patient resting with eyes closed. Neutral facial position observed. 1545: Patient resting in bed with eyes open, grimace observed, patient not interactive. Right chest port accessed and hydromorphone 1mg IV administered. Patient's fingers on patient's left hand noted to have new mottling present. Patient positioned with pillows for offloading  1620: Attempted to call patient's sister Augustina Castillo to make her aware of change in patient's condition. Unable to leave message due to full voice mail box   9919: Dr. Benita Prince called patient's daughter to make her aware of change in patient's condition today. Patient is resting in bed with eyes closed. Respirations are regular depth with respiratory rate of 8/min  1730: Patient resting in bed with eyes closed. Eyes open to tactile stimulation. Neutral facial position observed. Respiratory rate remains 8/min, unlabored.    1815: Patient has repositioned himself in bed. He is resting quietly in bed . Respirations are 10/min. 1825: Patient's sister called, update provided on patient and condition.  She will be coming to see patient tonight.   1900: Report given to France Smith

## 2021-12-09 NOTE — PROGRESS NOTES
1910- Received report from [employer]. Patient lying in bed in fetal position. No cardiac or respiratory distress but appears to be depressed. 2010- Patient lying in bed in fetal position asleep. Denies having any needs to be met at this time, reports he just \"Needs to rest\" No cardiac or respiratory distress noted. 2110- Patient refused scheduled medication of Oxycontin  2245- Continues to refuse medications  2310- Patient lying in bed asleep no cardiac or respiratory distress noted.  Report given to Sommer Santos RN

## 2021-12-09 NOTE — PROGRESS NOTES
NAME OF PATIENT:  Joana Estrella    LEVEL OF CARE:  Routine    REASON FOR GIP:   N/A    *PATIENT REMAINS ELIGIBLE FOR GIP LEVEL OF CARE AS EVIDENCED BY: (MUST BE ADDRESSED OF PATIENT GIP)      REASON FOR RESPITE:  N/A    O2 SAFETY:  n/a    FALL INTERVENTIONS PROVIDED:   Implemented/recommended use of non-skid footwear, Implemented/recommended use of fall risk identification flag to all team members, Implemented/recommended resources for alarm system (personal alarm, bed alarm, call bell, etc.) , Implemented/recommended environmental changes (remove hazards, lower bed, improve lighting, etc.) and N/A    INTERDISPLINARY COMMUNICATION/COLLABORATION:  Physician, MSW, Maria G Larkin RN, CNA and LPN    NEW MEDICATION INITIATION DOCUMENTATION:  N/A    Reason medication is being initiated:  N/A    MD / Provider name consulted re: change in status / initiation of new medication:  N/A    New Symptom(s):  N/A    New Order(s):  N/A    Name of the person notified of the changes:  N/a    Name of person being taught:  N/A    Instructions given:  N/A    Side Effects taught:  N/A    Response to teaching:  N/A      COMFORTABLE DYING MEASURE:  Is Patient/family satisfied with symptom level?  yes    DISCHARGE PLAN:  unknown

## 2021-12-09 NOTE — PROGRESS NOTES
0007 Received report from [facility]  4562 Patient sleeping on left side. Appears comfortable with relaxed facial expression. 0100 Patient sleeping. Bed in lowest position. 4903 Patient's friend Marcia Wagner called. Brennan sounds intoxicated and and admitted that he was drinking. He is also grieving the pending loss of his long time friend. He insisted that I wake the patient so that he could speak to him as \"it could be the last time\". Patient was laying in bed with eyes closed, but opened them when I came in the room. I asked him if he wanted to speak to Marcia Wagner, but he said that he would call him in the morning. I notified Marcia Wagner of this. He was upset, but understood. 0230 Patient is sleeping. Will continue to assess. 0330 Patient sleeping with relaxed facial expression. 0400 Patient sleeping. Crocheted blanket fell off of bed. Replaced blankets. Patient did not wake, but appears relaxed laying on left side. 3146 Patient resting in bed. States he can't get comfortable. He repositioned himself and his blankets were straightened. He denies pain. 0630 Patient sleeping. Appears comfortable.   0700 Report given to [employer], RN

## 2021-12-10 NOTE — PROGRESS NOTES
1900 report received from [place], [address] [facility] Assessment performed. Appearance is jaundiced with mottling of palms and soles of feet. Skin is cool to the touch. Respiratory rate is 4-6 breaths per minute with periods of agonal breathing. 2015 Patient continues with respiratory rate of 4-6 breaths per minute. Patient is being comforted with gentle touch and words of comfort and affirmation. 2100 Family here to visit patient. Sitting at bedside. 2110 Family rang call bell to advise that patient had to use the bathroom, but he had urinated in the bed. Family left the room while linens were changed and soiled underwear removed. Patient appeared confused during changing. 2130  Patient's sisters back in room spending time with patient. 2200 Patient with grimace on face and hiccups. 2300 Patient continues with grimace, hiccups that seem to be bothering him and periodic spastic type movements with his arms. Appears uncomfortable. Patient's sister also commented that he seemed to be getting more restless. Family left for the night and advised that patient's daughter will be here early in the morning for a visit. She works nights, but will be here when she gets off.      2315 PRN Dilaudid and Ativan administered. Patient tolerated well. Will continue to monitor. 0000 Patient's daughter Leigh Kolb arrived to visit with her dad. She is tearful, but sitting at bedside. 0100 Patient has hiccups and is restless. He has a grimace on his face and is making moaning noises periodically. 0110 PRN Dilaudid and Ativan administered. Patient tolerated well. 0130 Patient's daughter's fiance is here to support Leigh Kolb. Both are at the patient's bedside. 0200 Patient's daughter and her fiance at bedside. Patient had some brown tinged discharge from his mouth. 0330 Patient continues to exhibit signs of agitation by spastically moving arms at times and almost constant grimace on face.   He will also occasionally kacey.  0335 PRN Ativan and Dilaudid administered. Patient tolerated well. Will continue to monitor. 0425 Patient is experiencing bouts of agonal breathing, but less agitation that this time. Will continue to assess. 7134 Patient resting with agonal breathing. 0545 PRN Dilaudid and Ativan administered due to grimace, moaning and restlessness. NAME OF PATIENT:  Catherine Delgadillo    LEVEL OF CARE:  Routine    REASON FOR GIP:   N/A    *PATIENT REMAINS ELIGIBLE FOR GIP LEVEL OF CARE AS EVIDENCED BY: (MUST BE ADDRESSED OF PATIENT GIP)  N/A       REASON FOR RESPITE:  N/A    O2 SAFETY:  N/A    FALL INTERVENTIONS PROVIDED:   Implemented/recommended resources for alarm system (personal alarm, bed alarm, call bell, etc.) , Implemented/recommended environmental changes (remove hazards, lower bed, improve lighting, etc.) and Implemented/recommended increased supervision/assistance    INTERDISPLINARY COMMUNICATION/COLLABORATION:  Physician, MSW, Dave and RN, CNA    NEW MEDICATION INITIATION DOCUMENTATION:  N/A    Reason medication is being initiated:  N/A    MD / Provider name consulted re: change in status / initiation of new medication:  N/A    New Symptom(s):  N/A    New Order(s):  N/A    Name of the person notified of the changes:  N/A    Name of person being taught:  N/A    Instructions given:  N/A    Side Effects taught:  N/A    Response to teaching:  N/A      COMFORTABLE DYING MEASURE:  Is Patient/family satisfied with symptom level?  yes    DISCHARGE PLAN:  Patient will likely pass at [facility], but should his condition improve, LTC placement would occur.

## 2021-12-10 NOTE — PROGRESS NOTES
Problem: Pain  Goal: *Control of Pain  Outcome: Resolved/Met     Problem: Patient Education: Go to Patient Education Activity  Goal: Patient/Family Education  Outcome: Resolved/Met     Problem: Pain  Goal: *Control of Pain  Outcome: Resolved/Met  Goal: *PALLIATIVE CARE:  Alleviation of Pain  Outcome: Resolved/Met     Problem: Patient Education: Go to Patient Education Activity  Goal: Patient/Family Education  Outcome: Resolved/Met     Problem: Activity Intolerance  Goal: *Oxygen saturation during activity within specified parameters  Outcome: Resolved/Met  Goal: *Able to remain out of bed as prescribed  Outcome: Resolved/Met     Problem: Patient Education: Go to Patient Education Activity  Goal: Patient/Family Education  Outcome: Resolved/Met     Problem: Falls - Risk of  Goal: *Absence of Falls  Description: Document Shelby Fall Risk and appropriate interventions in the flowsheet. Outcome: Resolved/Met     Problem: Patient Education: Go to Patient Education Activity  Goal: Patient/Family Education  Outcome: Resolved/Met     Problem: Emotional Support Needs  Goal: Patient/family is receiving emotional support  Outcome: Resolved/Met     Problem: Family Significant Other Needs  Goal: Patient/family will receive support from community resources  Outcome: Resolved/Met     Problem: Pressure Injury - Risk of  Goal: *Prevention of pressure injury  Description: Document Waqas Scale and appropriate interventions in the flowsheet. Outcome: Resolved/Met     Problem: Patient Education: Go to Patient Education Activity  Goal: Patient/Family Education  Outcome: Resolved/Met     Problem: Spiritual Evaluation  Goal: Identify beliefs/practices that support hospice experience  Description: Patient/family identify their beliefs/practices that impair Hospice experience. Patient/family identify their beliefs/practices that support Hospice experience. Patient coping identified.   Spiritual distress identified and decreased with visit.   Outcome: Resolved/Met

## 2021-12-10 NOTE — HOSPICE
[address] [facility] Bereavement/Condolence Call: This MSW called pts sister to offer condolences and support. MSW offered [address] information for ongoing support. LCSW also assisted family with follow up regarding  arrangements.       Drew Sams    [phone]

## 2021-12-10 NOTE — PROGRESS NOTES
1900  Received report from [address], [address] [place] [address] Patient assessment performed. Patient's appearance is jaundiced, extremities are cool to touch and soles of feet and hands are mottled. Patient's respiratory rate is 4-6.    2015  Patient continues with respiratory rate of 4-6 per minute with agonal breathing. NAME OF PATIENT:  Bibiana Aquino    LEVEL OF CARE:  ***    REASON FOR GIP:   {Reason for GIP:23811}    *PATIENT REMAINS ELIGIBLE FOR [facility] LEVEL OF CARE AS EVIDENCED BY: (MUST BE ADDRESSED OF PATIENT GIP)      REASON FOR RESPITE:  {Reason for Respite:72052}    O2 SAFETY:  {O2 Safety:48580}    FALL INTERVENTIONS PROVIDED:   {Fall Interventions Provided:41995}    INTERDISPLINARY COMMUNICATION/COLLABORATION:  {Care Team:67738}    NEW MEDICATION INITIATION DOCUMENTATION:  {Med Initiation Documentation:86037}    Reason medication is being initiated:  ***    MD / Provider name consulted re: change in status / initiation of new medication:  ***    New Symptom(s):  ***    New Order(s):  ***    Name of the person notified of the changes:  ***    Name of person being taught:  ***    Instructions given:  ***    Side Effects taught:  ***    Response to teaching:  ***      COMFORTABLE DYING MEASURE:  Is Patient/family satisfied with symptom level?   {Yes/No:49098::\"no\"}    DISCHARGE PLAN:  ***

## 2021-12-10 NOTE — PROGRESS NOTES
Problem: Pain  Goal: *Control of Pain  Outcome: Progressing Towards Goal     Problem: Falls - Risk of  Goal: *Absence of Falls  Description: Document Shelby Fall Risk and appropriate interventions in the flowsheet. Outcome: Progressing Towards Goal  Note: Fall Risk Interventions:  Mobility Interventions: Patient to call before getting OOB    Mentation Interventions: Adequate sleep, hydration, pain control, Door open when patient unattended, More frequent rounding, Reorient patient, Update white board    Medication Interventions: Patient to call before getting OOB    Elimination Interventions: Bed/chair exit alarm, Call light in reach, Patient to call for help with toileting needs    History of Falls Interventions: Door open when patient unattended         Problem: Pressure Injury - Risk of  Goal: *Prevention of pressure injury  Description: Document Waqas Scale and appropriate interventions in the flowsheet. Outcome: Progressing Towards Goal  Note: Pressure Injury Interventions:  Sensory Interventions: Assess changes in LOC, Avoid rigorous massage over bony prominences, Check visual cues for pain, Keep linens dry and wrinkle-free, Minimize linen layers, Pressure redistribution bed/mattress (bed type), Turn and reposition approx.  every two hours (pillows and wedges if needed)    Moisture Interventions: Absorbent underpads, Apply protective barrier, creams and emollients, Check for incontinence Q2 hours and as needed, Minimize layers    Activity Interventions: Pressure redistribution bed/mattress(bed type)    Mobility Interventions: Pressure redistribution bed/mattress (bed type)    Nutrition Interventions: Offer support with meals,snacks and hydration    Friction and Shear Interventions: Apply protective barrier, creams and emollients, Minimize layers, Transferring/repositioning devices

## 2021-12-10 NOTE — PROGRESS NOTES
0700: Received report from [facility] . Patient is resting in bed with neutral facial position  0715:Patient resting in bed with eyes partially open. Respirations are shallow, respiratory rate is 6/min. Neutral facial position observed. 0745: Called to room by Lien Contreras RN to report that patient appears to have passed. Upon evaluation, patient found to be without signs of life. Time of death 12/10/2021 at [id number].     0755: Called patient's sister, Gaston Perez, no answer and not able to leave voice mail  0800: Called patient's daughter, Jackie Meyer, to inform of patient's death. She appears to be grieving appropriately.  plans have not been finalized at this time. She will call and talk with the rest of her family and we will touch base around 9am.   0810: Patient's sister, Gaston Perez, returned call, she confirmed that plans for  home has not been confirmed. She will contact Jackie Meyer to work on plans   0945: Spoke with patient's daughter, Jackie Meyer,  They will be using [employer] and [employer] [facility]. [facility] called and confirmed that the family has selected [employer] and [address].

## 2021-12-11 ENCOUNTER — HOME CARE VISIT (OUTPATIENT)
Dept: HOSPICE | Facility: HOSPICE | Age: 61
End: 2021-12-11
Payer: MEDICAID

## 2021-12-13 ENCOUNTER — APPOINTMENT (OUTPATIENT)
Dept: INFUSION THERAPY | Age: 61
End: 2021-12-13

## 2021-12-13 NOTE — HOSPICE
MSW returned call to Ms. Mignon Dubose with [facility] to request UAI screening. MSW made UAI request. Ms. Mignon Dubose shared that her first available appointment would be on 12/27. MSW provided contact information for patient's sister and daughter so that Ms. Mignon Dubose can get all needed information. Ms. Mignon Dubose reports she will follow up with family.      Monica Garcia, AMALIA  [facility]

## 2021-12-13 NOTE — HOSPICE
MSW received return call from Ms. Silver Kinsey. Ms. Silver Kinsey shared that she spoke with patient's sister and noted that patient is expected to go to his daughter's [place] Ms. Silver Kinsey shared that they would be unable to screen patient if he is not in [place]. MSW shared that patient may be back to [facility] on 12/27 and asked to keep appointment. MSW shared that team will follow up with Ms. Silver Kinsey on plans for patient, but asked her to keep patient on scheduled at this time.     Darlene Jesus, AMALIA  [facility]

## 2021-12-13 NOTE — HOSPICE
MSW called Joaquin Cyr with [facility] on 12/10 at 3:45pm to update that patient had passed and UAI screening no longer needed. There was no answer, MSW left message sharing that screening was no longer needed.      Marlon Cabot, MSW  [facility]

## 2021-12-15 ENCOUNTER — APPOINTMENT (OUTPATIENT)
Dept: INFUSION THERAPY | Age: 61
End: 2021-12-15

## 2021-12-27 ENCOUNTER — APPOINTMENT (OUTPATIENT)
Dept: INFUSION THERAPY | Age: 61
End: 2021-12-27

## 2021-12-29 ENCOUNTER — APPOINTMENT (OUTPATIENT)
Dept: INFUSION THERAPY | Age: 61
End: 2021-12-29

## 2022-01-10 ENCOUNTER — APPOINTMENT (OUTPATIENT)
Dept: INFUSION THERAPY | Age: 62
End: 2022-01-10

## 2022-01-12 ENCOUNTER — APPOINTMENT (OUTPATIENT)
Dept: INFUSION THERAPY | Age: 62
End: 2022-01-12

## (undated) DEVICE — ELECTRODE,RADIOTRANSLUCENT,FOAM,3PK: Brand: MEDLINE

## (undated) DEVICE — SET GRAV CK VLV NEEDLESS ST 3 GANGED 4WAY STPCOCK HI FLO 10

## (undated) DEVICE — KIT COLON W/ 1.1OZ LUB AND 2 END

## (undated) DEVICE — ADULT SPO2 SENSOR,REMANUFACTURED,REPROCESSED DEVICE FOR SINGLE USE; REPROCESSED BY COVIDIEN LLC: Brand: NELLCOR

## (undated) DEVICE — ENDOSCOPIC ULTRASOUND FINE NEEDLE BIOPSY (FNB) DEVICE: Brand: ACQUIRE

## (undated) DEVICE — SET ADMIN 16ML TBNG L100IN 2 Y INJ SITE IV PIGGY BK DISP

## (undated) DEVICE — BITEBLOCK ENDOSCP 60FR MAXI WHT POLYETH STURDY W/ VELC WVN

## (undated) DEVICE — Device

## (undated) DEVICE — SIMPLICITY FLUFF UNDERPAD 23X36, MODERATE: Brand: SIMPLICITY

## (undated) DEVICE — NDL FLTR TIP 5 MIC 18GX1.5IN --

## (undated) DEVICE — CUFF BLD PRSS AD CLTH SGL TB W/ BAYNT CONN ROUNDED CORNER

## (undated) DEVICE — BAG BELONG PT PERS CLEAR HANDL

## (undated) DEVICE — CATH IV AUTOGRD BC PNK 20GA 25 -- INSYTE

## (undated) DEVICE — 1200 GUARD II KIT W/5MM TUBE W/O VAC TUBE: Brand: GUARDIAN

## (undated) DEVICE — SOLIDIFIER MEDC 1200ML -- CONVERT TO 356117